# Patient Record
Sex: FEMALE | Race: OTHER | Employment: OTHER | URBAN - METROPOLITAN AREA
[De-identification: names, ages, dates, MRNs, and addresses within clinical notes are randomized per-mention and may not be internally consistent; named-entity substitution may affect disease eponyms.]

---

## 2017-03-03 ENCOUNTER — OFFICE VISIT (OUTPATIENT)
Dept: FAMILY MEDICINE CLINIC | Facility: CLINIC | Age: 62
End: 2017-03-03

## 2017-03-03 VITALS
DIASTOLIC BLOOD PRESSURE: 84 MMHG | RESPIRATION RATE: 18 BRPM | TEMPERATURE: 98 F | BODY MASS INDEX: 33 KG/M2 | HEART RATE: 98 BPM | WEIGHT: 174 LBS | OXYGEN SATURATION: 98 % | SYSTOLIC BLOOD PRESSURE: 122 MMHG

## 2017-03-03 DIAGNOSIS — M54.32 SCIATICA OF LEFT SIDE: Primary | ICD-10-CM

## 2017-03-03 PROCEDURE — 99213 OFFICE O/P EST LOW 20 MIN: CPT | Performed by: PHYSICIAN ASSISTANT

## 2017-03-03 RX ORDER — CYCLOBENZAPRINE HCL 10 MG
10 TABLET ORAL 3 TIMES DAILY PRN
Qty: 30 TABLET | Refills: 0 | Status: SHIPPED | OUTPATIENT
Start: 2017-03-03 | End: 2017-11-17 | Stop reason: ALTCHOICE

## 2017-03-03 RX ORDER — NAPROXEN 500 MG/1
500 TABLET ORAL 2 TIMES DAILY WITH MEALS
Qty: 30 TABLET | Refills: 0 | Status: SHIPPED | OUTPATIENT
Start: 2017-03-03 | End: 2017-03-18

## 2017-03-03 NOTE — PATIENT INSTRUCTIONS
Sciatica    Sciatica is a condition that causes pain in the lower back that spreads down into the buttock, hip, and leg. Sometimes the leg pain can happen without any back pain.  Sciatica happens when a spinal nerve is irritated or has pressure put on it · When in bed, try to find a position that is comfortable. A firm mattress is best. Try lying flat on your back with pillows under your knees. You can also try lying on your side with your knees bent up toward your chest and a pillow between your knees.   · © 5431-8118 [address], [address] [place]. All rights reserved. This information is not intended as a substitute for professional medical care. Always follow your healthcare professional's instructions.

## 2017-03-03 NOTE — PROGRESS NOTES
CHIEF COMPLAINT:     Patient presents with:  Spasms: left hip. HPI:   Irene Sotomayor is a 62year old female who presents with complaints of left hip and posterolateral thigh pain to the knee. Pain is sharp, shooting and intermittent.   Symptoms occ Social History:    Smoking Status: Never Smoker                      Smokeless Status: Never Used                        Alcohol Use: Yes           0.0 oz/week       0 Standard drinks or equivalent per week       Comment: rare       REVIEW OF SYSTEMS: Muscle spasms. naproxen 500 MG Oral Tab 30 tablet 0      Sig: Take 1 tablet (500 mg total) by mouth 2 (two) times daily with meals. Risks, benefits, side effects of medication addressed and explained.     There are no Patient Instructions on

## 2017-03-09 ENCOUNTER — OFFICE VISIT (OUTPATIENT)
Dept: FAMILY MEDICINE CLINIC | Facility: CLINIC | Age: 62
End: 2017-03-09

## 2017-03-09 ENCOUNTER — HOSPITAL ENCOUNTER (OUTPATIENT)
Dept: MAMMOGRAPHY | Age: 62
Discharge: HOME | End: 2017-03-09
Attending: PHYSICIAN ASSISTANT
Payer: COMMERCIAL

## 2017-03-09 ENCOUNTER — HOSPITAL ENCOUNTER (OUTPATIENT)
Dept: GENERAL RADIOLOGY | Age: 62
Discharge: HOME | End: 2017-03-09
Attending: PHYSICIAN ASSISTANT
Payer: COMMERCIAL

## 2017-03-09 VITALS
HEART RATE: 93 BPM | OXYGEN SATURATION: 99 % | TEMPERATURE: 98 F | BODY MASS INDEX: 32.47 KG/M2 | RESPIRATION RATE: 16 BRPM | DIASTOLIC BLOOD PRESSURE: 76 MMHG | HEIGHT: 61 IN | SYSTOLIC BLOOD PRESSURE: 120 MMHG | WEIGHT: 172 LBS

## 2017-03-09 DIAGNOSIS — Z12.39 SCREENING FOR BREAST CANCER: ICD-10-CM

## 2017-03-09 DIAGNOSIS — Z13.0 SCREENING FOR ENDOCRINE, METABOLIC AND IMMUNITY DISORDER: ICD-10-CM

## 2017-03-09 DIAGNOSIS — Z13.29 SCREENING FOR ENDOCRINE, METABOLIC AND IMMUNITY DISORDER: ICD-10-CM

## 2017-03-09 DIAGNOSIS — R80.9 TYPE 2 DIABETES MELLITUS WITH MICROALBUMINURIA, WITH LONG-TERM CURRENT USE OF INSULIN (HCC): ICD-10-CM

## 2017-03-09 DIAGNOSIS — E11.29 TYPE 2 DIABETES MELLITUS WITH MICROALBUMINURIA, WITH LONG-TERM CURRENT USE OF INSULIN (HCC): ICD-10-CM

## 2017-03-09 DIAGNOSIS — M54.42 LUMBAGO WITH SCIATICA, LEFT SIDE: ICD-10-CM

## 2017-03-09 DIAGNOSIS — M54.42 LUMBAGO WITH SCIATICA, LEFT SIDE: Primary | ICD-10-CM

## 2017-03-09 DIAGNOSIS — K42.9 PERIUMBILICAL HERNIA: ICD-10-CM

## 2017-03-09 DIAGNOSIS — Z12.11 SCREENING FOR COLON CANCER: ICD-10-CM

## 2017-03-09 DIAGNOSIS — Z79.4 TYPE 2 DIABETES MELLITUS WITH MICROALBUMINURIA, WITH LONG-TERM CURRENT USE OF INSULIN (HCC): ICD-10-CM

## 2017-03-09 DIAGNOSIS — Z13.228 SCREENING FOR ENDOCRINE, METABOLIC AND IMMUNITY DISORDER: ICD-10-CM

## 2017-03-09 PROCEDURE — 99214 OFFICE O/P EST MOD 30 MIN: CPT | Performed by: PHYSICIAN ASSISTANT

## 2017-03-09 RX ORDER — METHYLPREDNISOLONE 4 MG/1
TABLET ORAL
Qty: 1 KIT | Refills: 0 | Status: SHIPPED | OUTPATIENT
Start: 2017-03-09 | End: 2017-11-17 | Stop reason: ALTCHOICE

## 2017-03-09 NOTE — PROGRESS NOTES
CHIEF COMPLAINT:     Patient presents with: Follow - Up      HPI:   Ej Stanton is a 62year old female who is here to f/u after [facility] visit on 3/3/17 for sciatica. Patient was given naproxen and flexeril with relief until it wears off.  Pain is in her oz/week       0 Standard drinks or equivalent per week       Comment: rare       REVIEW OF SYSTEMS:   GENERAL: feels well otherwise  SKIN: No erythema or ecchymosis. GI: denies abdominal pain, N/V/C/D. : no dysuria, urgency or flank pain.   Ena Chowdhury methylPREDNISolone (MEDROL) 4 MG Oral Tablet Therapy Pack 1 kit 0      Sig: As directed.

## 2017-03-14 ENCOUNTER — HOSPITAL ENCOUNTER (OUTPATIENT)
Dept: GENERAL RADIOLOGY | Age: 62
Discharge: HOME | End: 2017-03-14
Attending: PHYSICIAN ASSISTANT
Payer: COMMERCIAL

## 2017-03-14 ENCOUNTER — HOSPITAL ENCOUNTER (OUTPATIENT)
Dept: MAMMOGRAPHY | Age: 62
Discharge: HOME | End: 2017-03-14
Attending: PHYSICIAN ASSISTANT
Payer: COMMERCIAL

## 2017-03-14 PROCEDURE — 72110 X-RAY EXAM L-2 SPINE 4/>VWS: CPT

## 2017-03-14 PROCEDURE — 77067 SCR MAMMO BI INCL CAD: CPT

## 2017-03-17 ENCOUNTER — TELEPHONE (OUTPATIENT)
Dept: FAMILY MEDICINE CLINIC | Facility: CLINIC | Age: 62
End: 2017-03-17

## 2017-03-17 NOTE — TELEPHONE ENCOUNTER
Discussed results with pt and MRI order. Pt would like to hold off on the MRI for now. Pt is planning on seeing [facility] [address] for a consult first early next week (her daughter works there). She will call back if she decides she wants the MRI.   She

## 2017-03-17 NOTE — TELEPHONE ENCOUNTER
Patient states this started with back pain 2-17-17 with pain so bad she can't walk at time. Completed the steroids, and currently taking the pain meds and muscle relaxants, and pain still very bad and runs down her left leg.   Would like to move on to the

## 2017-03-17 NOTE — TELEPHONE ENCOUNTER
Please go over xray results with the patient. I did review them on 3/14. Please order MRI lumbar spine for the patient. Diagnosis is DDD lumbar spine, lumbago with left sided sciatica.

## 2017-03-23 ENCOUNTER — TELEPHONE (OUTPATIENT)
Dept: FAMILY MEDICINE CLINIC | Facility: CLINIC | Age: 62
End: 2017-03-23

## 2017-03-23 DIAGNOSIS — M54.42 LUMBAGO WITH SCIATICA, LEFT SIDE: ICD-10-CM

## 2017-03-23 DIAGNOSIS — M51.36 DDD (DEGENERATIVE DISC DISEASE), LUMBAR: Primary | ICD-10-CM

## 2017-03-23 NOTE — TELEPHONE ENCOUNTER
See last TE. Patient refused lumbar spine MRI at that time. Please order MRI lumbar spine for the patient. Diagnosis is DDD lumbar spine, lumbago with left sided sciatica.

## 2017-05-31 RX ORDER — INSULIN DETEMIR 100 [IU]/ML
INJECTION, SOLUTION SUBCUTANEOUS
Qty: 90 ML | Refills: 1 | Status: SHIPPED | OUTPATIENT
Start: 2017-05-31 | End: 2018-07-24 | Stop reason: ALTCHOICE

## 2017-09-27 ENCOUNTER — TELEPHONE (OUTPATIENT)
Dept: FAMILY MEDICINE CLINIC | Facility: CLINIC | Age: 62
End: 2017-09-27

## 2017-09-27 NOTE — TELEPHONE ENCOUNTER
Patient is dropping the parking placard off to the office to have completed. She would like a call when it is complete and she will pick it back up. Putting form in the Triage folder for completion.

## 2017-10-06 NOTE — TELEPHONE ENCOUNTER
PSR: PLEASE LET PATIENT KNOW FORM WAS MAILED. THANK YOU! Parking placard form completed for patient. Copy sent to scanning. Form mailed to [place] of [id number] for patient.

## 2017-11-03 ENCOUNTER — TELEPHONE (OUTPATIENT)
Dept: FAMILY MEDICINE CLINIC | Facility: CLINIC | Age: 62
End: 2017-11-03

## 2017-11-17 ENCOUNTER — OFFICE VISIT (OUTPATIENT)
Dept: FAMILY MEDICINE CLINIC | Facility: CLINIC | Age: 62
End: 2017-11-17

## 2017-11-17 VITALS
RESPIRATION RATE: 16 BRPM | OXYGEN SATURATION: 98 % | DIASTOLIC BLOOD PRESSURE: 80 MMHG | BODY MASS INDEX: 32.8 KG/M2 | HEART RATE: 94 BPM | HEIGHT: 61.5 IN | SYSTOLIC BLOOD PRESSURE: 126 MMHG | TEMPERATURE: 98 F | WEIGHT: 176 LBS

## 2017-11-17 DIAGNOSIS — J20.9 ACUTE BRONCHITIS, UNSPECIFIED ORGANISM: ICD-10-CM

## 2017-11-17 DIAGNOSIS — J01.00 ACUTE NON-RECURRENT MAXILLARY SINUSITIS: Primary | ICD-10-CM

## 2017-11-17 PROCEDURE — 99214 OFFICE O/P EST MOD 30 MIN: CPT | Performed by: FAMILY MEDICINE

## 2017-11-17 RX ORDER — AZITHROMYCIN 250 MG/1
TABLET, FILM COATED ORAL
Qty: 6 TABLET | Refills: 0 | Status: SHIPPED | OUTPATIENT
Start: 2017-11-17 | End: 2017-12-18 | Stop reason: ALTCHOICE

## 2017-11-17 RX ORDER — CODEINE PHOSPHATE AND GUAIFENESIN 10; 100 MG/5ML; MG/5ML
5 SOLUTION ORAL 3 TIMES DAILY PRN
Qty: 120 ML | Refills: 0 | Status: SHIPPED
Start: 2017-11-17 | End: 2017-12-18 | Stop reason: ALTCHOICE

## 2017-11-17 NOTE — PROGRESS NOTES
Patient presents with:  Cough: Nasal drainage 1 week. HPI:   Pedro Luis Herrera is a 65year old female who presents for cough  for  7  days. Patient reports congestion, dry cough, cough is keeping pt up at night, wheezing. Cough started gradually, tight Father    • Diabetes Sister       Smoking status: Never Smoker                                                              Smokeless tobacco: Never Used                      Alcohol use: Yes           0.0 oz/week     Comment: rare        REVIEW OF SYSTEMS by mouth 3 (three) times daily as needed for cough. The patient indicates understanding of these issues and agrees to the plan. The patient is asked to return if sx's persist or worsen.

## 2017-11-21 ENCOUNTER — TELEPHONE (OUTPATIENT)
Dept: FAMILY MEDICINE CLINIC | Facility: CLINIC | Age: 62
End: 2017-11-21

## 2017-11-21 NOTE — TELEPHONE ENCOUNTER
Pt has been off of work since 11/17 when she saw Dr. Beatriz Fernandes. For her bronchitis and sinus has not been to work since would like a note stating she can return to work 11/24, will have spouse  note when ready.

## 2017-12-16 ENCOUNTER — APPOINTMENT (OUTPATIENT)
Dept: GENERAL RADIOLOGY | Facility: HOSPITAL | Age: 62
End: 2017-12-16
Attending: EMERGENCY MEDICINE
Payer: COMMERCIAL

## 2017-12-16 ENCOUNTER — HOSPITAL ENCOUNTER (EMERGENCY)
Facility: HOSPITAL | Age: 62
Discharge: HOME | End: 2017-12-17
Attending: EMERGENCY MEDICINE
Payer: COMMERCIAL

## 2017-12-16 ENCOUNTER — LABORATORY ENCOUNTER (OUTPATIENT)
Dept: LAB | Age: 62
End: 2017-12-16
Attending: FAMILY MEDICINE
Payer: COMMERCIAL

## 2017-12-16 VITALS
TEMPERATURE: 97 F | RESPIRATION RATE: 16 BRPM | SYSTOLIC BLOOD PRESSURE: 138 MMHG | BODY MASS INDEX: 33.04 KG/M2 | OXYGEN SATURATION: 99 % | HEART RATE: 85 BPM | DIASTOLIC BLOOD PRESSURE: 68 MMHG | WEIGHT: 175 LBS | HEIGHT: 61 IN

## 2017-12-16 DIAGNOSIS — Z13.29 SCREENING FOR ENDOCRINE, METABOLIC AND IMMUNITY DISORDER: ICD-10-CM

## 2017-12-16 DIAGNOSIS — E11.29 TYPE 2 DIABETES MELLITUS WITH MICROALBUMINURIA, WITH LONG-TERM CURRENT USE OF INSULIN (HCC): ICD-10-CM

## 2017-12-16 DIAGNOSIS — Z13.0 SCREENING FOR ENDOCRINE, METABOLIC AND IMMUNITY DISORDER: ICD-10-CM

## 2017-12-16 DIAGNOSIS — L97.529 ULCER OF LEFT FOOT, UNSPECIFIED ULCER STAGE (HCC): Primary | ICD-10-CM

## 2017-12-16 DIAGNOSIS — Z79.4 TYPE 2 DIABETES MELLITUS WITH MICROALBUMINURIA, WITH LONG-TERM CURRENT USE OF INSULIN (HCC): ICD-10-CM

## 2017-12-16 DIAGNOSIS — Z13.228 SCREENING FOR ENDOCRINE, METABOLIC AND IMMUNITY DISORDER: ICD-10-CM

## 2017-12-16 DIAGNOSIS — R80.9 TYPE 2 DIABETES MELLITUS WITH MICROALBUMINURIA, WITH LONG-TERM CURRENT USE OF INSULIN (HCC): ICD-10-CM

## 2017-12-16 PROCEDURE — 82570 ASSAY OF URINE CREATININE: CPT | Performed by: PHYSICIAN ASSISTANT

## 2017-12-16 PROCEDURE — 82306 VITAMIN D 25 HYDROXY: CPT | Performed by: PHYSICIAN ASSISTANT

## 2017-12-16 PROCEDURE — 87040 BLOOD CULTURE FOR BACTERIA: CPT | Performed by: EMERGENCY MEDICINE

## 2017-12-16 PROCEDURE — 87147 CULTURE TYPE IMMUNOLOGIC: CPT | Performed by: EMERGENCY MEDICINE

## 2017-12-16 PROCEDURE — 82043 UR ALBUMIN QUANTITATIVE: CPT | Performed by: PHYSICIAN ASSISTANT

## 2017-12-16 PROCEDURE — 87205 SMEAR GRAM STAIN: CPT | Performed by: EMERGENCY MEDICINE

## 2017-12-16 PROCEDURE — 87070 CULTURE OTHR SPECIMN AEROBIC: CPT | Performed by: EMERGENCY MEDICINE

## 2017-12-16 PROCEDURE — 85652 RBC SED RATE AUTOMATED: CPT | Performed by: EMERGENCY MEDICINE

## 2017-12-16 PROCEDURE — 36415 COLL VENOUS BLD VENIPUNCTURE: CPT | Performed by: PHYSICIAN ASSISTANT

## 2017-12-16 PROCEDURE — 36415 COLL VENOUS BLD VENIPUNCTURE: CPT

## 2017-12-16 PROCEDURE — 73630 X-RAY EXAM OF FOOT: CPT | Performed by: EMERGENCY MEDICINE

## 2017-12-16 PROCEDURE — 86140 C-REACTIVE PROTEIN: CPT | Performed by: EMERGENCY MEDICINE

## 2017-12-16 PROCEDURE — 96365 THER/PROPH/DIAG IV INF INIT: CPT

## 2017-12-16 PROCEDURE — 80050 GENERAL HEALTH PANEL: CPT | Performed by: PHYSICIAN ASSISTANT

## 2017-12-16 PROCEDURE — 83036 HEMOGLOBIN GLYCOSYLATED A1C: CPT | Performed by: PHYSICIAN ASSISTANT

## 2017-12-16 PROCEDURE — 99284 EMERGENCY DEPT VISIT MOD MDM: CPT

## 2017-12-16 PROCEDURE — 80061 LIPID PANEL: CPT | Performed by: PHYSICIAN ASSISTANT

## 2017-12-16 PROCEDURE — 85025 COMPLETE CBC W/AUTO DIFF WBC: CPT | Performed by: EMERGENCY MEDICINE

## 2017-12-17 RX ORDER — TRAMADOL HYDROCHLORIDE 50 MG/1
TABLET ORAL EVERY 4 HOURS PRN
Qty: 20 TABLET | Refills: 0 | Status: SHIPPED | OUTPATIENT
Start: 2017-12-17 | End: 2017-12-24

## 2017-12-17 RX ORDER — AMOXICILLIN AND CLAVULANATE POTASSIUM 875; 125 MG/1; MG/1
1 TABLET, FILM COATED ORAL 2 TIMES DAILY
Qty: 20 TABLET | Refills: 0 | Status: SHIPPED | OUTPATIENT
Start: 2017-12-17 | End: 2017-12-27

## 2017-12-17 NOTE — ED NOTES
Agree with triage note. Pt has diabetic ulcer to L bottom of foot. Pt reports pain has increased, pt sees a podiatrist, last saw them 1 month ago, has not contacted him yet.  Denies fevers or chills

## 2017-12-17 NOTE — ED PROVIDER NOTES
Patient Seen in: [facility] Emergency Department    History   Patient presents with:  Cellulitis (integumentary, infectious)    Stated Complaint: foot ulcer x one year    HPI    Diabetic has had an ulcer on the bottom of the left foot for about a ye tenderness around the wound. There is a watery drainage from the wound. No lymphangitis or lymphadenitis noted. Foot pulses intact.   Capillary refill normal.       ED Course     Labs Reviewed   BASIC METABOLIC PANEL (8) - Abnormal; Notable for the follo physician in 2 days.           Disposition and Plan     Clinical Impression:  Ulcer of left foot, unspecified ulcer stage ([facility] [address]  (primary encounter diagnosis)    Disposition:  Discharge  12/17/2017 12:22 am    Follow-up:  Libia Diamond MD  [facility]

## 2017-12-18 ENCOUNTER — OFFICE VISIT (OUTPATIENT)
Dept: FAMILY MEDICINE CLINIC | Facility: CLINIC | Age: 62
End: 2017-12-18

## 2017-12-18 VITALS
DIASTOLIC BLOOD PRESSURE: 76 MMHG | HEIGHT: 62 IN | RESPIRATION RATE: 15 BRPM | OXYGEN SATURATION: 98 % | TEMPERATURE: 98 F | HEART RATE: 89 BPM | BODY MASS INDEX: 32.57 KG/M2 | SYSTOLIC BLOOD PRESSURE: 140 MMHG | WEIGHT: 177 LBS

## 2017-12-18 DIAGNOSIS — E11.65 UNCONTROLLED TYPE 2 DIABETES MELLITUS WITH FOOT ULCER, WITH LONG-TERM CURRENT USE OF INSULIN (HCC): Primary | ICD-10-CM

## 2017-12-18 DIAGNOSIS — E11.59 HYPERTENSION ASSOCIATED WITH DIABETES (HCC): ICD-10-CM

## 2017-12-18 DIAGNOSIS — E78.2 HYPERLIPIDEMIA, MIXED: ICD-10-CM

## 2017-12-18 DIAGNOSIS — L98.492 INFECTED SKIN ULCER WITH FAT LAYER EXPOSED (HCC): ICD-10-CM

## 2017-12-18 DIAGNOSIS — E55.9 VITAMIN D DEFICIENCY: ICD-10-CM

## 2017-12-18 DIAGNOSIS — E11.621 UNCONTROLLED TYPE 2 DIABETES MELLITUS WITH FOOT ULCER, WITH LONG-TERM CURRENT USE OF INSULIN (HCC): Primary | ICD-10-CM

## 2017-12-18 DIAGNOSIS — E11.319 DIABETIC RETINOPATHY OF LEFT EYE ASSOCIATED WITH TYPE 2 DIABETES MELLITUS, MACULAR EDEMA PRESENCE UNSPECIFIED, UNSPECIFIED RETINOPATHY SEVERITY (HCC): ICD-10-CM

## 2017-12-18 DIAGNOSIS — L97.509 UNCONTROLLED TYPE 2 DIABETES MELLITUS WITH FOOT ULCER, WITH LONG-TERM CURRENT USE OF INSULIN (HCC): Primary | ICD-10-CM

## 2017-12-18 DIAGNOSIS — L08.9 INFECTED SKIN ULCER WITH FAT LAYER EXPOSED (HCC): ICD-10-CM

## 2017-12-18 DIAGNOSIS — I15.2 HYPERTENSION ASSOCIATED WITH DIABETES (HCC): ICD-10-CM

## 2017-12-18 DIAGNOSIS — Z79.4 UNCONTROLLED TYPE 2 DIABETES MELLITUS WITH FOOT ULCER, WITH LONG-TERM CURRENT USE OF INSULIN (HCC): Primary | ICD-10-CM

## 2017-12-18 PROBLEM — E11.3592 PROLIFERATIVE DIABETIC RETINOPATHY OF LEFT EYE ASSOCIATED WITH TYPE 2 DIABETES MELLITUS (HCC): Status: ACTIVE | Noted: 2017-12-18

## 2017-12-18 PROBLEM — E11.3592 PROLIFERATIVE DIABETIC RETINOPATHY OF LEFT EYE ASSOCIATED WITH TYPE 2 DIABETES MELLITUS (HCC): Status: RESOLVED | Noted: 2017-12-18 | Resolved: 2017-12-18

## 2017-12-18 PROCEDURE — 99214 OFFICE O/P EST MOD 30 MIN: CPT | Performed by: PHYSICIAN ASSISTANT

## 2017-12-18 RX ORDER — ERGOCALCIFEROL 1.25 MG/1
50000 CAPSULE ORAL WEEKLY
Qty: 12 CAPSULE | Refills: 0 | Status: SHIPPED | OUTPATIENT
Start: 2017-12-18 | End: 2018-03-18

## 2017-12-18 RX ORDER — ATORVASTATIN CALCIUM 10 MG/1
10 TABLET, FILM COATED ORAL NIGHTLY
Qty: 30 TABLET | Refills: 0 | Status: SHIPPED | OUTPATIENT
Start: 2017-12-18 | End: 2018-01-17

## 2017-12-18 RX ORDER — LISINOPRIL 10 MG/1
10 TABLET ORAL DAILY
Qty: 30 TABLET | Refills: 0 | Status: SHIPPED | OUTPATIENT
Start: 2017-12-18 | End: 2018-01-17 | Stop reason: WASHOUT

## 2017-12-18 NOTE — PROGRESS NOTES
HPI:   Liban Jaimes is a 65year old female who presents to follow up on recent ER visit on 12/16/17. She was seen after she developed drainage from a left foot diabetic ulcer. She had a normal CBC. CMP showed elevated blood glucose at 340.  Blood cultu 12/07/2015 277 (H)   ----------  HDL Cholesterol (mg/dL)   Date Value   12/16/2017 50   07/07/2016 39 (L)   12/07/2015 41 (L)   ----------  HDL CHOL (mg/dL)   Date Value   10/09/2013 44 (L)   ----------  LDL CHOLESTROL (mg/dL)   Date Value   10/09/2013 1 each Rfl: 0      No Known Allergies   Past Medical History:   Diagnosis Date   • AVM (arteriovenous malformation) brain     being followed at [facility] clinic   • Stroke [facility]) 4/18/15    s/p Brain surgery   • Stroke [facility]) 4/2015   • Type II or unspecified type capillary refill   NEURO: Sensation is intact to monofilament.       ASSESSMENT AND PLAN:       Uncontrolled type 2 diabetes mellitus with foot ulcer, with long-term current use of insulin (HCC)  -     HEMOGLOBIN A1C; Future  -     MICROALB/CREAT RATIO, RAN important to take medication twice daily          - Follow up with podiatry scheduled for 12/20/17 at 11 am Dr Amisha Huynh          - Monitor for increased redness/swelling/discharge/fever         - Follow up in office in 4 days

## 2018-01-05 ENCOUNTER — TELEPHONE (OUTPATIENT)
Dept: FAMILY MEDICINE CLINIC | Facility: CLINIC | Age: 63
End: 2018-01-05

## 2018-01-05 DIAGNOSIS — E11.319 DIABETIC RETINOPATHY OF LEFT EYE ASSOCIATED WITH TYPE 2 DIABETES MELLITUS, MACULAR EDEMA PRESENCE UNSPECIFIED, UNSPECIFIED RETINOPATHY SEVERITY (HCC): Primary | ICD-10-CM

## 2018-01-05 NOTE — TELEPHONE ENCOUNTER
----- Message from Nelly Stephens MD sent at 1/4/2018  4:13 PM CST -----  Ok to do referral to Dr. Smooth Shields, tell pt its time to see him for diabetic eye exam, thanks.

## 2018-01-09 NOTE — TELEPHONE ENCOUNTER
Called patient's home and mobile number. Home number not working. Mobile number has VM not set up. Will ady for follow up. Referral placed.

## 2018-01-16 NOTE — TELEPHONE ENCOUNTER
Attempted to contact pt, ceell phone, no answer & VM not set up yet. Home # is not a working #. Will ady for follow up.

## 2018-01-27 ENCOUNTER — TELEPHONE (OUTPATIENT)
Dept: FAMILY MEDICINE CLINIC | Facility: CLINIC | Age: 63
End: 2018-01-27

## 2018-01-27 DIAGNOSIS — IMO0001 UNCONTROLLED TYPE 2 DIABETES MELLITUS WITHOUT COMPLICATION, WITH LONG-TERM CURRENT USE OF INSULIN: Primary | ICD-10-CM

## 2018-01-31 ENCOUNTER — PATIENT OUTREACH (OUTPATIENT)
Dept: FAMILY MEDICINE CLINIC | Facility: CLINIC | Age: 63
End: 2018-01-31

## 2018-02-14 ENCOUNTER — TELEPHONE (OUTPATIENT)
Dept: FAMILY MEDICINE CLINIC | Facility: CLINIC | Age: 63
End: 2018-02-14

## 2018-02-14 ENCOUNTER — OFFICE VISIT (OUTPATIENT)
Dept: FAMILY MEDICINE CLINIC | Facility: CLINIC | Age: 63
End: 2018-02-14

## 2018-02-14 ENCOUNTER — HOSPITAL ENCOUNTER (OUTPATIENT)
Dept: GENERAL RADIOLOGY | Age: 63
Discharge: HOME | End: 2018-02-14
Attending: FAMILY MEDICINE
Payer: COMMERCIAL

## 2018-02-14 VITALS
RESPIRATION RATE: 16 BRPM | BODY MASS INDEX: 31.65 KG/M2 | HEIGHT: 62 IN | SYSTOLIC BLOOD PRESSURE: 126 MMHG | OXYGEN SATURATION: 97 % | WEIGHT: 172 LBS | DIASTOLIC BLOOD PRESSURE: 84 MMHG | HEART RATE: 90 BPM | TEMPERATURE: 98 F

## 2018-02-14 DIAGNOSIS — R05.9 COUGH: ICD-10-CM

## 2018-02-14 DIAGNOSIS — J11.1 FLU: Primary | ICD-10-CM

## 2018-02-14 DIAGNOSIS — IMO0002: ICD-10-CM

## 2018-02-14 DIAGNOSIS — J11.1 FLU: ICD-10-CM

## 2018-02-14 PROCEDURE — 71046 X-RAY EXAM CHEST 2 VIEWS: CPT | Performed by: FAMILY MEDICINE

## 2018-02-14 PROCEDURE — 99214 OFFICE O/P EST MOD 30 MIN: CPT | Performed by: FAMILY MEDICINE

## 2018-02-14 RX ORDER — OSELTAMIVIR PHOSPHATE 75 MG/1
75 CAPSULE ORAL 2 TIMES DAILY
Qty: 10 CAPSULE | Refills: 0 | Status: SHIPPED | OUTPATIENT
Start: 2018-02-14 | End: 2018-07-24 | Stop reason: ALTCHOICE

## 2018-02-14 RX ORDER — CODEINE PHOSPHATE AND GUAIFENESIN 10; 100 MG/5ML; MG/5ML
5 SOLUTION ORAL 3 TIMES DAILY PRN
Qty: 120 ML | Refills: 0 | Status: SHIPPED
Start: 2018-02-14 | End: 2018-07-24 | Stop reason: ALTCHOICE

## 2018-02-14 NOTE — PROGRESS NOTES
Patient presents with:  Cough: x 4 days, has had chills, loose stools, watery eyes, bad cough last night, felt short of breath, Runny nose, H/A      HPI:   Rosaura Anne is a 65year old female who presents for upper respiratory symptoms for  2  weeks. Comment: мария   [facility]  No date:    Family History   Problem Relation Age of Onset   • Diabetes Mother    • Hypertension Mother    • Hypertension Father    • Heart Disorder Brother      CAD stent in his 45s   • Diabetes Brother    • Diabetes S patient indicates understanding of these issues and agrees to the plan. The patient is asked to return in 3-4 days if sx's persist or worsen. Otherwise f/u in one month.

## 2018-02-14 NOTE — TELEPHONE ENCOUNTER
No pneumonia on xray, has some problems with vertebrae, I will order blood work and Dexa scan she needs to do it its like compression deformity. I will send Tamiflu.

## 2018-02-14 NOTE — TELEPHONE ENCOUNTER
I called pt at [phone] and verified 2 patient identifiers: name & . I discussed results and recommendations at length with patient. Pt wants to know if she can have an RX of cough syrup with codeine like before to help with the cough.   Previ

## 2018-02-16 ENCOUNTER — TELEPHONE (OUTPATIENT)
Dept: FAMILY MEDICINE CLINIC | Facility: CLINIC | Age: 63
End: 2018-02-16

## 2018-02-16 RX ORDER — METHYLPREDNISOLONE 4 MG/1
TABLET ORAL
Qty: 1 KIT | Refills: 0 | Status: SHIPPED | OUTPATIENT
Start: 2018-02-16 | End: 2018-07-24 | Stop reason: ALTCHOICE

## 2018-02-16 RX ORDER — AZITHROMYCIN 250 MG/1
TABLET, FILM COATED ORAL
Qty: 6 TABLET | Refills: 0 | Status: SHIPPED | OUTPATIENT
Start: 2018-02-16 | End: 2018-07-24 | Stop reason: ALTCHOICE

## 2018-02-16 NOTE — TELEPHONE ENCOUNTER
Please read below message. Pt LOV 2/14 for flu. CXR showed normal lungs. Rx for Tamiflu, & Cheratussin given. Please advise on any orders for pt.

## 2018-02-16 NOTE — TELEPHONE ENCOUNTER
Pt was seen for flu and feels better but the only thing that has not gotten better is her cough and has not gone back to work wants to know what more she can do for the cough.  Please call pt to advise

## 2018-02-16 NOTE — TELEPHONE ENCOUNTER
Attempted to contact pt, cell VM, mailbox is not set up, home # pt is not accepting calls so I was unable to leave message. Rx sent. Will try again later to contact pt.

## 2018-02-20 ENCOUNTER — TELEPHONE (OUTPATIENT)
Dept: FAMILY MEDICINE CLINIC | Facility: CLINIC | Age: 63
End: 2018-02-20

## 2018-02-20 DIAGNOSIS — R05.9 COUGH: Primary | ICD-10-CM

## 2018-02-20 RX ORDER — BENZONATATE 200 MG/1
200 CAPSULE ORAL 3 TIMES DAILY PRN
Qty: 30 CAPSULE | Refills: 0 | Status: SHIPPED | OUTPATIENT
Start: 2018-02-20 | End: 2018-07-24 | Stop reason: ALTCHOICE

## 2018-02-20 NOTE — TELEPHONE ENCOUNTER
Pt wants to know when she should return back to work based on her symptoms. She would like to talk to a nurse.

## 2018-02-20 NOTE — TELEPHONE ENCOUNTER
Pt states she is just starting to feel a little better since starting the Zpak & Medrol lyudmila on 2/16 but her cough is still bad. Cheratussin helps at night.  She still can't go back to work because she talks on the phone all day & coughs whenever she tries

## 2018-02-20 NOTE — TELEPHONE ENCOUNTER
Patient should complete medrol dose pack and Zpack. She can take tessalon perles up to 3 times daily as needed for cough. Orders placed. She is to return if symptoms fail to improve or worsen after completing medrol dose pack and Zpack.

## 2018-02-22 ENCOUNTER — PATIENT OUTREACH (OUTPATIENT)
Dept: FAMILY MEDICINE CLINIC | Facility: CLINIC | Age: 63
End: 2018-02-22

## 2018-02-22 ENCOUNTER — TELEPHONE (OUTPATIENT)
Dept: FAMILY MEDICINE CLINIC | Facility: CLINIC | Age: 63
End: 2018-02-22

## 2018-02-22 NOTE — TELEPHONE ENCOUNTER
Patient is needing a note so she can return to work on 2/26/18. Patient was off work for the flu & sore throat w/ cough.  Patient was off from 2/12/18 - 2/25/18

## 2018-02-22 NOTE — TELEPHONE ENCOUNTER
Spoke with pt, letter sent through [employer]). Pt states if she has trouble logging into [employer]), she may call 2/23/18 requesting copy be placed at .

## 2018-02-22 NOTE — TELEPHONE ENCOUNTER
Please see below message. Patient is requesting a return to work note. LOV: 2/14/18  Please advise. Thank you!

## 2018-05-23 ENCOUNTER — TELEPHONE (OUTPATIENT)
Dept: FAMILY MEDICINE CLINIC | Facility: CLINIC | Age: 63
End: 2018-05-23

## 2018-06-28 ENCOUNTER — MED REC SCAN ONLY (OUTPATIENT)
Dept: FAMILY MEDICINE CLINIC | Facility: CLINIC | Age: 63
End: 2018-06-28

## 2018-07-24 RX ORDER — HYDROCODONE BITARTRATE AND ACETAMINOPHEN 10; 325 MG/1; MG/1
1 TABLET ORAL
Refills: 0 | COMMUNITY
Start: 2018-07-19 | End: 2018-12-05 | Stop reason: ALTCHOICE

## 2018-07-24 RX ORDER — ENOXAPARIN SODIUM 100 MG/ML
40 INJECTION SUBCUTANEOUS NIGHTLY
COMMUNITY
Start: 2018-07-05 | End: 2018-12-05 | Stop reason: ALTCHOICE

## 2018-07-24 RX ORDER — INSULIN ASPART 100 [IU]/ML
INJECTION, SUSPENSION SUBCUTANEOUS
Refills: 0 | COMMUNITY
Start: 2018-07-18 | End: 2018-08-22 | Stop reason: ALTCHOICE

## 2018-07-24 RX ORDER — ASPIRIN 325 MG
TABLET, DELAYED RELEASE (ENTERIC COATED) ORAL
Refills: 0 | COMMUNITY
Start: 2018-07-18 | End: 2018-08-20

## 2018-07-24 RX ORDER — INSULIN DETEMIR 100 [IU]/ML
INJECTION, SOLUTION SUBCUTANEOUS
Qty: 90 ML | Refills: 1 | OUTPATIENT
Start: 2018-07-24

## 2018-07-24 RX ORDER — DOCUSATE SODIUM 100 MG/1
100 CAPSULE, LIQUID FILLED ORAL
COMMUNITY
Start: 2018-07-06 | End: 2020-02-03 | Stop reason: ALTCHOICE

## 2018-07-24 NOTE — TELEPHONE ENCOUNTER
CHELSEA:     Received request for pt's Levimir refill. Called and spoke with pt who states she is no longer taking levemir and is now taking novolog. She is now taking Metformin 1000 BID.  Pt had surgery on her leg, after a work injury, on 7/3/18 at [facility] in [place]

## 2018-08-20 ENCOUNTER — TELEPHONE (OUTPATIENT)
Dept: FAMILY MEDICINE CLINIC | Facility: CLINIC | Age: 63
End: 2018-08-20

## 2018-08-20 RX ORDER — ASPIRIN 325 MG
325 TABLET, DELAYED RELEASE (ENTERIC COATED) ORAL DAILY
Qty: 30 TABLET | Refills: 0 | Status: SHIPPED | OUTPATIENT
Start: 2018-08-20 | End: 2020-01-02

## 2018-08-20 NOTE — TELEPHONE ENCOUNTER
MetFORMIN HCl 1000 MG Oral Tab   Sig :  Take 1 tablet by mouth 2 (two) times daily. aspirin  MG Oral Tab EC   Sig :  Replacing lovenox inj starting 7/30/18        The pharmacy said they sent these to us electronically. Needs refills.  [facility] in [place]

## 2018-08-22 ENCOUNTER — TELEPHONE (OUTPATIENT)
Dept: FAMILY MEDICINE CLINIC | Facility: CLINIC | Age: 63
End: 2018-08-22

## 2018-08-22 RX ORDER — INSULIN DETEMIR 100 [IU]/ML
INJECTION, SOLUTION SUBCUTANEOUS
Qty: 90 ML | Refills: 1 | COMMUNITY
Start: 2018-08-22 | End: 2018-10-10

## 2018-08-22 NOTE — TELEPHONE ENCOUNTER
Pt called for two medication reasons:    1) Metformin was approved on 08/20 for one month only due to need for OV. However, Pt is currently still on non-weight bearing status per her orthopedist following Agueda injury.  As a result, leaving house is extremel

## 2018-08-22 NOTE — TELEPHONE ENCOUNTER
Pt was hospitalized in June for Ortho surgery & her Levemir was changed to below Novolog 70/30 30 units w/breakfast, 20 units with dinner , she is asking if she can return to taking her Levemir  30 UNITS SUBCUTANEOUSLY EVERY MORNING & 35 UNITS AT BEDTIME?

## 2018-09-07 ENCOUNTER — TELEPHONE (OUTPATIENT)
Dept: FAMILY MEDICINE CLINIC | Facility: CLINIC | Age: 63
End: 2018-09-07

## 2018-12-04 ENCOUNTER — NURSE ONLY (OUTPATIENT)
Dept: FAMILY MEDICINE CLINIC | Facility: CLINIC | Age: 63
End: 2018-12-04
Payer: COMMERCIAL

## 2018-12-04 VITALS
SYSTOLIC BLOOD PRESSURE: 120 MMHG | DIASTOLIC BLOOD PRESSURE: 80 MMHG | HEIGHT: 62 IN | OXYGEN SATURATION: 97 % | BODY MASS INDEX: 32.14 KG/M2 | HEART RATE: 93 BPM | RESPIRATION RATE: 16 BRPM | WEIGHT: 174.63 LBS | TEMPERATURE: 98 F

## 2018-12-04 DIAGNOSIS — J40 BRONCHITIS: ICD-10-CM

## 2018-12-04 DIAGNOSIS — E11.65 UNCONTROLLED TYPE 2 DIABETES MELLITUS WITH HYPERGLYCEMIA (HCC): ICD-10-CM

## 2018-12-04 DIAGNOSIS — R73.9 BLOOD GLUCOSE ELEVATED: Primary | ICD-10-CM

## 2018-12-04 PROCEDURE — 99213 OFFICE O/P EST LOW 20 MIN: CPT | Performed by: PHYSICIAN ASSISTANT

## 2018-12-04 PROCEDURE — 82962 GLUCOSE BLOOD TEST: CPT | Performed by: PHYSICIAN ASSISTANT

## 2018-12-05 ENCOUNTER — OFFICE VISIT (OUTPATIENT)
Dept: FAMILY MEDICINE CLINIC | Facility: CLINIC | Age: 63
End: 2018-12-05
Payer: COMMERCIAL

## 2018-12-05 VITALS
BODY MASS INDEX: 32.02 KG/M2 | OXYGEN SATURATION: 98 % | SYSTOLIC BLOOD PRESSURE: 118 MMHG | TEMPERATURE: 98 F | WEIGHT: 174 LBS | HEIGHT: 62 IN | HEART RATE: 64 BPM | DIASTOLIC BLOOD PRESSURE: 80 MMHG | RESPIRATION RATE: 16 BRPM

## 2018-12-05 DIAGNOSIS — J01.10 ACUTE NON-RECURRENT FRONTAL SINUSITIS: ICD-10-CM

## 2018-12-05 DIAGNOSIS — Z13.6 ENCOUNTER FOR LIPID SCREENING FOR CARDIOVASCULAR DISEASE: ICD-10-CM

## 2018-12-05 DIAGNOSIS — E55.9 VITAMIN D DEFICIENCY: ICD-10-CM

## 2018-12-05 DIAGNOSIS — E11.65 UNCONTROLLED TYPE 2 DIABETES MELLITUS WITH HYPERGLYCEMIA (HCC): Primary | ICD-10-CM

## 2018-12-05 DIAGNOSIS — Z13.220 ENCOUNTER FOR LIPID SCREENING FOR CARDIOVASCULAR DISEASE: ICD-10-CM

## 2018-12-05 PROCEDURE — 99214 OFFICE O/P EST MOD 30 MIN: CPT | Performed by: NURSE PRACTITIONER

## 2018-12-05 RX ORDER — AZITHROMYCIN 250 MG/1
TABLET, FILM COATED ORAL
Qty: 6 TABLET | Refills: 0 | Status: SHIPPED | OUTPATIENT
Start: 2018-12-05 | End: 2019-09-13 | Stop reason: ALTCHOICE

## 2018-12-05 RX ORDER — INSULIN DETEMIR 100 [IU]/ML
INJECTION, SOLUTION SUBCUTANEOUS
Qty: 90 ML | Refills: 1 | Status: SHIPPED | OUTPATIENT
Start: 2018-12-05 | End: 2019-09-13

## 2018-12-05 RX ORDER — FLUTICASONE PROPIONATE 50 MCG
2 SPRAY, SUSPENSION (ML) NASAL DAILY
Qty: 3 BOTTLE | Refills: 3 | Status: SHIPPED | OUTPATIENT
Start: 2018-12-05 | End: 2019-11-30

## 2018-12-05 NOTE — PATIENT INSTRUCTIONS
Diabetes with High Blood Sugar  You have been treated for high blood sugar (hyperglycemia). This may be because of an infection or other illness, eating too many sweets or starches, or not taking enough insulin.   Home care  Monitor and write down your bl instructions.

## 2018-12-05 NOTE — PROGRESS NOTES
CHIEF COMPLAINT:   Patient presents with:  Cough: x 2 weeks      HPI:   Eric Colon is a 59year old female who presents for cough for  2 weeks.   Patient reports cough-productive, scratchy throat, PND, nasal congestion, minimal sinus pressure, HA, ear s/p Brain surgery   • Stroke [facility]) 4/2015   • Type II or unspecified type diabetes mellitus without mention of complication, not stated as uncontrolled    • Vitamin D deficiency       Past Surgical History:   Procedure Laterality Date   • BRAIN SURGERY  4/ lymphadenopathy.     Recent Results (from the past 24 hour(s))   GLUCOSE BLOOD TEST    Collection Time: 12/04/18  6:42 PM   Result Value Ref Range    GLUCOSE 306     Test Strip Lot # [id number] Numeric    Test Strip Expiration Date 02/28/19 Date       ASSESSME

## 2018-12-05 NOTE — PROGRESS NOTES
HPI:   Libertad Richard is a 59year old female who presents for recheck of her diabetes.    Patient’s fasting blood sugars have been high since she ran out insulin  around 11/22/18 Reports 200   Last visit with ophthalmologist was 2 y ago   Pt  been checki Rfl: 0   aspirin  MG Oral Tab EC Take 1 tablet (325 mg total) by mouth daily.  Replacing lovenox inj starting 7/30/18 Disp: 30 tablet Rfl: 0   Glucose Blood In Vitro Strip Test daily as needed Disp: 100 each Rfl: 11   Calcium Carb-Cholecalciferol (MEREDITH numbness/tingling     EXAM:   /80   Pulse 64   Temp 98 °F (36.7 °C) (Oral)   Resp 16   Ht 62\"   Wt 174 lb   SpO2 98%   BMI 31.83 kg/m²   GENERAL: well developed, well nourished, in no apparent distress  SKIN: no rashes, no suspicious lesions  HEENT: VITAMIN D, 25-HYDROXY; Future    Encounter for lipid screening for cardiovascular disease  -     TSH W REFLEX TO FREE T4; Future    Acute non-recurrent frontal sinusitis  -     azithromycin (ZITHROMAX Z-SHILPA) 250 MG Oral Tab;  Take two tablets by mouth toda

## 2018-12-10 ENCOUNTER — TELEPHONE (OUTPATIENT)
Dept: FAMILY MEDICINE CLINIC | Facility: CLINIC | Age: 63
End: 2018-12-10

## 2018-12-10 DIAGNOSIS — E11.65 TYPE 2 DIABETES MELLITUS WITH HYPERGLYCEMIA, WITH LONG-TERM CURRENT USE OF INSULIN (HCC): Primary | ICD-10-CM

## 2018-12-10 DIAGNOSIS — Z79.4 TYPE 2 DIABETES MELLITUS WITH HYPERGLYCEMIA, WITH LONG-TERM CURRENT USE OF INSULIN (HCC): Primary | ICD-10-CM

## 2018-12-10 NOTE — TELEPHONE ENCOUNTER
Pharmacy [facility] calling to advise that pen needles are needed for levemir not syringes, please f/u with pharmacy

## 2018-12-10 NOTE — TELEPHONE ENCOUNTER
[facility] called. Patient was given Levemir pen but syringes were ordered not pen needles. Please approve or deny pending pen needles for patient. Thank you!

## 2018-12-20 ENCOUNTER — MED REC SCAN ONLY (OUTPATIENT)
Dept: FAMILY MEDICINE CLINIC | Facility: CLINIC | Age: 63
End: 2018-12-20

## 2019-05-02 DIAGNOSIS — E11.65 UNCONTROLLED TYPE 2 DIABETES MELLITUS WITH HYPERGLYCEMIA (HCC): ICD-10-CM

## 2019-05-02 NOTE — TELEPHONE ENCOUNTER
.  Medication(s) to Refill:   Requested Prescriptions     Pending Prescriptions Disp Refills   • metFORMIN HCl 1000 MG Oral Tab 180 tablet 0     Sig: Take 1 tablet (1,000 mg total) by mouth 2 (two) times daily.          Reason for Medication Refill being se

## 2019-09-06 ENCOUNTER — TELEPHONE (OUTPATIENT)
Dept: FAMILY MEDICINE CLINIC | Facility: CLINIC | Age: 64
End: 2019-09-06

## 2019-09-09 NOTE — TELEPHONE ENCOUNTER
Message left for spouse to call office back per [employer]   Fax received from pt's work for Dr. Rocio John to completed FMLA for him to take care of pt. Pt will need HFU OV. If pt had surgery recently, Surgeon should complete paperwork for him.

## 2019-09-13 ENCOUNTER — OFFICE VISIT (OUTPATIENT)
Dept: FAMILY MEDICINE CLINIC | Facility: CLINIC | Age: 64
End: 2019-09-13
Payer: COMMERCIAL

## 2019-09-13 VITALS
DIASTOLIC BLOOD PRESSURE: 78 MMHG | SYSTOLIC BLOOD PRESSURE: 120 MMHG | OXYGEN SATURATION: 100 % | HEIGHT: 62 IN | TEMPERATURE: 99 F | RESPIRATION RATE: 20 BRPM | BODY MASS INDEX: 30.18 KG/M2 | HEART RATE: 66 BPM | WEIGHT: 164 LBS

## 2019-09-13 DIAGNOSIS — Z23 NEED FOR VACCINATION: ICD-10-CM

## 2019-09-13 DIAGNOSIS — T81.42XA INFECTION FOLLOWING A PROCEDURE, DEEP INCISIONAL SURGICAL SITE, INITIAL ENCOUNTER: Primary | ICD-10-CM

## 2019-09-13 DIAGNOSIS — E11.65 UNCONTROLLED TYPE 2 DIABETES MELLITUS WITH HYPERGLYCEMIA (HCC): ICD-10-CM

## 2019-09-13 PROCEDURE — 87077 CULTURE AEROBIC IDENTIFY: CPT | Performed by: FAMILY MEDICINE

## 2019-09-13 PROCEDURE — 87205 SMEAR GRAM STAIN: CPT | Performed by: FAMILY MEDICINE

## 2019-09-13 PROCEDURE — 87070 CULTURE OTHR SPECIMN AEROBIC: CPT | Performed by: FAMILY MEDICINE

## 2019-09-13 PROCEDURE — 99215 OFFICE O/P EST HI 40 MIN: CPT | Performed by: FAMILY MEDICINE

## 2019-09-13 PROCEDURE — 90471 IMMUNIZATION ADMIN: CPT | Performed by: FAMILY MEDICINE

## 2019-09-13 PROCEDURE — 90686 IIV4 VACC NO PRSV 0.5 ML IM: CPT | Performed by: FAMILY MEDICINE

## 2019-09-13 PROCEDURE — 1111F DSCHRG MED/CURRENT MED MERGE: CPT | Performed by: FAMILY MEDICINE

## 2019-09-13 PROCEDURE — 87186 SC STD MICRODIL/AGAR DIL: CPT | Performed by: FAMILY MEDICINE

## 2019-09-13 RX ORDER — LEVOFLOXACIN 500 MG/1
500 TABLET, FILM COATED ORAL DAILY
Qty: 7 TABLET | Refills: 0 | Status: SHIPPED | OUTPATIENT
Start: 2019-09-13 | End: 2019-09-24 | Stop reason: ALTCHOICE

## 2019-09-13 RX ORDER — INSULIN DETEMIR 100 [IU]/ML
INJECTION, SOLUTION SUBCUTANEOUS
Qty: 90 ML | Refills: 0 | Status: SHIPPED | OUTPATIENT
Start: 2019-09-13 | End: 2020-02-03

## 2019-09-13 RX ORDER — LEVOFLOXACIN 500 MG/1
500 TABLET, FILM COATED ORAL DAILY
Qty: 7 TABLET | Refills: 0 | Status: SHIPPED
Start: 2019-09-13 | End: 2019-09-17

## 2019-09-13 NOTE — PROGRESS NOTES
CC: DM, wound. HPI:  Pt has postsurgical wound problem, got gallbladder removal and umbilical hernia last week, pt has erythema and pus coming from umbilical area. No fever, no abdominal pain.   Justice Alberts is a 59year old female who presents for rec 1   aspirin  MG Oral Tab EC Take 1 tablet (325 mg total) by mouth daily.  Replacing lovenox inj starting 7/30/18 Disp: 30 tablet Rfl: 0   Glucose Blood In Vitro Strip Test daily as needed Disp: 100 each Rfl: 11   Calcium Carb-Cholecalciferol (CALCIUM- and EOMI. No carotid bruits. No thyromegaly or masses. Heart: Regular rate and rhythm. S1, S2 no murmurs. Lungs: Clear to auscultation bilaterally, with no wheeze, rhonchi, or rales.     Abdomen: erythema of the umbilical incision, pus, abdominal is sof recommended. 45 min counsleing about diabetes, skin care. The patient indicates understanding of these issues and agrees to the plan. The patient is asked to return in 3 days, if not better in 48h will go to ER. Pt understands the directives.

## 2019-09-16 ENCOUNTER — TELEPHONE (OUTPATIENT)
Dept: FAMILY MEDICINE CLINIC | Facility: CLINIC | Age: 64
End: 2019-09-16

## 2019-09-16 NOTE — TELEPHONE ENCOUNTER
----- Message from Bekah Ramesh MD sent at 9/16/2019 11:26 AM CDT -----  Pt has infection, on antibiotic, ask pt how is she doing, tomorrow she has appt with surgeon.

## 2019-09-16 NOTE — TELEPHONE ENCOUNTER
Pt notified of lab results & below orders. Continue Levaquin, keep appt with Dr. Adenike Gilmore for tomorrow. Results routed to him. All questions answered, pt expresses understanding.

## 2019-09-20 ENCOUNTER — MED REC SCAN ONLY (OUTPATIENT)
Dept: FAMILY MEDICINE CLINIC | Facility: CLINIC | Age: 64
End: 2019-09-20

## 2019-09-20 ENCOUNTER — TELEPHONE (OUTPATIENT)
Dept: ENDOCRINOLOGY CLINIC | Facility: CLINIC | Age: 64
End: 2019-09-20

## 2019-09-20 DIAGNOSIS — E11.65 UNCONTROLLED TYPE 2 DIABETES MELLITUS WITH HYPERGLYCEMIA (HCC): Primary | ICD-10-CM

## 2019-09-20 NOTE — TELEPHONE ENCOUNTER
Pt. Contacted regarding referral for Diabetes Education; she was agreeable w/plan & appt. Scheduled for next week. Pt. also requested refill on testing supplies. E-scribed per pt.  Request.

## 2019-11-12 ENCOUNTER — TELEPHONE (OUTPATIENT)
Dept: FAMILY MEDICINE CLINIC | Facility: CLINIC | Age: 64
End: 2019-11-12

## 2019-11-12 DIAGNOSIS — E55.9 VITAMIN D DEFICIENCY: ICD-10-CM

## 2019-11-12 DIAGNOSIS — E11.319 DIABETIC RETINOPATHY OF LEFT EYE ASSOCIATED WITH TYPE 2 DIABETES MELLITUS, MACULAR EDEMA PRESENCE UNSPECIFIED, UNSPECIFIED RETINOPATHY SEVERITY (HCC): Primary | ICD-10-CM

## 2019-11-12 DIAGNOSIS — E11.65 UNCONTROLLED TYPE 2 DIABETES MELLITUS WITH HYPERGLYCEMIA (HCC): ICD-10-CM

## 2019-11-12 DIAGNOSIS — E78.2 HYPERLIPIDEMIA, MIXED: ICD-10-CM

## 2019-11-12 NOTE — TELEPHONE ENCOUNTER
----- Message from Curtis Bearden MD sent at 11/12/2019 12:52 PM CST -----  Please reorder all the labs and make sure she does it. Marivanna Ayush call her to see you. Labs all ordered and lmom for pt asking her to please get these done. Very important.   Fa

## 2019-11-25 ENCOUNTER — TELEPHONE (OUTPATIENT)
Dept: ENDOCRINOLOGY CLINIC | Facility: CLINIC | Age: 64
End: 2019-11-25

## 2019-11-25 NOTE — TELEPHONE ENCOUNTER
ANATM that she needs to return call to reschedule diabetes education appt. & also needs to complete labs ordered by her PCP. Hrs & no provided.

## 2019-11-26 ENCOUNTER — LAB ENCOUNTER (OUTPATIENT)
Dept: LAB | Age: 64
End: 2019-11-26
Attending: FAMILY MEDICINE
Payer: COMMERCIAL

## 2019-11-26 DIAGNOSIS — Z13.6 ENCOUNTER FOR LIPID SCREENING FOR CARDIOVASCULAR DISEASE: ICD-10-CM

## 2019-11-26 DIAGNOSIS — Z13.220 ENCOUNTER FOR LIPID SCREENING FOR CARDIOVASCULAR DISEASE: ICD-10-CM

## 2019-11-26 DIAGNOSIS — E78.2 HYPERLIPIDEMIA, MIXED: ICD-10-CM

## 2019-11-26 DIAGNOSIS — E55.9 VITAMIN D DEFICIENCY: ICD-10-CM

## 2019-11-26 DIAGNOSIS — E11.65 UNCONTROLLED TYPE 2 DIABETES MELLITUS WITH HYPERGLYCEMIA (HCC): ICD-10-CM

## 2019-11-26 DIAGNOSIS — E11.319 DIABETIC RETINOPATHY OF LEFT EYE ASSOCIATED WITH TYPE 2 DIABETES MELLITUS, MACULAR EDEMA PRESENCE UNSPECIFIED, UNSPECIFIED RETINOPATHY SEVERITY (HCC): ICD-10-CM

## 2019-11-26 PROCEDURE — 83036 HEMOGLOBIN GLYCOSYLATED A1C: CPT | Performed by: NURSE PRACTITIONER

## 2019-11-26 PROCEDURE — 80050 GENERAL HEALTH PANEL: CPT | Performed by: NURSE PRACTITIONER

## 2019-11-26 PROCEDURE — 36415 COLL VENOUS BLD VENIPUNCTURE: CPT | Performed by: NURSE PRACTITIONER

## 2019-11-26 PROCEDURE — 82306 VITAMIN D 25 HYDROXY: CPT | Performed by: NURSE PRACTITIONER

## 2019-11-26 PROCEDURE — 80061 LIPID PANEL: CPT | Performed by: NURSE PRACTITIONER

## 2019-11-27 ENCOUNTER — TELEPHONE (OUTPATIENT)
Dept: FAMILY MEDICINE CLINIC | Facility: CLINIC | Age: 64
End: 2019-11-27

## 2019-11-27 NOTE — TELEPHONE ENCOUNTER
----- Message from JOAQUIN Manzano sent at 11/26/2019  1:28 PM CST -----  Vitamin D low - sent new prescription for Vitamin D

## 2019-11-27 NOTE — TELEPHONE ENCOUNTER
----- Message from Israel Ramos MD sent at 11/26/2019  2:32 PM CST -----      Elio Kumar NP,  [facility] office every Thursday.       Phone: [phone]

## 2019-12-04 ENCOUNTER — TELEPHONE (OUTPATIENT)
Dept: FAMILY MEDICINE CLINIC | Facility: CLINIC | Age: 64
End: 2019-12-04

## 2019-12-04 DIAGNOSIS — R19.7 DIARRHEA, UNSPECIFIED TYPE: Primary | ICD-10-CM

## 2019-12-04 NOTE — TELEPHONE ENCOUNTER
Patient called, would like stool test for c-diff. Patient has diarrhea almost every day after eating. Please advise. Thank you.

## 2019-12-04 NOTE — TELEPHONE ENCOUNTER
Please see below message. LOV 9/13. Please advise on any orders for pt or if pt needs OV with Ausra.

## 2019-12-05 ENCOUNTER — NURSE ONLY (OUTPATIENT)
Dept: ENDOCRINOLOGY CLINIC | Facility: CLINIC | Age: 64
End: 2019-12-05
Payer: COMMERCIAL

## 2019-12-05 DIAGNOSIS — E11.319 DIABETIC RETINOPATHY OF LEFT EYE ASSOCIATED WITH TYPE 2 DIABETES MELLITUS, MACULAR EDEMA PRESENCE UNSPECIFIED, UNSPECIFIED RETINOPATHY SEVERITY (HCC): ICD-10-CM

## 2019-12-05 PROCEDURE — G0108 DIAB MANAGE TRN  PER INDIV: HCPCS | Performed by: DIETITIAN, REGISTERED

## 2019-12-08 VITALS — SYSTOLIC BLOOD PRESSURE: 136 MMHG | DIASTOLIC BLOOD PRESSURE: 80 MMHG | BODY MASS INDEX: 31 KG/M2 | WEIGHT: 164.38 LBS

## 2019-12-09 NOTE — PROGRESS NOTES
Berenice Crockett  : 10/22/1955 was seen for Diabetic Education Follow up:    Date: 2019  Referring Provider: Dr. Latasha Bejarano  Start time: 12:30pm End time: 1:30pm    Assessment:     Assessment: /80 (BP Location: Right arm, Patient Position: selection, rotation, action, timing, and side effects reviewed. Reducing Risk  Overview of complications reviewed including eye, dental, CV health, renal protection, and foot care discussed.     Health Coping  Family involvement/support encouraged  Blanquita

## 2019-12-13 ENCOUNTER — TELEPHONE (OUTPATIENT)
Dept: ENDOCRINOLOGY CLINIC | Facility: CLINIC | Age: 64
End: 2019-12-13

## 2019-12-13 DIAGNOSIS — E11.319 DIABETIC RETINOPATHY OF LEFT EYE ASSOCIATED WITH TYPE 2 DIABETES MELLITUS, MACULAR EDEMA PRESENCE UNSPECIFIED, UNSPECIFIED RETINOPATHY SEVERITY (HCC): Primary | ICD-10-CM

## 2019-12-24 ENCOUNTER — TELEPHONE (OUTPATIENT)
Dept: ENDOCRINOLOGY CLINIC | Facility: CLINIC | Age: 64
End: 2019-12-24

## 2019-12-24 NOTE — TELEPHONE ENCOUNTER
Pt. Jf Colviners that she no-showed appt. W/me because of her brother's death. Wants to reschedule. Attempted to contact pt. ; BARBARA to return call.

## 2020-01-02 ENCOUNTER — OFFICE VISIT (OUTPATIENT)
Dept: FAMILY MEDICINE CLINIC | Facility: CLINIC | Age: 65
End: 2020-01-02
Payer: COMMERCIAL

## 2020-01-02 VITALS
OXYGEN SATURATION: 98 % | WEIGHT: 168 LBS | RESPIRATION RATE: 16 BRPM | TEMPERATURE: 98 F | HEIGHT: 61 IN | SYSTOLIC BLOOD PRESSURE: 134 MMHG | HEART RATE: 92 BPM | DIASTOLIC BLOOD PRESSURE: 80 MMHG | BODY MASS INDEX: 31.72 KG/M2

## 2020-01-02 DIAGNOSIS — Z00.00 ROUTINE GENERAL MEDICAL EXAMINATION AT A HEALTH CARE FACILITY: Primary | ICD-10-CM

## 2020-01-02 DIAGNOSIS — Z12.39 SCREENING FOR BREAST CANCER: ICD-10-CM

## 2020-01-02 DIAGNOSIS — E11.65 UNCONTROLLED TYPE 2 DIABETES MELLITUS WITH HYPERGLYCEMIA (HCC): ICD-10-CM

## 2020-01-02 PROCEDURE — 99396 PREV VISIT EST AGE 40-64: CPT | Performed by: FAMILY MEDICINE

## 2020-01-02 NOTE — PROGRESS NOTES
Chelsea Fowler is a 62year old female who presents for a complete physical exam, no gyn. HPI:     Patient presents with:  Physical      Patient feels well, dental visit up to date, no hearing problem. Vaccinations up to date.   H/o DM, not well contr History:   Diagnosis Date   • AVM (arteriovenous malformation) brain     being followed at [facility] clinic   • Stroke [facility]) 4/18/15    s/p Brain surgery   • Stroke [facility]) 4/2015   • Type II or unspecified type diabetes mellitus without mention of complication, moist no lesions. Neck is supple, with no cervical LAD or thyroid abnormalities. No carotid bruits. Lungs: are clear to auscultation bilaterally, with no wheeze, rhonchi, or rales. Heart: is RRR.   S1, S2, with no murmurs,clicks, gallops  Breasts bila

## 2020-01-10 ENCOUNTER — OFFICE VISIT (OUTPATIENT)
Dept: ENDOCRINOLOGY CLINIC | Facility: CLINIC | Age: 65
End: 2020-01-10
Payer: COMMERCIAL

## 2020-01-10 VITALS
DIASTOLIC BLOOD PRESSURE: 68 MMHG | BODY MASS INDEX: 31.72 KG/M2 | HEART RATE: 85 BPM | SYSTOLIC BLOOD PRESSURE: 118 MMHG | WEIGHT: 168 LBS | RESPIRATION RATE: 16 BRPM | HEIGHT: 61 IN

## 2020-01-10 DIAGNOSIS — E78.5 DYSLIPIDEMIA: ICD-10-CM

## 2020-01-10 DIAGNOSIS — E11.65 TYPE 2 DIABETES MELLITUS WITH HYPERGLYCEMIA, WITH LONG-TERM CURRENT USE OF INSULIN (HCC): Primary | ICD-10-CM

## 2020-01-10 DIAGNOSIS — Z79.4 TYPE 2 DIABETES MELLITUS WITH HYPERGLYCEMIA, WITH LONG-TERM CURRENT USE OF INSULIN (HCC): Primary | ICD-10-CM

## 2020-01-10 PROBLEM — Z87.74 H/O ARTERIOVENOUS MALFORMATION (AVM): Status: ACTIVE | Noted: 2018-07-01

## 2020-01-10 PROBLEM — R80.9 POSITIVE FOR MACROALBUMINURIA: Status: ACTIVE | Noted: 2020-01-10

## 2020-01-10 PROCEDURE — 95250 CONT GLUC MNTR PHYS/QHP EQP: CPT | Performed by: NURSE PRACTITIONER

## 2020-01-10 PROCEDURE — 99214 OFFICE O/P EST MOD 30 MIN: CPT | Performed by: NURSE PRACTITIONER

## 2020-01-10 NOTE — PROGRESS NOTES
A continuous glucose sensor for 24 hour blood sugar monitoring was placed on patient today per [facility] order.    Serial NUMBER: [id number]  Exp date: (5/31/20)  - After cleansing skin with alcohol prep, Sensor (F87)was inserted on  Right Posterior upper arm ar

## 2020-01-10 NOTE — PROGRESS NOTES
HPI:   Irene Sotomayor is a 62year old female who presents for recheck of her type 2 diabetes. Strong family hx of type 2 DM.     Patient presents with:  New Patient: diabeates management     Diabetes: uncontrolled, needs improvement  Type: 2   Duration: Glucose Blood (ONETOUCH ULTRA BLUE) In Vitro Strip, Test 3 times daily  LEVEMIR FLEXTOUCH 100 UNIT/ML Subcutaneous Solution Pen-injector, INJECT 20 UNITS SUBCUTANEOUSLY EVERY MORNING & 30 UNITS AT BEDTIME (Patient taking differently: INJECT 30 UNITS SUBCUT Microalb (most recent labs)   Lab Results   Component Value Date/Time    MICROALBCREA 714.3 (H) 12/16/2017 07:06 AM        Lab results reviewed.     REVIEW OF SYSTEMS:   GENERAL HEALTH: feels well otherwise  SKIN: denies any unusual skin lesions or rashes A professional [facility] continuous glucose sensor for 24 hour blood sugar monitoring was placed on patient for 2 weeks to identify patterns and trends of glucose. Will reevaluate medication at next visit based on CGM interpretation.   Mustapha holly -     OPHTHALMOLOGY - INTERNAL  -     PODIATRY - INTERNAL    BMI 31.0-31.9,adult    Dyslipidemia       Return in about 2 weeks (around 1/24/2020) for 45 minute appointment, Professional CGM.     The risks and benefits of my recommendations, as well as other

## 2020-01-10 NOTE — PATIENT INSTRUCTIONS
We are here to support you with Diabetes but please remember that you still need your primary care doctor for your routine health maintenance. Your current A1C: 10.4%  This test provides us with your average blood sugar for the past 3 months.    The main activity in log book  Call us with any questions or concerns. Bring glucose sensor to two week appointment with logbook for download. If sensor falls off prior to 7 days call office to replace, bring sensor back to office.   If sensor falls off after 7 d

## 2020-01-16 ENCOUNTER — TELEPHONE (OUTPATIENT)
Dept: PODIATRY CLINIC | Facility: CLINIC | Age: 65
End: 2020-01-16

## 2020-01-16 ENCOUNTER — NURSE ONLY (OUTPATIENT)
Dept: ENDOCRINOLOGY CLINIC | Facility: CLINIC | Age: 65
End: 2020-01-16
Payer: COMMERCIAL

## 2020-01-16 ENCOUNTER — APPOINTMENT (OUTPATIENT)
Dept: LAB | Age: 65
End: 2020-01-16
Attending: NURSE PRACTITIONER
Payer: COMMERCIAL

## 2020-01-16 VITALS — SYSTOLIC BLOOD PRESSURE: 118 MMHG | WEIGHT: 168.81 LBS | BODY MASS INDEX: 32 KG/M2 | DIASTOLIC BLOOD PRESSURE: 64 MMHG

## 2020-01-16 DIAGNOSIS — E11.65 TYPE 2 DIABETES MELLITUS WITH HYPERGLYCEMIA, WITH LONG-TERM CURRENT USE OF INSULIN (HCC): ICD-10-CM

## 2020-01-16 DIAGNOSIS — Z79.4 TYPE 2 DIABETES MELLITUS WITH HYPERGLYCEMIA, WITH LONG-TERM CURRENT USE OF INSULIN (HCC): ICD-10-CM

## 2020-01-16 DIAGNOSIS — E11.65 UNCONTROLLED TYPE 2 DIABETES MELLITUS WITH HYPERGLYCEMIA (HCC): ICD-10-CM

## 2020-01-16 LAB
CREAT UR-SCNC: 160 MG/DL
MICROALBUMIN UR-MCNC: 289 MG/DL
MICROALBUMIN/CREAT 24H UR-RTO: 1806.3 UG/MG (ref ?–30)

## 2020-01-16 PROCEDURE — 82043 UR ALBUMIN QUANTITATIVE: CPT | Performed by: NURSE PRACTITIONER

## 2020-01-16 PROCEDURE — G0108 DIAB MANAGE TRN  PER INDIV: HCPCS | Performed by: DIETITIAN, REGISTERED

## 2020-01-16 PROCEDURE — 82570 ASSAY OF URINE CREATININE: CPT | Performed by: NURSE PRACTITIONER

## 2020-01-16 PROCEDURE — 36415 COLL VENOUS BLD VENIPUNCTURE: CPT | Performed by: NURSE PRACTITIONER

## 2020-01-16 NOTE — TELEPHONE ENCOUNTER
Spoke to pt and she states that both of her feet have sores. Rates pain 4/10. Cushioning sores with padding. Pt is diabetic. States she saw [facility] a couple of years ago. Onset 1-2 mths ago and not better. No abx taken.  Denies fever or red streaking or giovana

## 2020-01-16 NOTE — TELEPHONE ENCOUNTER
Patient called in and has sores (diabetic) and said Dr. Zuhair Montero told her to call and get in right away if that happens. Would like to see him tomorrow if possible-no availabillity.

## 2020-01-16 NOTE — PATIENT INSTRUCTIONS
1. Need to give urine sample to check for protein  2. Follow-up with Podiatry for foot exam   3.  Schedule visit  For Dilated eye exam

## 2020-01-16 NOTE — TELEPHONE ENCOUNTER
Dr. Clark Silverio, is it okay to schedule this pt with you for tomorrow? You are already overbooked and it would be another double book. Have you seen this pt before? I do not have record of this.

## 2020-01-17 ENCOUNTER — TELEPHONE (OUTPATIENT)
Dept: FAMILY MEDICINE CLINIC | Facility: CLINIC | Age: 65
End: 2020-01-17

## 2020-01-17 DIAGNOSIS — E11.65 TYPE 2 DIABETES MELLITUS WITH HYPERGLYCEMIA, WITH LONG-TERM CURRENT USE OF INSULIN (HCC): Primary | ICD-10-CM

## 2020-01-17 DIAGNOSIS — R35.0 INCREASED FREQUENCY OF URINATION: ICD-10-CM

## 2020-01-17 DIAGNOSIS — Z79.4 TYPE 2 DIABETES MELLITUS WITH HYPERGLYCEMIA, WITH LONG-TERM CURRENT USE OF INSULIN (HCC): Primary | ICD-10-CM

## 2020-01-17 NOTE — TELEPHONE ENCOUNTER
Pt returned call. Spoke with pt regarding results and instructions listed below. Pt verbalizes understanding. Pt states she will complete labs early next week. Pt requested further information about the urine test and asked if it \"showed infection\".

## 2020-01-17 NOTE — TELEPHONE ENCOUNTER
MICROALB/CREAT RATIO, RANDOM URINE   Notes recorded by Myrna Valente MD on 1/16/2020 at 1:59 PM CST  Pt has proteinuria, ok to do all labs for DM and then see me for f/u thanks

## 2020-01-17 NOTE — TELEPHONE ENCOUNTER
LVM for pt to call back to review test results. PSR: OK to lync me when pt calls back. Thank you! Repeat labs placed.

## 2020-01-17 NOTE — TELEPHONE ENCOUNTER
To confirm. .. Pt sees Cher Delarosa for DM and has an appointment scheduled with her on 02/03/2020. Last DM labs 11/26/19. Would you like pt to repeat labs now and follow up with you or continue with DM clinic plan and follow up with them 02/03/20?

## 2020-01-20 ENCOUNTER — OFFICE VISIT (OUTPATIENT)
Dept: PODIATRY CLINIC | Facility: CLINIC | Age: 65
End: 2020-01-20
Payer: COMMERCIAL

## 2020-01-20 DIAGNOSIS — E11.621 DIABETIC ULCER OF RIGHT MIDFOOT ASSOCIATED WITH TYPE 2 DIABETES MELLITUS, LIMITED TO BREAKDOWN OF SKIN (HCC): ICD-10-CM

## 2020-01-20 DIAGNOSIS — L97.411 DIABETIC ULCER OF RIGHT MIDFOOT ASSOCIATED WITH TYPE 2 DIABETES MELLITUS, LIMITED TO BREAKDOWN OF SKIN (HCC): ICD-10-CM

## 2020-01-20 DIAGNOSIS — B35.1 ONYCHOMYCOSIS: ICD-10-CM

## 2020-01-20 DIAGNOSIS — Z79.4 TYPE 2 DIABETES MELLITUS WITH HYPERGLYCEMIA, WITH LONG-TERM CURRENT USE OF INSULIN (HCC): Primary | ICD-10-CM

## 2020-01-20 DIAGNOSIS — E11.42 DIABETIC POLYNEUROPATHY ASSOCIATED WITH TYPE 2 DIABETES MELLITUS (HCC): ICD-10-CM

## 2020-01-20 DIAGNOSIS — E11.65 TYPE 2 DIABETES MELLITUS WITH HYPERGLYCEMIA, WITH LONG-TERM CURRENT USE OF INSULIN (HCC): Primary | ICD-10-CM

## 2020-01-20 DIAGNOSIS — L84 CALLUS OF FOOT: ICD-10-CM

## 2020-01-20 PROCEDURE — 11721 DEBRIDE NAIL 6 OR MORE: CPT | Performed by: PODIATRIST

## 2020-01-20 PROCEDURE — 11056 PARNG/CUTG B9 HYPRKR LES 2-4: CPT | Performed by: PODIATRIST

## 2020-01-20 NOTE — PROGRESS NOTES
Santa Peng is a 62year old female. Patient presents with:  Consult: pt states that she has sores on the bottoms of bilateral feet. pt is diabetic. bg this am 250. pt was seen at diabetes clinic 1/10/20. hgb a1c 10.4 on 11/26/19.  the wounds are a lit Needle 31G X 5 MM Does not apply Misc Use with Novolog pen TID and Levemir pen BID as directed 1 Box 4   • Glucose Blood (ONETOUCH ULTRA BLUE) In Vitro Strip USE AS DIRECTED 100 strip 0      Past Medical History:   Diagnosis Date   • AVM (arteriovenous mal dry.  Toenails 1-5 are thickened yellowed and dystrophic with subungual debris distal lysis from the nailbed and subungual keratosis 1-5 bilateral feet.   She has hyperkeratotic lesions which are nucleated underneath the first metatarsal head bilaterally th

## 2020-01-20 NOTE — PROGRESS NOTES
Briseydaritchie Delroy  : 10/22/1955 was seen for Diabetic Education Follow up:    Date: 2020 Referring Provider: TIP Menchaca/Dr.L. Betancourt  Start time: 9:30am End time10:30am    Assessment:     Assessment: /64 (BP Location: Right arm, Patien selection/Rotation/Storage/Travel: Pt. provided w/handout on inj. sites & review of steps for proper use of insulin pens.  Store unopened pens in refrigerator;once opened can remain at room temp     Hyperglycemia  - Causes/symptoms/prevention/treatment  - H

## 2020-02-03 ENCOUNTER — OFFICE VISIT (OUTPATIENT)
Dept: ENDOCRINOLOGY CLINIC | Facility: CLINIC | Age: 65
End: 2020-02-03
Payer: COMMERCIAL

## 2020-02-03 VITALS
DIASTOLIC BLOOD PRESSURE: 80 MMHG | HEART RATE: 89 BPM | SYSTOLIC BLOOD PRESSURE: 138 MMHG | HEIGHT: 61 IN | BODY MASS INDEX: 31.34 KG/M2 | WEIGHT: 166 LBS | RESPIRATION RATE: 16 BRPM

## 2020-02-03 DIAGNOSIS — E11.65 TYPE 2 DIABETES MELLITUS WITH HYPERGLYCEMIA, WITH LONG-TERM CURRENT USE OF INSULIN (HCC): Primary | ICD-10-CM

## 2020-02-03 DIAGNOSIS — Z79.4 TYPE 2 DIABETES MELLITUS WITH HYPERGLYCEMIA, WITH LONG-TERM CURRENT USE OF INSULIN (HCC): Primary | ICD-10-CM

## 2020-02-03 DIAGNOSIS — R80.9 POSITIVE FOR MACROALBUMINURIA: ICD-10-CM

## 2020-02-03 PROCEDURE — 95251 CONT GLUC MNTR ANALYSIS I&R: CPT | Performed by: NURSE PRACTITIONER

## 2020-02-03 PROCEDURE — 99214 OFFICE O/P EST MOD 30 MIN: CPT | Performed by: NURSE PRACTITIONER

## 2020-02-03 RX ORDER — SEMAGLUTIDE 1.34 MG/ML
0.25 INJECTION, SOLUTION SUBCUTANEOUS
Qty: 1 PEN | Refills: 0 | COMMUNITY
Start: 2020-02-03 | End: 2021-03-29

## 2020-02-03 RX ORDER — ERGOCALCIFEROL 1.25 MG/1
CAPSULE ORAL
COMMUNITY
Start: 2020-02-02 | End: 2021-04-12 | Stop reason: ALTCHOICE

## 2020-02-03 RX ORDER — INSULIN DETEMIR 100 [IU]/ML
INJECTION, SOLUTION SUBCUTANEOUS
Qty: 90 ML | Refills: 0 | COMMUNITY
Start: 2020-02-03 | End: 2020-06-11

## 2020-02-03 RX ORDER — LISINOPRIL 5 MG/1
5 TABLET ORAL DAILY
Qty: 30 TABLET | Refills: 1 | Status: SHIPPED | OUTPATIENT
Start: 2020-02-03 | End: 2021-03-29

## 2020-02-03 NOTE — PROGRESS NOTES
HPI:   Chau Roa is a 62year old female who presents for recheck of her type 2 diabetes.      Patient presents with:  Diabetes: Samara Munson follow up     Diabetes: uncontrolled, needs improvement  Type: 2   Duration:dx 2000  Current Meds: Levemir 30 units never used smokeless tobacco. She reports that she does not drink alcohol or use drugs. She has No Known Allergies.    ergocalciferol 1.25 MG (10243 UT) Oral Cap,   LEVEMIR FLEXTOUCH 100 UNIT/ML Subcutaneous Solution Pen-injector, INJECT 30 UNITS SUBCUT A1C 10.4 (H) 11/26/2019 10:16 AM          Microalb (most recent labs)   Lab Results   Component Value Date/Time    MICROALBCREA 1,806.3 (H) 01/16/2020 10:35 AM        Lab results reviewed.     REVIEW OF SYSTEMS:   GENERAL HEALTH: feels well otherwise  SKIN: agreed to monitoring bid - fasting in AM and 2 hours postprandial of one meal per day. Hypoglycemia S&S, Rx, and when to call APRN/CDE reviewed using Rule of 15's.  Stressed need to call if 2 readings below 80 mg/dl in 1 week for further insulin adjustment discussed with the patient today. questions were answered to the best of my knowledge.    35 min spent with patient and >50% time spent counseling and coordinating care related to their office visit.        [address]AN, LISAE

## 2020-02-03 NOTE — PATIENT INSTRUCTIONS
We are here to support you with Diabetes but please remember that you still need your primary care doctor for your routine health maintenance. Your current A1C: 10.4%  This test provides us with your average blood sugar for the past 3 months.    The main  (preferably < 110)  2 hours After meals: less than 180 (preferably less than 150)   Call for persistent blood sugars < 75 or > 200.    Blood sugars greater than 200 are not acceptable to reach your goal of improving diabetes    Health Maintenance:

## 2020-02-28 ENCOUNTER — APPOINTMENT (OUTPATIENT)
Dept: LAB | Age: 65
End: 2020-02-28
Attending: FAMILY MEDICINE
Payer: COMMERCIAL

## 2020-02-28 DIAGNOSIS — R19.7 DIARRHEA, UNSPECIFIED TYPE: ICD-10-CM

## 2020-02-28 PROCEDURE — 87493 C DIFF AMPLIFIED PROBE: CPT | Performed by: FAMILY MEDICINE

## 2020-02-29 LAB — C DIFF TOX B STL QL: NEGATIVE

## 2020-03-20 ENCOUNTER — TELEPHONE (OUTPATIENT)
Dept: PODIATRY CLINIC | Facility: CLINIC | Age: 65
End: 2020-03-20

## 2020-03-20 NOTE — TELEPHONE ENCOUNTER
Per Dr Magnus Olszewski, due to concerns regarding spread of COVID-19, call pt to r/s appt if pt does not have any open wounds on feet.      LMTCB on pt's preferred #

## 2020-06-11 DIAGNOSIS — E11.65 TYPE 2 DIABETES MELLITUS WITH HYPERGLYCEMIA, WITH LONG-TERM CURRENT USE OF INSULIN (HCC): ICD-10-CM

## 2020-06-11 DIAGNOSIS — Z79.4 TYPE 2 DIABETES MELLITUS WITH HYPERGLYCEMIA, WITH LONG-TERM CURRENT USE OF INSULIN (HCC): ICD-10-CM

## 2020-06-11 DIAGNOSIS — E11.65 UNCONTROLLED TYPE 2 DIABETES MELLITUS WITH HYPERGLYCEMIA (HCC): ICD-10-CM

## 2020-06-11 RX ORDER — LANCETS 30 GAUGE
EACH MISCELLANEOUS
Qty: 100 EACH | Refills: 0 | Status: SHIPPED | OUTPATIENT
Start: 2020-06-11 | End: 2021-03-16 | Stop reason: ALTCHOICE

## 2020-06-11 NOTE — TELEPHONE ENCOUNTER
Medication(s) to Refill:   Requested Prescriptions     Pending Prescriptions Disp Refills   • insulin detemir (LEVEMIR FLEXTOUCH) 100 UNIT/ML Subcutaneous Solution Pen-injector [Pharmacy Med Name: Christian Paz 100 UNIT/ML] 45 mL 1     Sig: INJECT 20 U

## 2020-08-26 ENCOUNTER — TELEPHONE (OUTPATIENT)
Dept: FAMILY MEDICINE CLINIC | Facility: CLINIC | Age: 65
End: 2020-08-26

## 2020-08-26 NOTE — TELEPHONE ENCOUNTER
[employer] message was sent to pt in Feb with these normal results and spoke to pt with these results today.

## 2020-10-21 DIAGNOSIS — E11.65 UNCONTROLLED TYPE 2 DIABETES MELLITUS WITH HYPERGLYCEMIA (HCC): ICD-10-CM

## 2020-10-21 RX ORDER — BLOOD SUGAR DIAGNOSTIC
STRIP MISCELLANEOUS
Qty: 100 STRIP | Refills: 11 | Status: SHIPPED | OUTPATIENT
Start: 2020-10-21 | End: 2021-03-16 | Stop reason: ALTCHOICE

## 2020-11-09 ENCOUNTER — OFFICE VISIT (OUTPATIENT)
Dept: FAMILY MEDICINE CLINIC | Facility: CLINIC | Age: 65
End: 2020-11-09
Payer: COMMERCIAL

## 2020-11-09 VITALS
WEIGHT: 175 LBS | TEMPERATURE: 98 F | OXYGEN SATURATION: 98 % | HEART RATE: 89 BPM | DIASTOLIC BLOOD PRESSURE: 70 MMHG | HEIGHT: 61 IN | SYSTOLIC BLOOD PRESSURE: 126 MMHG | BODY MASS INDEX: 33.04 KG/M2

## 2020-11-09 DIAGNOSIS — Z20.822 SUSPECTED 2019 NOVEL CORONAVIRUS INFECTION: Primary | ICD-10-CM

## 2020-11-09 PROCEDURE — 3008F BODY MASS INDEX DOCD: CPT | Performed by: NURSE PRACTITIONER

## 2020-11-09 PROCEDURE — 99213 OFFICE O/P EST LOW 20 MIN: CPT | Performed by: NURSE PRACTITIONER

## 2020-11-09 PROCEDURE — 99072 ADDL SUPL MATRL&STAF TM PHE: CPT | Performed by: NURSE PRACTITIONER

## 2020-11-09 PROCEDURE — 3074F SYST BP LT 130 MM HG: CPT | Performed by: NURSE PRACTITIONER

## 2020-11-09 PROCEDURE — 3078F DIAST BP <80 MM HG: CPT | Performed by: NURSE PRACTITIONER

## 2020-11-09 NOTE — PATIENT INSTRUCTIONS
Coronavirus Disease 2019 (COVID-19)     [facility] is committed to the safety and well-being of our patients, members, employees, and communities.  As concerns arise about the new strain of coronavirus that causes COVID-19, [facility] 4. If you have a medical appointment, call the healthcare provider ahead of time and tell them that you have or may have COVID-19.  5. For medical emergencies, call 911 and notify the dispatch personnel that you have or may have COVID-19.   6. Cover your c · At least 10 days have passed since symptoms first appeared OR if asymptomatic patient or date of symptom onset is unclear then use 10 days post COVID test date.    · At least 20 days have passed for severe illness (requiring hospitalization) OR if you are *Some people will be required to have a repeat COVID-19 test in order to be eligible to donate. If you’re instructed by Rachel Hernandez that a repeat test is required, please contact the [facility] COVID-19 Nurse Triage Line at [phone].     Additional Inf

## 2020-11-09 NOTE — PROGRESS NOTES
CHIEF COMPLAINT:   Patient presents with:  Cold: loss of taste and smell,cough, fatigue      HPI:   Liliana Jones is a 60year old female who presents for upper respiratory symptoms for  6 days.  Patient reports sore throat only at the beginning of sx's, mellitus without mention of complication, not stated as uncontrolled    • Vitamin D deficiency       Past Surgical History:   Procedure Laterality Date   • [facility]   •            Social History    Tobacco Use Monitor for any chest pain/breathing problems/sob. If occurs go to ED. Will notify of test results in 3 days. Follow up with PCP via phone call in 2-3 days, sooner if worsening.   Comfort care as described in Patient Instructions      Meds & Refills fo

## 2020-11-16 ENCOUNTER — TELEPHONE (OUTPATIENT)
Dept: FAMILY MEDICINE CLINIC | Facility: CLINIC | Age: 65
End: 2020-11-16

## 2020-11-16 NOTE — TELEPHONE ENCOUNTER
I spoke with pt, pt wanted to review when she could be around people again. 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 Below info given to pt, I instructed pt should wear a mask & have her Mom wear a mask also.        Due to the recent COVI

## 2020-12-18 DIAGNOSIS — Z79.4 TYPE 2 DIABETES MELLITUS WITH HYPERGLYCEMIA, WITH LONG-TERM CURRENT USE OF INSULIN (HCC): ICD-10-CM

## 2020-12-18 DIAGNOSIS — E11.65 TYPE 2 DIABETES MELLITUS WITH HYPERGLYCEMIA, WITH LONG-TERM CURRENT USE OF INSULIN (HCC): ICD-10-CM

## 2020-12-18 NOTE — TELEPHONE ENCOUNTER
Medication(s) to Refill:   Requested Prescriptions      No prescriptions requested or ordered in this encounter         Reason for Medication Refill being sent to Provider / Reason Protocol Failed:  [] Patient is overdue for an appointment  [] Labs are ove

## 2020-12-21 ENCOUNTER — TELEMEDICINE (OUTPATIENT)
Dept: FAMILY MEDICINE CLINIC | Facility: CLINIC | Age: 65
End: 2020-12-21
Payer: COMMERCIAL

## 2020-12-21 DIAGNOSIS — Z13.29 SCREENING FOR ENDOCRINE, NUTRITIONAL, METABOLIC AND IMMUNITY DISORDER: ICD-10-CM

## 2020-12-21 DIAGNOSIS — Z13.228 SCREENING FOR ENDOCRINE, NUTRITIONAL, METABOLIC AND IMMUNITY DISORDER: ICD-10-CM

## 2020-12-21 DIAGNOSIS — Z13.21 SCREENING FOR ENDOCRINE, NUTRITIONAL, METABOLIC AND IMMUNITY DISORDER: ICD-10-CM

## 2020-12-21 DIAGNOSIS — Z79.4 TYPE 2 DIABETES MELLITUS WITH HYPERGLYCEMIA, WITH LONG-TERM CURRENT USE OF INSULIN (HCC): ICD-10-CM

## 2020-12-21 DIAGNOSIS — E11.65 TYPE 2 DIABETES MELLITUS WITH HYPERGLYCEMIA, WITH LONG-TERM CURRENT USE OF INSULIN (HCC): ICD-10-CM

## 2020-12-21 DIAGNOSIS — M54.32 SCIATICA, LEFT SIDE: Primary | ICD-10-CM

## 2020-12-21 DIAGNOSIS — Z13.0 SCREENING FOR ENDOCRINE, NUTRITIONAL, METABOLIC AND IMMUNITY DISORDER: ICD-10-CM

## 2020-12-21 PROCEDURE — 99214 OFFICE O/P EST MOD 30 MIN: CPT | Performed by: FAMILY MEDICINE

## 2020-12-21 RX ORDER — MELOXICAM 15 MG/1
15 TABLET ORAL DAILY
Qty: 30 TABLET | Refills: 0 | Status: SHIPPED | OUTPATIENT
Start: 2020-12-21 | End: 2021-01-18

## 2020-12-21 NOTE — PROGRESS NOTES
CC; back pain. HPI:      Pt has L buttock pain, sharp, throbbing, radiation to the L knee, worsening, certain positions makes it worse and rest and lying still makes it better. Worsening. Last:one week.  Pt has DM, due for eye exam.    Current Outpati Smoker      Smokeless tobacco: Never Used    Alcohol use: Never      Alcohol/week: 0.0 standard drinks      Frequency: Never      Comment: rare    Drug use: No            ROS:  GEN: no fever, no chills, no fatigue  CHEST: no chest pains.   SKIN: no rashes

## 2020-12-28 DIAGNOSIS — Z79.4 TYPE 2 DIABETES MELLITUS WITH HYPERGLYCEMIA, WITH LONG-TERM CURRENT USE OF INSULIN (HCC): ICD-10-CM

## 2020-12-28 DIAGNOSIS — E11.65 TYPE 2 DIABETES MELLITUS WITH HYPERGLYCEMIA, WITH LONG-TERM CURRENT USE OF INSULIN (HCC): ICD-10-CM

## 2020-12-31 DIAGNOSIS — E11.65 TYPE 2 DIABETES MELLITUS WITH HYPERGLYCEMIA, WITH LONG-TERM CURRENT USE OF INSULIN (HCC): ICD-10-CM

## 2020-12-31 DIAGNOSIS — Z79.4 TYPE 2 DIABETES MELLITUS WITH HYPERGLYCEMIA, WITH LONG-TERM CURRENT USE OF INSULIN (HCC): ICD-10-CM

## 2020-12-31 RX ORDER — INSULIN DETEMIR 100 [IU]/ML
INJECTION, SOLUTION SUBCUTANEOUS
Qty: 15 ML | Refills: 0 | Status: SHIPPED | OUTPATIENT
Start: 2020-12-31 | End: 2021-02-25

## 2020-12-31 RX ORDER — INSULIN DETEMIR 100 [IU]/ML
INJECTION, SOLUTION SUBCUTANEOUS
Refills: 1 | OUTPATIENT
Start: 2020-12-31

## 2020-12-31 NOTE — TELEPHONE ENCOUNTER
Pt needs OV for med refills. Last OV 1/2/20. lmom for pt that 30 day supply was sent but will need either virtual or OV with Dr. Jose Burr for further refills.

## 2021-01-17 DIAGNOSIS — M54.32 SCIATICA, LEFT SIDE: ICD-10-CM

## 2021-01-18 RX ORDER — MELOXICAM 15 MG/1
TABLET ORAL
Qty: 30 TABLET | Refills: 0 | Status: SHIPPED | OUTPATIENT
Start: 2021-01-18 | End: 2021-05-24

## 2021-02-11 ENCOUNTER — TELEPHONE (OUTPATIENT)
Dept: FAMILY MEDICINE CLINIC | Facility: CLINIC | Age: 66
End: 2021-02-11

## 2021-02-11 DIAGNOSIS — R39.15 URGENCY OF URINATION: ICD-10-CM

## 2021-02-11 DIAGNOSIS — R30.0 DYSURIA: Primary | ICD-10-CM

## 2021-02-11 NOTE — TELEPHONE ENCOUNTER
New or ongoing issue:     Brief description of symptoms: urgency of urination, no burning    Approximately how long? : few days    Offered appointment: Pt is scheduled for 2/15    Appointment scheduled:   yes                 Scheduled with - PCP    Patient

## 2021-02-12 ENCOUNTER — LAB ENCOUNTER (OUTPATIENT)
Dept: LAB | Age: 66
End: 2021-02-12
Attending: FAMILY MEDICINE
Payer: COMMERCIAL

## 2021-02-12 DIAGNOSIS — Z13.29 SCREENING FOR ENDOCRINE, NUTRITIONAL, METABOLIC AND IMMUNITY DISORDER: ICD-10-CM

## 2021-02-12 DIAGNOSIS — R39.15 URGENCY OF URINATION: ICD-10-CM

## 2021-02-12 DIAGNOSIS — Z13.0 SCREENING FOR ENDOCRINE, NUTRITIONAL, METABOLIC AND IMMUNITY DISORDER: ICD-10-CM

## 2021-02-12 DIAGNOSIS — Z13.21 SCREENING FOR ENDOCRINE, NUTRITIONAL, METABOLIC AND IMMUNITY DISORDER: ICD-10-CM

## 2021-02-12 DIAGNOSIS — Z13.228 SCREENING FOR ENDOCRINE, NUTRITIONAL, METABOLIC AND IMMUNITY DISORDER: ICD-10-CM

## 2021-02-12 DIAGNOSIS — Z79.4 TYPE 2 DIABETES MELLITUS WITH HYPERGLYCEMIA, WITH LONG-TERM CURRENT USE OF INSULIN (HCC): ICD-10-CM

## 2021-02-12 DIAGNOSIS — E11.65 TYPE 2 DIABETES MELLITUS WITH HYPERGLYCEMIA, WITH LONG-TERM CURRENT USE OF INSULIN (HCC): ICD-10-CM

## 2021-02-12 LAB
ALBUMIN SERPL-MCNC: 3.4 G/DL (ref 3.4–5)
ALBUMIN/GLOB SERPL: 0.9 {RATIO} (ref 1–2)
ALP LIVER SERPL-CCNC: 89 U/L
ALT SERPL-CCNC: 14 U/L
ANION GAP SERPL CALC-SCNC: 6 MMOL/L (ref 0–18)
AST SERPL-CCNC: 9 U/L (ref 15–37)
BASOPHILS # BLD AUTO: 0.06 X10(3) UL (ref 0–0.2)
BASOPHILS NFR BLD AUTO: 0.8 %
BILIRUB SERPL-MCNC: 0.4 MG/DL (ref 0.1–2)
BILIRUB UR QL STRIP.AUTO: NEGATIVE
BUN BLD-MCNC: 16 MG/DL (ref 7–18)
BUN/CREAT SERPL: 27.6 (ref 10–20)
CALCIUM BLD-MCNC: 9.6 MG/DL (ref 8.5–10.1)
CHLORIDE SERPL-SCNC: 102 MMOL/L (ref 98–112)
CHOLEST SMN-MCNC: 231 MG/DL (ref ?–200)
CLARITY UR REFRACT.AUTO: CLEAR
CO2 SERPL-SCNC: 29 MMOL/L (ref 21–32)
COLOR UR AUTO: YELLOW
CREAT BLD-MCNC: 0.58 MG/DL
CREAT UR-SCNC: 37.8 MG/DL
DEPRECATED RDW RBC AUTO: 42.9 FL (ref 35.1–46.3)
EOSINOPHIL # BLD AUTO: 0.22 X10(3) UL (ref 0–0.7)
EOSINOPHIL NFR BLD AUTO: 3.1 %
ERYTHROCYTE [DISTWIDTH] IN BLOOD BY AUTOMATED COUNT: 13.4 % (ref 11–15)
EST. AVERAGE GLUCOSE BLD GHB EST-MCNC: 223 MG/DL (ref 68–126)
GLOBULIN PLAS-MCNC: 4 G/DL (ref 2.8–4.4)
GLUCOSE BLD-MCNC: 162 MG/DL (ref 70–99)
GLUCOSE UR STRIP.AUTO-MCNC: NEGATIVE MG/DL
HBA1C MFR BLD HPLC: 9.4 % (ref ?–5.7)
HCT VFR BLD AUTO: 37.9 %
HDLC SERPL-MCNC: 52 MG/DL (ref 40–59)
HGB BLD-MCNC: 12.3 G/DL
IMM GRANULOCYTES # BLD AUTO: 0.02 X10(3) UL (ref 0–1)
IMM GRANULOCYTES NFR BLD: 0.3 %
KETONES UR STRIP.AUTO-MCNC: NEGATIVE MG/DL
LDLC SERPL CALC-MCNC: 151 MG/DL (ref ?–100)
LEUKOCYTE ESTERASE UR QL STRIP.AUTO: NEGATIVE
LYMPHOCYTES # BLD AUTO: 1.79 X10(3) UL (ref 1–4)
LYMPHOCYTES NFR BLD AUTO: 24.9 %
M PROTEIN MFR SERPL ELPH: 7.4 G/DL (ref 6.4–8.2)
MCH RBC QN AUTO: 28.5 PG (ref 26–34)
MCHC RBC AUTO-ENTMCNC: 32.5 G/DL (ref 31–37)
MCV RBC AUTO: 87.7 FL
MICROALBUMIN UR-MCNC: 48.9 MG/DL
MICROALBUMIN/CREAT 24H UR-RTO: 1293.7 UG/MG (ref ?–30)
MONOCYTES # BLD AUTO: 0.56 X10(3) UL (ref 0.1–1)
MONOCYTES NFR BLD AUTO: 7.8 %
NEUTROPHILS # BLD AUTO: 4.55 X10 (3) UL (ref 1.5–7.7)
NEUTROPHILS # BLD AUTO: 4.55 X10(3) UL (ref 1.5–7.7)
NEUTROPHILS NFR BLD AUTO: 63.1 %
NITRITE UR QL STRIP.AUTO: NEGATIVE
NONHDLC SERPL-MCNC: 179 MG/DL (ref ?–130)
OSMOLALITY SERPL CALC.SUM OF ELEC: 289 MOSM/KG (ref 275–295)
PATIENT FASTING Y/N/NP: YES
PATIENT FASTING Y/N/NP: YES
PH UR STRIP.AUTO: 7 [PH] (ref 4.5–8)
PLATELET # BLD AUTO: 250 10(3)UL (ref 150–450)
POTASSIUM SERPL-SCNC: 4.1 MMOL/L (ref 3.5–5.1)
PROT UR STRIP.AUTO-MCNC: 100 MG/DL
RBC # BLD AUTO: 4.32 X10(6)UL
RBC UR QL AUTO: NEGATIVE
SODIUM SERPL-SCNC: 137 MMOL/L (ref 136–145)
SP GR UR STRIP.AUTO: 1.01 (ref 1–1.03)
TRIGL SERPL-MCNC: 141 MG/DL (ref 30–149)
TSI SER-ACNC: 1.63 MIU/ML (ref 0.36–3.74)
UROBILINOGEN UR STRIP.AUTO-MCNC: <2 MG/DL
VLDLC SERPL CALC-MCNC: 28 MG/DL (ref 0–30)
WBC # BLD AUTO: 7.2 X10(3) UL (ref 4–11)

## 2021-02-12 PROCEDURE — 81001 URINALYSIS AUTO W/SCOPE: CPT | Performed by: FAMILY MEDICINE

## 2021-02-12 PROCEDURE — 82043 UR ALBUMIN QUANTITATIVE: CPT | Performed by: FAMILY MEDICINE

## 2021-02-12 PROCEDURE — 36415 COLL VENOUS BLD VENIPUNCTURE: CPT | Performed by: FAMILY MEDICINE

## 2021-02-12 PROCEDURE — 80050 GENERAL HEALTH PANEL: CPT | Performed by: FAMILY MEDICINE

## 2021-02-12 PROCEDURE — 80061 LIPID PANEL: CPT | Performed by: FAMILY MEDICINE

## 2021-02-12 PROCEDURE — 83036 HEMOGLOBIN GLYCOSYLATED A1C: CPT | Performed by: FAMILY MEDICINE

## 2021-02-12 PROCEDURE — 82570 ASSAY OF URINE CREATININE: CPT | Performed by: FAMILY MEDICINE

## 2021-02-12 NOTE — TELEPHONE ENCOUNTER
Pt was already in for labs this morning, I spoke with Amos Wasserman in lab, she can add UA on to Urine Micro Albumin but a Urine Culture can not be added. UA order placed,& urine labeled in lab for UA also.

## 2021-02-15 ENCOUNTER — TELEMEDICINE (OUTPATIENT)
Dept: FAMILY MEDICINE CLINIC | Facility: CLINIC | Age: 66
End: 2021-02-15
Payer: COMMERCIAL

## 2021-02-15 DIAGNOSIS — R80.9 POSITIVE FOR MACROALBUMINURIA: ICD-10-CM

## 2021-02-15 DIAGNOSIS — Z12.31 VISIT FOR SCREENING MAMMOGRAM: Primary | ICD-10-CM

## 2021-02-15 DIAGNOSIS — E78.2 HYPERLIPIDEMIA, MIXED: ICD-10-CM

## 2021-02-15 DIAGNOSIS — B37.3 YEAST VAGINITIS: ICD-10-CM

## 2021-02-15 DIAGNOSIS — Z79.4 TYPE 2 DIABETES MELLITUS WITH HYPERGLYCEMIA, WITH LONG-TERM CURRENT USE OF INSULIN (HCC): ICD-10-CM

## 2021-02-15 DIAGNOSIS — E11.65 TYPE 2 DIABETES MELLITUS WITH HYPERGLYCEMIA, WITH LONG-TERM CURRENT USE OF INSULIN (HCC): ICD-10-CM

## 2021-02-15 PROCEDURE — 99214 OFFICE O/P EST MOD 30 MIN: CPT | Performed by: FAMILY MEDICINE

## 2021-02-15 RX ORDER — FLUCONAZOLE 150 MG/1
150 TABLET ORAL DAILY
Qty: 3 TABLET | Refills: 0 | Status: SHIPPED | OUTPATIENT
Start: 2021-02-15 | End: 2021-03-16 | Stop reason: ALTCHOICE

## 2021-02-15 NOTE — PROGRESS NOTES
Pedro Luis Herrera verbally consents to a [employer] on 02/15/21. Time spent:30 min    Pedro Luis Herrera is a 60year old female who presents for vaginal itching, HL, DM, abnormal labs.       HPI:  The patient complains of vaginal  i BEDTIME 15 mL 0   • Insulin Pen Needle (NOVOFINE PLUS) 32G X 4 MM Does not apply Misc Use to inject Levemir daily 100 each 1   • metFORMIN HCl 1000 MG Oral Tab Take 1 tablet (1,000 mg total) by mouth daily with dinner.  90 tablet 0   • ONETOUCH ULTRA In Vit pain on exertion  GI: as above. No heartburn. No melena, no rectal bleeding. No vomiting. :no dysuria. NEURO: denies headaches      EXAM:   There were no vitals taken for this visit. There is no height or weight on file to calculate BMI.    GENERAL: in no

## 2021-02-16 ENCOUNTER — TELEPHONE (OUTPATIENT)
Dept: FAMILY MEDICINE CLINIC | Facility: CLINIC | Age: 66
End: 2021-02-16

## 2021-02-16 DIAGNOSIS — E78.5 DYSLIPIDEMIA: ICD-10-CM

## 2021-02-16 DIAGNOSIS — I15.2 HYPERTENSION ASSOCIATED WITH DIABETES (HCC): ICD-10-CM

## 2021-02-16 DIAGNOSIS — E11.65 TYPE 2 DIABETES MELLITUS WITH HYPERGLYCEMIA, WITH LONG-TERM CURRENT USE OF INSULIN (HCC): Primary | ICD-10-CM

## 2021-02-16 DIAGNOSIS — E03.9 HYPOTHYROIDISM, UNSPECIFIED TYPE: ICD-10-CM

## 2021-02-16 DIAGNOSIS — Z79.4 TYPE 2 DIABETES MELLITUS WITH HYPERGLYCEMIA, WITH LONG-TERM CURRENT USE OF INSULIN (HCC): Primary | ICD-10-CM

## 2021-02-16 DIAGNOSIS — E11.59 HYPERTENSION ASSOCIATED WITH DIABETES (HCC): ICD-10-CM

## 2021-02-16 NOTE — TELEPHONE ENCOUNTER
----- Message from Aldair Alex MD sent at 2/12/2021  9:11 PM CST -----  Protein in the urine, most likely from not control DM.  Our DM with Kell Bryan is next step

## 2021-02-16 NOTE — TELEPHONE ENCOUNTER
Message left for pt to call office back. Pt [facility] Olivier[facility]

## 2021-02-18 NOTE — TELEPHONE ENCOUNTER
Called pt and lmom with results/instructions. Left the contact info for [facility], APN and referral in system. Asked pt after reviewing message to call office with any questions.

## 2021-02-25 ENCOUNTER — TELEPHONE (OUTPATIENT)
Dept: ENDOCRINOLOGY CLINIC | Facility: CLINIC | Age: 66
End: 2021-02-25

## 2021-02-25 DIAGNOSIS — Z79.4 TYPE 2 DIABETES MELLITUS WITH HYPERGLYCEMIA, WITH LONG-TERM CURRENT USE OF INSULIN (HCC): ICD-10-CM

## 2021-02-25 DIAGNOSIS — E11.65 TYPE 2 DIABETES MELLITUS WITH HYPERGLYCEMIA, WITH LONG-TERM CURRENT USE OF INSULIN (HCC): ICD-10-CM

## 2021-02-25 RX ORDER — INSULIN DETEMIR 100 [IU]/ML
INJECTION, SOLUTION SUBCUTANEOUS
Qty: 15 ML | Refills: 0 | Status: CANCELLED | OUTPATIENT
Start: 2021-02-25

## 2021-02-25 RX ORDER — INSULIN DETEMIR 100 [IU]/ML
INJECTION, SOLUTION SUBCUTANEOUS
Qty: 45 ML | Refills: 0 | Status: SHIPPED | OUTPATIENT
Start: 2021-02-25 | End: 2021-06-14

## 2021-03-13 ENCOUNTER — HOSPITAL ENCOUNTER (OUTPATIENT)
Dept: MAMMOGRAPHY | Age: 66
Discharge: HOME | End: 2021-03-13
Attending: FAMILY MEDICINE
Payer: COMMERCIAL

## 2021-03-13 DIAGNOSIS — E11.65 TYPE 2 DIABETES MELLITUS WITH HYPERGLYCEMIA, WITH LONG-TERM CURRENT USE OF INSULIN (HCC): ICD-10-CM

## 2021-03-13 DIAGNOSIS — Z79.4 TYPE 2 DIABETES MELLITUS WITH HYPERGLYCEMIA, WITH LONG-TERM CURRENT USE OF INSULIN (HCC): ICD-10-CM

## 2021-03-13 DIAGNOSIS — Z12.31 VISIT FOR SCREENING MAMMOGRAM: ICD-10-CM

## 2021-03-13 PROCEDURE — 77067 SCR MAMMO BI INCL CAD: CPT | Performed by: FAMILY MEDICINE

## 2021-03-13 PROCEDURE — 77063 BREAST TOMOSYNTHESIS BI: CPT | Performed by: FAMILY MEDICINE

## 2021-03-16 ENCOUNTER — OFFICE VISIT (OUTPATIENT)
Dept: PODIATRY CLINIC | Facility: CLINIC | Age: 66
End: 2021-03-16
Payer: COMMERCIAL

## 2021-03-16 DIAGNOSIS — L97.411 DIABETIC ULCER OF RIGHT MIDFOOT ASSOCIATED WITH TYPE 2 DIABETES MELLITUS, LIMITED TO BREAKDOWN OF SKIN (HCC): ICD-10-CM

## 2021-03-16 DIAGNOSIS — E11.65 TYPE 2 DIABETES MELLITUS WITH HYPERGLYCEMIA, WITH LONG-TERM CURRENT USE OF INSULIN (HCC): Primary | ICD-10-CM

## 2021-03-16 DIAGNOSIS — E11.621 DIABETIC ULCER OF RIGHT MIDFOOT ASSOCIATED WITH TYPE 2 DIABETES MELLITUS, LIMITED TO BREAKDOWN OF SKIN (HCC): ICD-10-CM

## 2021-03-16 DIAGNOSIS — E11.42 DIABETIC POLYNEUROPATHY ASSOCIATED WITH TYPE 2 DIABETES MELLITUS (HCC): ICD-10-CM

## 2021-03-16 DIAGNOSIS — Z79.4 TYPE 2 DIABETES MELLITUS WITH HYPERGLYCEMIA, WITH LONG-TERM CURRENT USE OF INSULIN (HCC): Primary | ICD-10-CM

## 2021-03-16 DIAGNOSIS — L84 CALLUS OF FOOT: ICD-10-CM

## 2021-03-16 PROCEDURE — 99213 OFFICE O/P EST LOW 20 MIN: CPT | Performed by: PODIATRIST

## 2021-03-16 PROCEDURE — L3260 AMBULATORY SURGICAL BOOT EAC: HCPCS | Performed by: PODIATRIST

## 2021-03-16 RX ORDER — AMOXICILLIN AND CLAVULANATE POTASSIUM 875; 125 MG/1; MG/1
1 TABLET, FILM COATED ORAL 2 TIMES DAILY
Qty: 14 TABLET | Refills: 0 | Status: SHIPPED | OUTPATIENT
Start: 2021-03-16 | End: 2021-03-29

## 2021-03-16 NOTE — PROGRESS NOTES
Jesse Mello is a 60year old female. Patient presents with:  Diabetic Foot Care: pt states she has calluses on both feet. pt states her her right hallux has cracked skin. pt states her fating BS was 167 today. LOV w/ pcp was 2/15/21.  last A1c was 9.4 on 3/16/2021 )        Past Medical History:   Diagnosis Date   • AVM (arteriovenous malformation) brain     being followed at [facility] clinic   • Stroke [facility]) 4/18/15    s/p Brain surgery   • Stroke [facility]) 4/2015   • Type II or unspecified type diabetes mellitu Member of Clubs or Organizations:       Attends Club or Organization Meetings:       Marital Status:   Intimate Partner Violence:       Fear of Current or Ex-Partner:       Emotionally Abused:       Physically Abused:       Sexually Abused:         REVIEW trimmed down debrided the edges of the skin fissure there was some hemorrhaging controlled with silver nitrate. The patient was told that this was turned the skin that bled a dark black not to confuse it with gangrene.   I dressed it with a gauze dressing

## 2021-03-22 ENCOUNTER — OFFICE VISIT (OUTPATIENT)
Dept: PODIATRY CLINIC | Facility: CLINIC | Age: 66
End: 2021-03-22
Payer: COMMERCIAL

## 2021-03-22 DIAGNOSIS — E11.621 DIABETIC ULCER OF RIGHT MIDFOOT ASSOCIATED WITH TYPE 2 DIABETES MELLITUS, LIMITED TO BREAKDOWN OF SKIN (HCC): ICD-10-CM

## 2021-03-22 DIAGNOSIS — L97.411 DIABETIC ULCER OF RIGHT MIDFOOT ASSOCIATED WITH TYPE 2 DIABETES MELLITUS, LIMITED TO BREAKDOWN OF SKIN (HCC): ICD-10-CM

## 2021-03-22 DIAGNOSIS — E11.42 DIABETIC POLYNEUROPATHY ASSOCIATED WITH TYPE 2 DIABETES MELLITUS (HCC): ICD-10-CM

## 2021-03-22 DIAGNOSIS — Z79.4 TYPE 2 DIABETES MELLITUS WITH HYPERGLYCEMIA, WITH LONG-TERM CURRENT USE OF INSULIN (HCC): Primary | ICD-10-CM

## 2021-03-22 DIAGNOSIS — L84 CALLUS OF FOOT: ICD-10-CM

## 2021-03-22 DIAGNOSIS — E11.65 TYPE 2 DIABETES MELLITUS WITH HYPERGLYCEMIA, WITH LONG-TERM CURRENT USE OF INSULIN (HCC): Primary | ICD-10-CM

## 2021-03-22 PROCEDURE — 99213 OFFICE O/P EST LOW 20 MIN: CPT | Performed by: PODIATRIST

## 2021-03-22 NOTE — PROGRESS NOTES
Garrison Fairbanks is a 60year old female. Patient presents with:  Diabetic Ulcer: right hallux- pt states she is taking augmentin as rx'd and applying mupirocin to  toe.  pt denies any fever or chills        HPI:   Patient returns the clinic she has not sta complication, not stated as uncontrolled    • Vitamin D deficiency       Past Surgical History:   Procedure Laterality Date   • BRAIN SURGERY  4/16/15    av[facility]   [facility]   •         Family History   Problem Relation Age of Onset   • Diabetes Mot reviewed systens as documented below  GENERAL HEALTH: feels well otherwise  SKIN: Refer to exam below  RESPIRATORY: denies shortness of breath with exertion  CARDIOVASCULAR: denies chest pain on exertion  GI: denies abdominal pain and denies heartburn  LACIE

## 2021-03-23 ENCOUNTER — TELEPHONE (OUTPATIENT)
Dept: ENDOCRINOLOGY CLINIC | Facility: CLINIC | Age: 66
End: 2021-03-23

## 2021-03-26 ENCOUNTER — TELEPHONE (OUTPATIENT)
Dept: ENDOCRINOLOGY CLINIC | Facility: CLINIC | Age: 66
End: 2021-03-26

## 2021-03-26 RX ORDER — BLOOD SUGAR DIAGNOSTIC
STRIP MISCELLANEOUS
Qty: 50 STRIP | Refills: 5 | Status: SHIPPED | OUTPATIENT
Start: 2021-03-26 | End: 2021-10-25 | Stop reason: ALTCHOICE

## 2021-03-26 NOTE — TELEPHONE ENCOUNTER
Patient states she was given a new meter and needs more test strips and lancets. Will send to pharmacy.

## 2021-03-29 ENCOUNTER — OFFICE VISIT (OUTPATIENT)
Dept: ENDOCRINOLOGY CLINIC | Facility: CLINIC | Age: 66
End: 2021-03-29
Payer: COMMERCIAL

## 2021-03-29 VITALS
BODY MASS INDEX: 30.78 KG/M2 | HEART RATE: 91 BPM | DIASTOLIC BLOOD PRESSURE: 54 MMHG | RESPIRATION RATE: 16 BRPM | WEIGHT: 163 LBS | HEIGHT: 61 IN | SYSTOLIC BLOOD PRESSURE: 128 MMHG

## 2021-03-29 DIAGNOSIS — E11.65 TYPE 2 DIABETES MELLITUS WITH HYPERGLYCEMIA, WITH LONG-TERM CURRENT USE OF INSULIN (HCC): Primary | ICD-10-CM

## 2021-03-29 DIAGNOSIS — I10 ESSENTIAL HYPERTENSION: ICD-10-CM

## 2021-03-29 DIAGNOSIS — R80.9 POSITIVE FOR MACROALBUMINURIA: ICD-10-CM

## 2021-03-29 DIAGNOSIS — Z79.4 TYPE 2 DIABETES MELLITUS WITH HYPERGLYCEMIA, WITH LONG-TERM CURRENT USE OF INSULIN (HCC): Primary | ICD-10-CM

## 2021-03-29 DIAGNOSIS — E66.09 CLASS 1 OBESITY DUE TO EXCESS CALORIES WITH SERIOUS COMORBIDITY AND BODY MASS INDEX (BMI) OF 30.0 TO 30.9 IN ADULT: ICD-10-CM

## 2021-03-29 DIAGNOSIS — E78.2 MIXED HYPERLIPIDEMIA: ICD-10-CM

## 2021-03-29 PROCEDURE — 99214 OFFICE O/P EST MOD 30 MIN: CPT | Performed by: NURSE PRACTITIONER

## 2021-03-29 PROCEDURE — 3074F SYST BP LT 130 MM HG: CPT | Performed by: NURSE PRACTITIONER

## 2021-03-29 PROCEDURE — 3078F DIAST BP <80 MM HG: CPT | Performed by: NURSE PRACTITIONER

## 2021-03-29 PROCEDURE — 3008F BODY MASS INDEX DOCD: CPT | Performed by: NURSE PRACTITIONER

## 2021-03-29 RX ORDER — LISINOPRIL 5 MG/1
5 TABLET ORAL DAILY
Qty: 30 TABLET | Refills: 1 | Status: SHIPPED | OUTPATIENT
Start: 2021-03-29 | End: 2021-10-25

## 2021-03-29 RX ORDER — SEMAGLUTIDE 1.34 MG/ML
0.25 INJECTION, SOLUTION SUBCUTANEOUS
Qty: 1 PEN | Refills: 0 | COMMUNITY
Start: 2021-03-29 | End: 2021-09-23 | Stop reason: ALTCHOICE

## 2021-03-29 NOTE — PROGRESS NOTES
HPI:   Mary Jane Lin is a 60year old female who presents for follow up for the management of her diabetes. Patient has not been seen by provider since 2/2020. Patient did not start Ozempic or lisinopril as ordered during last office visit.      Jose tobacco. She reports that she does not drink alcohol and does not use drugs. She has No Known Allergies.    Glucose Blood (CONTOUR NEXT TEST) In Vitro Strip, Test blood glucose 1-2 times daily  Tushar Microlet Lancets Does not apply Misc, Test 1-2 times 02/12/2021 09:11 AM    K 4.1 02/12/2021 09:11 AM     02/12/2021 09:11 AM    CO2 29.0 02/12/2021 09:11 AM           Lipids  (most recent labs)   Lab Results   Component Value Date/Time    CHOLEST 231 (H) 02/12/2021 09:11 AM    TRIG 141 02/12/2021 09:1 thyroid CA or MEN 2.      Patient to continue Metformin 1000mg po @ dinner and Levemir 30 units in AM and 20 units in PM.  Patient to start Ozempic 0.25mg injection WEEKLY    Per ADA guidelines, in patients with type 2 diabetes and established ASCVD, incorp Last Foot Exam: 03/22/21  Date of last PHQ-2 depression screen: PHQ-2 - Date of last depression screening: 3/29/2021  Patient  reports that she has never smoked. She has never used smokeless tobacco.  When is flu vaccine due?  Influenza Vaccine(1) due on 10

## 2021-03-29 NOTE — PATIENT INSTRUCTIONS
We are here to support you with Diabetes but please remember that you still need your primary care doctor for your routine health maintenance. Your current A1C: 9.4%  This test provides us with your average blood sugar for the past 3 months.    The main g (preferably less than 150)   Call for persistent blood sugars < 75 or > 200. Blood sugars greater than 200 are not acceptable to reach your goal of improving diabetes      Health Maintenance:   1.  LABS: It is important to monitor your kidney function (bl

## 2021-04-06 ENCOUNTER — TELEPHONE (OUTPATIENT)
Dept: FAMILY MEDICINE CLINIC | Facility: CLINIC | Age: 66
End: 2021-04-06

## 2021-04-06 RX ORDER — BLOOD SUGAR DIAGNOSTIC
STRIP MISCELLANEOUS
Qty: 200 EACH | Refills: 0 | Status: SHIPPED | OUTPATIENT
Start: 2021-04-06 | End: 2021-11-02

## 2021-04-12 ENCOUNTER — TELEPHONE (OUTPATIENT)
Dept: FAMILY MEDICINE CLINIC | Facility: CLINIC | Age: 66
End: 2021-04-12

## 2021-04-12 ENCOUNTER — HOSPITAL ENCOUNTER (OUTPATIENT)
Dept: GENERAL RADIOLOGY | Age: 66
Discharge: HOME | End: 2021-04-12
Attending: FAMILY MEDICINE
Payer: COMMERCIAL

## 2021-04-12 ENCOUNTER — OFFICE VISIT (OUTPATIENT)
Dept: FAMILY MEDICINE CLINIC | Facility: CLINIC | Age: 66
End: 2021-04-12
Payer: COMMERCIAL

## 2021-04-12 VITALS
SYSTOLIC BLOOD PRESSURE: 130 MMHG | BODY MASS INDEX: 31.53 KG/M2 | TEMPERATURE: 98 F | HEART RATE: 89 BPM | RESPIRATION RATE: 18 BRPM | WEIGHT: 167 LBS | DIASTOLIC BLOOD PRESSURE: 82 MMHG | OXYGEN SATURATION: 97 % | HEIGHT: 61 IN

## 2021-04-12 DIAGNOSIS — S29.9XXA TRAUMATIC INJURY OF RIB: ICD-10-CM

## 2021-04-12 DIAGNOSIS — Z00.00 ROUTINE GENERAL MEDICAL EXAMINATION AT A HEALTH CARE FACILITY: Primary | ICD-10-CM

## 2021-04-12 PROCEDURE — 99213 OFFICE O/P EST LOW 20 MIN: CPT | Performed by: FAMILY MEDICINE

## 2021-04-12 PROCEDURE — 71101 X-RAY EXAM UNILAT RIBS/CHEST: CPT | Performed by: FAMILY MEDICINE

## 2021-04-12 PROCEDURE — 3075F SYST BP GE 130 - 139MM HG: CPT | Performed by: FAMILY MEDICINE

## 2021-04-12 PROCEDURE — 99396 PREV VISIT EST AGE 40-64: CPT | Performed by: FAMILY MEDICINE

## 2021-04-12 PROCEDURE — 3008F BODY MASS INDEX DOCD: CPT | Performed by: FAMILY MEDICINE

## 2021-04-12 PROCEDURE — 3079F DIAST BP 80-89 MM HG: CPT | Performed by: FAMILY MEDICINE

## 2021-04-12 NOTE — PROGRESS NOTES
Juanita Spears is a 60year old female who presents for a complete physical exam, no gyn.   HPI:     Patient presents with:  Physical: pt states she fell getting into shower this morning, right side pain       Patient feels well, dental visit up to date not apply Misc Use to inject Levemir daily 100 each 1   • lisinopril 5 MG Oral Tab Take 1 tablet (5 mg total) by mouth daily.  (Patient not taking: Reported on 4/12/2021 ) 30 tablet 1   • Semaglutide,0.25 or 0.5MG/DOS, (OZEMPIC, 0.25 OR 0.5 MG/DOSE,) 2 MG/1 anemia; denies bruising or excessive bleeding  ENDOCRINE: denies excessive thirst or urination; denies unexpected wt gain or wt loss  ALLERGY/IMM.: denies food or seasonal allergies  PSYCH: no symptoms of depression or anxiety      EXAM:   /82   Puls

## 2021-04-12 NOTE — TELEPHONE ENCOUNTER
Called pt to let her know that x-ray of ribs were normal per Dr. Mcgraw Grandchild. OK to take Tylenol or Ibuprofen as needed for pain. Pt expresses understanding and does not have any questions at this time.

## 2021-04-29 DIAGNOSIS — E11.65 TYPE 2 DIABETES MELLITUS WITH HYPERGLYCEMIA, WITH LONG-TERM CURRENT USE OF INSULIN (HCC): ICD-10-CM

## 2021-04-29 DIAGNOSIS — Z79.4 TYPE 2 DIABETES MELLITUS WITH HYPERGLYCEMIA, WITH LONG-TERM CURRENT USE OF INSULIN (HCC): ICD-10-CM

## 2021-04-29 DIAGNOSIS — R80.9 POSITIVE FOR MACROALBUMINURIA: ICD-10-CM

## 2021-04-29 RX ORDER — LISINOPRIL 5 MG/1
TABLET ORAL
Qty: 30 TABLET | Refills: 1 | OUTPATIENT
Start: 2021-04-29

## 2021-05-03 ENCOUNTER — TELEPHONE (OUTPATIENT)
Dept: FAMILY MEDICINE CLINIC | Facility: CLINIC | Age: 66
End: 2021-05-03

## 2021-05-24 DIAGNOSIS — M54.32 SCIATICA, LEFT SIDE: ICD-10-CM

## 2021-05-24 RX ORDER — MELOXICAM 15 MG/1
TABLET ORAL
Qty: 30 TABLET | Refills: 0 | Status: SHIPPED | OUTPATIENT
Start: 2021-05-24 | End: 2021-10-18

## 2021-05-24 NOTE — TELEPHONE ENCOUNTER
Medication(s) to Refill:   Requested Prescriptions     Pending Prescriptions Disp Refills   • MELOXICAM 15 MG Oral Tab [Pharmacy Med Name: MELOXICAM 15 MG TABLET] 30 tablet 0     Sig: TAKE 1 TABLET BY MOUTH EVERY DAY         Reason for Medication Refill be

## 2021-06-14 DIAGNOSIS — Z79.4 TYPE 2 DIABETES MELLITUS WITH HYPERGLYCEMIA, WITH LONG-TERM CURRENT USE OF INSULIN (HCC): Primary | ICD-10-CM

## 2021-06-14 DIAGNOSIS — E11.65 TYPE 2 DIABETES MELLITUS WITH HYPERGLYCEMIA, WITH LONG-TERM CURRENT USE OF INSULIN (HCC): Primary | ICD-10-CM

## 2021-06-14 RX ORDER — INSULIN DETEMIR 100 [IU]/ML
INJECTION, SOLUTION SUBCUTANEOUS
Qty: 45 ML | Refills: 0 | Status: SHIPPED | OUTPATIENT
Start: 2021-06-14 | End: 2021-09-23

## 2021-06-14 NOTE — TELEPHONE ENCOUNTER
Requested Prescriptions     Pending Prescriptions Disp Refills   • insulin detemir (LEVEMIR FLEXTOUCH) 100 UNIT/ML Subcutaneous Solution Pen-injector [Pharmacy Med Name: Marliynn Furl 100 UNIT/ML] 45 mL 0     Sig: Inject 30 units in the AM and 20 units

## 2021-08-20 DIAGNOSIS — E11.65 TYPE 2 DIABETES MELLITUS WITH HYPERGLYCEMIA, WITH LONG-TERM CURRENT USE OF INSULIN (HCC): ICD-10-CM

## 2021-08-20 DIAGNOSIS — Z79.4 TYPE 2 DIABETES MELLITUS WITH HYPERGLYCEMIA, WITH LONG-TERM CURRENT USE OF INSULIN (HCC): ICD-10-CM

## 2021-08-20 RX ORDER — PEN NEEDLE, DIABETIC 32GX 5/32"
NEEDLE, DISPOSABLE MISCELLANEOUS
Qty: 100 EACH | Refills: 1 | Status: SHIPPED | OUTPATIENT
Start: 2021-08-20 | End: 2022-01-06

## 2021-09-22 ENCOUNTER — TELEPHONE (OUTPATIENT)
Dept: FAMILY MEDICINE CLINIC | Facility: CLINIC | Age: 66
End: 2021-09-22

## 2021-09-22 ENCOUNTER — TELEPHONE (OUTPATIENT)
Dept: ENDOCRINOLOGY CLINIC | Facility: CLINIC | Age: 66
End: 2021-09-22

## 2021-09-22 DIAGNOSIS — E78.2 MIXED HYPERLIPIDEMIA: ICD-10-CM

## 2021-09-22 DIAGNOSIS — Z79.4 TYPE 2 DIABETES MELLITUS WITH HYPERGLYCEMIA, WITH LONG-TERM CURRENT USE OF INSULIN (HCC): Primary | ICD-10-CM

## 2021-09-22 DIAGNOSIS — E11.65 UNCONTROLLED TYPE 2 DIABETES MELLITUS WITH HYPERGLYCEMIA (HCC): ICD-10-CM

## 2021-09-22 DIAGNOSIS — E11.65 TYPE 2 DIABETES MELLITUS WITH HYPERGLYCEMIA, WITH LONG-TERM CURRENT USE OF INSULIN (HCC): Primary | ICD-10-CM

## 2021-09-22 NOTE — TELEPHONE ENCOUNTER
Patient previously seeing DM clinic for diabetes management. Called patient to clarify if she is still seeing DM clinic (patient overdue for F/U and labs). Patient states that she does still see DM clinic.  Patient states that she left a message with DM

## 2021-09-22 NOTE — TELEPHONE ENCOUNTER
Pt called in and requests sample of ozempic (would like to restart) and levemir. She will have medicare effective 10/01/2021. Pt reports she stopped metformin IR due to GI side effects. Pt with many questions, booked video visit for Friday.   Ordered A

## 2021-09-23 ENCOUNTER — LAB ENCOUNTER (OUTPATIENT)
Dept: LAB | Age: 66
End: 2021-09-23
Attending: NURSE PRACTITIONER
Payer: COMMERCIAL

## 2021-09-23 DIAGNOSIS — E78.2 MIXED HYPERLIPIDEMIA: ICD-10-CM

## 2021-09-23 DIAGNOSIS — Z79.4 TYPE 2 DIABETES MELLITUS WITH HYPERGLYCEMIA, WITH LONG-TERM CURRENT USE OF INSULIN (HCC): ICD-10-CM

## 2021-09-23 DIAGNOSIS — E11.65 TYPE 2 DIABETES MELLITUS WITH HYPERGLYCEMIA, WITH LONG-TERM CURRENT USE OF INSULIN (HCC): ICD-10-CM

## 2021-09-23 LAB
ALBUMIN SERPL-MCNC: 3.3 G/DL (ref 3.4–5)
ALBUMIN/GLOB SERPL: 0.8 {RATIO} (ref 1–2)
ALP LIVER SERPL-CCNC: 99 U/L
ALT SERPL-CCNC: 14 U/L
ANION GAP SERPL CALC-SCNC: 7 MMOL/L (ref 0–18)
AST SERPL-CCNC: 11 U/L (ref 15–37)
BILIRUB SERPL-MCNC: 0.6 MG/DL (ref 0.1–2)
BUN BLD-MCNC: 16 MG/DL (ref 7–18)
CALCIUM BLD-MCNC: 9.3 MG/DL (ref 8.5–10.1)
CHLORIDE SERPL-SCNC: 104 MMOL/L (ref 98–112)
CHOLEST SERPL-MCNC: 271 MG/DL (ref ?–200)
CO2 SERPL-SCNC: 27 MMOL/L (ref 21–32)
CREAT BLD-MCNC: 0.72 MG/DL
CREAT UR-SCNC: 117 MG/DL
GLOBULIN PLAS-MCNC: 4.3 G/DL (ref 2.8–4.4)
GLUCOSE BLD-MCNC: 204 MG/DL (ref 70–99)
HDLC SERPL-MCNC: 51 MG/DL (ref 40–59)
LDLC SERPL CALC-MCNC: 190 MG/DL (ref ?–100)
MICROALBUMIN UR-MCNC: 137 MG/DL
MICROALBUMIN/CREAT 24H UR-RTO: 1170.9 UG/MG (ref ?–30)
NONHDLC SERPL-MCNC: 220 MG/DL (ref ?–130)
OSMOLALITY SERPL CALC.SUM OF ELEC: 293 MOSM/KG (ref 275–295)
PATIENT FASTING Y/N/NP: YES
PATIENT FASTING Y/N/NP: YES
POTASSIUM SERPL-SCNC: 3.8 MMOL/L (ref 3.5–5.1)
PROT SERPL-MCNC: 7.6 G/DL (ref 6.4–8.2)
SODIUM SERPL-SCNC: 138 MMOL/L (ref 136–145)
TRIGL SERPL-MCNC: 161 MG/DL (ref 30–149)
VLDLC SERPL CALC-MCNC: 34 MG/DL (ref 0–30)

## 2021-09-23 PROCEDURE — 82570 ASSAY OF URINE CREATININE: CPT | Performed by: NURSE PRACTITIONER

## 2021-09-23 PROCEDURE — 3046F HEMOGLOBIN A1C LEVEL >9.0%: CPT | Performed by: FAMILY MEDICINE

## 2021-09-23 PROCEDURE — 82043 UR ALBUMIN QUANTITATIVE: CPT | Performed by: NURSE PRACTITIONER

## 2021-09-23 PROCEDURE — 80053 COMPREHEN METABOLIC PANEL: CPT | Performed by: NURSE PRACTITIONER

## 2021-09-23 PROCEDURE — 3060F POS MICROALBUMINURIA REV: CPT | Performed by: FAMILY MEDICINE

## 2021-09-23 PROCEDURE — 80061 LIPID PANEL: CPT | Performed by: NURSE PRACTITIONER

## 2021-09-23 RX ORDER — INSULIN DETEMIR 100 [IU]/ML
INJECTION, SOLUTION SUBCUTANEOUS
Qty: 45 ML | Refills: 0 | Status: SHIPPED | OUTPATIENT
Start: 2021-09-23 | End: 2021-10-01

## 2021-09-23 NOTE — TELEPHONE ENCOUNTER
Patient has not been seen by provider since 3/21 and then a year prior in 2/2020. Patient will not be able to be seen virtually. Please cancel virtual visit and schedule patient for in office visit for 45 minutes.   Additional lab orders have been placed,

## 2021-10-01 DIAGNOSIS — E11.65 TYPE 2 DIABETES MELLITUS WITH HYPERGLYCEMIA, WITH LONG-TERM CURRENT USE OF INSULIN (HCC): ICD-10-CM

## 2021-10-01 DIAGNOSIS — Z79.4 TYPE 2 DIABETES MELLITUS WITH HYPERGLYCEMIA, WITH LONG-TERM CURRENT USE OF INSULIN (HCC): ICD-10-CM

## 2021-10-01 RX ORDER — INSULIN DETEMIR 100 [IU]/ML
INJECTION, SOLUTION SUBCUTANEOUS
Qty: 45 ML | Refills: 0 | Status: SHIPPED | OUTPATIENT
Start: 2021-10-01 | End: 2021-10-25

## 2021-10-18 DIAGNOSIS — M54.32 SCIATICA, LEFT SIDE: ICD-10-CM

## 2021-10-18 RX ORDER — MELOXICAM 15 MG/1
15 TABLET ORAL DAILY
Qty: 30 TABLET | Refills: 0 | Status: SHIPPED | OUTPATIENT
Start: 2021-10-18 | End: 2021-11-24

## 2021-10-18 NOTE — TELEPHONE ENCOUNTER
Medication(s) to Refill:   Requested Prescriptions     Pending Prescriptions Disp Refills   • Meloxicam 15 MG Oral Tab 30 tablet 0     Sig: Take 1 tablet (15 mg total) by mouth daily.          Reason for Medication Refill being sent to Provider / Reason Pro

## 2021-10-25 ENCOUNTER — OFFICE VISIT (OUTPATIENT)
Dept: ENDOCRINOLOGY CLINIC | Facility: CLINIC | Age: 66
End: 2021-10-25
Payer: MEDICARE

## 2021-10-25 VITALS
SYSTOLIC BLOOD PRESSURE: 126 MMHG | HEIGHT: 61 IN | HEART RATE: 90 BPM | RESPIRATION RATE: 18 BRPM | BODY MASS INDEX: 31.91 KG/M2 | WEIGHT: 169 LBS | DIASTOLIC BLOOD PRESSURE: 74 MMHG

## 2021-10-25 DIAGNOSIS — E66.09 CLASS 1 OBESITY DUE TO EXCESS CALORIES WITH SERIOUS COMORBIDITY AND BODY MASS INDEX (BMI) OF 31.0 TO 31.9 IN ADULT: ICD-10-CM

## 2021-10-25 DIAGNOSIS — I10 ESSENTIAL HYPERTENSION: ICD-10-CM

## 2021-10-25 DIAGNOSIS — Z79.4 TYPE 2 DIABETES MELLITUS WITH HYPERGLYCEMIA, WITH LONG-TERM CURRENT USE OF INSULIN (HCC): Primary | ICD-10-CM

## 2021-10-25 DIAGNOSIS — E78.2 MIXED HYPERLIPIDEMIA: ICD-10-CM

## 2021-10-25 DIAGNOSIS — R80.9 POSITIVE FOR MACROALBUMINURIA: ICD-10-CM

## 2021-10-25 DIAGNOSIS — E11.65 TYPE 2 DIABETES MELLITUS WITH HYPERGLYCEMIA, WITH LONG-TERM CURRENT USE OF INSULIN (HCC): Primary | ICD-10-CM

## 2021-10-25 PROCEDURE — 3078F DIAST BP <80 MM HG: CPT | Performed by: NURSE PRACTITIONER

## 2021-10-25 PROCEDURE — 3074F SYST BP LT 130 MM HG: CPT | Performed by: NURSE PRACTITIONER

## 2021-10-25 PROCEDURE — 3008F BODY MASS INDEX DOCD: CPT | Performed by: NURSE PRACTITIONER

## 2021-10-25 PROCEDURE — 99214 OFFICE O/P EST MOD 30 MIN: CPT | Performed by: NURSE PRACTITIONER

## 2021-10-25 RX ORDER — INSULIN DETEMIR 100 [IU]/ML
INJECTION, SOLUTION SUBCUTANEOUS
Qty: 45 ML | Refills: 0 | Status: SHIPPED | OUTPATIENT
Start: 2021-10-25 | End: 2021-10-25

## 2021-10-25 RX ORDER — INSULIN DETEMIR 100 [IU]/ML
26 INJECTION, SOLUTION SUBCUTANEOUS
Qty: 45 ML | Refills: 0 | COMMUNITY
Start: 2021-10-25

## 2021-10-25 RX ORDER — METFORMIN HYDROCHLORIDE 500 MG/1
500 TABLET, EXTENDED RELEASE ORAL
Qty: 30 TABLET | Refills: 1 | Status: SHIPPED | OUTPATIENT
Start: 2021-10-25

## 2021-10-25 RX ORDER — SEMAGLUTIDE 1.34 MG/ML
0.25 INJECTION, SOLUTION SUBCUTANEOUS
Qty: 1.5 ML | Refills: 1 | Status: SHIPPED | OUTPATIENT
Start: 2021-10-25 | End: 2021-11-26 | Stop reason: DRUGHIGH

## 2021-10-25 RX ORDER — LISINOPRIL 5 MG/1
5 TABLET ORAL DAILY
Qty: 30 TABLET | Refills: 1 | Status: SHIPPED | OUTPATIENT
Start: 2021-10-25

## 2021-10-25 NOTE — PROGRESS NOTES
HPI:   Dilip Ramos is a 73year old female who presents for follow up for the management of her diabetes.      Patient presents with:  Diabetes: follow up       Diabetes: needs improvement  Type: 2   Duration:dx yr 2000  Current Meds: Levemir 30 units Pen-injector, Inject 30 units in the AM and 26 units in the PM  Meloxicam 15 MG Oral Tab, Take 1 tablet (15 mg total) by mouth daily. Insulin Pen Needle (BD PEN NEEDLE TITO 2ND GEN) 32G X 4 MM Does not apply Misc, Use once daily. 90 day supply.   Glucose 1,170.9 (H) 09/23/2021 09:22 AM        Lab results reviewed with patient.     REVIEW OF SYSTEMS:   GENERAL HEALTH: feels well otherwise  SKIN: denies any unusual skin lesions or rashes  RESPIRATORY: denies shortness of breath with exertion  CARDIOVASCULAR: type 2 diabetes who are currently treating their CV disease. Discussed self monitoring blood glucose and the importance of monitoring. Patient agreed to monitoring bid - fasting in AM and 2 hours postprandial of one meal per day.     Hypoglycemia S&S, Rx, MG/DOSE,) 2 MG/1.5ML Subcutaneous Solution Pen-injector; Inject 0.25 mg into the skin every 7 days for 28 days. -     lisinopril 5 MG Oral Tab;  Take 1 tablet (5 mg total) by mouth daily.  -     Discontinue: insulin detemir (LEVEMIR FLEXTOUCH) 100 UNIT/ML

## 2021-10-25 NOTE — PATIENT INSTRUCTIONS
We are here to support you with Diabetes but please remember that you still need your primary care doctor for your routine health maintenance. Your current A1C: 9.2%  This test provides us with your average blood sugar for the past 3 months.    The main g than 150)   Call for persistent blood sugars < 75 or > 200. Blood sugars greater than 200 are not acceptable to reach your goal of improving diabetes      Health Maintenance:   1.  LABS: It is important to monitor your kidney function (blood and urine pro

## 2021-10-28 ENCOUNTER — DIABETIC EDUCATION (OUTPATIENT)
Dept: ENDOCRINOLOGY CLINIC | Facility: CLINIC | Age: 66
End: 2021-10-28
Payer: MEDICARE

## 2021-10-28 DIAGNOSIS — E11.65 TYPE 2 DIABETES MELLITUS WITH HYPERGLYCEMIA, WITH LONG-TERM CURRENT USE OF INSULIN (HCC): Primary | ICD-10-CM

## 2021-10-28 DIAGNOSIS — Z79.4 TYPE 2 DIABETES MELLITUS WITH HYPERGLYCEMIA, WITH LONG-TERM CURRENT USE OF INSULIN (HCC): Primary | ICD-10-CM

## 2021-10-28 PROCEDURE — 97802 MEDICAL NUTRITION INDIV IN: CPT | Performed by: DIETITIAN, REGISTERED

## 2021-10-28 NOTE — PROGRESS NOTES
Courtney Rios   10/22/1955 was seen for Medical Nutrition Therapy with Diabetes:    10/28/2021  Referring Provider: Rafat Renner  Start time: 8:00 End time: 9:00    HGBA1C (%)   Date Value   10/09/2013 12.1 (H)     HgbA1C (%)   Date Value   09/23/2021 9 protein to breakfast, 2) discontinue reg coke unless hypoglycemic, 3) reduce sweets    Patient is willing to make lifestyle changes to improve blood glucose control. Written materials provided for all topics covered.   Patient verbalized understanding an

## 2021-11-02 ENCOUNTER — TELEPHONE (OUTPATIENT)
Dept: ENDOCRINOLOGY CLINIC | Facility: CLINIC | Age: 66
End: 2021-11-02

## 2021-11-02 RX ORDER — LANCETS
EACH MISCELLANEOUS
Qty: 200 EACH | Refills: 2 | Status: SHIPPED | OUTPATIENT
Start: 2021-11-02

## 2021-11-02 RX ORDER — BLOOD SUGAR DIAGNOSTIC
STRIP MISCELLANEOUS
Qty: 200 EACH | Refills: 0 | Status: SHIPPED | OUTPATIENT
Start: 2021-11-02

## 2021-11-08 ENCOUNTER — OFFICE VISIT (OUTPATIENT)
Dept: FAMILY MEDICINE CLINIC | Facility: CLINIC | Age: 66
End: 2021-11-08
Payer: MEDICARE

## 2021-11-08 VITALS
RESPIRATION RATE: 16 BRPM | HEIGHT: 61 IN | OXYGEN SATURATION: 97 % | WEIGHT: 169 LBS | BODY MASS INDEX: 31.91 KG/M2 | SYSTOLIC BLOOD PRESSURE: 124 MMHG | HEART RATE: 99 BPM | DIASTOLIC BLOOD PRESSURE: 82 MMHG

## 2021-11-08 DIAGNOSIS — N32.81 OVERACTIVE BLADDER: ICD-10-CM

## 2021-11-08 DIAGNOSIS — G25.81 RLS (RESTLESS LEGS SYNDROME): Primary | ICD-10-CM

## 2021-11-08 DIAGNOSIS — E11.69 HYPERLIPIDEMIA ASSOCIATED WITH TYPE 2 DIABETES MELLITUS (HCC): ICD-10-CM

## 2021-11-08 DIAGNOSIS — E78.5 HYPERLIPIDEMIA ASSOCIATED WITH TYPE 2 DIABETES MELLITUS (HCC): ICD-10-CM

## 2021-11-08 PROCEDURE — 99214 OFFICE O/P EST MOD 30 MIN: CPT | Performed by: FAMILY MEDICINE

## 2021-11-08 PROCEDURE — 3079F DIAST BP 80-89 MM HG: CPT | Performed by: FAMILY MEDICINE

## 2021-11-08 PROCEDURE — 3008F BODY MASS INDEX DOCD: CPT | Performed by: FAMILY MEDICINE

## 2021-11-08 PROCEDURE — 3074F SYST BP LT 130 MM HG: CPT | Performed by: FAMILY MEDICINE

## 2021-11-08 RX ORDER — OXYBUTYNIN CHLORIDE 5 MG/1
5 TABLET ORAL EVERY EVENING
Qty: 30 TABLET | Refills: 5 | Status: SHIPPED | OUTPATIENT
Start: 2021-11-08

## 2021-11-08 RX ORDER — ROPINIROLE 0.5 MG/1
0.5 TABLET, FILM COATED ORAL NIGHTLY
Qty: 30 TABLET | Refills: 3 | Status: SHIPPED | OUTPATIENT
Start: 2021-11-08

## 2021-11-08 RX ORDER — ROSUVASTATIN CALCIUM 10 MG/1
10 TABLET, COATED ORAL NIGHTLY
Qty: 30 TABLET | Refills: 11 | Status: SHIPPED | OUTPATIENT
Start: 2021-11-08

## 2021-11-08 NOTE — PROGRESS NOTES
Patient presents with: Follow - Up: diabetic labs         HPI:  Pt has lower legs cramps, moderate to severe, worse at night, pt states she has to move her legs to get some relief, extremely disturbing the sleep, bothersome, worsening. Last months.    Pt skin every 7 days for 28 days. 1.5 mL 1   • insulin detemir (LEVEMIR FLEXTOUCH) 100 UNIT/ML Subcutaneous Solution Pen-injector Inject 30 units in the AM and 26 units in the PM 45 mL 0   • Meloxicam 15 MG Oral Tab Take 1 tablet (15 mg total) by mouth daily. dayan.  HEART:S1/S2 reg., no murmurs, clicks, gallops. NEURO: CN 2-12 intact, moving 4 extremities without difficulty, dtr 2+/4+ x 4 ext. No diff with hand eye coordination. No papilledema. A & O x 3 and answering questions without difficulty.  Visual fields

## 2021-11-23 DIAGNOSIS — M54.32 SCIATICA, LEFT SIDE: ICD-10-CM

## 2021-11-24 ENCOUNTER — TELEPHONE (OUTPATIENT)
Dept: ENDOCRINOLOGY CLINIC | Facility: CLINIC | Age: 66
End: 2021-11-24

## 2021-11-24 RX ORDER — MELOXICAM 15 MG/1
TABLET ORAL
Qty: 30 TABLET | Refills: 0 | Status: SHIPPED | OUTPATIENT
Start: 2021-11-24

## 2021-11-24 NOTE — TELEPHONE ENCOUNTER
Called pt to get BG reading for fridays visit.  Pt can not due to not having meter with told pt to give us a mychart message with the reading since she leaves her mothers house at 9 pm.

## 2021-11-24 NOTE — TELEPHONE ENCOUNTER
Called and spoke with pt asking for BG readings for upcoming appointment with [facility], pt states does not have readings on her at the moment but will give us a call back with BG readings

## 2021-11-26 ENCOUNTER — VIRTUAL PHONE E/M (OUTPATIENT)
Dept: ENDOCRINOLOGY CLINIC | Facility: CLINIC | Age: 66
End: 2021-11-26
Payer: MEDICARE

## 2021-11-26 DIAGNOSIS — E11.65 TYPE 2 DIABETES MELLITUS WITH HYPERGLYCEMIA, WITH LONG-TERM CURRENT USE OF INSULIN (HCC): Primary | ICD-10-CM

## 2021-11-26 DIAGNOSIS — Z79.4 TYPE 2 DIABETES MELLITUS WITH HYPERGLYCEMIA, WITH LONG-TERM CURRENT USE OF INSULIN (HCC): Primary | ICD-10-CM

## 2021-11-26 PROCEDURE — 99441 PHONE E/M BY PHYS 5-10 MIN: CPT | Performed by: NURSE PRACTITIONER

## 2021-11-26 RX ORDER — SEMAGLUTIDE 1.34 MG/ML
0.5 INJECTION, SOLUTION SUBCUTANEOUS
Qty: 1.5 ML | Refills: 1 | Status: SHIPPED | OUTPATIENT
Start: 2021-11-26

## 2021-11-26 NOTE — PROGRESS NOTES
HPI:   Berenice Crockett is a 73year old female presents virtually for her management of diabetes. This visit is conducted using Telemedicine with live, interactive audio due to COVID-19. Patient verbally consents to Telemedicine visit.   Patient underst TABLET(15 MG) BY MOUTH DAILY  rosuvastatin (CRESTOR) 10 MG Oral Tab, Take 1 tablet (10 mg total) by mouth nightly. rOPINIRole HCl (REQUIP) 0.5 MG Oral Tab, Take 1 tablet (0.5 mg total) by mouth at bedtime.  One tablet  oxybutynin 5 MG Oral Tab, Take 1 tabl 09/23/2021 09:22 AM    HDL 51 09/23/2021 09:22 AM     (H) 09/23/2021 09:22 AM    NONHDLC 220 (H) 09/23/2021 09:22 AM          Diabetes  (most recent labs)   Lab Results   Component Value Date/Time    A1C 9.2 (H) 09/23/2021 09:22 AM          Microalb 10/22/2021    The patient indicates understanding of these issues and agrees to the plan. Refills addressed at time of office visit.     Diagnoses and all orders for this visit:    Type 2 diabetes mellitus with hyperglycemia, with long-term current use of

## 2021-11-26 NOTE — PATIENT INSTRUCTIONS
Continue Metformin ER 500mg @ dinner.   Increase Ozempic to 0.5mg injection weekly and decrease Levemir to 26 units in AM and 26 units in PM

## 2021-12-07 ENCOUNTER — LAB ENCOUNTER (OUTPATIENT)
Dept: LAB | Age: 66
End: 2021-12-07
Attending: FAMILY MEDICINE
Payer: MEDICARE

## 2021-12-07 DIAGNOSIS — E78.5 HYPERLIPIDEMIA ASSOCIATED WITH TYPE 2 DIABETES MELLITUS (HCC): ICD-10-CM

## 2021-12-07 DIAGNOSIS — E11.69 HYPERLIPIDEMIA ASSOCIATED WITH TYPE 2 DIABETES MELLITUS (HCC): ICD-10-CM

## 2021-12-07 DIAGNOSIS — G25.81 RLS (RESTLESS LEGS SYNDROME): ICD-10-CM

## 2021-12-07 DIAGNOSIS — N32.81 OVERACTIVE BLADDER: ICD-10-CM

## 2021-12-07 PROCEDURE — 3052F HG A1C>EQUAL 8.0%<EQUAL 9.0%: CPT | Performed by: FAMILY MEDICINE

## 2021-12-07 PROCEDURE — 80053 COMPREHEN METABOLIC PANEL: CPT

## 2021-12-07 PROCEDURE — 80061 LIPID PANEL: CPT

## 2021-12-07 PROCEDURE — 36415 COLL VENOUS BLD VENIPUNCTURE: CPT

## 2021-12-07 PROCEDURE — 83036 HEMOGLOBIN GLYCOSYLATED A1C: CPT

## 2021-12-07 PROCEDURE — 82607 VITAMIN B-12: CPT

## 2021-12-07 PROCEDURE — 83735 ASSAY OF MAGNESIUM: CPT

## 2021-12-09 ENCOUNTER — TELEPHONE (OUTPATIENT)
Dept: FAMILY MEDICINE CLINIC | Facility: CLINIC | Age: 66
End: 2021-12-09

## 2021-12-09 NOTE — TELEPHONE ENCOUNTER
----- Message from Sejal Nicole MD sent at 12/8/2021 12:53 PM CST -----  Abnormal results patient needs to make an appointment to f/u.

## 2021-12-09 NOTE — TELEPHONE ENCOUNTER
Message sent to patient stating Dr Scar Flores would like her to come in to discuss her plan of care due to poor labs.

## 2021-12-10 ENCOUNTER — OFFICE VISIT (OUTPATIENT)
Dept: FAMILY MEDICINE CLINIC | Facility: CLINIC | Age: 66
End: 2021-12-10
Payer: MEDICARE

## 2021-12-10 VITALS
WEIGHT: 164 LBS | SYSTOLIC BLOOD PRESSURE: 122 MMHG | BODY MASS INDEX: 30.96 KG/M2 | HEART RATE: 87 BPM | RESPIRATION RATE: 16 BRPM | DIASTOLIC BLOOD PRESSURE: 84 MMHG | HEIGHT: 61 IN | OXYGEN SATURATION: 96 %

## 2021-12-10 DIAGNOSIS — Z00.00 WELCOME TO MEDICARE PREVENTIVE VISIT: Primary | ICD-10-CM

## 2021-12-10 DIAGNOSIS — E03.9 ACQUIRED HYPOTHYROIDISM: ICD-10-CM

## 2021-12-10 DIAGNOSIS — E78.2 MIXED HYPERLIPIDEMIA: ICD-10-CM

## 2021-12-10 DIAGNOSIS — Z79.4 TYPE 2 DIABETES MELLITUS WITH HYPERGLYCEMIA, WITH LONG-TERM CURRENT USE OF INSULIN (HCC): ICD-10-CM

## 2021-12-10 DIAGNOSIS — E11.3592: ICD-10-CM

## 2021-12-10 DIAGNOSIS — Z23 NEED FOR PNEUMOCOCCAL VACCINATION: ICD-10-CM

## 2021-12-10 DIAGNOSIS — G25.81 RLS (RESTLESS LEGS SYNDROME): ICD-10-CM

## 2021-12-10 DIAGNOSIS — M85.89 OSTEOPENIA OF MULTIPLE SITES: ICD-10-CM

## 2021-12-10 DIAGNOSIS — R80.9 POSITIVE FOR MACROALBUMINURIA: ICD-10-CM

## 2021-12-10 DIAGNOSIS — Z23 NEED FOR VACCINATION: ICD-10-CM

## 2021-12-10 DIAGNOSIS — Z12.31 VISIT FOR SCREENING MAMMOGRAM: ICD-10-CM

## 2021-12-10 DIAGNOSIS — I10 ESSENTIAL HYPERTENSION: ICD-10-CM

## 2021-12-10 DIAGNOSIS — Z86.73 STATUS POST STROKE: ICD-10-CM

## 2021-12-10 DIAGNOSIS — N32.81 OVERACTIVE BLADDER: ICD-10-CM

## 2021-12-10 DIAGNOSIS — E11.65 TYPE 2 DIABETES MELLITUS WITH HYPERGLYCEMIA, WITH LONG-TERM CURRENT USE OF INSULIN (HCC): ICD-10-CM

## 2021-12-10 PROBLEM — IMO0002: Status: RESOLVED | Noted: 2018-02-14 | Resolved: 2021-12-10

## 2021-12-10 PROCEDURE — 96160 PT-FOCUSED HLTH RISK ASSMT: CPT | Performed by: FAMILY MEDICINE

## 2021-12-10 PROCEDURE — 3008F BODY MASS INDEX DOCD: CPT | Performed by: FAMILY MEDICINE

## 2021-12-10 PROCEDURE — 3079F DIAST BP 80-89 MM HG: CPT | Performed by: FAMILY MEDICINE

## 2021-12-10 PROCEDURE — 3074F SYST BP LT 130 MM HG: CPT | Performed by: FAMILY MEDICINE

## 2021-12-10 PROCEDURE — G0403 EKG FOR INITIAL PREVENT EXAM: HCPCS | Performed by: FAMILY MEDICINE

## 2021-12-10 PROCEDURE — G0009 ADMIN PNEUMOCOCCAL VACCINE: HCPCS | Performed by: FAMILY MEDICINE

## 2021-12-10 PROCEDURE — 90662 IIV NO PRSV INCREASED AG IM: CPT | Performed by: FAMILY MEDICINE

## 2021-12-10 PROCEDURE — 90732 PPSV23 VACC 2 YRS+ SUBQ/IM: CPT | Performed by: FAMILY MEDICINE

## 2021-12-10 PROCEDURE — G0008 ADMIN INFLUENZA VIRUS VAC: HCPCS | Performed by: FAMILY MEDICINE

## 2021-12-10 PROCEDURE — G0402 INITIAL PREVENTIVE EXAM: HCPCS | Performed by: FAMILY MEDICINE

## 2021-12-10 PROCEDURE — 99397 PER PM REEVAL EST PAT 65+ YR: CPT | Performed by: FAMILY MEDICINE

## 2021-12-10 NOTE — PROGRESS NOTES
REASON FOR VISIT:    Joslyn Mendez is a 73year old female who presents for a Medicare Initial Preventative Physical exam.    HPI:    Patient Care Team: Patient Care Team:  Baron Raquel MD as PCP - General (Family Practice)  Geno Richards MD 1 tablet (10 mg total) by mouth nightly. (Patient not taking: Reported on 12/10/2021) 30 tablet 11   • rOPINIRole HCl (REQUIP) 0.5 MG Oral Tab Take 1 tablet (0.5 mg total) by mouth at bedtime.  One tablet (Patient not taking: Reported on 12/10/2021) 30 tabl patient maintain a good energy level?: Other  How would you describe your daily physical activity?: Moderate  How would you describe your current health state?: Poor  How do you maintain positive mental well-being?: Visiting Family  If you are a male age 3 Value   10/09/2013 12.1 (H)     HgbA1C (%)   Date Value   12/07/2021 8.8 (H)       Fasting Blood Sugar (FSB)Annually Glucose (mg/dL)   Date Value   12/07/2021 140 (H)     GLUCOSE (mg/dL)   Date Value   10/09/2013 316 (H)      Cardiovascular Disease Screeni Past Medical History:   Diagnosis Date   • AVM (arteriovenous malformation) brain     being followed at [facility] clinic   • Stroke [facility]) 4/18/15    s/p Brain surgery   • Stroke [facility]) 4/2015   • Type II or unspecified type diabetes mellitus without mention of or ST  LUNGS: denies shortness of breath with exertion  CARDIOVASCULAR: denies chest pain on exertion  GI: denies abdominal pain, denies heartburn  : denies dysuria, vaginal discharge or itching  MUSCULOSKELETAL: denies back pain  NEURO: denies headaches sites  -     XR DEXA BONE DENSITOMETRY (CPT=77080); Future   high calcium and protein diet d/w the pt. Caltrate 600+D once a day recommended. Acitive life style and exercise 3 times a week d/w the pt. Handouts given.     Visit for screening mammogram  -     M

## 2021-12-10 NOTE — PATIENT INSTRUCTIONS
Vero Ch M.D. or [facility]       [id number]  [address], [address] [place] [address]    Phone: [phone]  Fax: [phone].       Prevention G cancer All women at average risk in this age group through age 76 who are in good health. For women ages 68 to 80, talk with your healthcare provider about whether to continue screening. For women 80 and older, screening is not needed.  Multiple tests are a Osteoporosis All women in this age group Routinely done every 2 years, but repeat as advised by your healthcare provider   Syphilis Women at increased risk for infection At routine exams; talk with your healthcare provider   Thyroid-stimulating hormone ( Zoster (shingles) All women in this age group 2 vaccines are available:  · Recombinant zoster vaccine (Roddie Fails; Shigrix) is recommended as the preferred shingles vaccine. It's given in a series of 2 doses.  The 2nd dose is given 2 to 6 months after the first.

## 2022-01-06 DIAGNOSIS — Z79.4 TYPE 2 DIABETES MELLITUS WITH HYPERGLYCEMIA, WITH LONG-TERM CURRENT USE OF INSULIN (HCC): ICD-10-CM

## 2022-01-06 DIAGNOSIS — E11.65 TYPE 2 DIABETES MELLITUS WITH HYPERGLYCEMIA, WITH LONG-TERM CURRENT USE OF INSULIN (HCC): ICD-10-CM

## 2022-01-06 RX ORDER — PEN NEEDLE, DIABETIC 32GX 5/32"
NEEDLE, DISPOSABLE MISCELLANEOUS
Qty: 100 EACH | Refills: 1 | Status: SHIPPED | OUTPATIENT
Start: 2022-01-06

## 2022-01-07 ENCOUNTER — TELEPHONE (OUTPATIENT)
Dept: PODIATRY CLINIC | Facility: CLINIC | Age: 67
End: 2022-01-07

## 2022-01-07 NOTE — TELEPHONE ENCOUNTER
PT. Had upcoming appt but we no longer accept this insurance plan. Left message to call back if needed for possible self-pay.

## 2022-01-14 NOTE — TELEPHONE ENCOUNTER
[facility] - Emergency Department  Cardiology  Progress Note    Patient Name: Neymar Patel  Patient :  1954  MRN: [id number]  Admission Date: 2021  Hospital Length of Stay: 23 days  Code Status: Full Code   Attending Physician: Raul Gan III, MD   Primary Care Physician: Landen Brown MD  Expected Discharge Date:   Principal Problem:Acute on chronic respiratory failure with hypoxia    Subjective:     Brief HPI:    Chief Complaint:     Dizziness      HPI:   Patient is a 67-year-old male with persistent atrial flutter, PAD, history of CVA, hypertension, hyperlipidemia, tobacco use and alcohol abuse.     Presented to hospital for shortness of breath and dizziness.  Continues to have atrial fibrillation with RVR.  Continues with ETOH abuse.  States he has quit smoking.  Reports diagnosis of bronchitis about 2 weeks ago and symptoms have progressed.  Denies chest pain.        Interval History:   2021:  Shortness of breath improving. Rate improved but still has intermittent tachycardia.     Obtained from pcp note:  21:   Rapid Response Call #1: Blood pressure initially low and patient heart rate in the 50s sinus Nakul.  Glucose within normal limits.  Fluid bolus 1 L was given with improvement in blood pressure.  Advised to hold any medication that will lower patient's heart rate.  Patient also complains of shortness of breath but according to respiratory therapist, patient did not take his breathing treatment because he did not think he needed it.  Patient wheezy expiratory and saturating in the high 80s on room air. not on any antibiotics currently so will start.  Labs show that patient has TANIKA possibly due to dehydration.  Will resuscitate with fluid.  Will recheck basic blood work and continue to hydrate.  Treat accordingly will at started if patient is not already on it.  Patient more alert and oriented x4 moving all extremities with improvement of blood pressure into the 90s and  Will fax the Rx heart rate remains in the 50s sinus Nakul saturating 94% on 2 L  Rapid Response Call #2:Rapid response team called again with subsequent intubation and transfer to MICU.   12/25/21: On minimal sedation and pressor support in the MICU.   12/26/21: Able to wean off of all pressor support, pH of 5 while on bicarb on AM labs. Will get rid of bicarb drip and switch to gentle IV hydration with LR. SBT tomorrow?  12/27/21:  Events over the weekend noted.  Patient is now intubated on the ventilator.  Patient was admitted with pneumonia atrial fibrillation and alcohol abuse and now also has a enterococcal urinary tract infection with sensitivities pending.  Patient has required to sedatives for agitation and combativeness.  12/28/21:  Patient's ABG has been reviewed.  Patient has a mild respiratory alkalosis and his respiratory rate has been changed.  We have encouraged respiratory and nursing to wean his sedation and titrate his tube feeds.  The patient's chest x-ray also seems like he is slightly volume overloaded therefore will discontinue IV fluids and give low dosing of Lasix.  Patient has enterococcal infection was ampicillin sensitive therefore Rocephin should be adequate.  Will change Zithromax to Levaquin.  12/29/21 FM:  Patient had an episode of desaturation yesterday.  Respiratory was able to deep suction and several mucus plugs and thick secretions were removed.  I will broaden patient antibiotics today to Zosyn and otherwise plan on increasing his diuretics today.  Patient has bilateral pulmonary infiltrates consistent with pulmonary edema.  12/30/21 FM:  Patient had a elevated temperature through the night.  Rocephin was changed to Zosyn yesterday and consideration of drug fever should be entertained.  Will add medicine for MRSA infection today as well as antifungals.  Patient's chest x-ray seems to be improved with diuretics.  Continue diuretics cautiously.  12/31/21 CG: Antibiotic course broadened,  tachycardic with an increase in PEEP. He is net negative over the past 24 hours.    1/1/22 CG: No acute overnight events. PEEP decreased to 10.   1/2/22 CG: CXR shows worsening fluid burden, heart rates in 110s-120s.   1/3/22 FM:  Patient is on an FiO2 of 55%.  His chest x-ray still shows pulmonary edema which is also consistent with his CT scan.  We will replace electrolytes transfuse and continue to diurese.  Hopefully we can wean his FiO2 down this weekend and wean him off the ventilator by the end of the week.  1/4/22 FM:  Patient's vital signs are stable this morning and he is sedated.  Nursing reports that when sedation is weaned the patient is awake opens eyes and is alert.  We are attempting a CPAP trial this morning as the patient is tolerating and FiO2 of 50% and could probably be weaned further.  Will reduce dosing of diuretics today.  Continue to replete potassium.  1/5/22 FM:  Yesterday afternoon the patient had a desaturation his oxygen and his chest x-ray showed a worsening right-sided pleural effusion.  A CT scan done 2 days ago showed some layering and there was a discussion about possible loculations.  The patient's chest tube initially had 200 cc of a serous drainage which is now bloody.  Patient's chest x-ray has improved after placement.  Patient's oxygenation today is improved but his condition is precarious.  I have spoken to the family and let them know a high risk and possible poor outcome.  1/6/22 FM:  Patient had a left-sided pleural effusion drained and about 600 cc of a serous fluid was removed.  Patient's right chest tube had 10 cc overnight of a bloody fluid.  Diagnostic testing is all pending.  Today the patient's chest x-ray is noted in his left lung seems to be hypoinflated.  I am increasing the tidal volume slightly today and watching his airway pressures.  1/7/22 FM:  Patient's ABG shows a respiratory alkalosis.  We are reducing his minute ventilation.  Patient's chest x-ray is  significantly improved today.  Will have Respiratory and Nursing try to wean sedation and CPAP trial today.  Patient is now on ventilator day 14.  Patient has had intermittent bradycardia and will hold amiodarone for now.  1/8 AA, weekend crosscover, patient on ventilator, repeat chest x-ray pending, amiodarone stopped secondary to bradycardia, history of AFib, patient was on amiodarone, stopped secondary to bradycardia, now patient increased heart rate, will restart amiodarone, will decrease dose, blood pressure appears stable, continue to wean nor epi, on stress dose steroids, surgery following, H&H dropped, possible transfusion  1/9 AA, weekend crosscover, patient started to have afib with RVR, eICU was called, adjustment made to pressors, started on metoprolol, on levophed, drop in H/H, had chest tube removed on 7th, no melena or BRBPR reported, surgery called to evaluate, suspect possible from previous chest tube site, transfuse prbc, serial H/Hs, follow up with surgery recs.  1/10/22 FM:  Events over the weekend noted.  Patient is now back on amiodarone and his heart rate is slightly improved.  Chest tube is back in place with 100 cc of a bloody thin fluid.  All cultures have been reviewed and there is no significant growth.  Patient has now been on broad-spectrum antibiotics and antifungals for greater than 10 days.  Patient's WBC count is going up.  Spoke with General surgery today about placing tracheostomy.  1/11/22 FM:  Patient's chest x-ray has improved this morning with better ventilation.  Yesterday the patient's ET tube was in his right mainstem and the tube was pulled back.  The patient has a worsening anemia today and has had bleeding related to his chest tube.  General surgery is planning on doing a tracheostomy toward the end of the week.  Patient's white blood cell count is improved.  Patient's atrial fibrillation has worsened and we are titrating his amiodarone.  1/12/22 FM:  We are weaning the  patient's oxygen today.  Patient's chest x-ray shows improved aeration.  The plan is to have the patient have a tracheostomy your at the end of this week or this weekend and then to moved to an LTAC for vent weaning.  1/13/2022 FM:  Patient today has not had much change to the night.  For some reason the patient is back on 100% FiO2 and is satting 100% today.  I have turned this down and encouraged weaning of oxygen.  The patient's chest x-ray has been reviewed.  Patient's chest tube output was 33 cc through the night and his creatinine is 0.9.  1/14/2021 FM:  Patient had an acute episode of hypoxia through the night.  His atrial fibrillation with RVR also worsened.  Patient has been loaded on digoxin and has been changed to an amiodarone drip.  Patient today also had tube feeds and his ET tube suctioning.  Patient has coarse bilateral breath sounds and acute worsening on his chest x-ray this morning.  I have spoken with family and informed them that complications continue to arise and despite making some gains and planning to have tracheostomy placed the patient has now declined again.    Review of Systems:  Review of Systems   Unable to perform ROS: Intubated       Social History:  Social History     Tobacco Use    Smoking status: Current Every Day Smoker     Packs/day: 2.00     Years: 53.00     Pack years: 106.00     Start date: 1968    Smokeless tobacco: Never Used    Tobacco comment: Pt enrolled in the [employer] on 3/21/16 ([employer] Member ID # [id number]).  Ambulatory referral to Smoking Cessation Program.   Substance Use Topics    Alcohol use: Yes     Comment: vodka 3 drinks once weekly        Objective:     Vital Signs (Most Recent):  Temp: (!) 100.58 °F (38.1 °C) (01/14/22 1100)  Pulse: (!) 125 (01/14/22 1100)  Resp: (!) 21 (01/14/22 1100)  BP: (!) 96/54 (01/14/22 1100)  SpO2: (!) 90 % (01/14/22 1100) Vital Signs (24h Range):  Temp:  [98.3 °F (36.8 °C)-100.58 °F (38.1 °C)] 100.58 °F (38.1 °C)  Pulse:   [112-141] 125  Resp:  [18-54] 21  SpO2:  [84 %-100 %] 90 %  BP: ()/(50-66) 96/54  Arterial Line BP: ()/(43-60) 98/49     Weight: 77.1 kg (170 lb)  Body mass index is 24.39 kg/m².    SpO2: (!) 90 %  O2 Device (Oxygen Therapy): (S) ventilator      Intake/Output Summary (Last 24 hours) at 1/14/2022 1143  Last data filed at 1/14/2022 0500  Gross per 24 hour   Intake 5101.63 ml   Output 1270 ml   Net 3831.63 ml       Lines/Drains/Airways     Central Venous Catheter Line            Percutaneous Central Line Insertion/Assessment - Triple Lumen  01/09/22 1110 right internal jugular 5 days          Drain                 NG/OG Tube 12/24/21 2300 orogastric Center mouth 20 days         Urethral Catheter 12/24/21 2245 Latex 16 Fr. 20 days         Fecal Incontinence  01/06/22 0000 8 days         Chest Tube 01/09/22 1300 1 Right Midaxillary;Pleural 32 Fr. 4 days          Airway                 Airway - Non-Surgical 12/24/21 2230 Endotracheal Tube 20 days         Airway - Non-Surgical 01/10/22 1015 4 days          Arterial Line            Arterial Line 01/09/22 1100 Left Radial 5 days          Peripheral Intravenous Line                 Peripheral IV - Single Lumen 12/29/21 0500 22 G Posterior;Right Forearm 16 days         Midline Catheter Insertion/Assessment  - Single Lumen 12/31/21 1200 Right basilic vein (medial side of arm) 18g x 10cm 13 days                VTE Risk Mitigation (From admission, onward)         Ordered     enoxaparin injection 40 mg  Daily         01/12/22 0823     Place sequential compression device  Until discontinued         01/05/22 0815     IP VTE HIGH RISK PATIENT  Once         12/21/21 2233     Reason for No Pharmacological VTE Prophylaxis  Once        Question:  Reasons:  Answer:  Already adequately anticoagulated on oral Anticoagulants    12/21/21 2233                Significant Labs:   Recent Lab Results  (Last 5 results in the past 72 hours)      01/14/22  1008   01/14/22  0825    01/14/22  0431   01/14/22  0352   01/13/22  0523        Base Deficit     9.9     12.3       Notified Note         Called and read back by ROSA ISELA ELIZABETH       Performed By:     Latasha Pagan       Correct Temperature (PH)     7.456     7.405       Corrected Temperature (pCO2)     48.0     59.1       Corrected Temperature (pO2)     84.6     216       Provider Notified:         ROSA ISELA ELIZABETH       Specimen source     Arterial     Arterial       Notified Time         01/13/2022 05:24       Notified By         Latasha CHAMBERS     12     12       Albumin       1.1         Alkaline Phosphatase       148         ALT       32         Anion Gap       8         AST       78         Bands       18.0         Basophil %       0.0         BILIRUBIN TOTAL       1.2  Comment: For infants and newborns, interpretation of results should be based  on gestational age, weight and in agreement with clinical  observations.    Premature Infant recommended reference ranges:  Up to 24 hours.............<8.0 mg/dL  Up to 48 hours............<12.0 mg/dL  3-5 days..................<15.0 mg/dL  6-29 days.................<15.0 mg/dL    For patients on Eltrombopag therapy, use of Dimension [place] TBIL is   not   recommended.           BUN       73         Calcium       8.8         Chloride       101         CO2       32         Creatinine       1.1         D-Dimer 4.14  Comment: The quantitative D-dimer assay should be used as an aid in   the diagnosis of deep vein thrombosis and pulmonary embolism  in patients with the appropriate presentation and clinical  history. The upper limit of the reference interval and the clinical   cut off   point are identical. Causes of a positive (>0.50 mg/L FEU) D-Dimer   test  include, but are not limited to: DVT, PE, DIC, thrombolytic   therapy, anticoagulant therapy, recent surgery, trauma, or   pregnancy, disseminated malignancy, aortic aneurysm, cirrhosis,  and severe infection. False negative results may  occur in   patients with distal DVT.                 Differential Method       Manual  Comment: CORRECTED RESULT; previously reported as Automated on 01/14/2022 at   04:26.    [C]         eGFR if        >60.0         eGFR if non        >60.0  Comment: Calculation used to obtain the estimated glomerular filtration  rate (eGFR) is the CKD-EPI equation.            Eosinophil %       0.0         FiO2     70.0     100.0       Glucose       114         Gran %       72.0         Hematocrit       25.6         Hemoglobin       8.3         Immature Grans (Abs)       CANCELED  Comment: Mild elevation in immature granulocytes is non specific and   can be seen in a variety of conditions including stress response,   acute inflammation, trauma and pregnancy. Correlation with other   laboratory and clinical findings is essential.    Result canceled by the ancillary.           Immature Granulocytes       CANCELED  Comment: Result canceled by the ancillary.         Lymph %       6.0         Magnesium       2.6         MCH       29.6         MCHC       32.4         MCV       91         MODIFIED BON'S TEST     NA     NA       Mono %       4.0         MPV       12.2         nRBC       0         O2DEVICE     Ventilator     Ventilator       PEEP     8.0     8.0       Platelets       175         POC HCO3     32.6     34.6       POC PCO2     48.0  Comment: Value above reference range     59.1  Comment: Latasha notified ROSA ISELA ELIZABETH at 01/13/2022 05:24  Called and read back by ROSA ISELA ELIZABETH read back  Value above critical limit         POC PH     7.456  Comment: Value above reference range     7.405       POC PO2     84.6     216  Comment: Value above reference range       POC SATURATED O2     97.3     99.8       POC TCO2     31.8     35.1       POC Temp     37.0     37.0       Potassium       3.4         PROTEIN TOTAL       5.3         RBC       2.80         RDW       17.9         SARS-CoV-2 RNA,  Amplification, Qual   Negative  Comment: This test utilizes isothermal nucleic acid amplification   technology to detect the SARS-CoV-2 RdRp nucleic acid segment.   The analytical sensitivity (limit of detection) is 125 genome   equivalents/mL.     A POSITIVE result implies infection with the SARS-CoV-2 virus;  the patient is presumed to be contagious.    A NEGATIVE result means that SARS-CoV-2 nucleic acids are not  present above the limit of detection. A NEGATIVE result should be   treated as presumptive. It does not rule out the possibility of   COVID-19 and should not be the sole basis for treatment decisions.   If COVID-19 is strongly suspected based on clinical and exposure   history, re-testing using an alternate molecular assay should be   considered.       This test is only for use under the Food and Drug   Administration s Emergency Use Authorization (EUA).   Commercial kits are provided by [employer].   Performance characteristics of the EUA have been independently  verified by [facility] Department of  Pathology and Laboratory Medicine.   _________________________________________________________________  The ID NOW COVID-19 Letter of Authorization, along with the   authorized Fact Sheet for Healthcare Providers, the authorized Fact  Sheet for Patients, and authorized labeling are available on the FDA   website:  www.fda.gov/MedicalDevices/Safety/EmergencySituations/gvs195062.htm               Sodium       141         Vancomycin-Trough 19.8               Vt     550     550       WBC       28.51                               [C] - Corrected Result             Significant Imaging:   Imaging Results          CT Chest With Contrast (Final result)  Result time 12/21/21 18:07:07    Final result by Evangelist Mcgarry MD (12/21/21 18:07:07)                 Impression:      1. Small to moderate amount of right pleural fluid, increased when compared to prior CT of 12/01/2021.  The fluid is of  low attenuation.  2. Interval resolution of small left pleural effusion.  3. Mild pulmonary emphysema.  4. Bronchial wall thickening in the right lower lobe, may reflect bronchitis.  5. A nondisplaced fracture lateral aspect of right 6th rib, as noted on chest CT of 12/01/2021.      Electronically signed by: Evangelist Mcgarry MD  Date:    12/21/2021  Time:    18:07             Narrative:    EXAMINATION:  CT CHEST WITH CONTRAST    CLINICAL HISTORY:  Pleural effusion, malignancy suspected; history of a fall with a right rib fracture on chest CT of 12/01/2021.    TECHNIQUE:  Axial CT images were obtained. Iterative reconstruction technique was used.  CT/cardiac nuclear exam/s in prior 12 months: 4.    COMPARISON:  Portable chest 12/21/2021, CT chest with IV contrast 12/01/2021..    FINDINGS:  Mild pulmonary emphysema.    Bronchial wall thickening in the right lower lobe bronchi.    Small to moderate amount of right pleural fluid, increased when compared to prior exam of 12/01/2021.  There is associated compressive atelectasis.    No pneumothorax.    The left lung is clear.    There is no aortic aneurysm.  Atherosclerotic calcification of the thoracic aorta and coronary arteries.    No hilar or mediastinal lymph node enlargement.    No pericardial fluid.    Visualized upper abdomen appears unremarkable.    No axillary lymph node enlargement.    Nondisplaced fracture lateral aspect of right 6th rib, as noted on prior examination.                               X-Ray Chest 1 View (Final result)  Result time 12/21/21 17:01:22    Final result by Enedina Mcnair MD (12/21/21 17:01:22)                 Impression:      Increase in size of the right pleural effusion since 12/01/2021 with increased adjacent atelectasis or pneumonia    Enlarged cardiac silhouette      Electronically signed by: Enedina Mcnair MD  Date:    12/21/2021  Time:    17:01             Narrative:    EXAMINATION:  XR CHEST 1 VIEW    CLINICAL  HISTORY:  Weakness    COMPARISON:  12/01/2021    FINDINGS:  Significant increase in size of the right pleural effusion since 12/01/2021.  Adjacent atelectasis versus pneumonia.  Upper right lung and left lung are clear.  Cardiac silhouette is enlarged, as before.                               RADIOLOGY REPORT (Final result)  Result time 01/05/22 11:24:05    Final result by Unknown User (01/05/22 11:24:05)                                 RADIOLOGY REPORT (Final result)  Result time 01/14/22 11:42:53    Final result by Unknown User (01/14/22 11:42:53)                                  Recent Diagnostics:      Last Lipids:   Lab Results   Component Value Date    CHOL 221 (H) 03/06/2019    CHOL 158 02/07/2018    CHOL 172 08/11/2017     Lab Results   Component Value Date    HDL 61 03/06/2019     (H) 02/07/2018    HDL 85 (H) 08/11/2017     Lab Results   Component Value Date    LDLCALC 133.8 03/06/2019    LDLCALC 46.2 (L) 02/07/2018    LDLCALC 73.6 08/11/2017     Lab Results   Component Value Date    TRIG 131 03/06/2019    TRIG 54 02/07/2018    TRIG 67 08/11/2017     Lab Results   Component Value Date    CHOLHDL 27.6 03/06/2019    CHOLHDL 63.9 (H) 02/07/2018    CHOLHDL 49.4 08/11/2017       Telemetry:  Atrial fibrillation with RVR, rate 115 bpm         MEDICATIONS:    Current Facility-Administered Medications:     0.9%  NaCl infusion (for blood administration), , Intravenous, Q24H PRN, Raul Gan III, MD, Given at 01/03/22 1025    acetaminophen tablet 500 mg, 500 mg, Per NG tube, Q6H PRN, Raul Gan III, MD, 500 mg at 01/14/22 1010    amiodarone 360 mg/200 mL (1.8 mg/mL) infusion, 0.5 mg/min, Intravenous, Continuous, Raul Gan III, MD, Last Rate: 16.7 mL/hr at 01/14/22 0241, 0.5 mg/min at 01/14/22 0241    atorvastatin tablet 40 mg, 40 mg, Oral, Daily, GERALD Lewis, 40 mg at 01/14/22 0836    dexmedetomidine (PRECEDEX) 400mcg/100mL 0.9% NaCL infusion, 0-1.4 mcg/kg/hr, Intravenous, Continuous,  Marcial Bah MD, Last Rate: 27 mL/hr at 01/14/22 1053, 1.4 mcg/kg/hr at 01/14/22 1053    digoxin injection 250 mcg, 250 mcg, Intravenous, Daily, Raul Gan III, MD, 250 mcg at 01/14/22 0836    enoxaparin injection 40 mg, 40 mg, Subcutaneous, Daily, Raul Gan III, MD, 40 mg at 01/13/22 1624    fluconazole (DIFLUCAN) IVPB 200 mg 100 mL, 200 mg, Intravenous, Q24H, Raul Gan III, MD, Last Rate: 100 mL/hr at 01/14/22 1054, 200 mg at 01/14/22 1054    furosemide injection 20 mg, 20 mg, Intravenous, Q12H, Raul Gan III, MD, 20 mg at 01/14/22 0836    hydrocortisone sodium succinate injection 25 mg, 25 mg, Intravenous, Q8H, Raul Gan III, MD, 25 mg at 01/14/22 0550    influenza (QUADRIVALENT ADJUVANTED PF) vaccine 0.5 mL, 0.5 mL, Intramuscular, vaccine x 1 dose, Raul Gan III, MD    iohexoL (OMNIPAQUE 350) injection 100 mL, 100 mL, Intravenous, ONCE PRN, Raul Gan III, MD    ipratropium 0.02 % nebulizer solution 0.5 mg, 0.5 mg, Nebulization, Q8H, Raul Gan III, MD, 0.5 mg at 01/14/22 0729    levalbuterol nebulizer solution 1.25 mg, 1.25 mg, Nebulization, Q8H, Raul Gan III, MD, 1.25 mg at 01/14/22 0729    magnesium oxide tablet 400 mg, 400 mg, Oral, BID, Lashonda Syed NP, 400 mg at 01/14/22 0836    morphine injection 2 mg, 2 mg, Intravenous, Q4H PRN, Raul Gan III, MD, 2 mg at 01/13/22 0947    morphine injection 3 mg, 3 mg, Intravenous, Q4H PRN, Raul Gan III, MD    NORepinephrine 32 mg in dextrose 5 % 250 mL infusion, 0-3 mcg/kg/min, Intravenous, Continuous, Macy Ni MD, Last Rate: 10.1 mL/hr at 01/14/22 0000, 0.28 mcg/kg/min at 01/14/22 0000    pantoprazole injection 40 mg, 40 mg, Intravenous, Daily, Choco Sifuentes MD, 40 mg at 01/14/22 0836    piperacillin-tazobactam 4.5 g in dextrose 5 % 100 mL IVPB (ready to mix system), 4.5 g, Intravenous, Q8H, Raul Gan III, MD, Stopped at 01/14/22 0851    propofol (DIPRIVAN) 10  mg/mL infusion, 0-50 mcg/kg/min, Intravenous, Continuous, Choco Sifuentes MD, Last Rate: 23.1 mL/hr at 01/14/22 0626, 50 mcg/kg/min at 01/14/22 0626    sodium chloride 0.9% flush 10 mL, 10 mL, Intravenous, PRN, Romero Colunga MD    Pharmacy to dose Vancomycin consult, , , Once **AND** vancomycin - pharmacy to dose, , Intravenous, pharmacy to manage frequency, Gianfranco Hyatt MD    vancomycin in dextrose 5 % 1 gram/250 mL IVPB 1,000 mg, 1,000 mg, Intravenous, Q24H, Raul Gan III, MD, Stopped at 01/13/22 1228    Physical Exam:  Physical Exam  Vitals and nursing note reviewed.   Constitutional:       Appearance: He is ill-appearing.      Comments: Intubated    HENT:      Head: Normocephalic and atraumatic.      Nose: Nose normal.      Mouth/Throat:      Mouth: Mucous membranes are moist.   Eyes:      Pupils: Pupils are equal, round, and reactive to light.   Cardiovascular:      Rate and Rhythm: Tachycardia present. Rhythm irregular.      Pulses: Normal pulses.   Pulmonary:      Breath sounds: Rhonchi and rales present. No wheezing.   Abdominal:      General: Abdomen is flat.      Palpations: Abdomen is soft.   Skin:     General: Skin is warm.      Capillary Refill: Capillary refill takes less than 2 seconds.   Psychiatric:      Comments: Intubated and sedated          Assessment:     Active Diagnoses:    Diagnosis Date Noted POA    PRINCIPAL PROBLEM:  Acute on chronic respiratory failure with hypoxia [J96.21] 12/25/2021 Yes    Acute hemorrhage [R58] 01/09/2022 No    Pleural effusion [J90] 01/05/2022 No    PNA (pneumonia) [J18.9] 12/30/2021 No    UTI (urinary tract infection) [N39.0] 12/27/2021 No    Severe sepsis [A41.9, R65.20] 12/27/2021 Yes    DVT prophylaxis [Z29.9] 12/23/2021 Not Applicable    Tobacco user [Z72.0] 11/23/2021 Yes    Atrial fibrillation with rapid ventricular response [I48.91] 11/23/2021 Yes    Alcohol dependence [F10.20] 02/04/2019 Yes    Elevated LFTs [R79.89] 01/30/2018 Yes     TANIKA (acute kidney injury) [N17.9] 06/26/2016 No      Problems Resolved During this Admission:    Diagnosis Date Noted Date Resolved POA    Acute hypoxemic respiratory failure [J96.01] 12/25/2021 12/25/2021 Yes    Moderate recurrent major depression [F33.1] 11/23/2021 12/25/2021 Yes    PAD (peripheral artery disease) [I73.9] 04/10/2019 12/26/2021 Yes     Chronic    Centrilobular emphysema [J43.2] 04/10/2019 12/25/2021 Yes    Hypertension [I10] 07/06/2016 12/25/2021 Yes       VTE Risk Mitigation (From admission, onward)         Ordered     enoxaparin injection 40 mg  Daily         01/12/22 0823     Place sequential compression device  Until discontinued         01/05/22 0815     IP VTE HIGH RISK PATIENT  Once         12/21/21 2233     Reason for No Pharmacological VTE Prophylaxis  Once        Question:  Reasons:  Answer:  Already adequately anticoagulated on oral Anticoagulants    12/21/21 2233                    Plan:            1.  Acute on chronic respiratory failure with hypoxia and pneumonia:  Pending CT scan today s/t elevated D-Dimer.  Pulmonary decline over night.  Continue abx per IM services  Covid screen negative.  Possible aspiration?      2.  Atrial fibrillation with RVR:  Currently receiving amiodarone infusion and digoxin IVP  Will check digoxin level today  Consider adding CCB; however, tachycardia is likely complicated by multitude of other acute issues including declining pulmonary status.      3.  ETOH abuse:    Must abstain.       4.  History of CVA:  No acute neurological deficits      5.  HTN:  On levophed for hypotension and BP support.      6.  Hyperlipidemia:  Resume statin when able          GERALD Lewis  Cardiology  [place] - Emergency Department  01/14/2022

## 2022-02-18 ENCOUNTER — TELEPHONE (OUTPATIENT)
Dept: ENDOCRINOLOGY CLINIC | Facility: CLINIC | Age: 67
End: 2022-02-18

## 2022-02-18 RX ORDER — BLOOD SUGAR DIAGNOSTIC
STRIP MISCELLANEOUS
Qty: 200 EACH | Refills: 0 | Status: SHIPPED | OUTPATIENT
Start: 2022-02-18

## 2022-02-21 ENCOUNTER — TELEPHONE (OUTPATIENT)
Dept: FAMILY MEDICINE CLINIC | Facility: CLINIC | Age: 67
End: 2022-02-21

## 2022-02-21 NOTE — TELEPHONE ENCOUNTER
DM eye exam completed 2/21/2022. Flowsheet filed. Report placed in Dr. Jeanine Painter for review and then sent to scan.

## 2022-03-02 ENCOUNTER — TELEPHONE (OUTPATIENT)
Dept: ENDOCRINOLOGY CLINIC | Facility: CLINIC | Age: 67
End: 2022-03-02

## 2022-03-02 NOTE — TELEPHONE ENCOUNTER
Received call from Lynne Barba @ [employer] on behalf of [facility]. They will fax request for one chart note date of service to right fax.

## 2022-03-04 ENCOUNTER — TELEPHONE (OUTPATIENT)
Dept: FAMILY MEDICINE CLINIC | Facility: CLINIC | Age: 67
End: 2022-03-04

## 2022-03-04 NOTE — TELEPHONE ENCOUNTER
DM eye exam completed 3/3/2022. Flowsheet filed. Report placed in Dr. Blu Holland for review and will then send to scan.

## 2022-03-15 ENCOUNTER — TELEPHONE (OUTPATIENT)
Dept: ENDOCRINOLOGY CLINIC | Facility: CLINIC | Age: 67
End: 2022-03-15

## 2022-03-15 NOTE — TELEPHONE ENCOUNTER
Received fax from [facility] requesting last chart notes on be half of [facility]. On 3/2/22 Ashley CURRAN received a call from a [employer] us about a the incoming fax. Fax everything back to [phone].  Confirmation fax received and sent to scan

## 2022-04-07 ENCOUNTER — TELEPHONE (OUTPATIENT)
Dept: ENDOCRINOLOGY CLINIC | Facility: CLINIC | Age: 67
End: 2022-04-07

## 2022-04-07 RX ORDER — INSULIN DETEMIR 100 [IU]/ML
INJECTION, SOLUTION SUBCUTANEOUS
Qty: 45 ML | Refills: 0 | Status: SHIPPED | OUTPATIENT
Start: 2022-04-07 | End: 2022-04-11

## 2022-04-07 RX ORDER — SEMAGLUTIDE 1.34 MG/ML
INJECTION, SOLUTION SUBCUTANEOUS
Qty: 1.5 ML | Refills: 1 | Status: SHIPPED | OUTPATIENT
Start: 2022-04-07 | End: 2022-04-11 | Stop reason: DRUGHIGH

## 2022-04-07 NOTE — TELEPHONE ENCOUNTER
Pt called verified name and . Returning a call about lab work prior to [facility] apt. There is no lab work ordered in the system. Pt will get labs drawn tomorrow if needed prior to apt. If needed.  Routed to [facility]

## 2022-04-07 NOTE — TELEPHONE ENCOUNTER
No labs needed prior to [facility] appointment. Will obtain A1c% during appointment, no need for patient to fast prior to appointment.

## 2022-04-08 ENCOUNTER — TELEPHONE (OUTPATIENT)
Dept: ENDOCRINOLOGY CLINIC | Facility: CLINIC | Age: 67
End: 2022-04-08

## 2022-04-08 NOTE — TELEPHONE ENCOUNTER
Pt called in regarding labs for upcoming appt on Monday. All other labs up to date, can do A1C in office on Monday.

## 2022-04-11 ENCOUNTER — OFFICE VISIT (OUTPATIENT)
Dept: ENDOCRINOLOGY CLINIC | Facility: CLINIC | Age: 67
End: 2022-04-11
Payer: MEDICARE

## 2022-04-11 VITALS
BODY MASS INDEX: 30.58 KG/M2 | HEIGHT: 61 IN | HEART RATE: 87 BPM | SYSTOLIC BLOOD PRESSURE: 116 MMHG | RESPIRATION RATE: 16 BRPM | WEIGHT: 162 LBS | DIASTOLIC BLOOD PRESSURE: 76 MMHG

## 2022-04-11 DIAGNOSIS — E66.09 CLASS 1 OBESITY DUE TO EXCESS CALORIES WITH SERIOUS COMORBIDITY AND BODY MASS INDEX (BMI) OF 30.0 TO 30.9 IN ADULT: ICD-10-CM

## 2022-04-11 DIAGNOSIS — R80.9 POSITIVE FOR MACROALBUMINURIA: ICD-10-CM

## 2022-04-11 DIAGNOSIS — E78.2 MIXED HYPERLIPIDEMIA: ICD-10-CM

## 2022-04-11 DIAGNOSIS — I10 ESSENTIAL HYPERTENSION: ICD-10-CM

## 2022-04-11 DIAGNOSIS — E11.65 TYPE 2 DIABETES MELLITUS WITH HYPERGLYCEMIA, WITH LONG-TERM CURRENT USE OF INSULIN (HCC): Primary | ICD-10-CM

## 2022-04-11 DIAGNOSIS — Z79.4 TYPE 2 DIABETES MELLITUS WITH HYPERGLYCEMIA, WITH LONG-TERM CURRENT USE OF INSULIN (HCC): Primary | ICD-10-CM

## 2022-04-11 LAB
CARTRIDGE LOT#: 903 NUMERIC
HEMOGLOBIN A1C: 7.1 % (ref 4.3–5.6)

## 2022-04-11 PROCEDURE — 3051F HG A1C>EQUAL 7.0%<8.0%: CPT | Performed by: NURSE PRACTITIONER

## 2022-04-11 PROCEDURE — 83036 HEMOGLOBIN GLYCOSYLATED A1C: CPT | Performed by: NURSE PRACTITIONER

## 2022-04-11 PROCEDURE — 99214 OFFICE O/P EST MOD 30 MIN: CPT | Performed by: NURSE PRACTITIONER

## 2022-04-11 PROCEDURE — 3008F BODY MASS INDEX DOCD: CPT | Performed by: NURSE PRACTITIONER

## 2022-04-11 PROCEDURE — 3078F DIAST BP <80 MM HG: CPT | Performed by: NURSE PRACTITIONER

## 2022-04-11 PROCEDURE — 3074F SYST BP LT 130 MM HG: CPT | Performed by: NURSE PRACTITIONER

## 2022-04-11 RX ORDER — SEMAGLUTIDE 1.34 MG/ML
1 INJECTION, SOLUTION SUBCUTANEOUS WEEKLY
Qty: 3 ML | Refills: 1 | Status: SHIPPED | OUTPATIENT
Start: 2022-04-11

## 2022-04-11 RX ORDER — INSULIN DETEMIR 100 [IU]/ML
22 INJECTION, SOLUTION SUBCUTANEOUS 2 TIMES DAILY
Qty: 45 ML | Refills: 0 | COMMUNITY
Start: 2022-04-11

## 2022-04-13 ENCOUNTER — TELEPHONE (OUTPATIENT)
Dept: ENDOCRINOLOGY CLINIC | Facility: CLINIC | Age: 67
End: 2022-04-13

## 2022-04-13 NOTE — TELEPHONE ENCOUNTER
Pt left message on nurse line. Called in stating ozempic was going to cost over $200 for one month supply and she can't afford it. Pt called her insurance and was told that she has a $7000 deductible. Recommended she look into the pap program for ozempic, she will check it out online and call office back.

## 2022-04-14 ENCOUNTER — NURSE ONLY (OUTPATIENT)
Dept: ENDOCRINOLOGY CLINIC | Facility: CLINIC | Age: 67
End: 2022-04-14
Payer: MEDICARE

## 2022-04-15 RX ORDER — SEMAGLUTIDE 1.34 MG/ML
1 INJECTION, SOLUTION SUBCUTANEOUS WEEKLY
Qty: 3 ML | Refills: 0 | COMMUNITY
Start: 2022-04-15

## 2022-05-05 ENCOUNTER — TELEPHONE (OUTPATIENT)
Dept: FAMILY MEDICINE CLINIC | Facility: CLINIC | Age: 67
End: 2022-05-05

## 2022-05-05 NOTE — TELEPHONE ENCOUNTER
Pt called and states that Dr Clara Baumann office has faxed over a form for pt to get diabetic shoes but that they have not heard back from our office. Did you get this form by chance and if so would it be ready?   thanks

## 2022-05-06 NOTE — TELEPHONE ENCOUNTER
Received a fax from [address] [place] [address]. Requesting PCP to sign statement of certifying physician for therapeutic footwear and non-custom heat moldable inserts. Form placed at provider's desk for completion.

## 2022-05-06 NOTE — TELEPHONE ENCOUNTER
Paperwork completed & faxed back to [facility] at [phone], confirmation received.

## 2022-05-18 ENCOUNTER — OFFICE VISIT (OUTPATIENT)
Dept: ENDOCRINOLOGY CLINIC | Facility: CLINIC | Age: 67
End: 2022-05-18
Payer: MEDICARE

## 2022-05-18 VITALS
BODY MASS INDEX: 30.21 KG/M2 | HEART RATE: 89 BPM | RESPIRATION RATE: 16 BRPM | DIASTOLIC BLOOD PRESSURE: 76 MMHG | WEIGHT: 160 LBS | SYSTOLIC BLOOD PRESSURE: 118 MMHG | HEIGHT: 61 IN

## 2022-05-18 DIAGNOSIS — I10 ESSENTIAL HYPERTENSION: ICD-10-CM

## 2022-05-18 DIAGNOSIS — R80.9 POSITIVE FOR MACROALBUMINURIA: ICD-10-CM

## 2022-05-18 DIAGNOSIS — E11.65 TYPE 2 DIABETES MELLITUS WITH HYPERGLYCEMIA, WITH LONG-TERM CURRENT USE OF INSULIN (HCC): Primary | ICD-10-CM

## 2022-05-18 DIAGNOSIS — Z79.4 TYPE 2 DIABETES MELLITUS WITH HYPERGLYCEMIA, WITH LONG-TERM CURRENT USE OF INSULIN (HCC): Primary | ICD-10-CM

## 2022-05-18 DIAGNOSIS — E78.2 MIXED HYPERLIPIDEMIA: ICD-10-CM

## 2022-05-18 DIAGNOSIS — E66.09 CLASS 1 OBESITY DUE TO EXCESS CALORIES WITH SERIOUS COMORBIDITY AND BODY MASS INDEX (BMI) OF 30.0 TO 30.9 IN ADULT: ICD-10-CM

## 2022-05-18 LAB
GLUCOSE BLOOD: 108
TEST STRIP LOT #: NORMAL NUMERIC

## 2022-05-18 PROCEDURE — 3008F BODY MASS INDEX DOCD: CPT | Performed by: NURSE PRACTITIONER

## 2022-05-18 PROCEDURE — 3078F DIAST BP <80 MM HG: CPT | Performed by: NURSE PRACTITIONER

## 2022-05-18 PROCEDURE — 82947 ASSAY GLUCOSE BLOOD QUANT: CPT | Performed by: NURSE PRACTITIONER

## 2022-05-18 PROCEDURE — 99214 OFFICE O/P EST MOD 30 MIN: CPT | Performed by: NURSE PRACTITIONER

## 2022-05-18 PROCEDURE — 3074F SYST BP LT 130 MM HG: CPT | Performed by: NURSE PRACTITIONER

## 2022-05-18 RX ORDER — METFORMIN HYDROCHLORIDE 500 MG/1
500 TABLET, EXTENDED RELEASE ORAL 2 TIMES DAILY WITH MEALS
Qty: 180 TABLET | Refills: 1 | Status: SHIPPED | OUTPATIENT
Start: 2022-05-18

## 2022-05-18 RX ORDER — PEN NEEDLE, DIABETIC 32GX 5/32"
1 NEEDLE, DISPOSABLE MISCELLANEOUS 2 TIMES DAILY
Qty: 200 EACH | Refills: 1 | Status: SHIPPED | OUTPATIENT
Start: 2022-05-18

## 2022-05-18 NOTE — PATIENT INSTRUCTIONS
We are here to support you with Diabetes but please remember that you still need your primary care doctor for your routine health maintenance. Your current A1C: 7.1%  This test provides us with your average blood sugar for the past 3 months. The main goal of diabetes treatment is to keep your sugar from going too high. We measure your overall blood sugar trends with a Hemoglobin A1C test. (also called an A1C)  For most people the target is less than 7.0% but sometimes we make exceptions based on age, health history and other factors. Keeping an A1C less than 7% helps prevents diabetes related health problems. If your A1C goes too high, then we need to talk about changing your current diabetes treatment. MEDICATIONS:   It is important to take all of your medications as prescribed. Please call me if you cannot get the prescriptions filled or are having issues with refills. Also, please call me if you have any issues with medication questions, side effects, dosing questions or problems with your blood sugar trends BEFORE CHANGING OR STOPPING ANY MEDICATIONS. Continue Ozempic 1mg injection weekly  Continue Levemir 22 units injection 2x/day   Increase Metformin ER to 500mg 2x/day with breakfast and dinner    Blood sugar testing:   Always bring your glucose meter or blood sugar logbook to every appointment here at the diabetes center. This allows me to safely make adjustments to your diabetes plan. In order for me to determine any patterns in your blood sugars, you will need to test your blood sugar daily   Alternate testing before breakfast (fasting) and testing blood sugar 2 hours after your meals. Call if 2 readings below 80 mg/dl in 1 week for medication adjustment. Blood sugar targets:  Before breakfast:   (preferably < 110)  2 hours After meals: less than 180 (preferably less than 150)   Call for persistent blood sugars < 75 or > 200.    Blood sugars greater than 200 are not acceptable to reach your goal of improving diabetes      Health Maintenance:   1. LABS: It is important to monitor your kidney function (blood and urine protein levels) , liver function tests and cholesterol levels when you have diabetes. 2. FOOT EXAMS:  daily foot inspections for foot wounds or skin changes are important for foot care. Any unusual changes should be reported immediately. 3. EYE EXAMS: Checking your eyes for diabetes changes is important and you should have a dilated eye exam done by an eye doctor EVERY year since these changes occur in the BACK of the eye and not visible by you. Please let me know if you need provider list for eye doctor. Lifestyle Therapy:    1. NUTRITION: Maintain optimal weight, calorie restriction if overweight, plant-based diet    2. PHYSICAL ACTIVITY: 150 minutes per week (30 minutes a day 5 days a week) of moderate exertion such as walking, stair climbing. Include strength training 2-3 times per week. Increase as tolerated. 3. SLEEP: Try and get 7-8 hours of sleep per night    4. BEHAVIOR:  Tobacco cessation and alcohol in moderation      Reminders: Follow up with podiatry as scheduled    [place] [facility], [facility]  [address], [address], [address]  [address] [place] [address], [address] [facility], [address]  [address], [address] [place] [address]  Diabetes Services at [address] [facility] [address]

## 2022-05-26 ENCOUNTER — HOSPITAL ENCOUNTER (OUTPATIENT)
Age: 67
Discharge: HOME | End: 2022-05-26
Payer: MEDICARE

## 2022-05-26 ENCOUNTER — TELEPHONE (OUTPATIENT)
Dept: CASE MANAGEMENT | Age: 67
End: 2022-05-26

## 2022-05-26 ENCOUNTER — OFFICE VISIT (OUTPATIENT)
Dept: FAMILY MEDICINE CLINIC | Facility: CLINIC | Age: 67
End: 2022-05-26
Payer: MEDICARE

## 2022-05-26 VITALS
OXYGEN SATURATION: 98 % | HEART RATE: 102 BPM | RESPIRATION RATE: 14 BRPM | SYSTOLIC BLOOD PRESSURE: 146 MMHG | DIASTOLIC BLOOD PRESSURE: 53 MMHG | BODY MASS INDEX: 30.21 KG/M2 | TEMPERATURE: 98 F | HEIGHT: 61 IN | WEIGHT: 160 LBS

## 2022-05-26 VITALS
WEIGHT: 160 LBS | TEMPERATURE: 98 F | HEIGHT: 61 IN | DIASTOLIC BLOOD PRESSURE: 61 MMHG | HEART RATE: 75 BPM | OXYGEN SATURATION: 98 % | SYSTOLIC BLOOD PRESSURE: 132 MMHG | BODY MASS INDEX: 30.21 KG/M2 | RESPIRATION RATE: 16 BRPM

## 2022-05-26 DIAGNOSIS — U07.1 COVID-19: Primary | ICD-10-CM

## 2022-05-26 LAB
OPERATOR ID: ABNORMAL
POCT LOT NUMBER: ABNORMAL
RAPID SARS-COV-2 BY PCR: DETECTED

## 2022-05-26 PROCEDURE — 99204 OFFICE O/P NEW MOD 45 MIN: CPT

## 2022-05-26 PROCEDURE — 99214 OFFICE O/P EST MOD 30 MIN: CPT

## 2022-05-26 PROCEDURE — U0002 COVID-19 LAB TEST NON-CDC: HCPCS | Performed by: PHYSICIAN ASSISTANT

## 2022-05-26 RX ORDER — BENZONATATE 100 MG/1
100 CAPSULE ORAL 3 TIMES DAILY PRN
Qty: 30 CAPSULE | Refills: 0 | Status: SHIPPED | OUTPATIENT
Start: 2022-05-26 | End: 2022-06-25

## 2022-05-26 RX ORDER — BEBTELOVIMAB 87.5 MG/ML
175 INJECTION, SOLUTION INTRAVENOUS ONCE
Status: COMPLETED | OUTPATIENT
Start: 2022-05-26 | End: 2022-05-26

## 2022-05-26 NOTE — ED QUICK NOTES
No adverse reaction noted from monoclonal given. Warm blanket and pillow provided. Pt made comfortable. Bed on low fowlers. 1 side rail up. Call light within reach. Bottled water and crackers given.

## 2022-05-26 NOTE — TELEPHONE ENCOUNTER
Pt received MAB infusion at [address] [place] [address] on 5/26/22 for COVID-19. Please follow-up with pt for post-infusion assessment and home monitoring if needed. Thank you.

## 2022-05-26 NOTE — ED INITIAL ASSESSMENT (HPI)
Runny nose started Sunday. Pt went to the [facility] today and tested +covid. Pt advised to come to [facility] for antibody infusion. Denies fever. Pt is vaccinated with booster.

## 2022-05-31 ENCOUNTER — TELEPHONE (OUTPATIENT)
Dept: FAMILY MEDICINE CLINIC | Facility: CLINIC | Age: 67
End: 2022-05-31

## 2022-05-31 NOTE — TELEPHONE ENCOUNTER
Called the patient for day 2 of home monitoring. (Tested positive on 5/26/2022.) Patient states that she is feeling better. Only experiencing intermittent cough and fatigue; however, cough and fatigue have improved. Denies fever, SOB, chest pain, or difficulty breathing at this time. Speaking in full sentences without difficulty while on the phone. No coughing noted while on the phone. Ok to discharge from home monitoring? Please advise. Thank you.

## 2022-06-22 DIAGNOSIS — Z79.4 TYPE 2 DIABETES MELLITUS WITH HYPERGLYCEMIA, WITH LONG-TERM CURRENT USE OF INSULIN (HCC): ICD-10-CM

## 2022-06-22 DIAGNOSIS — E11.65 TYPE 2 DIABETES MELLITUS WITH HYPERGLYCEMIA, WITH LONG-TERM CURRENT USE OF INSULIN (HCC): ICD-10-CM

## 2022-06-23 RX ORDER — INSULIN DETEMIR 100 [IU]/ML
INJECTION, SOLUTION SUBCUTANEOUS
Qty: 45 ML | Refills: 0 | Status: SHIPPED | OUTPATIENT
Start: 2022-06-23

## 2022-06-30 ENCOUNTER — TELEPHONE (OUTPATIENT)
Dept: FAMILY MEDICINE CLINIC | Facility: CLINIC | Age: 67
End: 2022-06-30

## 2022-06-30 ENCOUNTER — OFFICE VISIT (OUTPATIENT)
Dept: FAMILY MEDICINE CLINIC | Facility: CLINIC | Age: 67
End: 2022-06-30
Payer: MEDICARE

## 2022-06-30 VITALS
DIASTOLIC BLOOD PRESSURE: 64 MMHG | TEMPERATURE: 98 F | HEIGHT: 61 IN | OXYGEN SATURATION: 96 % | RESPIRATION RATE: 20 BRPM | BODY MASS INDEX: 30.78 KG/M2 | WEIGHT: 163 LBS | HEART RATE: 103 BPM | SYSTOLIC BLOOD PRESSURE: 138 MMHG

## 2022-06-30 DIAGNOSIS — H00.011 HORDEOLUM EXTERNUM RIGHT UPPER EYELID: ICD-10-CM

## 2022-06-30 DIAGNOSIS — J01.40 ACUTE PANSINUSITIS, RECURRENCE NOT SPECIFIED: Primary | ICD-10-CM

## 2022-06-30 PROCEDURE — 99213 OFFICE O/P EST LOW 20 MIN: CPT | Performed by: NURSE PRACTITIONER

## 2022-06-30 PROCEDURE — 3078F DIAST BP <80 MM HG: CPT | Performed by: NURSE PRACTITIONER

## 2022-06-30 PROCEDURE — 3075F SYST BP GE 130 - 139MM HG: CPT | Performed by: NURSE PRACTITIONER

## 2022-06-30 PROCEDURE — 3008F BODY MASS INDEX DOCD: CPT | Performed by: NURSE PRACTITIONER

## 2022-06-30 RX ORDER — ERYTHROMYCIN 5 MG/G
1 OINTMENT OPHTHALMIC 2 TIMES DAILY
Qty: 1 G | Refills: 0 | Status: SHIPPED | OUTPATIENT
Start: 2022-06-30 | End: 2022-07-05

## 2022-06-30 RX ORDER — DOXYCYCLINE HYCLATE 100 MG/1
100 CAPSULE ORAL 2 TIMES DAILY
Qty: 14 CAPSULE | Refills: 0 | Status: SHIPPED | OUTPATIENT
Start: 2022-06-30 | End: 2022-07-07

## 2022-06-30 RX ORDER — BENZONATATE 200 MG/1
200 CAPSULE ORAL 3 TIMES DAILY PRN
Qty: 30 CAPSULE | Refills: 0 | Status: SHIPPED | OUTPATIENT
Start: 2022-06-30 | End: 2022-07-10

## 2022-06-30 NOTE — TELEPHONE ENCOUNTER
Patient says she is experiencing a sinus infection and would like to know if doctor can send in an antibiotic or medication to pharmacy.

## 2022-06-30 NOTE — TELEPHONE ENCOUNTER
Pt informed that Dr. De Jesus is out of the office & she can go to [facility] to be evaluated. All questions answered, pt expresses understanding.

## 2022-08-08 ENCOUNTER — TELEPHONE (OUTPATIENT)
Dept: FAMILY MEDICINE CLINIC | Facility: CLINIC | Age: 67
End: 2022-08-08

## 2022-08-08 ENCOUNTER — APPOINTMENT (OUTPATIENT)
Dept: CT IMAGING | Facility: HOSPITAL | Age: 67
End: 2022-08-08
Attending: EMERGENCY MEDICINE
Payer: MEDICARE

## 2022-08-08 ENCOUNTER — HOSPITAL ENCOUNTER (EMERGENCY)
Facility: HOSPITAL | Age: 67
Discharge: HOME | End: 2022-08-08
Attending: EMERGENCY MEDICINE
Payer: MEDICARE

## 2022-08-08 VITALS
TEMPERATURE: 100 F | DIASTOLIC BLOOD PRESSURE: 62 MMHG | HEART RATE: 100 BPM | HEIGHT: 61 IN | OXYGEN SATURATION: 96 % | WEIGHT: 168 LBS | BODY MASS INDEX: 31.72 KG/M2 | RESPIRATION RATE: 16 BRPM | SYSTOLIC BLOOD PRESSURE: 128 MMHG

## 2022-08-08 DIAGNOSIS — K52.9 ACUTE GASTROENTERITIS: Primary | ICD-10-CM

## 2022-08-08 LAB
ALBUMIN SERPL-MCNC: 3.6 G/DL (ref 3.4–5)
ALBUMIN/GLOB SERPL: 0.9 {RATIO} (ref 1–2)
ALP LIVER SERPL-CCNC: 105 U/L
ALT SERPL-CCNC: 17 U/L
ANION GAP SERPL CALC-SCNC: 7 MMOL/L (ref 0–18)
AST SERPL-CCNC: 20 U/L (ref 15–37)
BASOPHILS # BLD AUTO: 0.04 X10(3) UL (ref 0–0.2)
BASOPHILS NFR BLD AUTO: 0.3 %
BILIRUB SERPL-MCNC: 1.2 MG/DL (ref 0.1–2)
BILIRUB UR QL STRIP.AUTO: NEGATIVE
BUN BLD-MCNC: 27 MG/DL (ref 7–18)
CALCIUM BLD-MCNC: 9.5 MG/DL (ref 8.5–10.1)
CHLORIDE SERPL-SCNC: 105 MMOL/L (ref 98–112)
CLARITY UR REFRACT.AUTO: CLEAR
CO2 SERPL-SCNC: 27 MMOL/L (ref 21–32)
COLOR UR AUTO: YELLOW
CREAT BLD-MCNC: 1.12 MG/DL
EOSINOPHIL # BLD AUTO: 0.01 X10(3) UL (ref 0–0.7)
EOSINOPHIL NFR BLD AUTO: 0.1 %
ERYTHROCYTE [DISTWIDTH] IN BLOOD BY AUTOMATED COUNT: 12.9 %
GFR SERPLBLD BASED ON 1.73 SQ M-ARVRAT: 55 ML/MIN/1.73M2 (ref 60–?)
GLOBULIN PLAS-MCNC: 4.1 G/DL (ref 2.8–4.4)
GLUCOSE BLD-MCNC: 289 MG/DL (ref 70–99)
GLUCOSE UR STRIP.AUTO-MCNC: 500 MG/DL
HCT VFR BLD AUTO: 38 %
HGB BLD-MCNC: 12.7 G/DL
IMM GRANULOCYTES # BLD AUTO: 0.06 X10(3) UL (ref 0–1)
IMM GRANULOCYTES NFR BLD: 0.4 %
KETONES UR STRIP.AUTO-MCNC: 15 MG/DL
LIPASE SERPL-CCNC: 177 U/L (ref 73–393)
LYMPHOCYTES # BLD AUTO: 0.46 X10(3) UL (ref 1–4)
LYMPHOCYTES NFR BLD AUTO: 3.1 %
MCH RBC QN AUTO: 29.1 PG (ref 26–34)
MCHC RBC AUTO-ENTMCNC: 33.4 G/DL (ref 31–37)
MCV RBC AUTO: 87.2 FL
MONOCYTES # BLD AUTO: 0.62 X10(3) UL (ref 0.1–1)
MONOCYTES NFR BLD AUTO: 4.2 %
NEUTROPHILS # BLD AUTO: 13.57 X10 (3) UL (ref 1.5–7.7)
NEUTROPHILS # BLD AUTO: 13.57 X10(3) UL (ref 1.5–7.7)
NEUTROPHILS NFR BLD AUTO: 91.9 %
NITRITE UR QL STRIP.AUTO: NEGATIVE
OSMOLALITY SERPL CALC.SUM OF ELEC: 304 MOSM/KG (ref 275–295)
PH UR STRIP.AUTO: 5 [PH] (ref 5–8)
PLATELET # BLD AUTO: 161 10(3)UL (ref 150–450)
POTASSIUM SERPL-SCNC: 3.9 MMOL/L (ref 3.5–5.1)
PROT SERPL-MCNC: 7.7 G/DL (ref 6.4–8.2)
PROT UR STRIP.AUTO-MCNC: >=300 MG/DL
RBC # BLD AUTO: 4.36 X10(6)UL
SODIUM SERPL-SCNC: 139 MMOL/L (ref 136–145)
SP GR UR STRIP.AUTO: 1.02 (ref 1–1.03)
UROBILINOGEN UR STRIP.AUTO-MCNC: 0.2 MG/DL
WBC # BLD AUTO: 14.8 X10(3) UL (ref 4–11)

## 2022-08-08 PROCEDURE — 83690 ASSAY OF LIPASE: CPT | Performed by: EMERGENCY MEDICINE

## 2022-08-08 PROCEDURE — 87186 SC STD MICRODIL/AGAR DIL: CPT | Performed by: EMERGENCY MEDICINE

## 2022-08-08 PROCEDURE — 85025 COMPLETE CBC W/AUTO DIFF WBC: CPT | Performed by: EMERGENCY MEDICINE

## 2022-08-08 PROCEDURE — 99285 EMERGENCY DEPT VISIT HI MDM: CPT

## 2022-08-08 PROCEDURE — 80053 COMPREHEN METABOLIC PANEL: CPT | Performed by: EMERGENCY MEDICINE

## 2022-08-08 PROCEDURE — 87077 CULTURE AEROBIC IDENTIFY: CPT | Performed by: EMERGENCY MEDICINE

## 2022-08-08 PROCEDURE — 87086 URINE CULTURE/COLONY COUNT: CPT | Performed by: EMERGENCY MEDICINE

## 2022-08-08 PROCEDURE — 96374 THER/PROPH/DIAG INJ IV PUSH: CPT

## 2022-08-08 PROCEDURE — 81001 URINALYSIS AUTO W/SCOPE: CPT | Performed by: EMERGENCY MEDICINE

## 2022-08-08 PROCEDURE — 81015 MICROSCOPIC EXAM OF URINE: CPT | Performed by: EMERGENCY MEDICINE

## 2022-08-08 PROCEDURE — 74177 CT ABD & PELVIS W/CONTRAST: CPT | Performed by: EMERGENCY MEDICINE

## 2022-08-08 PROCEDURE — 96361 HYDRATE IV INFUSION ADD-ON: CPT

## 2022-08-08 PROCEDURE — 99284 EMERGENCY DEPT VISIT MOD MDM: CPT

## 2022-08-08 RX ORDER — IOHEXOL 350 MG/ML
100 INJECTION, SOLUTION INTRAVENOUS
Status: COMPLETED | OUTPATIENT
Start: 2022-08-08 | End: 2022-08-08

## 2022-08-08 RX ORDER — DICYCLOMINE HCL 20 MG
20 TABLET ORAL 4 TIMES DAILY PRN
Qty: 30 TABLET | Refills: 0 | Status: SHIPPED | OUTPATIENT
Start: 2022-08-08 | End: 2022-09-07

## 2022-08-08 RX ORDER — ONDANSETRON 2 MG/ML
4 INJECTION INTRAMUSCULAR; INTRAVENOUS ONCE
Status: COMPLETED | OUTPATIENT
Start: 2022-08-08 | End: 2022-08-08

## 2022-08-08 RX ORDER — ONDANSETRON 4 MG/1
4 TABLET, ORALLY DISINTEGRATING ORAL EVERY 4 HOURS PRN
Qty: 10 TABLET | Refills: 0 | Status: SHIPPED | OUTPATIENT
Start: 2022-08-08 | End: 2022-08-15

## 2022-08-08 NOTE — TELEPHONE ENCOUNTER
Patient started trembling uncontrollably last night, it subsided, she had some tea and fell asleep, Pt started trembling again this morning. Pt is considering going to ER or IC but asked to send a message as she would like to talk to one of our nurses.

## 2022-08-08 NOTE — TELEPHONE ENCOUNTER
Pt states she has been trembling last night & then again this morning. She just vomited. She c/o back pin radiating to the side & trouble controlling her urine. Her blood sugars have been running high she states, not low. I advised pt to go to have someone take her to [facility] or ER. Pt agrees to plan. All questions answered, pt expresses understanding.

## 2022-08-08 NOTE — ED INITIAL ASSESSMENT (HPI)
Pt reports chills and fever yesterday, now having left flank pain that radiates to her lower abdomen with urinary frequency. Pt also reports diarrhea, nausea, vomiting x2 days.  Hx of kidney stone in the past.

## 2022-08-10 ENCOUNTER — VIRTUAL PHONE E/M (OUTPATIENT)
Dept: FAMILY MEDICINE CLINIC | Facility: CLINIC | Age: 67
End: 2022-08-10
Payer: MEDICARE

## 2022-08-10 DIAGNOSIS — N30.00 ACUTE CYSTITIS WITHOUT HEMATURIA: Primary | ICD-10-CM

## 2022-08-10 DIAGNOSIS — N20.0 NEPHROLITHIASIS: ICD-10-CM

## 2022-08-10 PROCEDURE — 99443 PHONE E/M BY PHYS 21-30 MIN: CPT | Performed by: FAMILY MEDICINE

## 2022-08-10 RX ORDER — CIPROFLOXACIN 500 MG/1
500 TABLET, FILM COATED ORAL 2 TIMES DAILY
Qty: 14 TABLET | Refills: 0 | Status: SHIPPED | OUTPATIENT
Start: 2022-08-10 | End: 2022-08-11

## 2022-08-11 ENCOUNTER — TELEPHONE (OUTPATIENT)
Dept: FAMILY MEDICINE CLINIC | Facility: CLINIC | Age: 67
End: 2022-08-11

## 2022-08-11 RX ORDER — CIPROFLOXACIN 500 MG/1
500 TABLET, FILM COATED ORAL 2 TIMES DAILY
Qty: 14 TABLET | Refills: 0 | Status: SHIPPED | OUTPATIENT
Start: 2022-08-11

## 2022-08-11 NOTE — TELEPHONE ENCOUNTER
Patient calling as Xenia did not receive this new medication that was prescribed from telephone visit yesterday     ciprofloxacin (CIPRO) 500 MG Oral Tab

## 2022-08-16 ENCOUNTER — TELEPHONE (OUTPATIENT)
Dept: FAMILY MEDICINE CLINIC | Facility: CLINIC | Age: 67
End: 2022-08-16

## 2022-08-16 DIAGNOSIS — N39.0 URINARY TRACT INFECTION WITHOUT HEMATURIA, SITE UNSPECIFIED: Primary | ICD-10-CM

## 2022-08-16 NOTE — TELEPHONE ENCOUNTER
Pt went to podiatrist and was prescribed amoxicillin 875mg for her toe infection. Pt wants to make sure it is ok to take this antibiotic instead of the cipro.

## 2022-08-16 NOTE — TELEPHONE ENCOUNTER
Pt was given Cipro Rx for UTI on 8/11 for 7 days. Today she was started on Amoxicillin 875mg by Podiatrist d/t infected toe. Pt is asking if it is OK for her to take both medications.

## 2022-08-16 NOTE — TELEPHONE ENCOUNTER
Pt notified of below orders. Culture orders in Epic. All questions answered, pt expresses understanding.

## 2022-09-01 ENCOUNTER — LAB ENCOUNTER (OUTPATIENT)
Dept: LAB | Age: 67
End: 2022-09-01
Attending: FAMILY MEDICINE
Payer: MEDICARE

## 2022-09-01 DIAGNOSIS — N30.00 ACUTE CYSTITIS WITHOUT HEMATURIA: ICD-10-CM

## 2022-09-01 DIAGNOSIS — N20.0 NEPHROLITHIASIS: ICD-10-CM

## 2022-09-01 PROCEDURE — 87086 URINE CULTURE/COLONY COUNT: CPT

## 2022-09-16 ENCOUNTER — MED REC SCAN ONLY (OUTPATIENT)
Dept: FAMILY MEDICINE CLINIC | Facility: CLINIC | Age: 67
End: 2022-09-16

## 2022-09-16 ENCOUNTER — TELEPHONE (OUTPATIENT)
Dept: FAMILY MEDICINE CLINIC | Facility: CLINIC | Age: 67
End: 2022-09-16

## 2022-09-16 NOTE — TELEPHONE ENCOUNTER
DM eye exam completed 9/15/2022. Flowsheet filed. Report placed in Dr. Maria Victoria Smith to review and will then send to scan.

## 2022-09-22 ENCOUNTER — TELEPHONE (OUTPATIENT)
Dept: FAMILY MEDICINE CLINIC | Facility: CLINIC | Age: 67
End: 2022-09-22

## 2022-09-22 DIAGNOSIS — Z79.4 TYPE 2 DIABETES MELLITUS WITH HYPERGLYCEMIA, WITH LONG-TERM CURRENT USE OF INSULIN (HCC): ICD-10-CM

## 2022-09-22 DIAGNOSIS — E55.9 VITAMIN D DEFICIENCY: ICD-10-CM

## 2022-09-22 DIAGNOSIS — I10 ESSENTIAL HYPERTENSION: ICD-10-CM

## 2022-09-22 DIAGNOSIS — E53.8 VITAMIN B12 DEFICIENCY: ICD-10-CM

## 2022-09-22 DIAGNOSIS — E11.65 TYPE 2 DIABETES MELLITUS WITH HYPERGLYCEMIA, WITH LONG-TERM CURRENT USE OF INSULIN (HCC): ICD-10-CM

## 2022-09-22 DIAGNOSIS — E03.9 HYPOTHYROIDISM, UNSPECIFIED TYPE: ICD-10-CM

## 2022-09-22 DIAGNOSIS — E78.2 MIXED HYPERLIPIDEMIA: Primary | ICD-10-CM

## 2022-09-26 ENCOUNTER — LAB ENCOUNTER (OUTPATIENT)
Dept: LAB | Age: 67
End: 2022-09-26
Attending: FAMILY MEDICINE

## 2022-09-26 ENCOUNTER — OFFICE VISIT (OUTPATIENT)
Dept: ENDOCRINOLOGY CLINIC | Facility: CLINIC | Age: 67
End: 2022-09-26

## 2022-09-26 VITALS
HEART RATE: 83 BPM | SYSTOLIC BLOOD PRESSURE: 112 MMHG | HEIGHT: 61 IN | WEIGHT: 165 LBS | BODY MASS INDEX: 31.15 KG/M2 | DIASTOLIC BLOOD PRESSURE: 76 MMHG | RESPIRATION RATE: 16 BRPM

## 2022-09-26 DIAGNOSIS — Z79.4 TYPE 2 DIABETES MELLITUS WITH HYPERGLYCEMIA, WITH LONG-TERM CURRENT USE OF INSULIN (HCC): ICD-10-CM

## 2022-09-26 DIAGNOSIS — E66.09 CLASS 1 OBESITY DUE TO EXCESS CALORIES WITH SERIOUS COMORBIDITY AND BODY MASS INDEX (BMI) OF 30.0 TO 30.9 IN ADULT: ICD-10-CM

## 2022-09-26 DIAGNOSIS — E11.65 TYPE 2 DIABETES MELLITUS WITH HYPERGLYCEMIA, WITH LONG-TERM CURRENT USE OF INSULIN (HCC): ICD-10-CM

## 2022-09-26 DIAGNOSIS — I10 ESSENTIAL HYPERTENSION: ICD-10-CM

## 2022-09-26 DIAGNOSIS — E53.8 VITAMIN B12 DEFICIENCY: ICD-10-CM

## 2022-09-26 DIAGNOSIS — E55.9 VITAMIN D DEFICIENCY: ICD-10-CM

## 2022-09-26 DIAGNOSIS — E78.2 MIXED HYPERLIPIDEMIA: ICD-10-CM

## 2022-09-26 DIAGNOSIS — E11.65 TYPE 2 DIABETES MELLITUS WITH HYPERGLYCEMIA, WITH LONG-TERM CURRENT USE OF INSULIN (HCC): Primary | ICD-10-CM

## 2022-09-26 DIAGNOSIS — R80.9 POSITIVE FOR MACROALBUMINURIA: ICD-10-CM

## 2022-09-26 DIAGNOSIS — E03.9 HYPOTHYROIDISM, UNSPECIFIED TYPE: ICD-10-CM

## 2022-09-26 DIAGNOSIS — Z79.4 TYPE 2 DIABETES MELLITUS WITH HYPERGLYCEMIA, WITH LONG-TERM CURRENT USE OF INSULIN (HCC): Primary | ICD-10-CM

## 2022-09-26 LAB
ALBUMIN SERPL-MCNC: 3.4 G/DL (ref 3.4–5)
ALBUMIN/GLOB SERPL: 0.8 {RATIO} (ref 1–2)
ALP LIVER SERPL-CCNC: 93 U/L
ALT SERPL-CCNC: 16 U/L
ANION GAP SERPL CALC-SCNC: 3 MMOL/L (ref 0–18)
AST SERPL-CCNC: 12 U/L (ref 15–37)
BASOPHILS # BLD AUTO: 0.05 X10(3) UL (ref 0–0.2)
BASOPHILS NFR BLD AUTO: 0.7 %
BILIRUB SERPL-MCNC: 0.6 MG/DL (ref 0.1–2)
BUN BLD-MCNC: 20 MG/DL (ref 7–18)
CALCIUM BLD-MCNC: 9.3 MG/DL (ref 8.5–10.1)
CARTRIDGE LOT#: 992 NUMERIC
CHLORIDE SERPL-SCNC: 108 MMOL/L (ref 98–112)
CHOLEST SERPL-MCNC: 239 MG/DL (ref ?–200)
CO2 SERPL-SCNC: 28 MMOL/L (ref 21–32)
CREAT BLD-MCNC: 0.77 MG/DL
EOSINOPHIL # BLD AUTO: 0.2 X10(3) UL (ref 0–0.7)
EOSINOPHIL NFR BLD AUTO: 2.7 %
ERYTHROCYTE [DISTWIDTH] IN BLOOD BY AUTOMATED COUNT: 13.2 %
FASTING PATIENT LIPID ANSWER: YES
FASTING STATUS PATIENT QL REPORTED: YES
GFR SERPLBLD BASED ON 1.73 SQ M-ARVRAT: 86 ML/MIN/1.73M2 (ref 60–?)
GLOBULIN PLAS-MCNC: 4.3 G/DL (ref 2.8–4.4)
GLUCOSE BLD-MCNC: 195 MG/DL (ref 70–99)
GLUCOSE BLOOD: 193
HCT VFR BLD AUTO: 37.2 %
HDLC SERPL-MCNC: 55 MG/DL (ref 40–59)
HEMOGLOBIN A1C: 7.8 % (ref 4.3–5.6)
HGB BLD-MCNC: 12.2 G/DL
IMM GRANULOCYTES # BLD AUTO: 0.02 X10(3) UL (ref 0–1)
IMM GRANULOCYTES NFR BLD: 0.3 %
LDLC SERPL CALC-MCNC: 165 MG/DL (ref ?–100)
LYMPHOCYTES # BLD AUTO: 1.89 X10(3) UL (ref 1–4)
LYMPHOCYTES NFR BLD AUTO: 25.1 %
MCH RBC QN AUTO: 28.3 PG (ref 26–34)
MCHC RBC AUTO-ENTMCNC: 32.8 G/DL (ref 31–37)
MCV RBC AUTO: 86.3 FL
MONOCYTES # BLD AUTO: 0.59 X10(3) UL (ref 0.1–1)
MONOCYTES NFR BLD AUTO: 7.8 %
NEUTROPHILS # BLD AUTO: 4.79 X10 (3) UL (ref 1.5–7.7)
NEUTROPHILS # BLD AUTO: 4.79 X10(3) UL (ref 1.5–7.7)
NEUTROPHILS NFR BLD AUTO: 63.4 %
NONHDLC SERPL-MCNC: 184 MG/DL (ref ?–130)
OSMOLALITY SERPL CALC.SUM OF ELEC: 296 MOSM/KG (ref 275–295)
PLATELET # BLD AUTO: 191 10(3)UL (ref 150–450)
POTASSIUM SERPL-SCNC: 4.6 MMOL/L (ref 3.5–5.1)
PROT SERPL-MCNC: 7.7 G/DL (ref 6.4–8.2)
RBC # BLD AUTO: 4.31 X10(6)UL
SODIUM SERPL-SCNC: 139 MMOL/L (ref 136–145)
TEST STRIP LOT #: NORMAL NUMERIC
TRIGL SERPL-MCNC: 109 MG/DL (ref 30–149)
TSI SER-ACNC: 1.05 MIU/ML (ref 0.36–3.74)
VIT B12 SERPL-MCNC: 320 PG/ML (ref 193–986)
VIT D+METAB SERPL-MCNC: 23.8 NG/ML (ref 30–100)
VLDLC SERPL CALC-MCNC: 21 MG/DL (ref 0–30)
WBC # BLD AUTO: 7.5 X10(3) UL (ref 4–11)

## 2022-09-26 PROCEDURE — 3074F SYST BP LT 130 MM HG: CPT | Performed by: NURSE PRACTITIONER

## 2022-09-26 PROCEDURE — 84443 ASSAY THYROID STIM HORMONE: CPT

## 2022-09-26 PROCEDURE — 80053 COMPREHEN METABOLIC PANEL: CPT

## 2022-09-26 PROCEDURE — 82607 VITAMIN B-12: CPT

## 2022-09-26 PROCEDURE — 3078F DIAST BP <80 MM HG: CPT | Performed by: NURSE PRACTITIONER

## 2022-09-26 PROCEDURE — 36415 COLL VENOUS BLD VENIPUNCTURE: CPT

## 2022-09-26 PROCEDURE — 82947 ASSAY GLUCOSE BLOOD QUANT: CPT | Performed by: NURSE PRACTITIONER

## 2022-09-26 PROCEDURE — 85025 COMPLETE CBC W/AUTO DIFF WBC: CPT

## 2022-09-26 PROCEDURE — 80061 LIPID PANEL: CPT

## 2022-09-26 PROCEDURE — 3051F HG A1C>EQUAL 7.0%<8.0%: CPT | Performed by: NURSE PRACTITIONER

## 2022-09-26 PROCEDURE — 83036 HEMOGLOBIN GLYCOSYLATED A1C: CPT | Performed by: NURSE PRACTITIONER

## 2022-09-26 PROCEDURE — 82306 VITAMIN D 25 HYDROXY: CPT

## 2022-09-26 PROCEDURE — 3008F BODY MASS INDEX DOCD: CPT | Performed by: NURSE PRACTITIONER

## 2022-09-26 PROCEDURE — 99214 OFFICE O/P EST MOD 30 MIN: CPT | Performed by: NURSE PRACTITIONER

## 2022-09-26 RX ORDER — SEMAGLUTIDE 1.34 MG/ML
0.25 INJECTION, SOLUTION SUBCUTANEOUS
Qty: 1.5 ML | Refills: 0 | COMMUNITY
Start: 2022-09-26

## 2022-09-26 RX ORDER — BLOOD SUGAR DIAGNOSTIC
STRIP MISCELLANEOUS
Qty: 100 STRIP | Refills: 1 | Status: SHIPPED | OUTPATIENT
Start: 2022-09-26 | End: 2023-09-26

## 2022-09-26 RX ORDER — ROSUVASTATIN CALCIUM 10 MG/1
10 TABLET, COATED ORAL NIGHTLY
Qty: 30 TABLET | Refills: 11 | Status: SHIPPED | OUTPATIENT
Start: 2022-09-26

## 2022-09-26 RX ORDER — LISINOPRIL 5 MG/1
5 TABLET ORAL DAILY
Qty: 30 TABLET | Refills: 1 | Status: SHIPPED | OUTPATIENT
Start: 2022-09-26 | End: 2022-09-26

## 2022-09-26 RX ORDER — METFORMIN HYDROCHLORIDE 500 MG/1
500 TABLET, EXTENDED RELEASE ORAL 2 TIMES DAILY WITH MEALS
Qty: 180 TABLET | Refills: 1 | Status: SHIPPED | OUTPATIENT
Start: 2022-09-26

## 2022-09-26 RX ORDER — SEMAGLUTIDE 1.34 MG/ML
0.5 INJECTION, SOLUTION SUBCUTANEOUS
Qty: 1.5 ML | Refills: 0 | COMMUNITY
Start: 2022-09-26

## 2022-09-26 RX ORDER — LANCETS 33 GAUGE
1 EACH MISCELLANEOUS DAILY
Qty: 100 EACH | Refills: 1 | Status: SHIPPED | OUTPATIENT
Start: 2022-09-26 | End: 2023-09-26

## 2022-09-26 RX ORDER — LISINOPRIL 5 MG/1
TABLET ORAL
Qty: 90 TABLET | Refills: 0 | Status: SHIPPED | OUTPATIENT
Start: 2022-09-26

## 2022-09-26 NOTE — PATIENT INSTRUCTIONS
We are here to support you with Diabetes but please remember that you still need your primary care doctor for your routine health maintenance. Your current A1C: 7.8%  This test provides us with your average blood sugar for the past 3 months. The main goal of diabetes treatment is to keep your sugar from going too high. We measure your overall blood sugar trends with a Hemoglobin A1C test. (also called an A1C)  For most people the target is less than 7.0% but sometimes we make exceptions based on age, health history and other factors. Keeping an A1C less than 7% helps prevents diabetes related health problems. If your A1C goes too high, then we need to talk about changing your current diabetes treatment. MEDICATIONS:   It is important to take all of your medications as prescribed. Please call me if you cannot get the prescriptions filled or are having issues with refills. Also, please call me if you have any issues with medication questions, side effects, dosing questions or problems with your blood sugar trends BEFORE CHANGING OR STOPPING ANY MEDICATIONS. Continue Levemir 30 units injection in AM and 28 units in PM  Restart Metformin ER 500mg 2x/day with breakfast and dinner - Week 1 take 1 tab in AM and if tolerating increase to 2x/day on Week2  Restart Ozempic 0.25mg injection weekly x 4 weeks if tolerating increase to 0.5mg injection weekly  Restart lisinopril and rosuvastatin    Blood sugar testing:   Always bring your glucose meter or blood sugar logbook to every appointment here at the diabetes center. This allows me to safely make adjustments to your diabetes plan. In order for me to determine any patterns in your blood sugars, you will need to test your blood sugar daily   It would be best to change up the times of day that you are testing your sugar. Alternate testing before breakfast (fasting) and testing blood sugar 2 hours after your meals.    Call if 2 readings below 80 mg/dl in 1 week for medication adjustment. Blood sugar targets:  Before breakfast:   (preferably < 110)  2 hours After meals: less than 180 (preferably less than 150)   Call for persistent blood sugars < 75 or > 200. Blood sugars greater than 200 are not acceptable to reach your goal of improving diabetes      Health Maintenance:   1. LABS: It is important to monitor your kidney function (blood and urine protein levels) , liver function tests and cholesterol levels when you have diabetes. 2. FOOT EXAMS:  daily foot inspections for foot wounds or skin changes are important for foot care. Any unusual changes should be reported immediately. 3. EYE EXAMS: Checking your eyes for diabetes changes is important and you should have a dilated eye exam done by an eye doctor EVERY year since these changes occur in the BACK of the eye and not visible by you. Please let me know if you need provider list for eye doctor. Lifestyle Therapy:    1. NUTRITION: Maintain optimal weight, calorie restriction if overweight, plant-based diet    2. PHYSICAL ACTIVITY: 150 minutes per week (30 minutes a day 5 days a week) of moderate exertion such as walking, stair climbing. Include strength training 2-3 times per week. Increase as tolerated. 3. SLEEP: Try and get 7-8 hours of sleep per night    4. BEHAVIOR:  Tobacco cessation and alcohol in moderation      Reminders:  Have fasting labs done  Apply for social security medication assistance    [facility] AGNES MCCARTY-[facility]ES  [address] [facility] [address], [address]  [address] [place] [address], [address] [facility] [facility] [address] [place], [address]  [address], [address] [place] [address]  Diabetes Services at 700 [facility] [address]

## 2022-09-27 ENCOUNTER — LABORATORY ENCOUNTER (OUTPATIENT)
Dept: LAB | Age: 67
End: 2022-09-27
Attending: NURSE PRACTITIONER

## 2022-09-27 ENCOUNTER — HOSPITAL ENCOUNTER (OUTPATIENT)
Dept: MAMMOGRAPHY | Age: 67
Discharge: HOME | End: 2022-09-27
Attending: FAMILY MEDICINE

## 2022-09-27 ENCOUNTER — TELEPHONE (OUTPATIENT)
Dept: FAMILY MEDICINE CLINIC | Facility: CLINIC | Age: 67
End: 2022-09-27

## 2022-09-27 ENCOUNTER — HOSPITAL ENCOUNTER (OUTPATIENT)
Dept: BONE DENSITY | Age: 67
Discharge: HOME | End: 2022-09-27
Attending: FAMILY MEDICINE

## 2022-09-27 DIAGNOSIS — Z12.31 VISIT FOR SCREENING MAMMOGRAM: ICD-10-CM

## 2022-09-27 DIAGNOSIS — E11.65 TYPE 2 DIABETES MELLITUS WITH HYPERGLYCEMIA, WITH LONG-TERM CURRENT USE OF INSULIN (HCC): ICD-10-CM

## 2022-09-27 DIAGNOSIS — Z79.4 TYPE 2 DIABETES MELLITUS WITH HYPERGLYCEMIA, WITH LONG-TERM CURRENT USE OF INSULIN (HCC): ICD-10-CM

## 2022-09-27 DIAGNOSIS — M85.89 OSTEOPENIA OF MULTIPLE SITES: ICD-10-CM

## 2022-09-27 LAB
CREAT UR-SCNC: 341 MG/DL
MICROALBUMIN UR-MCNC: 148 MG/DL
MICROALBUMIN/CREAT 24H UR-RTO: 434 UG/MG (ref ?–30)

## 2022-09-27 PROCEDURE — 77080 DXA BONE DENSITY AXIAL: CPT | Performed by: FAMILY MEDICINE

## 2022-09-27 PROCEDURE — 82570 ASSAY OF URINE CREATININE: CPT

## 2022-09-27 PROCEDURE — 77063 BREAST TOMOSYNTHESIS BI: CPT | Performed by: FAMILY MEDICINE

## 2022-09-27 PROCEDURE — 3060F POS MICROALBUMINURIA REV: CPT | Performed by: NURSE PRACTITIONER

## 2022-09-27 PROCEDURE — 77067 SCR MAMMO BI INCL CAD: CPT | Performed by: FAMILY MEDICINE

## 2022-09-27 PROCEDURE — 82043 UR ALBUMIN QUANTITATIVE: CPT

## 2022-09-28 ENCOUNTER — TELEPHONE (OUTPATIENT)
Dept: FAMILY MEDICINE CLINIC | Facility: CLINIC | Age: 67
End: 2022-09-28

## 2022-09-28 ENCOUNTER — OFFICE VISIT (OUTPATIENT)
Dept: FAMILY MEDICINE CLINIC | Facility: CLINIC | Age: 67
End: 2022-09-28

## 2022-09-28 VITALS
HEART RATE: 78 BPM | WEIGHT: 163 LBS | RESPIRATION RATE: 20 BRPM | HEIGHT: 61 IN | SYSTOLIC BLOOD PRESSURE: 110 MMHG | DIASTOLIC BLOOD PRESSURE: 68 MMHG | BODY MASS INDEX: 30.78 KG/M2 | OXYGEN SATURATION: 99 %

## 2022-09-28 DIAGNOSIS — E03.9 ACQUIRED HYPOTHYROIDISM: ICD-10-CM

## 2022-09-28 DIAGNOSIS — E66.09 CLASS 1 OBESITY DUE TO EXCESS CALORIES WITH SERIOUS COMORBIDITY AND BODY MASS INDEX (BMI) OF 30.0 TO 30.9 IN ADULT: ICD-10-CM

## 2022-09-28 DIAGNOSIS — G25.81 RLS (RESTLESS LEGS SYNDROME): ICD-10-CM

## 2022-09-28 DIAGNOSIS — M81.0 AGE-RELATED OSTEOPOROSIS WITHOUT CURRENT PATHOLOGICAL FRACTURE: Primary | ICD-10-CM

## 2022-09-28 DIAGNOSIS — E78.2 MIXED HYPERLIPIDEMIA: ICD-10-CM

## 2022-09-28 DIAGNOSIS — N32.81 OVERACTIVE BLADDER: ICD-10-CM

## 2022-09-28 DIAGNOSIS — Z87.74 H/O ARTERIOVENOUS MALFORMATION (AVM): ICD-10-CM

## 2022-09-28 DIAGNOSIS — Z86.73 STATUS POST STROKE: ICD-10-CM

## 2022-09-28 DIAGNOSIS — E11.3412 SEVERE NONPROLIFERATIVE DIABETIC RETINOPATHY OF LEFT EYE WITH MACULAR EDEMA ASSOCIATED WITH TYPE 2 DIABETES MELLITUS (HCC): ICD-10-CM

## 2022-09-28 DIAGNOSIS — E11.65 TYPE 2 DIABETES MELLITUS WITH HYPERGLYCEMIA, WITH LONG-TERM CURRENT USE OF INSULIN (HCC): ICD-10-CM

## 2022-09-28 DIAGNOSIS — I10 ESSENTIAL HYPERTENSION: ICD-10-CM

## 2022-09-28 DIAGNOSIS — Z79.4 TYPE 2 DIABETES MELLITUS WITH HYPERGLYCEMIA, WITH LONG-TERM CURRENT USE OF INSULIN (HCC): ICD-10-CM

## 2022-09-28 PROBLEM — R80.9 POSITIVE FOR MACROALBUMINURIA: Status: RESOLVED | Noted: 2020-01-10 | Resolved: 2022-09-28

## 2022-09-28 PROBLEM — E11.319 DIABETIC RETINOPATHY OF LEFT EYE ASSOCIATED WITH TYPE 2 DIABETES MELLITUS (HCC): Status: RESOLVED | Noted: 2017-12-18 | Resolved: 2022-09-28

## 2022-09-28 PROCEDURE — 96160 PT-FOCUSED HLTH RISK ASSMT: CPT | Performed by: FAMILY MEDICINE

## 2022-09-28 PROCEDURE — G0438 PPPS, INITIAL VISIT: HCPCS | Performed by: FAMILY MEDICINE

## 2022-09-28 PROCEDURE — 3078F DIAST BP <80 MM HG: CPT | Performed by: FAMILY MEDICINE

## 2022-09-28 PROCEDURE — 3008F BODY MASS INDEX DOCD: CPT | Performed by: FAMILY MEDICINE

## 2022-09-28 PROCEDURE — 99397 PER PM REEVAL EST PAT 65+ YR: CPT | Performed by: FAMILY MEDICINE

## 2022-09-28 PROCEDURE — 3074F SYST BP LT 130 MM HG: CPT | Performed by: FAMILY MEDICINE

## 2022-09-28 RX ORDER — IBANDRONATE SODIUM 150 MG/1
150 TABLET, FILM COATED ORAL
Qty: 4 TABLET | Refills: 11 | Status: SHIPPED | OUTPATIENT
Start: 2022-09-28

## 2022-09-28 NOTE — TELEPHONE ENCOUNTER
----- Message from Nicolasa Gillespie MD sent at 9/27/2022  5:11 PM CDT -----  Abnormal results patient needs to make an appointment to f/u.

## 2022-09-28 NOTE — TELEPHONE ENCOUNTER
Pt had visit today and forgot to bring in her form for handicap placard. Copy made.  Routed to Daniele Rockwell

## 2022-09-28 NOTE — TELEPHONE ENCOUNTER
----- Message from Russel Allison MD sent at 9/27/2022  5:07 PM CDT -----  I recommend low lipid diet and Crestor 5mg po qhs, 30, 3 refills, recheck lipids and CMP in 3 months.

## 2022-09-30 NOTE — TELEPHONE ENCOUNTER
Dr. De Jesus pointed out that form is only one page. New form placed in Dr. Marcie Merrittr to fill out and sign.

## 2022-10-06 ENCOUNTER — TELEPHONE (OUTPATIENT)
Dept: FAMILY MEDICINE CLINIC | Facility: CLINIC | Age: 67
End: 2022-10-06

## 2022-10-06 RX ORDER — TRAMADOL HYDROCHLORIDE 50 MG/1
50 TABLET ORAL EVERY 6 HOURS PRN
Qty: 30 TABLET | Refills: 1 | Status: SHIPPED | OUTPATIENT
Start: 2022-10-06

## 2022-10-06 NOTE — TELEPHONE ENCOUNTER
Pt is going on vacation for 10 days & is requesting an Rx stronger than Tylenol, to help her with her back & hip pains that she has during her traveling.    LOV 9/28

## 2022-10-06 NOTE — TELEPHONE ENCOUNTER
Pt requesting rx for stronger OTC Tylenol for 10 day trip.  Pt leaving tomorrow    Pt requesting update for handicap placard

## 2022-11-07 ENCOUNTER — OFFICE VISIT (OUTPATIENT)
Dept: ENDOCRINOLOGY CLINIC | Facility: CLINIC | Age: 67
End: 2022-11-07
Payer: MEDICARE

## 2022-11-07 VITALS
DIASTOLIC BLOOD PRESSURE: 64 MMHG | HEART RATE: 85 BPM | BODY MASS INDEX: 31.15 KG/M2 | SYSTOLIC BLOOD PRESSURE: 108 MMHG | WEIGHT: 165 LBS | HEIGHT: 61 IN | RESPIRATION RATE: 16 BRPM

## 2022-11-07 DIAGNOSIS — E11.29 TYPE 2 DIABETES MELLITUS WITH MICROALBUMINURIA, WITH LONG-TERM CURRENT USE OF INSULIN (HCC): Primary | ICD-10-CM

## 2022-11-07 DIAGNOSIS — E78.2 MIXED HYPERLIPIDEMIA: ICD-10-CM

## 2022-11-07 DIAGNOSIS — Z23 INFLUENZA VACCINE NEEDED: ICD-10-CM

## 2022-11-07 DIAGNOSIS — R80.9 TYPE 2 DIABETES MELLITUS WITH MICROALBUMINURIA, WITH LONG-TERM CURRENT USE OF INSULIN (HCC): Primary | ICD-10-CM

## 2022-11-07 DIAGNOSIS — I10 ESSENTIAL HYPERTENSION: ICD-10-CM

## 2022-11-07 DIAGNOSIS — Z79.4 TYPE 2 DIABETES MELLITUS WITH MICROALBUMINURIA, WITH LONG-TERM CURRENT USE OF INSULIN (HCC): Primary | ICD-10-CM

## 2022-11-07 RX ORDER — INSULIN DETEMIR 100 [IU]/ML
25 INJECTION, SOLUTION SUBCUTANEOUS 2 TIMES DAILY
Qty: 45 ML | Refills: 0 | COMMUNITY
Start: 2022-11-07

## 2022-11-07 NOTE — PATIENT INSTRUCTIONS
We are here to support you with Diabetes but please remember that you still need your primary care doctor for your routine health maintenance. Your current A1C: 7.8%  This test provides us with your average blood sugar for the past 3 months. The main goal of diabetes treatment is to keep your sugar from going too high. We measure your overall blood sugar trends with a Hemoglobin A1C test. (also called an A1C)  For most people the target is less than 7.0% but sometimes we make exceptions based on age, health history and other factors. Keeping an A1C less than 7% helps prevents diabetes related health problems. If your A1C goes too high, then we need to talk about changing your current diabetes treatment. MEDICATIONS:   It is important to take all of your medications as prescribed. Please call me if you cannot get the prescriptions filled or are having issues with refills. Also, please call me if you have any issues with medication questions, side effects, dosing questions or problems with your blood sugar trends BEFORE CHANGING OR STOPPING ANY MEDICATIONS. Continue Metformin ER 500mg 2x/day with breakfast and dinner  Increase Ozempic 0.5mg injection weekly  Decrease Levemir to 25 units injection 2x/day    Blood sugar testing:   Always bring your glucose meter or blood sugar logbook to every appointment here at the diabetes center. This allows me to safely make adjustments to your diabetes plan. In order for me to determine any patterns in your blood sugars, you will need to test your blood sugar daily   It would be best to change up the times of day that you are testing your sugar. Alternate testing before breakfast (fasting) and testing blood sugar 2 hours after your meals. Call if 2 readings below 80 mg/dl in 1 week for medication adjustment.      Blood sugar targets:  Before breakfast:   (preferably < 110)  2 hours After meals: less than 180 (preferably less than 150)   Call for persistent blood sugars < 75 or > 200. Blood sugars greater than 200 are not acceptable to reach your goal of improving diabetes      Health Maintenance:   1. LABS: It is important to monitor your kidney function (blood and urine protein levels) , liver function tests and cholesterol levels when you have diabetes. 2. FOOT EXAMS:  daily foot inspections for foot wounds or skin changes are important for foot care. Any unusual changes should be reported immediately. 3. EYE EXAMS: Checking your eyes for diabetes changes is important and you should have a dilated eye exam done by an eye doctor EVERY year since these changes occur in the BACK of the eye and not visible by you. Please let me know if you need provider list for eye doctor. Lifestyle Therapy:    1. NUTRITION: Maintain optimal weight, calorie restriction if overweight, plant-based diet    2. PHYSICAL ACTIVITY: 150 minutes per week (30 minutes a day 5 days a week) of moderate exertion such as walking, stair climbing. Include strength training 2-3 times per week. Increase as tolerated. 3. SLEEP: Try and get 7-8 hours of sleep per night    4. BEHAVIOR:  Tobacco cessation and alcohol in moderation      Reminders:  Contact social security for [employer] assistance, if denies obtain denial letter    [employer] APRN, BC-ADM, [facility]  [address], [address], [address]  [address] [place] [address], [address] [facility], [address]  [address], [address] [facility] [address]  Diabetes Services at [facility] [address]

## 2022-11-11 ENCOUNTER — TELEPHONE (OUTPATIENT)
Dept: FAMILY MEDICINE CLINIC | Facility: CLINIC | Age: 67
End: 2022-11-11

## 2022-11-11 ENCOUNTER — OFFICE VISIT (OUTPATIENT)
Dept: FAMILY MEDICINE CLINIC | Facility: CLINIC | Age: 67
End: 2022-11-11
Payer: MEDICARE

## 2022-11-11 VITALS
HEART RATE: 98 BPM | SYSTOLIC BLOOD PRESSURE: 119 MMHG | RESPIRATION RATE: 18 BRPM | DIASTOLIC BLOOD PRESSURE: 48 MMHG | TEMPERATURE: 98 F | WEIGHT: 165 LBS | HEIGHT: 61 IN | OXYGEN SATURATION: 98 % | BODY MASS INDEX: 31.15 KG/M2

## 2022-11-11 DIAGNOSIS — J06.9 VIRAL URI WITH COUGH: Primary | ICD-10-CM

## 2022-11-11 PROCEDURE — 3008F BODY MASS INDEX DOCD: CPT | Performed by: NURSE PRACTITIONER

## 2022-11-11 PROCEDURE — 3078F DIAST BP <80 MM HG: CPT | Performed by: NURSE PRACTITIONER

## 2022-11-11 PROCEDURE — 87637 SARSCOV2&INF A&B&RSV AMP PRB: CPT | Performed by: NURSE PRACTITIONER

## 2022-11-11 PROCEDURE — 3074F SYST BP LT 130 MM HG: CPT | Performed by: NURSE PRACTITIONER

## 2022-11-11 PROCEDURE — 99213 OFFICE O/P EST LOW 20 MIN: CPT | Performed by: NURSE PRACTITIONER

## 2022-11-11 NOTE — TELEPHONE ENCOUNTER
Pt had her flu shot here on 11/07. Pt has been sick ever since. Cough, sore throat, ears hurt, fever and very weak. Pls call pt.

## 2022-11-11 NOTE — TELEPHONE ENCOUNTER
Pt states she has been having Flu-like symptoms since 11/7, she received Flu vaccine. She denies SOB. Has fever, cough, sore throat, ear pain. Advised pt for comfort measures, Tylenol for pain or fever, Mucinex, rest, push fluids, or pt can go to walk in clinic to be evaluated. All questions answered, pt expresses understanding.

## 2022-11-12 LAB
FLUAV + FLUBV RNA SPEC NAA+PROBE: NOT DETECTED
FLUAV + FLUBV RNA SPEC NAA+PROBE: NOT DETECTED
RSV RNA SPEC NAA+PROBE: NOT DETECTED
SARS-COV-2 RNA RESP QL NAA+PROBE: NOT DETECTED

## 2022-11-16 ENCOUNTER — PATIENT MESSAGE (OUTPATIENT)
Dept: ENDOCRINOLOGY CLINIC | Facility: CLINIC | Age: 67
End: 2022-11-16

## 2022-11-16 DIAGNOSIS — R80.9 TYPE 2 DIABETES MELLITUS WITH MICROALBUMINURIA, WITH LONG-TERM CURRENT USE OF INSULIN (HCC): ICD-10-CM

## 2022-11-16 DIAGNOSIS — E11.29 TYPE 2 DIABETES MELLITUS WITH MICROALBUMINURIA, WITH LONG-TERM CURRENT USE OF INSULIN (HCC): ICD-10-CM

## 2022-11-16 DIAGNOSIS — Z79.4 TYPE 2 DIABETES MELLITUS WITH MICROALBUMINURIA, WITH LONG-TERM CURRENT USE OF INSULIN (HCC): ICD-10-CM

## 2022-11-16 RX ORDER — INSULIN DETEMIR 100 [IU]/ML
25 INJECTION, SOLUTION SUBCUTANEOUS 2 TIMES DAILY
Qty: 45 ML | Refills: 0 | Status: SHIPPED | OUTPATIENT
Start: 2022-11-16

## 2022-11-16 NOTE — TELEPHONE ENCOUNTER
From: Antolin Gomez  To: TIP Rosa  Sent: 11/16/2022 1:28 PM CST  Subject: Levemir insulin    It is time to refill my prescription for levemir and want to make sure that I should refill for 3 months.  Thanks

## 2022-11-16 NOTE — TELEPHONE ENCOUNTER
LOV: 11/07/2022  Future Appointments   Date Time Provider Susana Syeda   12/8/2022 11:30 AM Norah Hicks APN [id number]     Last A1c value was 7.8% done 9/26/2022.

## 2022-11-28 ENCOUNTER — TELEPHONE (OUTPATIENT)
Dept: ENDOCRINOLOGY CLINIC | Facility: CLINIC | Age: 67
End: 2022-11-28

## 2022-11-28 NOTE — TELEPHONE ENCOUNTER
Pt called stating her levemir was almost $400, so she has not filled it. States it was supposed to go down this refill. Contacted pharmacy, they confirmed she is in coverage gap currently. Requests assistance for less expensive med.   Could she use [employer] card  basaglar through for $35?

## 2022-11-28 NOTE — TELEPHONE ENCOUNTER
[facility] savings card is not able to be used with Medicare. Please have patient contact insurance to inquire on the lowest cost basal insulin with her insurance. Have her contact us with preferred basal and will send to pharmacy. Can also provide patient with [facility] weave energy information for Lantus, patient will need to sign up.     Adrien MCCARTY, BC-ADM, [facility]

## 2022-12-02 ENCOUNTER — TELEPHONE (OUTPATIENT)
Dept: FAMILY MEDICINE CLINIC | Facility: CLINIC | Age: 67
End: 2022-12-02

## 2022-12-02 ENCOUNTER — VIRTUAL PHONE E/M (OUTPATIENT)
Dept: FAMILY MEDICINE CLINIC | Facility: CLINIC | Age: 67
End: 2022-12-02
Payer: MEDICARE

## 2022-12-02 DIAGNOSIS — J01.00 ACUTE NON-RECURRENT MAXILLARY SINUSITIS: Primary | ICD-10-CM

## 2022-12-02 RX ORDER — ALBUTEROL SULFATE 90 UG/1
2 AEROSOL, METERED RESPIRATORY (INHALATION) EVERY 4 HOURS PRN
Qty: 18 G | Refills: 0 | Status: SHIPPED | OUTPATIENT
Start: 2022-12-02

## 2022-12-02 RX ORDER — AMOXICILLIN AND CLAVULANATE POTASSIUM 875; 125 MG/1; MG/1
1 TABLET, FILM COATED ORAL 2 TIMES DAILY
Qty: 14 TABLET | Refills: 0 | Status: SHIPPED | OUTPATIENT
Start: 2022-12-02 | End: 2022-12-09

## 2022-12-02 RX ORDER — BENZONATATE 200 MG/1
200 CAPSULE ORAL 3 TIMES DAILY PRN
Qty: 60 CAPSULE | Refills: 0 | Status: SHIPPED | OUTPATIENT
Start: 2022-12-02

## 2022-12-07 ENCOUNTER — TELEPHONE (OUTPATIENT)
Dept: FAMILY MEDICINE CLINIC | Facility: CLINIC | Age: 67
End: 2022-12-07

## 2022-12-07 DIAGNOSIS — J01.00 ACUTE NON-RECURRENT MAXILLARY SINUSITIS: ICD-10-CM

## 2022-12-07 RX ORDER — BENZONATATE 200 MG/1
200 CAPSULE ORAL 3 TIMES DAILY PRN
Qty: 60 CAPSULE | Refills: 0 | Status: SHIPPED | OUTPATIENT
Start: 2022-12-07

## 2022-12-07 NOTE — TELEPHONE ENCOUNTER
Candy Brothzenon. Benzonatate is not covered by the patient's insurance. Rx will cost (using GoodRx coupon) $64.36.     Using GoodRx coupon through [facility] will cost $16.73  [facility] [id number]  [address]  Member ID [id number]

## 2022-12-07 NOTE — TELEPHONE ENCOUNTER
Pt states cough med that was called in 12/2 she does not believe is covered by insurance. Pt states she is still not feeling better. Requesting new med.

## 2022-12-07 NOTE — TELEPHONE ENCOUNTER
Notified patient that benzonatate is not covered by insurance; however, rx can be sent to [address] [place] [address] for lower cost. Patient verbalized understanding and stated that she would like for the prescription to be sent to [facility] on [address]. Rx sent. While on the phone, patient was speaking in full sentences without difficulty. No coughing noted while on the phone.

## 2022-12-08 ENCOUNTER — OFFICE VISIT (OUTPATIENT)
Dept: ENDOCRINOLOGY CLINIC | Facility: CLINIC | Age: 67
End: 2022-12-08
Payer: MEDICARE

## 2022-12-08 VITALS
HEART RATE: 93 BPM | DIASTOLIC BLOOD PRESSURE: 72 MMHG | WEIGHT: 163 LBS | BODY MASS INDEX: 30.78 KG/M2 | SYSTOLIC BLOOD PRESSURE: 114 MMHG | RESPIRATION RATE: 16 BRPM | HEIGHT: 61 IN

## 2022-12-08 DIAGNOSIS — E11.29 TYPE 2 DIABETES MELLITUS WITH MICROALBUMINURIA, WITH LONG-TERM CURRENT USE OF INSULIN (HCC): Primary | ICD-10-CM

## 2022-12-08 DIAGNOSIS — Z79.4 TYPE 2 DIABETES MELLITUS WITH MICROALBUMINURIA, WITH LONG-TERM CURRENT USE OF INSULIN (HCC): Primary | ICD-10-CM

## 2022-12-08 DIAGNOSIS — E78.2 MIXED HYPERLIPIDEMIA: ICD-10-CM

## 2022-12-08 DIAGNOSIS — R80.9 TYPE 2 DIABETES MELLITUS WITH MICROALBUMINURIA, WITH LONG-TERM CURRENT USE OF INSULIN (HCC): Primary | ICD-10-CM

## 2022-12-08 DIAGNOSIS — I10 ESSENTIAL HYPERTENSION: ICD-10-CM

## 2022-12-08 LAB
CARTRIDGE LOT#: 535 NUMERIC
HEMOGLOBIN A1C: 7.4 % (ref 4.3–5.6)

## 2022-12-08 PROCEDURE — 3008F BODY MASS INDEX DOCD: CPT | Performed by: NURSE PRACTITIONER

## 2022-12-08 PROCEDURE — 3074F SYST BP LT 130 MM HG: CPT | Performed by: NURSE PRACTITIONER

## 2022-12-08 PROCEDURE — 3078F DIAST BP <80 MM HG: CPT | Performed by: NURSE PRACTITIONER

## 2022-12-08 PROCEDURE — 99214 OFFICE O/P EST MOD 30 MIN: CPT | Performed by: NURSE PRACTITIONER

## 2022-12-08 PROCEDURE — 83036 HEMOGLOBIN GLYCOSYLATED A1C: CPT | Performed by: NURSE PRACTITIONER

## 2022-12-08 RX ORDER — SEMAGLUTIDE 1.34 MG/ML
1 INJECTION, SOLUTION SUBCUTANEOUS WEEKLY
Qty: 3 ML | Refills: 0 | COMMUNITY
Start: 2022-12-08

## 2022-12-08 RX ORDER — SEMAGLUTIDE 1.34 MG/ML
1 INJECTION, SOLUTION SUBCUTANEOUS WEEKLY
Qty: 3 ML | Refills: 1 | Status: SHIPPED | OUTPATIENT
Start: 2022-12-08 | End: 2022-12-08

## 2022-12-08 NOTE — PATIENT INSTRUCTIONS
We are here to support you with Diabetes but please remember that you still need your primary care doctor for your routine health maintenance. Your current A1C: 7.4%  This test provides us with your average blood sugar for the past 3 months. The main goal of diabetes treatment is to keep your sugar from going too high. We measure your overall blood sugar trends with a Hemoglobin A1C test. (also called an A1C)  For most people the target is less than 7.0% but sometimes we make exceptions based on age, health history and other factors. Keeping an A1C less than 7% helps prevents diabetes related health problems. If your A1C goes too high, then we need to talk about changing your current diabetes treatment. MEDICATIONS:   It is important to take all of your medications as prescribed. Please call me if you cannot get the prescriptions filled or are having issues with refills. Also, please call me if you have any issues with medication questions, side effects, dosing questions or problems with your blood sugar trends BEFORE CHANGING OR STOPPING ANY MEDICATIONS. Continue Metformin ER 500mg 2x/day with breakfast and dinner  Continue Levemir 25 units injection 2x/day   Increase Ozempic 1mg injection weekly    Blood sugar testing:   Always bring your glucose meter or blood sugar logbook to every appointment here at the diabetes center. This allows me to safely make adjustments to your diabetes plan. In order for me to determine any patterns in your blood sugars, you will need to test your blood sugar daily   It would be best to change up the times of day that you are testing your sugar. Alternate testing before breakfast (fasting) and testing blood sugar 2 hours after your meals. Call if 2 readings below 80 mg/dl in 1 week for medication adjustment.      Blood sugar targets:  Before breakfast:   (preferably < 110)  2 hours After meals: less than 180 (preferably less than 150)   Call for persistent blood sugars < 75 or > 200. Blood sugars greater than 200 are not acceptable to reach your goal of improving diabetes      Health Maintenance:   1. LABS: It is important to monitor your kidney function (blood and urine protein levels) , liver function tests and cholesterol levels when you have diabetes. 2. FOOT EXAMS:  daily foot inspections for foot wounds or skin changes are important for foot care. Any unusual changes should be reported immediately. 3. EYE EXAMS: Checking your eyes for diabetes changes is important and you should have a dilated eye exam done by an eye doctor EVERY year since these changes occur in the BACK of the eye and not visible by you. Please let me know if you need provider list for eye doctor. Lifestyle Therapy:    1. NUTRITION: Maintain optimal weight, calorie restriction if overweight, plant-based diet    2. PHYSICAL ACTIVITY: 150 minutes per week (30 minutes a day 5 days a week) of moderate exertion such as walking, stair climbing. Include strength training 2-3 times per week. Increase as tolerated. 3. SLEEP: Try and get 7-8 hours of sleep per night    4. BEHAVIOR:  Tobacco cessation and alcohol in moderation      Reminders:  If assistance denied through social security, obtain denial letter and contact office to  [facility] patient assistance paperwork. Felix MCCARTY, BC-ADM, [facility]  [address], [address], [address]  [address] [place] [address], 45 [facility], [address]  [address]  Stephon Arias, [address] [place] [address]  Diabetes Services at 700 [facility] [address]

## 2022-12-29 ENCOUNTER — TELEPHONE (OUTPATIENT)
Dept: ENDOCRINOLOGY CLINIC | Facility: CLINIC | Age: 67
End: 2022-12-29

## 2022-12-29 NOTE — TELEPHONE ENCOUNTER
Pt left voice mail needed to speak to a nurse. Called back patient to see what it was about. Left message . Told her when call back hit option 2 to speak to nurse.

## 2022-12-29 NOTE — TELEPHONE ENCOUNTER
Called patient back, she was denied assistance through social security. Has access to [employer] so I will send link via [employer]. She will complete her portion and let us know when she's coming in.

## 2023-01-05 ENCOUNTER — OFFICE VISIT (OUTPATIENT)
Dept: ENDOCRINOLOGY CLINIC | Facility: CLINIC | Age: 68
End: 2023-01-05
Payer: MEDICARE

## 2023-01-05 VITALS
DIASTOLIC BLOOD PRESSURE: 70 MMHG | HEIGHT: 61 IN | HEART RATE: 89 BPM | RESPIRATION RATE: 18 BRPM | WEIGHT: 167 LBS | SYSTOLIC BLOOD PRESSURE: 130 MMHG | BODY MASS INDEX: 31.53 KG/M2

## 2023-01-05 DIAGNOSIS — E78.2 MIXED HYPERLIPIDEMIA: ICD-10-CM

## 2023-01-05 DIAGNOSIS — I10 ESSENTIAL HYPERTENSION: ICD-10-CM

## 2023-01-05 DIAGNOSIS — R80.9 TYPE 2 DIABETES MELLITUS WITH MICROALBUMINURIA, WITH LONG-TERM CURRENT USE OF INSULIN (HCC): Primary | ICD-10-CM

## 2023-01-05 DIAGNOSIS — Z79.4 TYPE 2 DIABETES MELLITUS WITH MICROALBUMINURIA, WITH LONG-TERM CURRENT USE OF INSULIN (HCC): Primary | ICD-10-CM

## 2023-01-05 DIAGNOSIS — E11.29 TYPE 2 DIABETES MELLITUS WITH MICROALBUMINURIA, WITH LONG-TERM CURRENT USE OF INSULIN (HCC): Primary | ICD-10-CM

## 2023-01-05 PROCEDURE — 99214 OFFICE O/P EST MOD 30 MIN: CPT | Performed by: NURSE PRACTITIONER

## 2023-01-05 PROCEDURE — 3008F BODY MASS INDEX DOCD: CPT | Performed by: NURSE PRACTITIONER

## 2023-01-05 PROCEDURE — 3075F SYST BP GE 130 - 139MM HG: CPT | Performed by: NURSE PRACTITIONER

## 2023-01-05 PROCEDURE — 3078F DIAST BP <80 MM HG: CPT | Performed by: NURSE PRACTITIONER

## 2023-01-05 RX ORDER — SEMAGLUTIDE 2.68 MG/ML
2 INJECTION, SOLUTION SUBCUTANEOUS WEEKLY
Qty: 3 ML | Refills: 0 | COMMUNITY
Start: 2023-01-05

## 2023-01-05 RX ORDER — INSULIN DETEMIR 100 [IU]/ML
20 INJECTION, SOLUTION SUBCUTANEOUS 2 TIMES DAILY
Qty: 15 ML | Refills: 0 | Status: SHIPPED | OUTPATIENT
Start: 2023-01-05

## 2023-01-05 NOTE — PATIENT INSTRUCTIONS
We are here to support you with Diabetes but please remember that you still need your primary care doctor for your routine health maintenance. Your current A1C: 7.4%  This test provides us with your average blood sugar for the past 3 months. The main goal of diabetes treatment is to keep your sugar from going too high. We measure your overall blood sugar trends with a Hemoglobin A1C test. (also called an A1C)  For most people the target is less than 7.0% but sometimes we make exceptions based on age, health history and other factors. Keeping an A1C less than 7% helps prevents diabetes related health problems. If your A1C goes too high, then we need to talk about changing your current diabetes treatment. MEDICATIONS:   It is important to take all of your medications as prescribed. Please call me if you cannot get the prescriptions filled or are having issues with refills. Also, please call me if you have any issues with medication questions, side effects, dosing questions or problems with your blood sugar trends BEFORE CHANGING OR STOPPING ANY MEDICATIONS. Continue Metformin ER 500mg 2x/day with breakfast and dinner  Increase Ozempic to 2mg injection weekly  Decrease Levemir to 20 units 2x/day with breakfast and dinner    Blood sugar testing:   Always bring your glucose meter or blood sugar logbook to every appointment here at the diabetes center. This allows me to safely make adjustments to your diabetes plan. In order for me to determine any patterns in your blood sugars, you will need to test your blood sugar daily   It would be best to change up the times of day that you are testing your sugar. Alternate testing before breakfast (fasting) and then alternate testing blood sugar 2 hours after your meals. Call if 2 readings below 80 mg/dl in 1 week for medication adjustment.      Blood sugar targets:  Before breakfast:   (preferably < 110)  2 hours After meals: less than 180 (preferably less than 150)   Call for persistent blood sugars < 75 or > 200. Blood sugars greater than 200 are not acceptable to reach your goal of improving diabetes      Health Maintenance:   1. LABS: It is important to monitor your kidney function (blood and urine protein levels) , liver function tests and cholesterol levels when you have diabetes. 2. FOOT EXAMS:  daily foot inspections for foot wounds or skin changes are important for foot care. Any unusual changes should be reported immediately. 3. EYE EXAMS: Checking your eyes for diabetes changes is important and you should have a dilated eye exam done by an eye doctor EVERY year since these changes occur in the BACK of the eye and not visible by you. Please let me know if you need provider list for eye doctor. Lifestyle Therapy:    1. NUTRITION: Maintain optimal weight, calorie restriction if overweight, plant-based diet    2. PHYSICAL ACTIVITY: 150 minutes per week (30 minutes a day 5 days a week) of moderate exertion such as walking, stair climbing. Include strength training 2-3 times per week. Increase as tolerated. 3. SLEEP: Try and get 7-8 hours of sleep per night    4. BEHAVIOR:  Tobacco cessation and alcohol in moderation      Reminders:  Complete patient assistance application and contact office to schedule drop off, bring social security denial letter with application    Thelma MCCARTY, BC-ADM, [facility]  [address], [address], [address]  [address] [place] [address], 45 [facility], [address]  [address]  Stephon Arias, [address]  Diabetes Services at 700 [facility] [address]

## 2023-01-19 ENCOUNTER — TELEPHONE (OUTPATIENT)
Dept: ENDOCRINOLOGY CLINIC | Facility: CLINIC | Age: 68
End: 2023-01-19

## 2023-01-19 NOTE — TELEPHONE ENCOUNTER
Pt called and said that she has been having HA/nausea/sweating after taking 2 doses of 2.0 dose Ozempic. She said the HA can last a few days after and the nausea takes awhile, but eventually goes away. Pt has only just taken 2nd dose since being increased. She said BG are good for her (between /150). She said when she was on lower dose Ozempic, she had no S/E. She is wondering what  wants her to do. 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 [facility] to [facility] neuro [facility] for CT/MRI every 2 years, but has not seen them in about 4 years now. Does not have local neuro. I told her we cannot say her HA is not r/t something neurological and with her hx, she should establish with a neuro nearby. Do you have any recommendations?

## 2023-01-20 NOTE — TELEPHONE ENCOUNTER
Attempted to contact patient to discuss symptoms. No answer, left message to contact office.     Thelma MCCARTY, BC-ADM, [facility]

## 2023-01-20 NOTE — TELEPHONE ENCOUNTER
Pt informed of all. States she is better today. States S/E typically start night of injection through the following day, but by day 2, she is better. She will continue ozempic 2mg for now and see if s/e lessen with future doses. Also instructed that if she feels HA worsen or she is concerned, she should f/u with PCP or see neuro and if necessary, proceed to ED.

## 2023-01-20 NOTE — TELEPHONE ENCOUNTER
Please attempt to follow up with patient, attempted to contact earlier today. If symptoms are tolerable continue Ozempic 2mg injection weekly as symptoms should improve as body adjusts to increased dose. Reinforce eating small meals throughout the day and drinking plenty of water to stay hydrated. Nausea is a common side effect however headaches and sweating are not common side effects. Patient should monitor glucose closely to ensure that she is not experiencing hypoglycemia. If headaches continue she should follow up with neurology or PCP, if symptoms worsen go to ED for further evaluation.       Faustino MCCARTY, BC-ADM, [facility]  I

## 2023-02-17 ENCOUNTER — TELEPHONE (OUTPATIENT)
Dept: ENDOCRINOLOGY CLINIC | Facility: CLINIC | Age: 68
End: 2023-02-17

## 2023-02-17 ENCOUNTER — NURSE ONLY (OUTPATIENT)
Dept: ENDOCRINOLOGY CLINIC | Facility: CLINIC | Age: 68
End: 2023-02-17
Payer: MEDICARE

## 2023-02-17 DIAGNOSIS — Z79.4 TYPE 2 DIABETES MELLITUS WITH MICROALBUMINURIA, WITH LONG-TERM CURRENT USE OF INSULIN (HCC): ICD-10-CM

## 2023-02-17 DIAGNOSIS — E11.29 TYPE 2 DIABETES MELLITUS WITH MICROALBUMINURIA, WITH LONG-TERM CURRENT USE OF INSULIN (HCC): ICD-10-CM

## 2023-02-17 DIAGNOSIS — R80.9 TYPE 2 DIABETES MELLITUS WITH MICROALBUMINURIA, WITH LONG-TERM CURRENT USE OF INSULIN (HCC): ICD-10-CM

## 2023-02-17 RX ORDER — SEMAGLUTIDE 2.68 MG/ML
2 INJECTION, SOLUTION SUBCUTANEOUS WEEKLY
Qty: 3 ML | Refills: 0 | COMMUNITY
Start: 2023-02-17

## 2023-02-17 NOTE — TELEPHONE ENCOUNTER
Pt called and said she is out of Ozempic and wonders if she can get a sample. I asked if she needed a prescription sent to the pharmacy and she told me she is supposed to be applying for patient assistance. She said she received a denial letter from the state, but she \"misplaced\" it, so she needs to call them to get another copy before she can finish her PAP paperwork. She injects on Sunday and she has none for this weekend. Please advise if a sample can be given. I will let her know to please work on PAP during next few weeks before she is out again.

## 2023-02-17 NOTE — TELEPHONE ENCOUNTER
[id number] [address] Lemus for 1 sample and pt to work on PAP asap as this can take several weeks to process as well

## 2023-02-17 NOTE — TELEPHONE ENCOUNTER
Pt schedule for  of sample. She agreed to work on PAP. I told her that paperwork can take weeks as well, so we need her to get that in. She verbalized understanding.

## 2023-02-22 ENCOUNTER — TELEPHONE (OUTPATIENT)
Dept: FAMILY MEDICINE CLINIC | Facility: CLINIC | Age: 68
End: 2023-02-22

## 2023-02-22 ENCOUNTER — VIRTUAL PHONE E/M (OUTPATIENT)
Dept: FAMILY MEDICINE CLINIC | Facility: CLINIC | Age: 68
End: 2023-02-22
Payer: MEDICARE

## 2023-02-22 DIAGNOSIS — K52.9 GASTROENTERITIS: Primary | ICD-10-CM

## 2023-02-22 DIAGNOSIS — K92.1 BLOOD IN THE STOOL: ICD-10-CM

## 2023-02-22 NOTE — TELEPHONE ENCOUNTER
Patient is feeling sick, with Vomiting, nausea and some mucus with blood. I offered the [facility] however the patient does not want to go out, she feels miserable and wants to know if there's an antibotic she can take?     [facility] [address] [facility], [address] [place] [address],[address] AT [facility], [phone], [phone]

## 2023-03-07 ENCOUNTER — TELEPHONE (OUTPATIENT)
Dept: ENDOCRINOLOGY CLINIC | Facility: CLINIC | Age: 68
End: 2023-03-07

## 2023-03-07 DIAGNOSIS — R80.9 TYPE 2 DIABETES MELLITUS WITH MICROALBUMINURIA, WITH LONG-TERM CURRENT USE OF INSULIN (HCC): Primary | ICD-10-CM

## 2023-03-07 DIAGNOSIS — Z79.4 TYPE 2 DIABETES MELLITUS WITH MICROALBUMINURIA, WITH LONG-TERM CURRENT USE OF INSULIN (HCC): Primary | ICD-10-CM

## 2023-03-07 DIAGNOSIS — E11.29 TYPE 2 DIABETES MELLITUS WITH MICROALBUMINURIA, WITH LONG-TERM CURRENT USE OF INSULIN (HCC): Primary | ICD-10-CM

## 2023-03-07 RX ORDER — INSULIN DETEMIR 100 [IU]/ML
20 INJECTION, SOLUTION SUBCUTANEOUS 2 TIMES DAILY
Qty: 15 ML | Refills: 0 | Status: SHIPPED | OUTPATIENT
Start: 2023-03-07

## 2023-03-07 NOTE — TELEPHONE ENCOUNTER
Pt called office today stating she went to pharmacy to  Levemir name to order pens changed from flexpen to flextouch. Told by pharmacy the Rx needs to be changed to reflect correct was told they do not have flexpen in stock need to reorder Levemir flextouch - exact same ingredients just a rebranding.

## 2023-03-13 DIAGNOSIS — E11.65 TYPE 2 DIABETES MELLITUS WITH HYPERGLYCEMIA, WITH LONG-TERM CURRENT USE OF INSULIN (HCC): ICD-10-CM

## 2023-03-13 DIAGNOSIS — Z79.4 TYPE 2 DIABETES MELLITUS WITH HYPERGLYCEMIA, WITH LONG-TERM CURRENT USE OF INSULIN (HCC): ICD-10-CM

## 2023-03-13 DIAGNOSIS — R80.9 POSITIVE FOR MACROALBUMINURIA: ICD-10-CM

## 2023-03-13 RX ORDER — LISINOPRIL 5 MG/1
5 TABLET ORAL DAILY
Qty: 90 TABLET | Refills: 0 | Status: SHIPPED | OUTPATIENT
Start: 2023-03-13

## 2023-03-24 DIAGNOSIS — R80.9 TYPE 2 DIABETES MELLITUS WITH MICROALBUMINURIA, WITH LONG-TERM CURRENT USE OF INSULIN (HCC): Primary | ICD-10-CM

## 2023-03-24 DIAGNOSIS — E11.29 TYPE 2 DIABETES MELLITUS WITH MICROALBUMINURIA, WITH LONG-TERM CURRENT USE OF INSULIN (HCC): Primary | ICD-10-CM

## 2023-03-24 DIAGNOSIS — Z79.4 TYPE 2 DIABETES MELLITUS WITH MICROALBUMINURIA, WITH LONG-TERM CURRENT USE OF INSULIN (HCC): Primary | ICD-10-CM

## 2023-03-24 RX ORDER — SEMAGLUTIDE 2.68 MG/ML
2 INJECTION, SOLUTION SUBCUTANEOUS WEEKLY
Qty: 3 ML | Refills: 1 | Status: SHIPPED | OUTPATIENT
Start: 2023-03-24

## 2023-03-24 NOTE — TELEPHONE ENCOUNTER
Pt called in requesting refill of ozempic 2 mg to [facility]. LOV: 01/05/2023  Future Appointments   Date Time Provider Susana Landa   4/6/2023 12:30 PM TIP Salguero [facility]   4/10/2023  3:00 PM Armida Enrique DO [facility]     Last A1c value was 7.4% done 12/8/2022.

## 2023-04-03 ENCOUNTER — OFFICE VISIT (OUTPATIENT)
Dept: ENDOCRINOLOGY CLINIC | Facility: CLINIC | Age: 68
End: 2023-04-03
Payer: MEDICARE

## 2023-04-03 VITALS
DIASTOLIC BLOOD PRESSURE: 78 MMHG | HEART RATE: 88 BPM | SYSTOLIC BLOOD PRESSURE: 124 MMHG | HEIGHT: 61 IN | WEIGHT: 163 LBS | BODY MASS INDEX: 30.78 KG/M2 | RESPIRATION RATE: 16 BRPM

## 2023-04-03 DIAGNOSIS — E78.2 MIXED HYPERLIPIDEMIA: ICD-10-CM

## 2023-04-03 DIAGNOSIS — E11.65 TYPE 2 DIABETES MELLITUS WITH HYPERGLYCEMIA, WITH LONG-TERM CURRENT USE OF INSULIN (HCC): Primary | ICD-10-CM

## 2023-04-03 DIAGNOSIS — I10 ESSENTIAL HYPERTENSION: ICD-10-CM

## 2023-04-03 DIAGNOSIS — Z79.4 TYPE 2 DIABETES MELLITUS WITH HYPERGLYCEMIA, WITH LONG-TERM CURRENT USE OF INSULIN (HCC): Primary | ICD-10-CM

## 2023-04-03 DIAGNOSIS — R80.9 POSITIVE FOR MACROALBUMINURIA: ICD-10-CM

## 2023-04-03 LAB
CARTRIDGE LOT#: 56 NUMERIC
HEMOGLOBIN A1C: 6 % (ref 4.3–5.6)

## 2023-04-03 NOTE — PATIENT INSTRUCTIONS
We are here to support you with Diabetes but please remember that you still need your primary care doctor for your routine health maintenance. Your current A1C: 6.0%  This test provides us with your average blood sugar for the past 3 months. The main goal of diabetes treatment is to keep your sugar from going too high. We measure your overall blood sugar trends with a Hemoglobin A1C test. (also called an A1C)  For most people the target is less than 7.0% but sometimes we make exceptions based on age, health history and other factors. Keeping an A1C less than 7% helps prevents diabetes related health problems. If your A1C goes too high, then we need to talk about changing your current diabetes treatment. MEDICATIONS:   It is important to take all of your medications as prescribed. Please call me if you cannot get the prescriptions filled or are having issues with refills. Also, please call me if you have any issues with medication questions, side effects, dosing questions or problems with your blood sugar trends BEFORE CHANGING OR STOPPING ANY MEDICATIONS. Continue Metformin ER 500mg 2x/day with breakfast and dinner  Continue Ozempic 2mg injection weekly  Continue Levemir 20 units 2x/day     Blood sugar testing:   Always bring your glucose meter or blood sugar logbook to every appointment here at the diabetes center. This allows me to safely make adjustments to your diabetes plan. In order for me to determine any patterns in your blood sugars, you will need to test your blood sugar daily   It would be best to change up the times of day that you are testing your sugar. Alternate testing before breakfast (fasting) and  testing blood sugar 2 hours after your meals. Call if 2 readings below 80 mg/dl in 1 week for medication adjustment.      Blood sugar targets:  Before breakfast:   (preferably < 110)  2 hours After meals: less than 180 (preferably less than 150)   Call for persistent blood sugars < 75 or > 200. Blood sugars greater than 200 are not acceptable to reach your goal of improving diabetes      Health Maintenance:   1. LABS: It is important to monitor your kidney function (blood and urine protein levels) , liver function tests and cholesterol levels when you have diabetes. 2. FOOT EXAMS:  daily foot inspections for foot wounds or skin changes are important for foot care. Any unusual changes should be reported immediately. 3. EYE EXAMS: Checking your eyes for diabetes changes is important and you should have a dilated eye exam done by an eye doctor EVERY year since these changes occur in the BACK of the eye and not visible by you. Please let me know if you need provider list for eye doctor. Lifestyle Therapy:    1. NUTRITION: Maintain optimal weight, calorie restriction if overweight, plant-based diet    2. PHYSICAL ACTIVITY: 150 minutes per week (30 minutes a day 5 days a week) of moderate exertion such as walking, stair climbing. Include strength training 2-3 times per week. Increase as tolerated. 3. SLEEP: Try and get 7-8 hours of sleep per night    4. BEHAVIOR:  Tobacco cessation and alcohol in moderation      Reminders:  Contact insurance to inquire on out of pocket cost for basal (long acting) insulin - Jesus Olivarez and Roxane  Contact insurance to inquire on out of pocket cost for [id number] [facility], [facility], [facility]  [address], [address], [address]  [address] [place] [address], 45 [facility], [address]  [address]  Stephon Arias, [address]  Diabetes Services at 700 [facility] [address]

## 2023-04-20 ENCOUNTER — TELEPHONE (OUTPATIENT)
Dept: FAMILY MEDICINE CLINIC | Facility: CLINIC | Age: 68
End: 2023-04-20

## 2023-04-20 NOTE — TELEPHONE ENCOUNTER
DM eye exam completed 4/20/2023. Flowsheet filed. Report placed in Dr. Erika Marinelli for review and will then send to scan.

## 2023-04-25 ENCOUNTER — TELEPHONE (OUTPATIENT)
Dept: ENDOCRINOLOGY CLINIC | Facility: CLINIC | Age: 68
End: 2023-04-25

## 2023-04-25 DIAGNOSIS — R80.9 TYPE 2 DIABETES MELLITUS WITH MICROALBUMINURIA, WITH LONG-TERM CURRENT USE OF INSULIN (HCC): Primary | ICD-10-CM

## 2023-04-25 DIAGNOSIS — E11.29 TYPE 2 DIABETES MELLITUS WITH MICROALBUMINURIA, WITH LONG-TERM CURRENT USE OF INSULIN (HCC): Primary | ICD-10-CM

## 2023-04-25 DIAGNOSIS — Z79.4 TYPE 2 DIABETES MELLITUS WITH MICROALBUMINURIA, WITH LONG-TERM CURRENT USE OF INSULIN (HCC): Primary | ICD-10-CM

## 2023-04-25 RX ORDER — SEMAGLUTIDE 2.68 MG/ML
2 INJECTION, SOLUTION SUBCUTANEOUS WEEKLY
Qty: 9 ML | Refills: 1 | Status: SHIPPED | OUTPATIENT
Start: 2023-04-25

## 2023-04-25 RX ORDER — INSULIN DEGLUDEC 200 U/ML
36 INJECTION, SOLUTION SUBCUTANEOUS DAILY
Qty: 18 ML | Refills: 1 | Status: SHIPPED | OUTPATIENT
Start: 2023-04-25

## 2023-04-25 NOTE — TELEPHONE ENCOUNTER
Pt was instructed to call insurance on medications and contact office with cost.     Long Acting Insulin- Doris Ax ,Lantus $10/30days $30/90days    Farxiga $47/ 30days or $141/90days     Ozempic $140/ 90days    Pt states provider was waiting for this information to change her insulin and she is almost out.

## 2023-04-25 NOTE — TELEPHONE ENCOUNTER
Contacted patient to discuss medication adjustment. Patient to continue Metformin ER 500mg po bid and Ozempic 2mg injection weekly. Start Tresiba U200 36 units injection daily and discontinue Levemir. Discussed timing and dosing of new basal insulin. Will discuss initiation of Trinda Pier and next office visit. Patient verbalizes understanding. Prescription for Ozempic 90 day supply and Letty Armor U200 sent to pharmacy.     Brian MCCARTY, BC-ADM, [facility]

## 2023-04-26 DIAGNOSIS — E11.65 TYPE 2 DIABETES MELLITUS WITH HYPERGLYCEMIA, WITH LONG-TERM CURRENT USE OF INSULIN (HCC): ICD-10-CM

## 2023-04-26 DIAGNOSIS — Z79.4 TYPE 2 DIABETES MELLITUS WITH HYPERGLYCEMIA, WITH LONG-TERM CURRENT USE OF INSULIN (HCC): ICD-10-CM

## 2023-04-27 RX ORDER — BLOOD SUGAR DIAGNOSTIC
STRIP MISCELLANEOUS
Qty: 100 STRIP | Refills: 1 | Status: SHIPPED | OUTPATIENT
Start: 2023-04-27 | End: 2023-04-27

## 2023-04-27 RX ORDER — BLOOD SUGAR DIAGNOSTIC
STRIP MISCELLANEOUS
Qty: 100 STRIP | Refills: 1 | Status: SHIPPED | OUTPATIENT
Start: 2023-04-27

## 2023-04-27 NOTE — TELEPHONE ENCOUNTER
Received fax from pharmacy requesting how many time pt is testing BG per day filled and sending back

## 2023-06-06 DIAGNOSIS — R80.9 POSITIVE FOR MACROALBUMINURIA: ICD-10-CM

## 2023-06-06 DIAGNOSIS — Z79.4 TYPE 2 DIABETES MELLITUS WITH HYPERGLYCEMIA, WITH LONG-TERM CURRENT USE OF INSULIN (HCC): ICD-10-CM

## 2023-06-06 DIAGNOSIS — E11.65 TYPE 2 DIABETES MELLITUS WITH HYPERGLYCEMIA, WITH LONG-TERM CURRENT USE OF INSULIN (HCC): ICD-10-CM

## 2023-06-06 RX ORDER — LISINOPRIL 5 MG/1
TABLET ORAL
Qty: 90 TABLET | Refills: 0 | Status: SHIPPED | OUTPATIENT
Start: 2023-06-06

## 2023-06-06 NOTE — TELEPHONE ENCOUNTER
Requesting  Lisinopril 5 mg 1 tab daily   LOV: 4/3/23  RTC: 3 mth  Last Relevant Labs: 4/3/23  6.0% HgbA1c  Filled: 3/13/23 # 90 with 0 refills    Future Appointments   Date Time Provider Susana Landa   7/3/2023  9:45 AM Jessica Hicks APN [facility]

## 2023-07-03 ENCOUNTER — OFFICE VISIT (OUTPATIENT)
Dept: ENDOCRINOLOGY CLINIC | Facility: CLINIC | Age: 68
End: 2023-07-03
Payer: MEDICARE

## 2023-07-03 VITALS
DIASTOLIC BLOOD PRESSURE: 66 MMHG | HEART RATE: 83 BPM | SYSTOLIC BLOOD PRESSURE: 132 MMHG | WEIGHT: 162.13 LBS | RESPIRATION RATE: 16 BRPM | OXYGEN SATURATION: 96 % | BODY MASS INDEX: 31 KG/M2

## 2023-07-03 DIAGNOSIS — Z79.4 TYPE 2 DIABETES MELLITUS WITH MICROALBUMINURIA, WITH LONG-TERM CURRENT USE OF INSULIN (HCC): Primary | ICD-10-CM

## 2023-07-03 DIAGNOSIS — E78.2 MIXED HYPERLIPIDEMIA: ICD-10-CM

## 2023-07-03 DIAGNOSIS — I10 ESSENTIAL HYPERTENSION: ICD-10-CM

## 2023-07-03 DIAGNOSIS — R80.9 TYPE 2 DIABETES MELLITUS WITH MICROALBUMINURIA, WITH LONG-TERM CURRENT USE OF INSULIN (HCC): Primary | ICD-10-CM

## 2023-07-03 DIAGNOSIS — E11.29 TYPE 2 DIABETES MELLITUS WITH MICROALBUMINURIA, WITH LONG-TERM CURRENT USE OF INSULIN (HCC): Primary | ICD-10-CM

## 2023-07-03 LAB
CARTRIDGE LOT#: 587 NUMERIC
HEMOGLOBIN A1C: 5.8 % (ref 4.3–5.6)

## 2023-07-03 PROCEDURE — 1160F RVW MEDS BY RX/DR IN RCRD: CPT | Performed by: NURSE PRACTITIONER

## 2023-07-03 PROCEDURE — 3078F DIAST BP <80 MM HG: CPT | Performed by: NURSE PRACTITIONER

## 2023-07-03 PROCEDURE — 1159F MED LIST DOCD IN RCRD: CPT | Performed by: NURSE PRACTITIONER

## 2023-07-03 PROCEDURE — 3075F SYST BP GE 130 - 139MM HG: CPT | Performed by: NURSE PRACTITIONER

## 2023-07-03 PROCEDURE — 83036 HEMOGLOBIN GLYCOSYLATED A1C: CPT | Performed by: NURSE PRACTITIONER

## 2023-07-03 PROCEDURE — 99214 OFFICE O/P EST MOD 30 MIN: CPT | Performed by: NURSE PRACTITIONER

## 2023-07-03 RX ORDER — INSULIN DEGLUDEC 200 U/ML
32 INJECTION, SOLUTION SUBCUTANEOUS DAILY
Qty: 18 ML | Refills: 1 | COMMUNITY
Start: 2023-07-03

## 2023-08-01 DIAGNOSIS — E11.65 TYPE 2 DIABETES MELLITUS WITH HYPERGLYCEMIA, WITH LONG-TERM CURRENT USE OF INSULIN (HCC): ICD-10-CM

## 2023-08-01 DIAGNOSIS — Z79.4 TYPE 2 DIABETES MELLITUS WITH HYPERGLYCEMIA, WITH LONG-TERM CURRENT USE OF INSULIN (HCC): ICD-10-CM

## 2023-08-01 RX ORDER — BLOOD SUGAR DIAGNOSTIC
STRIP MISCELLANEOUS
Qty: 100 STRIP | Refills: 1 | Status: SHIPPED | OUTPATIENT
Start: 2023-08-01

## 2023-08-01 NOTE — TELEPHONE ENCOUNTER
Requested Prescriptions     Pending Prescriptions Disp Refills    Glucose Blood (Limmie Marrow) In Vitro Strip [place] [facility] Name: ONE TOUCH VERIO TEST ST(NEW)100S] 100 strip 1     Sig: TEST ONCE TO TWICE DAILY     Your appointments       Date & Time Appointment Department [place] [facility])    Oct 09, 2023 10:00 AM CDT [place] Diabetic Follow Up with PORFIRIO Silva [address] ([facility] [place])              [facility] [address]  [facility] [place]  [address]  [facility] [id number] [phone]          Last A1c value was 5.8% done 7/3/2023.     Refill 4/27/23  LOV 7/03/23

## 2023-08-31 DIAGNOSIS — E11.65 TYPE 2 DIABETES MELLITUS WITH HYPERGLYCEMIA, WITH LONG-TERM CURRENT USE OF INSULIN (HCC): ICD-10-CM

## 2023-08-31 DIAGNOSIS — Z79.4 TYPE 2 DIABETES MELLITUS WITH HYPERGLYCEMIA, WITH LONG-TERM CURRENT USE OF INSULIN (HCC): ICD-10-CM

## 2023-08-31 DIAGNOSIS — R80.9 POSITIVE FOR MACROALBUMINURIA: ICD-10-CM

## 2023-08-31 RX ORDER — LISINOPRIL 5 MG/1
5 TABLET ORAL DAILY
Qty: 90 TABLET | Refills: 0 | Status: SHIPPED | OUTPATIENT
Start: 2023-08-31

## 2023-10-16 DIAGNOSIS — Z79.4 TYPE 2 DIABETES MELLITUS WITH MICROALBUMINURIA, WITH LONG-TERM CURRENT USE OF INSULIN (HCC): ICD-10-CM

## 2023-10-16 DIAGNOSIS — E11.29 TYPE 2 DIABETES MELLITUS WITH MICROALBUMINURIA, WITH LONG-TERM CURRENT USE OF INSULIN (HCC): ICD-10-CM

## 2023-10-16 DIAGNOSIS — R80.9 TYPE 2 DIABETES MELLITUS WITH MICROALBUMINURIA, WITH LONG-TERM CURRENT USE OF INSULIN (HCC): ICD-10-CM

## 2023-10-16 NOTE — TELEPHONE ENCOUNTER
Received fax with refill request, pended med  Requested Prescriptions     Pending Prescriptions Disp Refills    Insulin Degludec (TRESIBA FLEXTOUCH) 200 UNIT/ML Subcutaneous Solution Pen-injector 18 mL 1     Sig: Inject 32 Units into the skin daily.     Last A1c value was 5.8% done 7/3/2023.    Refill 7/3/23  LOV 7/3/23, RTC 3 months  No FU, [facility] sent

## 2023-10-17 RX ORDER — INSULIN DEGLUDEC 200 U/ML
32 INJECTION, SOLUTION SUBCUTANEOUS DAILY
Qty: 18 ML | Refills: 1 | Status: SHIPPED | OUTPATIENT
Start: 2023-10-17

## 2023-11-02 DIAGNOSIS — E11.65 TYPE 2 DIABETES MELLITUS WITH HYPERGLYCEMIA, WITH LONG-TERM CURRENT USE OF INSULIN (HCC): ICD-10-CM

## 2023-11-02 DIAGNOSIS — Z79.4 TYPE 2 DIABETES MELLITUS WITH HYPERGLYCEMIA, WITH LONG-TERM CURRENT USE OF INSULIN (HCC): ICD-10-CM

## 2023-11-02 RX ORDER — PEN NEEDLE, DIABETIC 32GX 5/32"
NEEDLE, DISPOSABLE MISCELLANEOUS
Qty: 200 EACH | Refills: 1 | Status: SHIPPED | OUTPATIENT
Start: 2023-11-02

## 2023-11-02 NOTE — TELEPHONE ENCOUNTER
Requested Prescriptions     Pending Prescriptions Disp Refills    BD PEN NEEDLE TITO 2ND GEN 32G X 4 MM Does not apply Misc [Pharmacy Med Name: B-D TITO 2ND GEN PEN NDL [id number]] 200 each 1     Sig: USE AS DIRECTED 1 [address] 106 SKIN TWICE DAILY     Your appointments       Date & Time Appointment Department ([place])    Nov 21, 2023  8:00 AM CST [place]/[facility] Diabetic Follow Up with BIBIANA Brown [address] ([facility] [place])              [address] DIAB[facility]ES [place]  [address]  [facility] [id number] [phone]          Last A1c value was 5.8% done 7/3/2023.     Refill 5/18/22  LOV 7/03/23

## 2023-11-06 ENCOUNTER — OFFICE VISIT (OUTPATIENT)
Dept: FAMILY MEDICINE CLINIC | Facility: CLINIC | Age: 68
End: 2023-11-06
Payer: MEDICARE

## 2023-11-06 VITALS
WEIGHT: 165 LBS | RESPIRATION RATE: 18 BRPM | SYSTOLIC BLOOD PRESSURE: 130 MMHG | BODY MASS INDEX: 31.15 KG/M2 | DIASTOLIC BLOOD PRESSURE: 80 MMHG | TEMPERATURE: 97 F | HEART RATE: 67 BPM | OXYGEN SATURATION: 100 % | HEIGHT: 61 IN

## 2023-11-06 DIAGNOSIS — R39.15 URINARY URGENCY: ICD-10-CM

## 2023-11-06 DIAGNOSIS — R39.9 UTI SYMPTOMS: Primary | ICD-10-CM

## 2023-11-06 LAB
APPEARANCE: CLEAR
BILIRUBIN: NEGATIVE
GLUCOSE (URINE DIPSTICK): 100 MG/DL
LEUKOCYTES: NEGATIVE
MULTISTIX LOT#: ABNORMAL NUMERIC
NITRITE, URINE: NEGATIVE
OCCULT BLOOD: NEGATIVE
PH, URINE: 5.5 (ref 4.5–8)
PROTEIN (URINE DIPSTICK): 100 MG/DL
SPECIFIC GRAVITY: 1.02 (ref 1–1.03)
URINE-COLOR: YELLOW
UROBILINOGEN,SEMI-QN: 0.2 MG/DL (ref 0–1.9)

## 2023-11-06 PROCEDURE — 87086 URINE CULTURE/COLONY COUNT: CPT | Performed by: NURSE PRACTITIONER

## 2023-11-10 ENCOUNTER — VIRTUAL PHONE E/M (OUTPATIENT)
Dept: FAMILY MEDICINE CLINIC | Facility: CLINIC | Age: 68
End: 2023-11-10
Payer: MEDICARE

## 2023-11-10 DIAGNOSIS — N32.81 OAB (OVERACTIVE BLADDER): ICD-10-CM

## 2023-11-10 DIAGNOSIS — N30.00 ACUTE CYSTITIS WITHOUT HEMATURIA: Primary | ICD-10-CM

## 2023-11-10 PROCEDURE — 99443 PHONE E/M BY PHYS 21-30 MIN: CPT | Performed by: FAMILY MEDICINE

## 2023-11-10 RX ORDER — CIPROFLOXACIN 500 MG/1
500 TABLET, FILM COATED ORAL 2 TIMES DAILY
Qty: 10 TABLET | Refills: 0 | Status: SHIPPED | OUTPATIENT
Start: 2023-11-10

## 2023-11-10 RX ORDER — FESOTERODINE FUMARATE 4 MG/1
4 TABLET, EXTENDED RELEASE ORAL DAILY
Qty: 30 TABLET | Refills: 3 | Status: SHIPPED | OUTPATIENT
Start: 2023-11-10

## 2023-11-10 NOTE — PROGRESS NOTES
Virtual Telephone Check-In    Betty Lozano verbally consents to a Virtual/Telephone Check-In visit on 11/10/23. Patient has been referred to the [facility] website at [email or web address] to review the yearly Consent to Treat document. Patient understands and accepts financial responsibility for any deductible, co-insurance and/or co-pays associated with this service. Duration of the service: 21 minutes      Summary of topics discussed:bladder issues      Betty Lozano is a 80year old female who presents for incontinence of the urine. HPI:  The patient complaints urinary incontinence. Feeling urgency to go very frequently, sometimes feeling of incomplete emptying of the bladder not worse  with sneezing, laughing, coughing, exercise and heavy lifting. Nocturnal symptoms. Started:few days ago  Volume:moderate  Associated symptoms:no fever, chills, dysuria, dyspareunia, depression, fecal incontinence, sexual dysfunction, function decline, embarrassment, social disengagement. No weigh loss. No abdominal pain, no pelvic pain. No vaginal pressure. No  surgery. No new medications. Current treatment:      Wt Readings from Last 6 Encounters:   11/06/23 165 lb (74.8 kg)   07/03/23 162 lb 1.9 oz (73.5 kg)   04/03/23 163 lb (73.9 kg)   01/05/23 167 lb (75.8 kg)   12/08/22 163 lb (73.9 kg)   11/11/22 165 lb (74.8 kg)     There is no height or weight on file to calculate BMI.      Cholesterol, Total (mg/dL)   Date Value   09/26/2022 239 (H)   12/07/2021 237 (H)   09/23/2021 271 (H)   02/12/2021 231 (H)     CHOLESTEROL (mg/dL)   Date Value   10/09/2013 245 (H)     HDL Cholesterol (mg/dL)   Date Value   09/26/2022 55   12/07/2021 47   09/23/2021 51   02/12/2021 52     HDL CHOL (mg/dL)   Date Value   10/09/2013 44 (L)     LDL Cholesterol (mg/dL)   Date Value   09/26/2022 165 (H)   12/07/2021 156 (H)   09/23/2021 190 (H)   02/12/2021 151 (H)     LDL CHOLESTROL (mg/dL)   Date Value   10/09/2013 170 (H)     AST (U/L)   Date Value   09/26/2022 12 (L)   08/08/2022 20   12/07/2021 11 (L)   10/09/2013 17     ALT (U/L)   Date Value   09/26/2022 16   08/08/2022 17   12/07/2021 13   10/09/2013 19      Current Outpatient Medications   Medication Sig Dispense Refill    ciprofloxacin (CIPRO) 500 MG Oral Tab Take 1 tablet (500 mg total) by mouth 2 (two) times daily. For 5 days 10 tablet 0    fesoterodine ER (TOVIAZ) 4 MG Oral Tablet 24 Hr Take 1 tablet (4 mg total) by mouth daily. 30 tablet 3    Insulin Pen Needle (BD PEN NEEDLE TITO 2ND GEN) 32G X 4 MM Does not apply Misc USE AS DIRECTED 1 EACH INTO THE SKIN TWICE DAILY 200 each 1    Insulin Degludec (TRESIBA FLEXTOUCH) 200 UNIT/ML Subcutaneous Solution Pen-injector Inject 32 Units into the skin daily. 18 mL 1    lisinopril 5 MG Oral Tab Take 1 tablet (5 mg total) by mouth daily. 90 tablet 0    Glucose Blood (ONETOUCH VERIO) In Vitro Strip TEST ONCE TO TWICE DAILY 100 strip 1    semaglutide (OZEMPIC, 2 MG/DOSE,) 8 MG/3ML Subcutaneous Solution Pen-injector Inject 2 mg into the skin once a week. (Patient not taking: Reported on 11/6/2023) 9 mL 1    albuterol (PROAIR HFA) 108 (90 Base) MCG/ACT Inhalation Aero Soln Inhale 2 puffs into the lungs every 4 (four) hours as needed for Wheezing. 18 g 0    traMADol 50 MG Oral Tab Take 1 tablet (50 mg total) by mouth every 6 (six) hours as needed for Pain. (Patient not taking: Reported on 11/6/2023) 30 tablet 1    rosuvastatin (CRESTOR) 10 MG Oral Tab Take 1 tablet (10 mg total) by mouth nightly. 30 tablet 11    OneTouch Delica Lancets 98W Does not apply Misc 1 lancet by Finger stick route daily. 100 each 1    metFORMIN  MG Oral Tablet 24 Hr Take 1 tablet (500 mg total) by mouth 2 (two) times daily with meals.  180 tablet 1    MELOXICAM 15 MG Oral Tab TAKE 1 TABLET(15 MG) BY MOUTH DAILY (Patient not taking: Reported on 11/6/2023) 30 tablet 0      Past Medical History:   Diagnosis Date    AVM (arteriovenous malformation) brain     [facility]    Stroke [facility]) 4/18/15    s/p Brain surgery    Stroke [facility]) 2015    Type II or unspecified type diabetes mellitus without mention of complication, not stated as uncontrolled     Vitamin D deficiency       Past Surgical History:   Procedure Laterality Date    [facility]            Family History   Problem Relation Age of Onset    Diabetes Mother     Hypertension Mother     Hypertension Father     Heart Disorder Brother         CAD stent in his 45s    Diabetes Brother     Diabetes Sister       Social History:  Social History     Socioeconomic History    Marital status:    Tobacco Use    Smoking status: Never    Smokeless tobacco: Never   Vaping Use    Vaping Use: Never used   Substance and Sexual Activity    Alcohol use: Never     Alcohol/week: 0.0 standard drinks of alcohol     Comment: rare    Drug use: No   Other Topics Concern    Caffeine Concern Yes     Comment: coffee 2 cups daily    Exercise No    Seat Belt Yes         REVIEW OF SYSTEMS:   As above. EXAM:   LMP  (LMP Unknown)   There is no height or weight on file to calculate BMI. GENERAL: in no apparent distress  HEENT: normal voice  LUNGS: no SOB  PSYCH: normal mood. ASSESSMENT AND PLAN:   Shima Marrero is a 80year old female who presents for:    Diagnoses and all orders for this visit:    Acute cystitis without hematuria    OAB (overactive bladder)    Other orders  -     ciprofloxacin (CIPRO) 500 MG Oral Tab; Take 1 tablet (500 mg total) by mouth 2 (two) times daily. For 5 days  -     fesoterodine ER (TOVIAZ) 4 MG Oral Tablet 24 Hr; Take 1 tablet (4 mg total) by mouth daily. [facility] visit and results d/w the pt. Continue with current treatment plan.       Allean Sicard, MD

## 2023-11-14 ENCOUNTER — TELEPHONE (OUTPATIENT)
Dept: FAMILY MEDICINE CLINIC | Facility: CLINIC | Age: 68
End: 2023-11-14

## 2023-11-14 RX ORDER — OXYBUTYNIN CHLORIDE 5 MG/1
5 TABLET ORAL 2 TIMES DAILY
Qty: 60 TABLET | Refills: 3 | Status: SHIPPED | OUTPATIENT
Start: 2023-11-14

## 2023-11-14 NOTE — TELEPHONE ENCOUNTER
fesoterodine ER (TOVIAZ) 4 MG Oral Tablet 24 Hr is not covered by INS. Pt asking if there is a substitute?

## 2023-11-20 ENCOUNTER — LAB ENCOUNTER (OUTPATIENT)
Dept: LAB | Age: 68
End: 2023-11-20
Attending: NURSE PRACTITIONER
Payer: MEDICARE

## 2023-11-20 DIAGNOSIS — D64.9 ANEMIA, UNSPECIFIED TYPE: ICD-10-CM

## 2023-11-20 DIAGNOSIS — E11.29 TYPE 2 DIABETES MELLITUS WITH MICROALBUMINURIA, WITH LONG-TERM CURRENT USE OF INSULIN (HCC): ICD-10-CM

## 2023-11-20 DIAGNOSIS — Z79.4 TYPE 2 DIABETES MELLITUS WITH MICROALBUMINURIA, WITH LONG-TERM CURRENT USE OF INSULIN (HCC): ICD-10-CM

## 2023-11-20 DIAGNOSIS — E78.2 MIXED HYPERLIPIDEMIA: ICD-10-CM

## 2023-11-20 DIAGNOSIS — K52.9 GASTROENTERITIS: ICD-10-CM

## 2023-11-20 DIAGNOSIS — R80.9 TYPE 2 DIABETES MELLITUS WITH MICROALBUMINURIA, WITH LONG-TERM CURRENT USE OF INSULIN (HCC): ICD-10-CM

## 2023-11-20 DIAGNOSIS — K92.1 BLOOD IN THE STOOL: ICD-10-CM

## 2023-11-20 LAB
ALBUMIN SERPL-MCNC: 3.3 G/DL (ref 3.4–5)
ALBUMIN/GLOB SERPL: 0.9 {RATIO} (ref 1–2)
ALP LIVER SERPL-CCNC: 77 U/L
ALT SERPL-CCNC: 13 U/L
ANION GAP SERPL CALC-SCNC: 3 MMOL/L (ref 0–18)
AST SERPL-CCNC: 18 U/L (ref 15–37)
BASOPHILS # BLD AUTO: 0.04 X10(3) UL (ref 0–0.2)
BASOPHILS NFR BLD AUTO: 0.6 %
BILIRUB SERPL-MCNC: 0.4 MG/DL (ref 0.1–2)
BUN BLD-MCNC: 22 MG/DL (ref 9–23)
CALCIUM BLD-MCNC: 9.1 MG/DL (ref 8.5–10.1)
CHLORIDE SERPL-SCNC: 108 MMOL/L (ref 98–112)
CHOLEST SERPL-MCNC: 157 MG/DL (ref ?–200)
CO2 SERPL-SCNC: 29 MMOL/L (ref 21–32)
CREAT BLD-MCNC: 0.8 MG/DL
CREAT UR-SCNC: 41.5 MG/DL
EGFRCR SERPLBLD CKD-EPI 2021: 81 ML/MIN/1.73M2 (ref 60–?)
EOSINOPHIL # BLD AUTO: 0.12 X10(3) UL (ref 0–0.7)
EOSINOPHIL NFR BLD AUTO: 1.8 %
ERYTHROCYTE [DISTWIDTH] IN BLOOD BY AUTOMATED COUNT: 13 %
EST. AVERAGE GLUCOSE BLD GHB EST-MCNC: 174 MG/DL (ref 68–126)
FASTING PATIENT LIPID ANSWER: YES
FASTING STATUS PATIENT QL REPORTED: YES
GLOBULIN PLAS-MCNC: 3.5 G/DL (ref 2.8–4.4)
GLUCOSE BLD-MCNC: 86 MG/DL (ref 70–99)
HBA1C MFR BLD: 7.7 % (ref ?–5.7)
HCT VFR BLD AUTO: 36.5 %
HDLC SERPL-MCNC: 52 MG/DL (ref 40–59)
HGB BLD-MCNC: 11.6 G/DL
IMM GRANULOCYTES # BLD AUTO: 0.01 X10(3) UL (ref 0–1)
IMM GRANULOCYTES NFR BLD: 0.1 %
LDLC SERPL CALC-MCNC: 87 MG/DL (ref ?–100)
LYMPHOCYTES # BLD AUTO: 1.96 X10(3) UL (ref 1–4)
LYMPHOCYTES NFR BLD AUTO: 28.7 %
MCH RBC QN AUTO: 28.7 PG (ref 26–34)
MCHC RBC AUTO-ENTMCNC: 31.8 G/DL (ref 31–37)
MCV RBC AUTO: 90.3 FL
MICROALBUMIN UR-MCNC: 10.1 MG/DL
MICROALBUMIN/CREAT 24H UR-RTO: 243.4 UG/MG (ref ?–30)
MONOCYTES # BLD AUTO: 0.43 X10(3) UL (ref 0.1–1)
MONOCYTES NFR BLD AUTO: 6.3 %
NEUTROPHILS # BLD AUTO: 4.27 X10 (3) UL (ref 1.5–7.7)
NEUTROPHILS # BLD AUTO: 4.27 X10(3) UL (ref 1.5–7.7)
NEUTROPHILS NFR BLD AUTO: 62.5 %
NONHDLC SERPL-MCNC: 105 MG/DL (ref ?–130)
OSMOLALITY SERPL CALC.SUM OF ELEC: 293 MOSM/KG (ref 275–295)
PLATELET # BLD AUTO: 211 10(3)UL (ref 150–450)
POTASSIUM SERPL-SCNC: 3.9 MMOL/L (ref 3.5–5.1)
PROT SERPL-MCNC: 6.8 G/DL (ref 6.4–8.2)
RBC # BLD AUTO: 4.04 X10(6)UL
SODIUM SERPL-SCNC: 140 MMOL/L (ref 136–145)
TRIGL SERPL-MCNC: 97 MG/DL (ref 30–149)
VLDLC SERPL CALC-MCNC: 16 MG/DL (ref 0–30)
WBC # BLD AUTO: 6.8 X10(3) UL (ref 4–11)

## 2023-11-20 PROCEDURE — 83550 IRON BINDING TEST: CPT

## 2023-11-20 PROCEDURE — 83036 HEMOGLOBIN GLYCOSYLATED A1C: CPT

## 2023-11-20 PROCEDURE — 36415 COLL VENOUS BLD VENIPUNCTURE: CPT

## 2023-11-20 PROCEDURE — 82728 ASSAY OF FERRITIN: CPT

## 2023-11-20 PROCEDURE — 80053 COMPREHEN METABOLIC PANEL: CPT

## 2023-11-20 PROCEDURE — 80061 LIPID PANEL: CPT

## 2023-11-20 PROCEDURE — 82043 UR ALBUMIN QUANTITATIVE: CPT

## 2023-11-20 PROCEDURE — 85025 COMPLETE CBC W/AUTO DIFF WBC: CPT

## 2023-11-20 PROCEDURE — 83540 ASSAY OF IRON: CPT

## 2023-11-20 PROCEDURE — 82570 ASSAY OF URINE CREATININE: CPT

## 2023-11-21 ENCOUNTER — TELEPHONE (OUTPATIENT)
Dept: FAMILY MEDICINE CLINIC | Facility: CLINIC | Age: 68
End: 2023-11-21

## 2023-11-21 ENCOUNTER — OFFICE VISIT (OUTPATIENT)
Dept: ENDOCRINOLOGY CLINIC | Facility: CLINIC | Age: 68
End: 2023-11-21
Payer: MEDICARE

## 2023-11-21 ENCOUNTER — MED REC SCAN ONLY (OUTPATIENT)
Dept: FAMILY MEDICINE CLINIC | Facility: CLINIC | Age: 68
End: 2023-11-21

## 2023-11-21 VITALS
DIASTOLIC BLOOD PRESSURE: 62 MMHG | BODY MASS INDEX: 33 KG/M2 | SYSTOLIC BLOOD PRESSURE: 130 MMHG | OXYGEN SATURATION: 94 % | RESPIRATION RATE: 16 BRPM | WEIGHT: 177 LBS | HEART RATE: 84 BPM

## 2023-11-21 DIAGNOSIS — R80.9 TYPE 2 DIABETES MELLITUS WITH MICROALBUMINURIA, WITH LONG-TERM CURRENT USE OF INSULIN (HCC): Primary | ICD-10-CM

## 2023-11-21 DIAGNOSIS — E66.09 CLASS 1 OBESITY DUE TO EXCESS CALORIES WITH SERIOUS COMORBIDITY AND BODY MASS INDEX (BMI) OF 33.0 TO 33.9 IN ADULT: ICD-10-CM

## 2023-11-21 DIAGNOSIS — I10 ESSENTIAL HYPERTENSION: ICD-10-CM

## 2023-11-21 DIAGNOSIS — E78.2 MIXED HYPERLIPIDEMIA: ICD-10-CM

## 2023-11-21 DIAGNOSIS — E11.29 TYPE 2 DIABETES MELLITUS WITH MICROALBUMINURIA, WITH LONG-TERM CURRENT USE OF INSULIN (HCC): Primary | ICD-10-CM

## 2023-11-21 DIAGNOSIS — Z79.4 TYPE 2 DIABETES MELLITUS WITH MICROALBUMINURIA, WITH LONG-TERM CURRENT USE OF INSULIN (HCC): Primary | ICD-10-CM

## 2023-11-21 LAB
DEPRECATED HBV CORE AB SER IA-ACNC: 41.4 NG/ML
GLUCOSE BLOOD: 89
IRON SATN MFR SERPL: 17 %
IRON SERPL-MCNC: 74 UG/DL
TEST STRIP LOT #: NORMAL NUMERIC
TIBC SERPL-MCNC: 435 UG/DL (ref 240–450)
TRANSFERRIN SERPL-MCNC: 292 MG/DL (ref 200–360)

## 2023-11-21 PROCEDURE — 99214 OFFICE O/P EST MOD 30 MIN: CPT | Performed by: NURSE PRACTITIONER

## 2023-11-21 PROCEDURE — 82947 ASSAY GLUCOSE BLOOD QUANT: CPT | Performed by: NURSE PRACTITIONER

## 2023-11-21 RX ORDER — SEMAGLUTIDE 0.68 MG/ML
0.5 INJECTION, SOLUTION SUBCUTANEOUS WEEKLY
Qty: 3 ML | Refills: 1 | Status: SHIPPED | OUTPATIENT
Start: 2023-11-21

## 2023-11-21 RX ORDER — SEMAGLUTIDE 0.68 MG/ML
0.25 INJECTION, SOLUTION SUBCUTANEOUS WEEKLY
Qty: 3 ML | Refills: 0 | COMMUNITY
Start: 2023-11-21

## 2023-11-21 RX ORDER — TIMOLOL MALEATE 5 MG/ML
1 SOLUTION/ DROPS OPHTHALMIC
COMMUNITY
Start: 2023-11-07

## 2023-11-21 NOTE — TELEPHONE ENCOUNTER
----- Message from BIBIANA Saleh sent at 11/21/2023  8:00 AM CST -----  Slight anemia - added iron studies , complete occult blood orders in the computer

## 2023-11-21 NOTE — PATIENT INSTRUCTIONS
We are here to support you with Diabetes but please remember that you still need your primary care doctor for your routine health maintenance. Your current A1C: 7.7%  This test provides us with your average blood sugar for the past 3 months. The main goal of diabetes treatment is to keep your sugar from going too high. We measure your overall blood sugar trends with a Hemoglobin A1C test. (also called an A1C)  For most people the target is less than 7.0% but sometimes we make exceptions based on age, health history and other factors. Keeping an A1C less than 7% helps prevents diabetes related health problems. If your A1C goes too high, then we need to talk about changing your current diabetes treatment. MEDICATIONS:   It is important to take all of your medications as prescribed. Please call me if you cannot get the prescriptions filled or are having issues with refills. Also, please call me if you have any issues with medication questions, side effects, dosing questions or problems with your blood sugar trends BEFORE CHANGING OR STOPPING ANY MEDICATIONS. Continue Metformin  mg 2x/day with breakfast and dinner  Continue Tresiba 32 units injection daily  Restart Ozempic at 0.25mg injection weekly x 4 weeks then if tolerating increase to 0.5mg injection weekly until follow up    Blood sugar testing:   Always bring your glucose meter or blood sugar logbook to every appointment here at the diabetes center. This allows me to safely make adjustments to your diabetes plan. In order for me to determine any patterns in your blood sugars, you will need to test your blood suga daily   It would be best to change up the times of day that you are testing your sugar. Alternate testing before breakfast (fasting) and testing blood sugar 2 hours after your meals. Call if 2 readings below 80 mg/dl in 1 week for medication adjustment.      Blood sugar targets:  Before breakfast:   (preferably < 110)  2 hours After meals: less than 180 (preferably less than 150)   Call for persistent blood sugars < 75 or > 200. Blood sugars greater than 200 are not acceptable to reach your goal of improving diabetes      Health Maintenance:   1. LABS: It is important to monitor your kidney function (blood and urine protein levels) , liver function tests and cholesterol levels when you have diabetes. 2. FOOT EXAMS:  daily foot inspections for foot wounds or skin changes are important for foot care. Any unusual changes should be reported immediately. 3. EYE EXAMS: Checking your eyes for diabetes changes is important and you should have a dilated eye exam done by an eye doctor EVERY year since these changes occur in the BACK of the eye and not visible by you. Please let me know if you need provider list for eye doctor. Lifestyle Therapy:    1. NUTRITION: Maintain optimal weight, calorie restriction if overweight, plant-based diet    2. PHYSICAL ACTIVITY: 150 minutes per week (30 minutes a day 5 days a week) of moderate exertion such as walking, stair climbing. Include strength training 2-3 times per week. Increase as tolerated. 3. SLEEP: Try and get 7-8 hours of sleep per night    4. BEHAVIOR:  Tobacco cessation and alcohol in moderation        Jessica MCCARTY, BC-ADM, [facility]  [address] [facility], [facility] [address], [address]  [address] [place] [address], [address] [place], [address]  [address] Arias, [address]  Diabetes Services at [address] [facility] [address]

## 2023-11-21 NOTE — TELEPHONE ENCOUNTER
DM eye exam completed. Flowsheet filed. Report placed in Dr. Aki Ness for review and will then send to scan.

## 2023-11-28 DIAGNOSIS — E78.2 MIXED HYPERLIPIDEMIA: ICD-10-CM

## 2023-11-28 DIAGNOSIS — E11.65 TYPE 2 DIABETES MELLITUS WITH HYPERGLYCEMIA, WITH LONG-TERM CURRENT USE OF INSULIN (HCC): ICD-10-CM

## 2023-11-28 DIAGNOSIS — Z79.4 TYPE 2 DIABETES MELLITUS WITH HYPERGLYCEMIA, WITH LONG-TERM CURRENT USE OF INSULIN (HCC): ICD-10-CM

## 2023-11-28 DIAGNOSIS — R80.9 POSITIVE FOR MACROALBUMINURIA: ICD-10-CM

## 2023-11-28 RX ORDER — ROSUVASTATIN CALCIUM 10 MG/1
10 TABLET, COATED ORAL NIGHTLY
Qty: 30 TABLET | Refills: 11 | OUTPATIENT
Start: 2023-11-28

## 2023-11-28 RX ORDER — LISINOPRIL 5 MG/1
5 TABLET ORAL DAILY
Qty: 90 TABLET | Refills: 1 | Status: SHIPPED | OUTPATIENT
Start: 2023-11-28

## 2023-11-28 RX ORDER — ROSUVASTATIN CALCIUM 10 MG/1
10 TABLET, COATED ORAL NIGHTLY
Qty: 90 TABLET | Refills: 3 | Status: SHIPPED | OUTPATIENT
Start: 2023-11-28

## 2023-11-28 NOTE — TELEPHONE ENCOUNTER
Requested Prescriptions     Pending Prescriptions Disp Refills    ROSUVASTATIN 10 MG Oral Tab [Pharmacy Med Name: ROSUVASTATIN 10MG TABLETS] 30 tablet 11     Sig: TAKE 1 TABLET(10 MG) BY MOUTH EVERY NIGHT    LISINOPRIL 5 MG Oral Tab [Pharmacy Med Name: LISINOPRIL 5MG TABLETS] 90 tablet 0     Sig: TAKE 1 TABLET(5 MG) BY MOUTH DAILY     Future Appointments   Date Time Provider Susana Landa   2/22/2024  8:15 AM Elizabeth Appl, APRN [id number]     Last A1c value was 7.7% done 11/20/2023.   Refill 9/26/22 8/31/23  LOV 11/21/23

## 2024-02-06 ENCOUNTER — TELEMEDICINE (OUTPATIENT)
Dept: FAMILY MEDICINE CLINIC | Facility: CLINIC | Age: 69
End: 2024-02-06
Payer: MEDICARE

## 2024-02-06 DIAGNOSIS — K21.9 GASTROESOPHAGEAL REFLUX DISEASE WITHOUT ESOPHAGITIS: Primary | ICD-10-CM

## 2024-02-06 PROCEDURE — 1159F MED LIST DOCD IN RCRD: CPT | Performed by: FAMILY MEDICINE

## 2024-02-06 PROCEDURE — 99443 PHONE E/M BY PHYS 21-30 MIN: CPT | Performed by: FAMILY MEDICINE

## 2024-02-06 PROCEDURE — 1160F RVW MEDS BY RX/DR IN RCRD: CPT | Performed by: FAMILY MEDICINE

## 2024-02-06 RX ORDER — PANTOPRAZOLE SODIUM 40 MG/1
40 TABLET, DELAYED RELEASE ORAL DAILY
Qty: 30 TABLET | Refills: 6 | Status: SHIPPED | OUTPATIENT
Start: 2024-02-06

## 2024-02-06 NOTE — PROGRESS NOTES
Marcela Moreno verbally consents to a Virtual/Telephone Check-In service on 02/06/24.    Time spent: 22 min.      HPI:    Marcela Moreno is a 68 year old female who presents for heartburn.    The patient complaints of heartburn.  Pain is located behind the sternum. It a lot bad taste in the mouth. Severity is moderate, severe. Associated symptoms: vomiting, bloating.Has had for 2  weeks. Problem is worsened by fried, spicy food and  relief  with tums, water and fasting. Pt on Ozempic 0.5mg weekly.      Wt Readings from Last 6 Encounters:   11/21/23 177 lb (80.3 kg)   11/06/23 165 lb (74.8 kg)   07/03/23 162 lb 1.9 oz (73.5 kg)   04/03/23 163 lb (73.9 kg)   01/05/23 167 lb (75.8 kg)   12/08/22 163 lb (73.9 kg)     There is no height or weight on file to calculate BMI.     Cholesterol, Total (mg/dL)   Date Value   11/20/2023 157   09/26/2022 239 (H)   12/07/2021 237 (H)   09/23/2021 271 (H)     CHOLESTEROL (mg/dL)   Date Value   10/09/2013 245 (H)     HDL Cholesterol (mg/dL)   Date Value   11/20/2023 52   09/26/2022 55   12/07/2021 47   09/23/2021 51     HDL CHOL (mg/dL)   Date Value   10/09/2013 44 (L)     LDL Cholesterol (mg/dL)   Date Value   11/20/2023 87   09/26/2022 165 (H)   12/07/2021 156 (H)   09/23/2021 190 (H)     LDL CHOLESTROL (mg/dL)   Date Value   10/09/2013 170 (H)     AST (U/L)   Date Value   11/20/2023 18   09/26/2022 12 (L)   08/08/2022 20   10/09/2013 17     ALT (U/L)   Date Value   11/20/2023 13   09/26/2022 16   08/08/2022 17   10/09/2013 19      Current Outpatient Medications   Medication Sig Dispense Refill    rosuvastatin 10 MG Oral Tab TAKE 1 TABLET(10 MG) BY MOUTH EVERY NIGHT 90 tablet 3    lisinopril 5 MG Oral Tab TAKE 1 TABLET(5 MG) BY MOUTH DAILY 90 tablet 1    timolol 0.5 % Ophthalmic Solution Place 1 drop into both eyes 4 DAYS.      semaglutide (OZEMPIC, 0.25 OR 0.5 MG/DOSE,) 2 MG/3ML Subcutaneous Solution Pen-injector Inject 0.5 mg into the skin once a week. 3 mL 1    semaglutide  (OZEMPIC, 0.25 OR 0.5 MG/DOSE,) 2 MG/3ML Subcutaneous Solution Pen-injector Inject 0.25 mg into the skin once a week. X 4 weeks then if tolerating increase to 0.5mg injection weekly 3 mL 0    oxybutynin 5 MG Oral Tab Take 1 tablet (5 mg total) by mouth in the morning and 1 tablet (5 mg total) before bedtime. 60 tablet 3    Insulin Pen Needle (BD PEN NEEDLE TITO 2ND GEN) 32G X 4 MM Does not apply Misc USE AS DIRECTED 1 EACH INTO THE SKIN TWICE DAILY 200 each 1    Insulin Degludec (TRESIBA FLEXTOUCH) 200 UNIT/ML Subcutaneous Solution Pen-injector Inject 32 Units into the skin daily. 18 mL 1    Glucose Blood (ONETOUCH VERIO) In Vitro Strip TEST ONCE TO TWICE DAILY 100 strip 1    OneTouch Delica Lancets 33G Does not apply Misc 1 lancet by Finger stick route daily. 100 each 1    metFORMIN  MG Oral Tablet 24 Hr Take 1 tablet (500 mg total) by mouth 2 (two) times daily with meals. 180 tablet 1      Past Medical History:   Diagnosis Date    AVM (arteriovenous malformation) brain     being followed at [facility]    [facility]) 4/18/15    s/p Brain surgery    Stroke ([facility]) 2015    Type II or unspecified type diabetes mellitus without mention of complication, not stated as uncontrolled     Vitamin D deficiency       Past Surgical History:   Procedure Laterality Date    BRAIN SURGERY  4/16/15    avm   [facility]            Family History   Problem Relation Age of Onset    Diabetes Mother     Hypertension Mother     Hypertension Father     Heart Disorder Brother         CAD stent in his 40s    Diabetes Brother     Diabetes Sister       Social History:  Social History     Socioeconomic History    Marital status:    Tobacco Use    Smoking status: Never    Smokeless tobacco: Never   Vaping Use    Vaping Use: Never used   Substance and Sexual Activity    Alcohol use: Never     Alcohol/week: 0.0 standard drinks of alcohol     Comment: rare    Drug use: No   Other Topics Concern    Caffeine Concern Yes      Comment: coffee 2 cups daily    Exercise No    Seat Belt Yes         REVIEW OF SYSTEMS:   GENERAL: feels tired, no lethargy, no fever, no chills  SKIN: denies any unusual skin lesions, rash.  HEENT:no abnormal taste  LUNGS: denies shortness of breath with exertion  CARDIOVASCULAR: denies chest pain on exertion  GI: as above.No heartburn.No melena, no rectal bleeding.No vomiting.  :no dysuria.  MUSCULOSKELETAL: no myalgia  NEURO: denies headaches      EXAM:   LMP  (LMP Unknown)   There is no height or weight on file to calculate BMI.   GENERAL: in no apparent distress  HEENT: normal voice  LUNGS: no SOB  PSYCH: normal mood.     ASSESSMENT AND PLAN:   Marcela Moreno is a 68 year old female who presents for   GERD:stop Ozempic.  Requested Prescriptions     Signed Prescriptions Disp Refills    pantoprazole 40 MG Oral Tab EC 30 tablet 6     Sig: Take 1 tablet (40 mg total) by mouth daily.      The pathophysiology of reflux disease was discussed in detail. Antireflux measures such as dietary changes and lifestyle modifications were discussed. These include trying to avoid ASA or NSAID products, caffeine, mints, tobacco and alcohol products and carbonated beverages. Additionally, the patient was instructed to avoid eating or drinking at least 2-3 hours before going to bed or taking naps, and raising the head of the bed.    F/u in one month.

## 2024-02-22 ENCOUNTER — OFFICE VISIT (OUTPATIENT)
Dept: ENDOCRINOLOGY CLINIC | Facility: CLINIC | Age: 69
End: 2024-02-22
Payer: MEDICARE

## 2024-02-22 VITALS
HEART RATE: 84 BPM | BODY MASS INDEX: 34 KG/M2 | OXYGEN SATURATION: 99 % | SYSTOLIC BLOOD PRESSURE: 122 MMHG | RESPIRATION RATE: 20 BRPM | WEIGHT: 180 LBS | DIASTOLIC BLOOD PRESSURE: 82 MMHG

## 2024-02-22 DIAGNOSIS — I10 ESSENTIAL HYPERTENSION: ICD-10-CM

## 2024-02-22 DIAGNOSIS — Z79.4 TYPE 2 DIABETES MELLITUS WITH HYPERGLYCEMIA, WITH LONG-TERM CURRENT USE OF INSULIN (HCC): Primary | ICD-10-CM

## 2024-02-22 DIAGNOSIS — E66.09 CLASS 1 OBESITY DUE TO EXCESS CALORIES WITH SERIOUS COMORBIDITY AND BODY MASS INDEX (BMI) OF 34.0 TO 34.9 IN ADULT: ICD-10-CM

## 2024-02-22 DIAGNOSIS — E11.65 TYPE 2 DIABETES MELLITUS WITH HYPERGLYCEMIA, WITH LONG-TERM CURRENT USE OF INSULIN (HCC): Primary | ICD-10-CM

## 2024-02-22 DIAGNOSIS — E78.2 MIXED HYPERLIPIDEMIA: ICD-10-CM

## 2024-02-22 DIAGNOSIS — R80.9 MICROALBUMINURIA: ICD-10-CM

## 2024-02-22 LAB
CARTRIDGE LOT#: 648 NUMERIC
HEMOGLOBIN A1C: 7.7 % (ref 4.3–5.6)

## 2024-02-22 PROCEDURE — 99214 OFFICE O/P EST MOD 30 MIN: CPT | Performed by: NURSE PRACTITIONER

## 2024-02-22 PROCEDURE — 83036 HEMOGLOBIN GLYCOSYLATED A1C: CPT | Performed by: NURSE PRACTITIONER

## 2024-02-22 RX ORDER — DAPAGLIFLOZIN 10 MG/1
10 TABLET, FILM COATED ORAL DAILY
Qty: 28 TABLET | Refills: 0 | COMMUNITY
Start: 2024-02-22 | End: 2024-03-23

## 2024-02-22 RX ORDER — METFORMIN HYDROCHLORIDE 500 MG/1
500 TABLET, EXTENDED RELEASE ORAL 2 TIMES DAILY WITH MEALS
COMMUNITY
Start: 2024-02-22

## 2024-02-22 NOTE — PATIENT INSTRUCTIONS
We are here to support you with Diabetes but please remember that you still need your primary care doctor for your routine health maintenance.   Your current A1C: 7.7%  This test provides us with your average blood sugar for the past 3 months.   The main goal of diabetes treatment is to keep your sugar from going too high. We measure your overall blood sugar trends with a Hemoglobin A1C test. (also called an A1C)  For most people the target is less than 7.0% but sometimes we make exceptions based on age, health history and other factors.   Keeping an A1C less than 7% helps prevents diabetes related health problems.   If your A1C goes too high, then we need to talk about changing your current diabetes treatment.    MEDICATIONS:   It is important to take all of your medications as prescribed.   Please call me if you cannot get the prescriptions filled or are having issues with refills.   Also, please call me if you have any issues with medication questions, side effects, dosing questions or problems with your blood sugar trends BEFORE CHANGING OR STOPPING ANY MEDICATIONS.   Increase Metformin ER to 500mg 2x/day with breakfast and dinner  Continue Tresiba 32 units injection once daily  Start Farxiga 10mg daily - drink 4-6 eight ounce glasses of water daily to avoid dehydration  Remain off of Ozempic at this time     Blood sugar testing:   Always bring your glucose meter or blood sugar logbook to every appointment here at the diabetes center.  This allows me to safely make adjustments to your diabetes plan.   In order for me to determine any patterns in your blood sugars, you will need to test your blood sugar daily   It would be best to change up the times of day that you are testing your sugar.   Alternate testing before breakfast (fasting) and then testing blood sugar 2 hours after your meals.   Call if 2 readings below 80 mg/dl in 1 week for medication adjustment.     Blood sugar targets:  Before breakfast:    (preferably < 110)  2 hours After meals: less than 180 (preferably less than 150)   Call for persistent blood sugars < 75 or > 200.   Blood sugars greater than 200 are not acceptable to reach your goal of improving diabetes      Health Maintenance:   1. LABS: It is important to monitor your kidney function (blood and urine protein levels) , liver function tests and cholesterol levels when you have diabetes.     2. FOOT EXAMS:  daily foot inspections for foot wounds or skin changes are important for foot care. Any unusual changes should be reported immediately.    3. EYE EXAMS: Checking your eyes for diabetes changes is important and you should have a dilated eye exam done by an eye doctor EVERY year since these changes occur in the BACK of the eye and not visible by you.  Please let me know if you need provider list for eye doctor.     Lifestyle Therapy:    1. NUTRITION: Maintain optimal weight, calorie restriction if overweight, plant-based diet    2. PHYSICAL ACTIVITY: 150 minutes per week (30 minutes a day 5 days a week) of moderate exertion such as walking, stair climbing.  Include strength training 2-3 times per week.  Increase as tolerated.    3. SLEEP: Try and get 7-8 hours of sleep per night    4. BEHAVIOR:  Tobacco cessation and alcohol in moderation        Jessica MCCARTY, BC-ADM, [facility]  [address] [place] [address]  [address], [address] [place] [address]  [address] [place] [address]  Diabetes Services at [facility]  T [phone]  F [phone]

## 2024-02-22 NOTE — PROGRESS NOTES
HPI:   Marcela Moreno is a 68 year old female who presents for follow up for the management of her diabetes.        Chief Complaint   Patient presents with    Diabetes     F/U - brought meter       Diabetes: stable, needs improvement  Type: 2   Duration: 2000  Current Meds: Metformin ER 500mg po bid - only taking once, Ozempic 0.5mg injection weekly - stopped taking having stomach issues, Tresiba 32 units injection daily   SE: diarrhea with higher doses of Metformin  Long term insulin use: yes  Failed Meds: Novolog 70/30, Jardiance, Toujeo, Novolog, Humalog 75/25, Lantus, Janumet, Metformin immediate release, Levemir, Ozempic    Complications: Retinopathy, Proteinuria, CAD, CVA/Stroke    Testing with: One Touch Verio   Monitoring blood glucose: daily  Average glucose readin mg/dl  Highest glucose readin mg/dl    Lowest glucose readin mg/dl  Hypoglycemia frequency:  denies     Overall glucose control:   HGBA1C:    Lab Results   Component Value Date    A1C 7.7 (A) 2024    A1C 7.7 (H) 2023    A1C 5.8 (A) 2023     (H) 2023       Hypertension: stable, at goal  Blood Pressure: 122/82  Medication prescribed: lisinopril  SE: denies     Hyperlipidemia: stable, at goal  LDL: 87   Tri  Medication prescribed: rosuvastatin  SE: denies     Wt Readings from Last 3 Encounters:   24 180 lb (81.6 kg)   23 177 lb (80.3 kg)   23 165 lb (74.8 kg)     BP Readings from Last 3 Encounters:   24 122/82   23 130/62   23 130/80          Past History:   She  has a past medical history of AVM (arteriovenous malformation) brain (HCC), Stroke (HCC) (4/18/15), Stroke (HCC) (2015), Type II or unspecified type diabetes mellitus without mention of complication, not stated as uncontrolled, and Vitamin D deficiency.   Her family history includes Diabetes in her brother, mother, and sister; Heart Disorder in her brother; Hypertension in her father and mother.    She  reports that she has never smoked. She has never used smokeless tobacco. She reports that she does not drink alcohol and does not use drugs.     She has No Known Allergies.     Current Outpatient Medications on File Prior to Visit   Medication Sig    metFORMIN  MG Oral Tablet 24 Hr Take 1 tablet (500 mg total) by mouth 2 (two) times daily with meals.    pantoprazole 40 MG Oral Tab EC Take 1 tablet (40 mg total) by mouth daily.    rosuvastatin 10 MG Oral Tab TAKE 1 TABLET(10 MG) BY MOUTH EVERY NIGHT    lisinopril 5 MG Oral Tab TAKE 1 TABLET(5 MG) BY MOUTH DAILY    timolol 0.5 % Ophthalmic Solution Place 1 drop into both eyes 4 DAYS.    oxybutynin 5 MG Oral Tab Take 1 tablet (5 mg total) by mouth in the morning and 1 tablet (5 mg total) before bedtime.    Insulin Pen Needle (BD PEN NEEDLE TITO 2ND GEN) 32G X 4 MM Does not apply Misc USE AS DIRECTED 1 EACH INTO THE SKIN TWICE DAILY    Insulin Degludec (TRESIBA FLEXTOUCH) 200 UNIT/ML Subcutaneous Solution Pen-injector Inject 32 Units into the skin daily.    Glucose Blood (ONETOUCH VERIO) In Vitro Strip TEST ONCE TO TWICE DAILY    OneTouch Delica Lancets 33G Does not apply Misc 1 lancet by Finger stick route daily.    [DISCONTINUED] metFORMIN  MG Oral Tablet 24 Hr Take 1 tablet (500 mg total) by mouth 2 (two) times daily with meals. (Patient taking differently: Take 1 tablet (500 mg total) by mouth daily with breakfast.)     No current facility-administered medications on file prior to visit.       CMP  (most recent labs)   Lab Results   Component Value Date/Time    GLU 86 11/20/2023 09:12 AM    BUN 22 11/20/2023 09:12 AM    CREATSERUM 0.80 11/20/2023 09:12 AM    GFRNAA 85 12/07/2021 09:09 AM    GFRAA 98 12/07/2021 09:09 AM    EGFRCR 81 11/20/2023 09:12 AM    CA 9.1 11/20/2023 09:12 AM    ALKPHO 77 11/20/2023 09:12 AM    AST 18 11/20/2023 09:12 AM    ALT 13 11/20/2023 09:12 AM    BILT 0.4 11/20/2023 09:12 AM    TP 6.8 11/20/2023 09:12 AM    ALB 3.3 (L)  11/20/2023 09:12 AM     11/20/2023 09:12 AM    K 3.9 11/20/2023 09:12 AM     11/20/2023 09:12 AM    CO2 29.0 11/20/2023 09:12 AM           Lipids  (most recent labs)   Lab Results   Component Value Date/Time    CHOLEST 157 11/20/2023 09:12 AM    TRIG 97 11/20/2023 09:12 AM    HDL 52 11/20/2023 09:12 AM    LDL 87 11/20/2023 09:12 AM    NONHDLC 105 11/20/2023 09:12 AM          Diabetes  (most recent labs)   Lab Results   Component Value Date/Time    A1C 7.7 (A) 02/22/2024 08:54 AM          Microalb (most recent labs)   Lab Results   Component Value Date/Time    MICROALBCREA 243.4 (H) 11/20/2023 09:09 AM        Lab results reviewed with patient.    REVIEW OF SYSTEMS:   GENERAL HEALTH: feels well otherwise  SKIN: denies any unusual skin lesions or rashes  RESPIRATORY: denies shortness of breath with exertion  CARDIOVASCULAR: denies chest pain on exertion  GI: c/o heartburn and occasional nausea and abdominal pain   NEURO: c/o numbness and tingling to bilateral feet, denies headaches  ENDO: denies polydipsia, polyuria or polyphagia, denies unexplained weight changes    EXAM:   /82   Pulse 84   Resp 20   Wt 180 lb (81.6 kg)   LMP  (LMP Unknown)   SpO2 99%   BMI 34.01 kg/m²  Estimated body mass index is 34.01 kg/m² as calculated from the following:    Height as of 11/6/23: 5' 1\" (1.549 m).    Weight as of this encounter: 180 lb (81.6 kg).   Physical Exam  Vitals reviewed.   Constitutional:       Appearance: Normal appearance.   Cardiovascular:      Pulses:           Dorsalis pedis pulses are 1+ on the right side and 1+ on the left side.   Pulmonary:      Effort: Pulmonary effort is normal.   Feet:      Right foot:      Protective Sensation: 5 sites tested.  5 sites sensed.      Skin integrity: Skin integrity normal.      Toenail Condition: Right toenails are abnormally thick.      Left foot:      Protective Sensation: 5 sites tested.  5 sites sensed.      Skin integrity: Skin integrity normal.       Toenail Condition: Left toenails are abnormally thick.      Comments: Diabetes foot exam performed: Vibration to dorsum to the first toe perceived bilaterally.  Foot care practices reviewed with patient.    Neurological:      Mental Status: She is alert and oriented to person, place, and time.   Psychiatric:         Mood and Affect: Mood normal.         Behavior: Behavior normal.         ASSESSMENT AND PLAN:   As for her Diabetes, it is needs further observation, needs improvement, needs to follow diet more regularly.     Recommendations are: continue present meds, lose weight by increased dietary compliance and exercise, and check feet daily.    Discussed the risk of and benefits of SGLT2 inhibitor class for treatment of type 2 Diabetes. This class of meds results in the renal excretion of glucose and can lower blood sugars.   eGfr: 81  Patient was instructed to drink at least 6 eight ounce glasses of water daily to avoid dehydration.    Patient to increase Metformin ER back to 500mg po bid.  ContinueTresiba 32 units injection daily.  Start Farxiga 10mg daily.  Remain off of Ozempic at this time until follow up with GI.    Per FDA label, Farxiga is indicated to reduce the risk of cardiovascular death and hospitalization for heart failure in adults with heart failure (NYHA class II-IV) with reduced ejection fraction.  Indicated to reduce the risk of kidney function decline and kidney failure in adults with chronic kidney disease who are at risk of disease progression.    Discussed self monitoring blood glucose and the importance of monitoring.  Patient agreed to monitoring daily - alternating fasting in AM and 2 hours postprandial of one meal per day.  Hypoglycemia S&S, Rx, and when to call APRN/CDE reviewed using Rule of 15's. Stressed need to call if 2 readings below 80 mg/dl in 1 week for medication adjustment.   Reinforced healthy eating in healthy portions and increasing daily physical activity.  Reviewed ways  to improve the protein in the urine:   Keep blood sugars in target range   Keep blood pressure in target range   Watch over the counter medicines like NSAID (non steroidal anti-inflammatory drugs) these can be harmful to  kidneys (examples include: , Motrin , Advil, ibuprofen, naprosyn, Aleve)   Continue ACEI and SGLT2 therapy - renoprotective       As for her hypertension, Blood Pressure is stable, no significant medication side effects noted.   PLAN: will continue present medications, reviewed diet, exercise and weight control, continue ACEI therapy.       As for her cholesterol, Lipids are stable, improved, no significant medication side effects noted.   PLAN: will continue present medications, reviewed diet, exercise and weight control, continue statin therapy.      DM Health Maintenance  Last dilated eye exam: Last Dilated Eye Exam: 23   Exam shows retinopathy? Eye Exam shows Diabetic Retinopathy?: Yes    Last diabetic foot exam: Last Foot Exam: 24      Date of last PHQ-2 depression screen: PHQ-2 - Date of last depression screenin2024    Patient  reports that she has never smoked. She has never used smokeless tobacco.  When is flu vaccine due? Influenza Vaccine(1) due on 10/01/2023  When is pneumonia vaccine due? No recommendations at this time    The patient indicates understanding of these issues and agrees to the plan.  Refills addressed at time of office visit.    Diagnoses and all orders for this visit:    Type 2 diabetes mellitus with hyperglycemia, with long-term current use of insulin (HCC)  -     HEMOGLOBIN A1C  -     Microalb/Creat Ratio, Random Urine [E]; Future  -     dapagliflozin (FARXIGA) 10 MG Oral Tab; Take 1 tablet (10 mg total) by mouth daily.    Essential hypertension    Mixed hyperlipidemia    Microalbuminuria    Class 1 obesity due to excess calories with serious comorbidity and body mass index (BMI) of 34.0 to 34.9 in adult           Return in about 4 weeks (around  3/21/2024) for 45 minute appointment, Virtual Visit.    The risks and benefits of my recommendations, as well as other treatment options were discussed with the patient today. Questions were answered to the best of my knowledge.   30 min spent with patient and >50% time spent counseling and coordinating care related to their office visit.      Jessica MCCARTY, BC-ADM, [facility]

## 2024-03-18 ENCOUNTER — TELEPHONE (OUTPATIENT)
Dept: ENDOCRINOLOGY CLINIC | Facility: CLINIC | Age: 69
End: 2024-03-18

## 2024-03-18 NOTE — TELEPHONE ENCOUNTER
Pt left message, returned call.  Pt states she is not tolerating the farxiga, can't handle the frequent urination.  States if she's out, she needs to be close to the nearest bathroom.  Pt reports her blood sugar trends are about the same since starting farxiga.  Informed pt I would notify Jessica, monitor BG trends, call with any concerns prior to next VV.  Informed pt Jessica sent [employer] message regarding upcoming appt and needing BG log Thursday afternoon.  Further action needed?

## 2024-03-18 NOTE — TELEPHONE ENCOUNTER
Will discuss glucose trends and medication during upcoming virtual visit on Friday.    Jessica MCCARTY, BC-ADM, [facility]

## 2024-03-22 ENCOUNTER — TELEMEDICINE (OUTPATIENT)
Dept: ENDOCRINOLOGY CLINIC | Facility: CLINIC | Age: 69
End: 2024-03-22
Payer: MEDICARE

## 2024-03-22 DIAGNOSIS — E78.2 MIXED HYPERLIPIDEMIA: ICD-10-CM

## 2024-03-22 DIAGNOSIS — I10 ESSENTIAL HYPERTENSION: ICD-10-CM

## 2024-03-22 DIAGNOSIS — R80.9 MICROALBUMINURIA: ICD-10-CM

## 2024-03-22 DIAGNOSIS — Z79.4 TYPE 2 DIABETES MELLITUS WITH HYPERGLYCEMIA, WITH LONG-TERM CURRENT USE OF INSULIN (HCC): Primary | ICD-10-CM

## 2024-03-22 DIAGNOSIS — E11.65 TYPE 2 DIABETES MELLITUS WITH HYPERGLYCEMIA, WITH LONG-TERM CURRENT USE OF INSULIN (HCC): Primary | ICD-10-CM

## 2024-03-22 PROCEDURE — 99214 OFFICE O/P EST MOD 30 MIN: CPT | Performed by: NURSE PRACTITIONER

## 2024-03-22 NOTE — PROGRESS NOTES
HPI:   Marcela Moreno is a 68 year old female who presents virtually for the management of her diabetes.   This visit is conducted using Telemedicine with live, two way, interactive video and audio.  Patient verbally consents to Telemedicine visit.  Patient understands and accepts financial responsibility for any deductible, co-insurance and/or co-pays associated with this service.    Chief Complaint: Diabetes Follow Up    Diabetes: needs improvement  Type: 2   Duration:dxx   Current Meds: Metformin ER 500mg po bid, Tresiba 32 units injection daily, Farxiga 10mg po daily - stopped taking  SE: frequent urination  *Patient continues to experience nausea and occasional vomiting despite discontinuation of Ozempic  Long term insulin use: yes  Failed Meds/Previous Tx: Novolog 70/30, Jardiance, Toujeo, Novolog, Humalog 75/25, Lantus, Janumet, Metformin IR, Levemir, Ozempic    Complications: Retinopathy, Proteinuria, CAD, CVA/Stroke    Testing with: One Touch Verio   Monitoring blood glucose: inconsistent  Average glucose readin mg/dl  Highest glucose readin mg/d    Lowest glucose readin mg/dl  Hypoglycemia frequency:  denies     Overall glucose control:   HGBA1C:    Lab Results   Component Value Date    A1C 7.7 (A) 2024    A1C 7.7 (H) 2023    A1C 5.8 (A) 2023     (H) 2023          Wt Readings from Last 3 Encounters:   24 180 lb (81.6 kg)   23 177 lb (80.3 kg)   23 165 lb (74.8 kg)           Past History:   She  has a past medical history of AVM (arteriovenous malformation) brain (HCC), Stroke (HCC) (4/18/15), Stroke (HCC) (2015), Type II or unspecified type diabetes mellitus without mention of complication, not stated as uncontrolled, and Vitamin D deficiency.   Her family history includes Diabetes in her brother, mother, and sister; Heart Disorder in her brother; Hypertension in her father and mother.   She  reports that she has never smoked. She  has never used smokeless tobacco. She reports that she does not drink alcohol and does not use drugs.     She has No Known Allergies.     Current Outpatient Medications on File Prior to Visit   Medication Sig    metFORMIN  MG Oral Tablet 24 Hr Take 1 tablet (500 mg total) by mouth 2 (two) times daily with meals.    pantoprazole 40 MG Oral Tab EC Take 1 tablet (40 mg total) by mouth daily.    rosuvastatin 10 MG Oral Tab TAKE 1 TABLET(10 MG) BY MOUTH EVERY NIGHT    lisinopril 5 MG Oral Tab TAKE 1 TABLET(5 MG) BY MOUTH DAILY    timolol 0.5 % Ophthalmic Solution Place 1 drop into both eyes 4 DAYS.    oxybutynin 5 MG Oral Tab Take 1 tablet (5 mg total) by mouth in the morning and 1 tablet (5 mg total) before bedtime.    Insulin Pen Needle (BD PEN NEEDLE TITO 2ND GEN) 32G X 4 MM Does not apply Misc USE AS DIRECTED 1 EACH INTO THE SKIN TWICE DAILY    Insulin Degludec (TRESIBA FLEXTOUCH) 200 UNIT/ML Subcutaneous Solution Pen-injector Inject 32 Units into the skin daily.    Glucose Blood (ONETOUCH VERIO) In Vitro Strip TEST ONCE TO TWICE DAILY    OneTouch Delica Lancets 33G Does not apply Misc 1 lancet by Finger stick route daily.     No current facility-administered medications on file prior to visit.       CMP  (most recent labs)   Lab Results   Component Value Date/Time    GLU 86 11/20/2023 09:12 AM    BUN 22 11/20/2023 09:12 AM    CREATSERUM 0.80 11/20/2023 09:12 AM    GFRNAA 85 12/07/2021 09:09 AM    GFRAA 98 12/07/2021 09:09 AM    EGFRCR 81 11/20/2023 09:12 AM    CA 9.1 11/20/2023 09:12 AM    ALKPHO 77 11/20/2023 09:12 AM    AST 18 11/20/2023 09:12 AM    ALT 13 11/20/2023 09:12 AM    BILT 0.4 11/20/2023 09:12 AM    TP 6.8 11/20/2023 09:12 AM    ALB 3.3 (L) 11/20/2023 09:12 AM     11/20/2023 09:12 AM    K 3.9 11/20/2023 09:12 AM     11/20/2023 09:12 AM    CO2 29.0 11/20/2023 09:12 AM           Lipids  (most recent labs)   Lab Results   Component Value Date/Time    CHOLEST 157 11/20/2023 09:12 AM     TRIG 97 11/20/2023 09:12 AM    HDL 52 11/20/2023 09:12 AM    LDL 87 11/20/2023 09:12 AM    NONHDLC 105 11/20/2023 09:12 AM          Diabetes  (most recent labs)   Lab Results   Component Value Date/Time    A1C 7.7 (A) 02/22/2024 08:54 AM          Microalb (most recent labs)   Lab Results   Component Value Date/Time    MICROALBCREA 243.4 (H) 11/20/2023 09:09 AM        Lab results reviewed with patient.    REVIEW OF SYSTEMS:   SKIN: denies any unusual skin lesions or rashes  RESPIRATORY: denies shortness of breath with exertion  CARDIOVASCULAR: denies chest pain on exertion  GI: c/o of intermittent nausea and vomiting, denies abdominal pain and denies heartburn  NEURO: denies headaches     EXAM:   Physical Exam  Constitutional:       Appearance: Normal appearance.   Pulmonary:      Comments: Able to speak in complete sentences without difficulty  Neurological:      Mental Status: She is alert and oriented to person, place, and time.   Psychiatric:         Mood and Affect: Mood normal.         Behavior: Behavior normal.         ASSESSMENT AND PLAN:   Diabetes: Patient to continue Metformin ER 500mg po bid and Tresiba 32 units injection daily at this time.    Discussed increasing Tresiba and starting mealtime rapid acting insulin regimen.  Patient declines at this time stating she does not want to make any changes to regimen including dosage changes until seen by GI 3/29.    Discussed that insulin will not affect her GI system in fact having elevated readings could contribute.  Patient continues to decline.  Will follow up with patient after she meets with GI and plan is to initiate meal time insulin and CGM therapy.  Discussed self monitoring blood glucose and the importance of monitoring.  Patient agreed to monitoring daily - alternating fasting in AM and 2 hours postprandial of one meal per day.  Reviewed ways to improve the protein in the urine:   Keep blood sugars in target range   Keep blood pressure in target  range   Watch over the counter medicines like NSAID (non steroidal anti-inflammatory drugs) these can be harmful to  kidneys (examples include: , Motrin , Advil, ibuprofen, naprosyn, Aleve)   Continue ACEI therapy - renoprotective    Reinforced healthy eating in healthy portions and increasing daily physical activity.      Hypertension:  Continue ACEI therapy.    Hyperlipidemia:  Continue statin therapy.    DM Health Maintenance  Last dilated eye exam: Last Dilated Eye Exam: 23   Exam shows retinopathy? Eye Exam shows Diabetic Retinopathy?: Yes    Last diabetic foot exam: Last Foot Exam: 24    Date of last PHQ-2 depression screen: PHQ-2 - Date of last depression screenin2024    Patient  reports that she has never smoked. She has never used smokeless tobacco.  When is flu vaccine due? Influenza Vaccine(1) due on 10/01/2023  When is pneumonia vaccine due? No recommendations at this time    The patient indicates understanding of these issues and agrees to the plan.  Refills addressed at time of office visit.    Diagnoses and all orders for this visit:    Type 2 diabetes mellitus with hyperglycemia, with long-term current use of insulin (HCC)    Essential hypertension    Mixed hyperlipidemia    Microalbuminuria       Return in about 10 days (around 2024) for 30 minute appointment.    The risks and benefits of my recommendations, as well as other treatment options were discussed with the patient today. questions were answered to the best of my knowledge.  Patient verbalizes understanding and amendable to plan of care.  Duration of this service:  15 minutes   Jessica MCCARTY, BC-ADM, [facility]

## 2024-03-25 ENCOUNTER — TELEPHONE (OUTPATIENT)
Dept: FAMILY MEDICINE CLINIC | Facility: CLINIC | Age: 69
End: 2024-03-25

## 2024-03-25 NOTE — TELEPHONE ENCOUNTER
DM eye exam completed. Flowsheet filed. Report placed in MD's inbox to be reviewed and will then send to scan.

## 2024-03-26 DIAGNOSIS — Z79.4 TYPE 2 DIABETES MELLITUS WITH HYPERGLYCEMIA, WITH LONG-TERM CURRENT USE OF INSULIN (HCC): ICD-10-CM

## 2024-03-26 DIAGNOSIS — E11.65 TYPE 2 DIABETES MELLITUS WITH HYPERGLYCEMIA, WITH LONG-TERM CURRENT USE OF INSULIN (HCC): ICD-10-CM

## 2024-03-26 RX ORDER — METFORMIN HYDROCHLORIDE 500 MG/1
500 TABLET, EXTENDED RELEASE ORAL 2 TIMES DAILY WITH MEALS
Qty: 180 TABLET | Refills: 1 | Status: SHIPPED | OUTPATIENT
Start: 2024-03-26

## 2024-03-26 NOTE — TELEPHONE ENCOUNTER
Received fax request from [employer] for refill of metformin, pended  Requested Prescriptions     Pending Prescriptions Disp Refills    metFORMIN  MG Oral Tablet 24 Hr 90 tablet 3     Sig: Take 1 tablet (500 mg total) by mouth 2 (two) times daily with meals.     Your appointments       Date & Time Appointment Department ([place])    Mar 29, 2024  8:40 AM CDT New Patient OV with Guerrero Moser MD [facility] ([facility])    Please arrive 15 minutes prior to your scheduled appointment time.         Apr 01, 2024  1:15 PM CDT Apn/Md Diabetic Follow Up with Jessica Hicks APRN [facility] [address] ([facility])              [facility] [address]  [facility] [place]  [address]  [facility] [id number] [phone] [facility]  [facility]  [address] [facility] [address] [phone]          Last A1c value was 7.7% done 2/22/2024.    Refill 2/22/24 - historical  LOV 3/22/24

## 2024-03-28 ENCOUNTER — MED REC SCAN ONLY (OUTPATIENT)
Dept: FAMILY MEDICINE CLINIC | Facility: CLINIC | Age: 69
End: 2024-03-28

## 2024-04-01 ENCOUNTER — OFFICE VISIT (OUTPATIENT)
Dept: ENDOCRINOLOGY CLINIC | Facility: CLINIC | Age: 69
End: 2024-04-01
Payer: MEDICARE

## 2024-04-01 VITALS
BODY MASS INDEX: 34 KG/M2 | SYSTOLIC BLOOD PRESSURE: 118 MMHG | DIASTOLIC BLOOD PRESSURE: 78 MMHG | WEIGHT: 182 LBS | RESPIRATION RATE: 20 BRPM

## 2024-04-01 DIAGNOSIS — Z79.4 TYPE 2 DIABETES MELLITUS WITH HYPERGLYCEMIA, WITH LONG-TERM CURRENT USE OF INSULIN (HCC): Primary | ICD-10-CM

## 2024-04-01 DIAGNOSIS — I10 ESSENTIAL HYPERTENSION: ICD-10-CM

## 2024-04-01 DIAGNOSIS — E11.65 TYPE 2 DIABETES MELLITUS WITH HYPERGLYCEMIA, WITH LONG-TERM CURRENT USE OF INSULIN (HCC): Primary | ICD-10-CM

## 2024-04-01 DIAGNOSIS — E78.2 MIXED HYPERLIPIDEMIA: ICD-10-CM

## 2024-04-01 DIAGNOSIS — R80.9 MICROALBUMINURIA: ICD-10-CM

## 2024-04-01 LAB
CREAT UR-SCNC: 132 MG/DL
MICROALBUMIN UR-MCNC: 76.1 MG/DL
MICROALBUMIN/CREAT 24H UR-RTO: 576.5 UG/MG (ref ?–30)

## 2024-04-01 PROCEDURE — 3074F SYST BP LT 130 MM HG: CPT | Performed by: NURSE PRACTITIONER

## 2024-04-01 PROCEDURE — 1160F RVW MEDS BY RX/DR IN RCRD: CPT | Performed by: NURSE PRACTITIONER

## 2024-04-01 PROCEDURE — 82043 UR ALBUMIN QUANTITATIVE: CPT | Performed by: NURSE PRACTITIONER

## 2024-04-01 PROCEDURE — 1159F MED LIST DOCD IN RCRD: CPT | Performed by: NURSE PRACTITIONER

## 2024-04-01 PROCEDURE — 3078F DIAST BP <80 MM HG: CPT | Performed by: NURSE PRACTITIONER

## 2024-04-01 PROCEDURE — 82570 ASSAY OF URINE CREATININE: CPT | Performed by: NURSE PRACTITIONER

## 2024-04-01 PROCEDURE — 99214 OFFICE O/P EST MOD 30 MIN: CPT | Performed by: NURSE PRACTITIONER

## 2024-04-01 RX ORDER — INSULIN DEGLUDEC 200 U/ML
36 INJECTION, SOLUTION SUBCUTANEOUS DAILY
COMMUNITY
Start: 2024-04-01

## 2024-04-01 NOTE — PROGRESS NOTES
HPI:   Marcela Moreno is a 68 year old female who presents for follow up for the management of her diabetes.  Patient met with GI and will be having a MRI, endoscopy and colonoscopy.      Chief Complaint   Patient presents with    Diabetes     F/U - brought meter       Diabetes: stable, needs improvement  Type: 2   Duration: 2000  Current Meds: Metformin ER 500mg po bid, Tresiba 32 units injection daily   SE: diarrhea with higher doses of Metformin  Long term insulin use: yes  Failed Meds: Novolog 70/30, Jardiance, Toujeo, Novolog, Humalog 75/25, Lantus, Janumet, Metformin immediate release, Levemir, Ozempic, Farxiga    Complications: Retinopathy, Proteinuria, CAD, CVA/Stroke    Testing with: One Touch Verio   Monitoring blood glucose: daily  Average glucose readin mg/dl  Highest glucose readin mg/dl    Lowest glucose readin mg/dl  Hypoglycemia frequency:  denies     Overall glucose control:   HGBA1C:    Lab Results   Component Value Date    A1C 7.7 (A) 2024    A1C 7.7 (H) 2023    A1C 5.8 (A) 2023     (H) 2023       Hypertension: stable, at goal  Blood Pressure: 118/78  Medication prescribed: lisinopril  SE: denies     Hyperlipidemia: stable   LDL: 87   Tri  Medication prescribed: rosuvastatin  SE: denies     Wt Readings from Last 3 Encounters:   24 182 lb (82.6 kg)   24 182 lb 12.8 oz (82.9 kg)   24 180 lb (81.6 kg)     BP Readings from Last 3 Encounters:   24 118/78   24 160/79   24 122/82          Past History:   She  has a past medical history of Abdominal pain, Acute, but ill-defined, cerebrovascular disease, Arthritis, AVM (arteriovenous malformation) brain (HCC), Back pain, Bad breath, Bloating, Calculus of kidney, Constipation, Diabetes mellitus (HCC), Diarrhea, unspecified, Fatigue, Fever, Food intolerance, Frequent urination, Heartburn, History of depression, Indigestion, Irregular bowel habits, Leaking of urine,  Leg swelling, Nausea, Pain in joints, Pain with bowel movements, Presence of other cardiac implants and grafts, Problems with swallowing, Sleep disturbance, Sputum production, Stool incontinence, Stroke ([facility]) (04/18/2015), Stroke ([facility]) (04/01/2015), Type II or unspecified type diabetes mellitus without mention of complication, not stated as uncontrolled, Uncomfortable fullness after meals, Vitamin D deficiency, Vomiting, Wears glasses, and Weight gain.   Her family history includes Diabetes in her brother, mother, and sister; Heart Attack in her brother; Heart Disorder in her brother; Hypertension in her father and mother.   She  reports that she has never smoked. She has never used smokeless tobacco. She reports that she does not drink alcohol and does not use drugs.     She has No Known Allergies.     Current Outpatient Medications on File Prior to Visit   Medication Sig    insulin degludec (TRESIBA FLEXTOUCH) 200 UNIT/ML Subcutaneous Solution Pen-injector Inject 36 Units into the skin daily.    metFORMIN  MG Oral Tablet 24 Hr Take 1 tablet (500 mg total) by mouth 2 (two) times daily with meals.    pantoprazole 40 MG Oral Tab EC Take 1 tablet (40 mg total) by mouth daily.    rosuvastatin 10 MG Oral Tab TAKE 1 TABLET(10 MG) BY MOUTH EVERY NIGHT    lisinopril 5 MG Oral Tab TAKE 1 TABLET(5 MG) BY MOUTH DAILY    timolol 0.5 % Ophthalmic Solution Place 1 drop into both eyes 4 DAYS.    oxybutynin 5 MG Oral Tab Take 1 tablet (5 mg total) by mouth in the morning and 1 tablet (5 mg total) before bedtime.    Insulin Pen Needle (BD PEN NEEDLE TITO 2ND GEN) 32G X 4 MM Does not apply Misc USE AS DIRECTED 1 EACH INTO THE SKIN TWICE DAILY    Glucose Blood (ONETOUCH VERIO) In Vitro Strip TEST ONCE TO TWICE DAILY    [DISCONTINUED] Insulin Degludec (TRESIBA FLEXTOUCH) 200 UNIT/ML Subcutaneous Solution Pen-injector Inject 32 Units into the skin daily.    OneTouch Delica Lancets 33G Does not apply Misc 1 lancet by Finger stick  route daily.     No current facility-administered medications on file prior to visit.       CMP  (most recent labs)   Lab Results   Component Value Date/Time    GLU 86 11/20/2023 09:12 AM    BUN 22 11/20/2023 09:12 AM    CREATSERUM 0.80 11/20/2023 09:12 AM    GFRNAA 85 12/07/2021 09:09 AM    GFRAA 98 12/07/2021 09:09 AM    EGFRCR 81 11/20/2023 09:12 AM    CA 9.1 11/20/2023 09:12 AM    ALKPHO 77 11/20/2023 09:12 AM    AST 18 11/20/2023 09:12 AM    ALT 13 11/20/2023 09:12 AM    BILT 0.4 11/20/2023 09:12 AM    TP 6.8 11/20/2023 09:12 AM    ALB 3.3 (L) 11/20/2023 09:12 AM     11/20/2023 09:12 AM    K 3.9 11/20/2023 09:12 AM     11/20/2023 09:12 AM    CO2 29.0 11/20/2023 09:12 AM           Lipids  (most recent labs)   Lab Results   Component Value Date/Time    CHOLEST 157 11/20/2023 09:12 AM    TRIG 97 11/20/2023 09:12 AM    HDL 52 11/20/2023 09:12 AM    LDL 87 11/20/2023 09:12 AM    NONHDLC 105 11/20/2023 09:12 AM          Diabetes  (most recent labs)   Lab Results   Component Value Date/Time    A1C 7.7 (A) 02/22/2024 08:54 AM          Microalb (most recent labs)   Lab Results   Component Value Date/Time    MICROALBCREA 243.4 (H) 11/20/2023 09:09 AM        Lab results reviewed with patient.    REVIEW OF SYSTEMS:   GENERAL HEALTH: feels well otherwise  SKIN: denies any unusual skin lesions or rashes  RESPIRATORY: denies shortness of breath with exertion  CARDIOVASCULAR: denies chest pain on exertion  GI: c/o heartburn and occasional nausea and abdominal pain   NEURO: c/o numbness and tingling to bilateral feet, denies headaches  ENDO: denies polydipsia, polyuria or polyphagia, denies unexplained weight changes    EXAM:   /78   Resp 20   Wt 182 lb (82.6 kg)   LMP  (LMP Unknown)   BMI 34.39 kg/m²  Estimated body mass index is 34.39 kg/m² as calculated from the following:    Height as of 3/29/24: 5' 1\" (1.549 m).    Weight as of this encounter: 182 lb (82.6 kg).   Physical Exam  Vitals reviewed.    Constitutional:       Appearance: Normal appearance.   Pulmonary:      Effort: Pulmonary effort is normal.   Neurological:      Mental Status: She is alert and oriented to person, place, and time.   Psychiatric:         Mood and Affect: Mood normal.         Behavior: Behavior normal.         ASSESSMENT AND PLAN:   As for her Diabetes, it is needs further observation, needs improvement, needs to follow diet more regularly.     Recommendations are: continue present meds, lose weight by increased dietary compliance and exercise, and check feet daily.    Patient to continue Metformin ER 500mg po bid and increase Tresiba to 36 units injection daily.    Discussed adding meal time insulin to regimen, patient declines at this time.  Amendable to discussing further during next office visit in 6 weeks based on professional CGM data.    Overview of complications of uncontrolled diabetes including foot, eye, dental, renal and lipid monitoring, and skin care reviewed.    A professional continuous glucose sensor for 24 hour blood sugar monitoring will be placed on patient for 2 weeks prior to next office visit to identify patterns and trends of glucose.  Will reevaluate medication at next visit based on CGM interpretation.  Discussed self monitoring blood glucose and the importance of monitoring.  Patient agreed to monitoring daily - alternating fasting in AM and 2 hours postprandial of one meal per day.  Hypoglycemia S&S, Rx, and when to call APRN/CDE reviewed using Rule of 15's. Stressed need to call if 2 readings below 80 mg/dl in 1 week for medication adjustment.   Reinforced healthy eating in healthy portions and increasing daily physical activity.  Reviewed ways to improve the protein in the urine:   Keep blood sugars in target range   Keep blood pressure in target range   Watch over the counter medicines like NSAID (non steroidal anti-inflammatory drugs) these can be harmful to  kidneys (examples include: , Motrin ,  Advil, ibuprofen, naprosyn, Aleve)   Continue ACEI and SGLT2 therapy - renoprotective       As for her hypertension, Blood Pressure is stable, no significant medication side effects noted.   PLAN: will continue present medications, reviewed diet, exercise and weight control, continue ACEI therapy.       As for her cholesterol, Lipids are stable, improved, no significant medication side effects noted.   PLAN: will continue present medications, reviewed diet, exercise and weight control, continue statin therapy.      DM Health Maintenance  Last dilated eye exam: Last Dilated Eye Exam: 24   Exam shows retinopathy? Eye Exam shows Diabetic Retinopathy?: Yes    Last diabetic foot exam: Last Foot Exam: 24      Date of last PHQ-2 depression screen: PHQ-2 - Date of last depression screenin2024    Patient  reports that she has never smoked. She has never used smokeless tobacco.  When is flu vaccine due? No recommendations at this time  When is pneumonia vaccine due? No recommendations at this time    The patient indicates understanding of these issues and agrees to the plan.  Refills addressed at time of office visit.    Diagnoses and all orders for this visit:    Type 2 diabetes mellitus with hyperglycemia, with long-term current use of insulin (HCC)    Essential hypertension    Mixed hyperlipidemia    Microalbuminuria             Return in about 7 weeks (around 2024) for 45 minute appointment, Professional CGM.    The risks and benefits of my recommendations, as well as other treatment options were discussed with the patient today. Questions were answered to the best of my knowledge.   30 min spent with patient and >50% time spent counseling and coordinating care related to their office visit.      Jessica MCCARTY, BC-ADM, CDCES

## 2024-04-05 ENCOUNTER — TELEPHONE (OUTPATIENT)
Dept: FAMILY MEDICINE CLINIC | Facility: CLINIC | Age: 69
End: 2024-04-05

## 2024-04-05 ENCOUNTER — VIRTUAL PHONE E/M (OUTPATIENT)
Dept: FAMILY MEDICINE CLINIC | Facility: CLINIC | Age: 69
End: 2024-04-05
Payer: MEDICARE

## 2024-04-05 DIAGNOSIS — N32.81 OAB (OVERACTIVE BLADDER): Primary | ICD-10-CM

## 2024-04-05 PROCEDURE — 99443 PHONE E/M BY PHYS 21-30 MIN: CPT | Performed by: FAMILY MEDICINE

## 2024-04-05 RX ORDER — CIPROFLOXACIN 250 MG/1
250 TABLET, FILM COATED ORAL 2 TIMES DAILY
Qty: 10 TABLET | Refills: 0 | Status: SHIPPED | OUTPATIENT
Start: 2024-04-05 | End: 2024-04-10

## 2024-04-05 RX ORDER — MIRABEGRON 50 MG/1
50 TABLET, FILM COATED, EXTENDED RELEASE ORAL DAILY
Qty: 21 TABLET | Refills: 0 | COMMUNITY
Start: 2024-04-05

## 2024-04-05 NOTE — TELEPHONE ENCOUNTER
Orders received from Dr. Betancourt to give pt Myrbetriq 50mg 1 every day samples. She did phone appt with pt today & pt will  samples today. Stop Oxybutynin. Samples & instructions placed at  for .

## 2024-04-05 NOTE — PROGRESS NOTES
Marcela Moreno verbally consents to a Virtual/Telephone Check-In service on 04/05/24.    Time spent: 21 min.    Marcela Moreno is a 68 year old female who presents for incontinence of the urine.      HPI:  The patient complaints urinary incontinence. Feeling urgency to go very frequently, sometimes feeling of incomplete emptying of the bladder not worse  with sneezing, laughing, coughing, exercise and heavy lifting.  Started:few weeks ago but pt has it for few years.  Volume:large  Progression:worsening recently.  Relieving factors: decrease fluid, coffee intake.  Exacerbating factors: coffee, large fluid intake, nightly symptoms mainly.  Associated symptoms:no fever, chills, dysuria,  depression, fecal incontinence, sexual dysfunction, function decline, embarrassment, social disengagement.No weigh loss.No abdominal pain, no pelvic pain.No vaginal pressure.  No  surgery.   No new medications.  Current treatment:Oxybutynin 5mg daily or BID.      Wt Readings from Last 6 Encounters:   04/01/24 182 lb (82.6 kg)   03/29/24 182 lb 12.8 oz (82.9 kg)   02/22/24 180 lb (81.6 kg)   11/21/23 177 lb (80.3 kg)   11/06/23 165 lb (74.8 kg)   07/03/23 162 lb 1.9 oz (73.5 kg)     There is no height or weight on file to calculate BMI.     Cholesterol, Total (mg/dL)   Date Value   11/20/2023 157   09/26/2022 239 (H)   12/07/2021 237 (H)   09/23/2021 271 (H)     CHOLESTEROL (mg/dL)   Date Value   10/09/2013 245 (H)     HDL Cholesterol (mg/dL)   Date Value   11/20/2023 52   09/26/2022 55   12/07/2021 47   09/23/2021 51     HDL CHOL (mg/dL)   Date Value   10/09/2013 44 (L)     LDL Cholesterol (mg/dL)   Date Value   11/20/2023 87   09/26/2022 165 (H)   12/07/2021 156 (H)   09/23/2021 190 (H)     LDL CHOLESTROL (mg/dL)   Date Value   10/09/2013 170 (H)     AST (U/L)   Date Value   11/20/2023 18   09/26/2022 12 (L)   08/08/2022 20   10/09/2013 17     ALT (U/L)   Date Value   11/20/2023 13   09/26/2022 16   08/08/2022 17   10/09/2013  19      Current Outpatient Medications   Medication Sig Dispense Refill    insulin degludec (TRESIBA FLEXTOUCH) 200 UNIT/ML Subcutaneous Solution Pen-injector Inject 36 Units into the skin daily.      metFORMIN  MG Oral Tablet 24 Hr Take 1 tablet (500 mg total) by mouth 2 (two) times daily with meals. 180 tablet 1    pantoprazole 40 MG Oral Tab EC Take 1 tablet (40 mg total) by mouth daily. 30 tablet 6    rosuvastatin 10 MG Oral Tab TAKE 1 TABLET(10 MG) BY MOUTH EVERY NIGHT 90 tablet 3    lisinopril 5 MG Oral Tab TAKE 1 TABLET(5 MG) BY MOUTH DAILY 90 tablet 1    timolol 0.5 % Ophthalmic Solution Place 1 drop into both eyes 4 DAYS.      oxybutynin 5 MG Oral Tab Take 1 tablet (5 mg total) by mouth in the morning and 1 tablet (5 mg total) before bedtime. 60 tablet 3    Insulin Pen Needle (BD PEN NEEDLE TITO 2ND GEN) 32G X 4 MM Does not apply Misc USE AS DIRECTED 1 EACH INTO THE SKIN TWICE DAILY 200 each 1    Glucose Blood (ONETOUCH VERIO) In Vitro Strip TEST ONCE TO TWICE DAILY 100 strip 1    OneTouch Delica Lancets 33G Does not apply Misc 1 lancet by Finger stick route daily. 100 each 1      Past Medical History:   Diagnosis Date    Abdominal pain     Acute, but ill-defined, cerebrovascular disease     Arthritis     AVM (arteriovenous malformation) brain ([facility])     being followed at [facility]    Back pain     Bad breath     Bloating     Calculus of kidney     Constipation     Diabetes mellitus ([facility])     Diarrhea, unspecified     Fatigue     Fever     Food intolerance     Frequent urination     Heartburn     History of depression     Indigestion     Irregular bowel habits     Leaking of urine     Leg swelling     Nausea     Pain in joints     Pain with bowel movements     Presence of other cardiac implants and grafts     Problems with swallowing     Sleep disturbance     Sputum production     Stool incontinence     Stroke ([facility]) 04/18/2015    s/p Brain surgery    Stroke ([facility]) 04/01/2015    Type II or unspecified  type diabetes mellitus without mention of complication, not stated as uncontrolled     Uncomfortable fullness after meals     Vitamin D deficiency     Vomiting     Wears glasses     Weight gain       Past Surgical History:   Procedure Laterality Date    BRAIN SURGERY  2015    [facility]          CHOLECYSTECTOMY      COLONOSCOPY      HERNIA SURGERY        Family History   Problem Relation Age of Onset    Diabetes Mother     Hypertension Mother     Hypertension Father     Heart Disorder Brother         CAD stent in his 40s    Diabetes Brother     Diabetes Sister     Heart Attack Brother       Social History:  Social History     Socioeconomic History    Marital status:    Tobacco Use    Smoking status: Never    Smokeless tobacco: Never   Vaping Use    Vaping Use: Never used   Substance and Sexual Activity    Alcohol use: Never     Comment: rare    Drug use: No   Other Topics Concern    Caffeine Concern Yes     Comment: coffee 2 cups daily    Exercise No    Seat Belt Yes         REVIEW OF SYSTEMS:   As above.      EXAM:   LMP  (LMP Unknown)   There is no height or weight on file to calculate BMI.   GENERAL: in no apparent distress  HEENT: normal voice  LUNGS: no SOB  PSYCH: normal mood.     ASSESSMENT AND PLAN:   Marcela Moreno is a 68 year old female who presents for:   Diagnoses and all orders for this visit:    OAB (overactive bladder)    Other orders  -     ciprofloxacin 250 MG Oral Tab; Take 1 tablet (250 mg total) by mouth 2 (two) times daily for 5 days.      Rx Merbetriq 50mg po every day, pt will stop Oxybutynin.  F/u in one week.

## 2024-04-10 DIAGNOSIS — E11.65 TYPE 2 DIABETES MELLITUS WITH HYPERGLYCEMIA, WITH LONG-TERM CURRENT USE OF INSULIN (HCC): ICD-10-CM

## 2024-04-10 DIAGNOSIS — Z79.4 TYPE 2 DIABETES MELLITUS WITH HYPERGLYCEMIA, WITH LONG-TERM CURRENT USE OF INSULIN (HCC): ICD-10-CM

## 2024-04-10 RX ORDER — LANCETS 33 GAUGE
1 EACH MISCELLANEOUS DAILY
Qty: 100 EACH | Refills: 0 | Status: SHIPPED | OUTPATIENT
Start: 2024-04-10 | End: 2025-04-10

## 2024-04-10 NOTE — TELEPHONE ENCOUNTER
Received fax from [employer] requesting refill of lancets, pended  Requested Prescriptions     Pending Prescriptions Disp Refills    OneTouch Delica Lancets 33G Does not apply Misc 100 each 0     Si Lancet by Finger stick route daily.     Your appointments       Date & Time Appointment Department ([place])    2024 8:00 AM CDT NUCLEAR MEDICINE GI GASTRIC EMPTYING EXAM with [facility] Nuclear Medicine ([facility])    Please arrive 15 minutes early for this appointment.    Nothing to eat or drink for 4 hours before your exam.     THE FOLLOWING MEDICATION RESTRICTIONS MUST BE OBSERVED PRIOR TO THE EXAM:     DO NOT TAKE THE FOLLOWING MEDICATION FOR 2 DAYS PRIOR TO YOUR EXAM.  Metoclopramide (Reglan), Tegaserod (Zelnorm), Erythromycin and Domperidone (Motilium), opioid pain medications ( including Demerol, Codeine, Morphine, Oxycontin, Percodan, Percocet, oxycodone, Hydrocodone, Dilaudid, buprenorphine, Norco, Tylenol with codeine(Tylenol #3), Vicodin, Vicoprofen, Xolox, Zamicet, Zerlor, Zydone, Tylox,Trezix, Lorcet, Lortab, Magnacet, Maxidone, Hycet, Endocet, Fiorinal with codeine, Duragesis( fentanyl patch), Ultram (tramadol)), Bentyl, Donnatal, Levsin, Robinul, Atropine, nifedipine, progesterone (therapeutic doses not birth control doses), octreotide, theophylline, phentolamine  benzodiazepines ( including vallium, diazepam, Ativan, alprazolam, lorazepam, clonazepam, Klonopin, Xanax, temazepam, flurazepam)       Wear comfortable clothing. Clothing should not contain metal, such as snaps or zippers.     Diabetic patients, please bring glucose monitor with you to the test.  IF the blood sugar is greater than 250mg/dL, the test will have to be rescheduled     No smoking or nicotine the morning of the test     You will be asked to eat a radiolabeled breakfast of eggs. After eating, the scan will begin.     The exam takes 2-4 hours        2024 10:30 AM CDT Colonoscopy  with [facility] (--)    Please arrive 60 minutes prior to your scheduled procedure time.         Apr 30, 2024 11:00 AM CDT EGD with [facility] PROCED[facility] (--)    Please arrive 60 minutes prior to your scheduled procedure time.         May 09, 2024 10:15 AM CDT Nurse Visit with [facility] NURSE [address] ([facility] [place])        May 23, 2024 1:30 PM CDT Diabetes Pump follow up with Jessica Hicks APRN [address] ([facility] [place])              [facility] Nuclear Medicine  [facility]  [address] [facility] [address]  [phone] [address]  [facility] [place]  [address]  [facility] [id number] [phone] [facility]  No address on file  [phone]          Last A1c value was 7.7% done 2/22/2024.    Refill 9/26/22  LOV 4/1/24

## 2024-04-15 ENCOUNTER — OFFICE VISIT (OUTPATIENT)
Dept: FAMILY MEDICINE CLINIC | Facility: CLINIC | Age: 69
End: 2024-04-15
Payer: MEDICARE

## 2024-04-15 ENCOUNTER — HOSPITAL ENCOUNTER (EMERGENCY)
Age: 69
Discharge: HOME | End: 2024-04-15
Attending: EMERGENCY MEDICINE
Payer: MEDICARE

## 2024-04-15 ENCOUNTER — APPOINTMENT (OUTPATIENT)
Dept: GENERAL RADIOLOGY | Age: 69
End: 2024-04-15
Payer: MEDICARE

## 2024-04-15 VITALS
HEIGHT: 61 IN | DIASTOLIC BLOOD PRESSURE: 70 MMHG | HEART RATE: 102 BPM | OXYGEN SATURATION: 97 % | BODY MASS INDEX: 33.99 KG/M2 | RESPIRATION RATE: 20 BRPM | WEIGHT: 180 LBS | TEMPERATURE: 100 F | SYSTOLIC BLOOD PRESSURE: 142 MMHG

## 2024-04-15 VITALS
SYSTOLIC BLOOD PRESSURE: 150 MMHG | HEART RATE: 98 BPM | BODY MASS INDEX: 33.99 KG/M2 | WEIGHT: 180 LBS | DIASTOLIC BLOOD PRESSURE: 72 MMHG | RESPIRATION RATE: 16 BRPM | OXYGEN SATURATION: 100 % | TEMPERATURE: 98 F | HEIGHT: 61 IN

## 2024-04-15 DIAGNOSIS — R05.1 ACUTE COUGH: Primary | ICD-10-CM

## 2024-04-15 DIAGNOSIS — J06.9 VIRAL URI WITH COUGH: Primary | ICD-10-CM

## 2024-04-15 LAB
POCT INFLUENZA A: NEGATIVE
POCT INFLUENZA B: NEGATIVE
SARS-COV-2 RNA RESP QL NAA+PROBE: NOT DETECTED

## 2024-04-15 PROCEDURE — 87502 INFLUENZA DNA AMP PROBE: CPT | Performed by: EMERGENCY MEDICINE

## 2024-04-15 PROCEDURE — 87502 INFLUENZA DNA AMP PROBE: CPT

## 2024-04-15 PROCEDURE — 99284 EMERGENCY DEPT VISIT MOD MDM: CPT

## 2024-04-15 PROCEDURE — 71046 X-RAY EXAM CHEST 2 VIEWS: CPT

## 2024-04-15 RX ORDER — PREDNISONE 20 MG/1
40 TABLET ORAL DAILY
Qty: 10 TABLET | Refills: 0 | Status: SHIPPED | OUTPATIENT
Start: 2024-04-15 | End: 2024-04-20

## 2024-04-15 RX ORDER — BENZONATATE 200 MG/1
200 CAPSULE ORAL 3 TIMES DAILY PRN
Qty: 30 CAPSULE | Refills: 0 | Status: SHIPPED | OUTPATIENT
Start: 2024-04-15 | End: 2024-05-15

## 2024-04-15 RX ORDER — GUAIFENESIN AND DEXTROMETHORPHAN HYDROBROMIDE 1200; 60 MG/1; MG/1
1 TABLET, EXTENDED RELEASE ORAL EVERY 12 HOURS
Qty: 20 TABLET | Refills: 0 | Status: SHIPPED | OUTPATIENT
Start: 2024-04-15

## 2024-04-15 NOTE — ED INITIAL ASSESSMENT (HPI)
Pt to ed from [facility], productive cough, EDGAR after coughing, left back and rib pain with fever since easter.   No medication taken

## 2024-04-15 NOTE — PROGRESS NOTES
Pt presented with cough, rib/back pain, mild dyspnea and low grade fever for 1 week.  Lungs clear, but diminished.      /70   Pulse 102   Temp 99.5 °F (37.5 °C) (Oral)   Resp 20   Ht 5' 1\" (1.549 m)   Wt 180 lb (81.6 kg)   LMP  (LMP Unknown)   SpO2 97%   BMI 34.01 kg/m²     Accompanied by:   After triage, a higher level of care was recommended to pt.  Discussed limitations of WIC and need for further evaluation due to needing imaging and further evaluation.  Referred to: ED  Patient verbalized understanding of rationale for further evaluation and was stable upon discharge.

## 2024-04-15 NOTE — ED PROVIDER NOTES
Patient Seen in: [facility] In [place]      History     Chief Complaint   Patient presents with    Cough/URI     Stated Complaint: From [facility]- cough/EDGAR/rib pain/fever X 1 week    Subjective:   HPI    68-year-old female states that she developed a respiratory infection 2 weeks ago associate with flulike symptoms which lasted for several days, seem to improve but now she has a persistent cough.  The cough is keeping her awake at night.  She is not using anything for the cough currently.  She believes she may be wheezing intermittently.  No colored sputum production.      Objective:   Past Medical History:    Abdominal pain    Acute, but ill-defined, cerebrovascular disease    Arthritis    AVM (arteriovenous malformation) brain (HCC)    being followed at [facility]    Back pain    Bad breath    Bloating    Calculus of kidney    Constipation    Diabetes mellitus (HCC)    Diarrhea, unspecified    Fatigue    Fever    Food intolerance    Frequent urination    Heartburn    High cholesterol    History of depression    Indigestion    Irregular bowel habits    Leaking of urine    Leg swelling    Nausea    Neuropathy    TOES SOMETIMES    Osteoarthritis    Pain in joints    Pain with bowel movements    Presence of other cardiac implants and grafts    Problems with swallowing    Sleep disturbance    Sputum production    Stool incontinence    Stroke (HCC)    s/p Brain surgery    Stroke (HCC)    LEFT SIDE EFFECTED- USING CANE    Type II or unspecified type diabetes mellitus without mention of complication, not stated as uncontrolled    Uncomfortable fullness after meals    Vitamin D deficiency    Vomiting    Wears glasses    Weight gain              Past Surgical History:   Procedure Laterality Date    Brain surgery  2015    avm   [facility]          X3    Cholecystectomy      Colonoscopy      Hernia surgery      Other surgical history      LEFT TIBIA FRACTURE- PLATE AND SCREWS                   Social History     Socioeconomic History    Marital status:    Tobacco Use    Smoking status: Never    Smokeless tobacco: Never   Vaping Use    Vaping status: Never Used   Substance and Sexual Activity    Alcohol use: Never     Comment: rare    Drug use: No   Other Topics Concern    Caffeine Concern Yes     Comment: coffee 2 cups daily    Exercise No    Seat Belt Yes     Social Determinants of Health      Received from [facility], [facility]    Social Connections              Review of Systems    Positive for stated complaint: [facility]- [facility]/EDGAR/rib pain/fever X 1 week  Other systems are as noted in HPI.  Constitutional and vital signs reviewed.      All other systems reviewed and negative except as noted above.    Physical Exam     ED Triage Vitals [04/15/24 1210]   /68   Pulse 99   Resp 18   Temp 98.2 °F (36.8 °C)   Temp src Oral   SpO2 98 %   O2 Device None (Room air)       Current:/70   Pulse 99   Temp 98.2 °F (36.8 °C) (Oral)   Resp 18   Ht 154.9 cm (5' 1\")   Wt 81.6 kg   LMP  (LMP Unknown)   SpO2 98%   BMI 34.01 kg/m²         Physical Exam    General appearance: This is a middle-aged female sitting on a gurney.  Vital signs were reviewed per nurses notes.  Pulse oximetry is 98% on room air.  HEENT: Normocephalic atraumatic.  Anicteric sclera.  Oral mucosa is moist.  Oropharynx is normal.  Neck: No adenopathy or thyromegaly.  No hoarseness or stridor.  Lungs are clear to auscultation.  Air exchange is good.  No wheezing on forced exhalation.  Heart exam: Normal S1-S2 without extra sounds or murmurs.  Regular rate and rhythm.  Skin is dry without rashes or lesions.  Neuroexam: Awake, conversive and moving all 4 extremities well.    ED Course     Labs Reviewed   RAPID SARS-COV-2 BY PCR - Normal   POCT FLU TEST - Normal    Narrative:     This assay is a rapid molecular in vitro test utilizing nucleic acid amplification of influenza A and B  viral RNA.             XR CHEST PA + LAT CHEST (CPT=71046)    Result Date: 4/15/2024  PROCEDURE:  XR CHEST PA + LAT CHEST (CPT=71046)  INDICATIONS:  From [facility]- cough/EDGAR/rib pain/fever X 1 week  COMPARISON:  [place], XR, XR CHEST PA + LAT CHEST (CPT=71046), 2/14/2018, 3:57 PM.  [place], XR, XR RIBS WITH CHEST (3 VIEWS), RIGHT  (CPT=71101), 4/12/2021, 2:21 PM.  TECHNIQUE:  PA and lateral chest radiographs were obtained.  PATIENT STATED HISTORY: (As transcribed by Technologist)  Productive cough with short of breath for past 1.5 weeks.    FINDINGS:  Cardiac silhouette and pulmonary vasculature within normal limits. No focal consolidation, pneumothorax or pleural effusion.  Mild anterior wedge deformity involves the T9 vertebral body stable when compared to 2/14/2018 radiograph.  Mild diffuse degenerative change of the mid to lower thoracic spine.            CONCLUSION:  No focal consolidation.   LOCATION:  [facility]   Dictated by (CST): Kendra Gann MD on 4/15/2024 at 1:30 PM     Finalized by (CST): Kendra Gann MD on 4/15/2024 at 1:32 PM       I personally reviewed the images myself and went over results with patient.    I viewed the chest x-ray films myself and there is no focal consolidation.    Test results and treatment plan were discussed.         MDM      #1.  Viral URI with cough.  COVID and flu negative.  No lobar infiltrate to suggest need for antibiotic therapy.  No wheezing to require bronchodilators.  Antitussives were prescribed including Tessalon and Mucinex DM as well as a brief course of steroids.                                   MDM    Disposition and Plan     Clinical Impression:  1. Viral URI with cough         Disposition:  Discharge  4/15/2024  1:49 pm    Follow-up:  Rhiannon Betancourt MD  [id number]  [facility] [address]  [phone]    Call  As needed          Medications Prescribed:  Current Discharge Medication List        START taking these medications    Details   benzonatate  200 MG Oral Cap Take 1 capsule (200 mg total) by mouth 3 (three) times daily as needed.  Qty: 30 capsule, Refills: 0      dextromethorphan-guaifenesin ER (MUCINEX DM MAXIMUM STRENGTH)  MG Oral Tablet 12 Hr Take 1 tablet by mouth every 12 (twelve) hours.  Qty: 20 tablet, Refills: 0      predniSONE 20 MG Oral Tab Take 2 tablets (40 mg total) by mouth daily for 5 days.  Qty: 10 tablet, Refills: 0

## 2024-04-15 NOTE — DISCHARGE INSTRUCTIONS
The prednisone will increase your blood sugars and you may require additional short acting insulin.    Return to the emergency department for new or worsening symptoms.

## 2024-04-17 ENCOUNTER — NURSE ONLY (OUTPATIENT)
Dept: ENDOCRINOLOGY CLINIC | Facility: CLINIC | Age: 69
End: 2024-04-17
Payer: MEDICARE

## 2024-04-17 ENCOUNTER — TELEPHONE (OUTPATIENT)
Dept: ENDOCRINOLOGY CLINIC | Facility: CLINIC | Age: 69
End: 2024-04-17

## 2024-04-17 ENCOUNTER — TELEPHONE (OUTPATIENT)
Dept: CASE MANAGEMENT | Facility: HOSPITAL | Age: 69
End: 2024-04-17

## 2024-04-17 DIAGNOSIS — E11.65 TYPE 2 DIABETES MELLITUS WITH HYPERGLYCEMIA, WITH LONG-TERM CURRENT USE OF INSULIN (HCC): Primary | ICD-10-CM

## 2024-04-17 DIAGNOSIS — Z79.4 TYPE 2 DIABETES MELLITUS WITH HYPERGLYCEMIA, WITH LONG-TERM CURRENT USE OF INSULIN (HCC): Primary | ICD-10-CM

## 2024-04-17 RX ORDER — INSULIN DEGLUDEC 200 U/ML
INJECTION, SOLUTION SUBCUTANEOUS
Qty: 3 ML | Refills: 0 | COMMUNITY
Start: 2024-04-17

## 2024-04-17 NOTE — TELEPHONE ENCOUNTER
Received fax from pt's insurance- Tresiba U-200 was approved until 04/17/25.     Called pharmacy- was on hold, left voicemail- asked to run again as pt was approved. Call office with any questions.     FYI-approval is on my desk if needing to review

## 2024-04-17 NOTE — TELEPHONE ENCOUNTER
Called pt to update- she will be here by 4 for tresiba u-200 sample, scheduled nurse visit, sample set aside- provided office address  Future Appointments   Date Time Provider Department Center   4/17/2024  3:45 PM [facility] NURSE [facility]       Updated about PA request- still waiting to hear from insurance- failed previous long-acting insulins, including alternatives suggested, Rosangela, made note of this on PA- will see what insurance says, hopefully it will be covered.

## 2024-04-17 NOTE — CM/SW NOTE
Pt states dextramorphan RX is not covered under her insurance. CM advised pt she may take the Mucinex DM over the counter which is the same as the RX.

## 2024-04-17 NOTE — TELEPHONE ENCOUNTER
We do have about 8 sample pens at this time- will set one aside for patient and update her on plan-

## 2024-04-17 NOTE — TELEPHONE ENCOUNTER
Pt called office- she is currently out of her Tresiba U-200. She had spoken with pharmacy where she had requested a Tresiba re-fill, they had told her she had re-fills, but when she went to  they told her that insurance is not covering the Tresiba anymore. According to LOV note, pt has failed all other long acting insulins previously-  Toujeo, Lantus, & Levemir.     Called pharmacy for more information- they stated that we can either submit a PA or we can change the pt to Lantus or Toujeo.     Starting PA on covermymeds

## 2024-04-17 NOTE — TELEPHONE ENCOUNTER
Submitted PA as urgent status- pt is currently out of prescription    Key: [id number]    Your information has been submitted to [employer]. [employer] is reviewing the PA request and you will receive an electronic response. You may check for the updated outcome later by reopening this request. The standard fax determination will also be sent to you directly.    If you have any questions about your PA submission, contact [employer] at [phone].

## 2024-04-18 NOTE — TELEPHONE ENCOUNTER
Called pharmacy - the rx is now going through pt's insurance. But they need to order the med. It should be in by tomorrow    Sent approval letter to jia scott approved through 4/17/25

## 2024-04-19 RX ORDER — OXYBUTYNIN CHLORIDE 5 MG/1
5 TABLET ORAL 2 TIMES DAILY
Qty: 180 TABLET | Refills: 0 | OUTPATIENT
Start: 2024-04-19

## 2024-04-22 ENCOUNTER — HOSPITAL ENCOUNTER (OUTPATIENT)
Dept: NUCLEAR MEDICINE | Facility: HOSPITAL | Age: 69
Discharge: HOME | End: 2024-04-22
Attending: INTERNAL MEDICINE
Payer: MEDICARE

## 2024-04-22 DIAGNOSIS — R11.14 BILIOUS VOMITING WITH NAUSEA: ICD-10-CM

## 2024-04-22 PROCEDURE — 78264 GASTRIC EMPTYING IMG STUDY: CPT | Performed by: INTERNAL MEDICINE

## 2024-04-25 ENCOUNTER — OFFICE VISIT (OUTPATIENT)
Dept: FAMILY MEDICINE CLINIC | Facility: CLINIC | Age: 69
End: 2024-04-25
Payer: MEDICARE

## 2024-04-25 VITALS
SYSTOLIC BLOOD PRESSURE: 134 MMHG | OXYGEN SATURATION: 97 % | DIASTOLIC BLOOD PRESSURE: 74 MMHG | BODY MASS INDEX: 33.99 KG/M2 | HEIGHT: 61 IN | HEART RATE: 88 BPM | WEIGHT: 180 LBS | RESPIRATION RATE: 16 BRPM

## 2024-04-25 DIAGNOSIS — J01.10 ACUTE NON-RECURRENT FRONTAL SINUSITIS: Primary | ICD-10-CM

## 2024-04-25 PROCEDURE — 1159F MED LIST DOCD IN RCRD: CPT | Performed by: NURSE PRACTITIONER

## 2024-04-25 PROCEDURE — 99214 OFFICE O/P EST MOD 30 MIN: CPT | Performed by: NURSE PRACTITIONER

## 2024-04-25 PROCEDURE — 3075F SYST BP GE 130 - 139MM HG: CPT | Performed by: NURSE PRACTITIONER

## 2024-04-25 PROCEDURE — 3008F BODY MASS INDEX DOCD: CPT | Performed by: NURSE PRACTITIONER

## 2024-04-25 PROCEDURE — 3078F DIAST BP <80 MM HG: CPT | Performed by: NURSE PRACTITIONER

## 2024-04-25 RX ORDER — AMOXICILLIN AND CLAVULANATE POTASSIUM 875; 125 MG/1; MG/1
1 TABLET, FILM COATED ORAL 2 TIMES DAILY
Qty: 20 TABLET | Refills: 0 | Status: SHIPPED | OUTPATIENT
Start: 2024-04-25 | End: 2024-05-05

## 2024-04-25 RX ORDER — METHYLPREDNISOLONE 4 MG/1
TABLET ORAL
Qty: 21 EACH | Refills: 0 | Status: SHIPPED | OUTPATIENT
Start: 2024-04-25

## 2024-04-25 RX ORDER — OXYBUTYNIN CHLORIDE 5 MG/1
TABLET ORAL
COMMUNITY
Start: 2024-04-19

## 2024-04-25 RX ORDER — BENZONATATE 200 MG/1
200 CAPSULE ORAL 3 TIMES DAILY PRN
Qty: 60 CAPSULE | Refills: 0 | Status: SHIPPED | OUTPATIENT
Start: 2024-04-25

## 2024-04-25 NOTE — PROGRESS NOTES
Chief Complaint   Patient presents with    ER F/U     C/o sinus drainage and cough        HPI:   Marcela Moreno is a 68 year old female who presents for cough  for  2  weeks. Patient reports congestion, dry cough, sinus pain.Cough is  tight, wheezy,deep, dry, worse at night, OTC cough syrups not helpful, getting worse.  Trigger for the cough:Sinus pressure, postnasal dripping, getting worse, yellow rhinorrhea.         Current Outpatient Medications   Medication Sig Dispense Refill    oxybutynin 5 MG Oral Tab       insulin degludec (TRESIBA FLEXTOUCH) 200 UNIT/ML Subcutaneous Solution Pen-injector Inject 36 units as directed daily. 3 mL 0    benzonatate 200 MG Oral Cap Take 1 capsule (200 mg total) by mouth 3 (three) times daily as needed. 30 capsule 0    dextromethorphan-guaifenesin ER (MUCINEX DM MAXIMUM STRENGTH)  MG Oral Tablet 12 Hr Take 1 tablet by mouth every 12 (twelve) hours. 20 tablet 0    OneTouch Delica Lancets 33G Does not apply Misc 1 Lancet by Finger stick route daily. 100 each 0    Mirabegron ER (MYRBETRIQ) 50 MG Oral Tablet 24 Hr Take 1 tablet (50 mg total) by mouth daily. 21 tablet 0    insulin degludec (TRESIBA FLEXTOUCH) 200 UNIT/ML Subcutaneous Solution Pen-injector Inject 36 Units into the skin every morning.      metFORMIN  MG Oral Tablet 24 Hr Take 1 tablet (500 mg total) by mouth 2 (two) times daily with meals. 180 tablet 1    pantoprazole 40 MG Oral Tab EC Take 1 tablet (40 mg total) by mouth daily. 30 tablet 6    rosuvastatin 10 MG Oral Tab TAKE 1 TABLET(10 MG) BY MOUTH EVERY NIGHT 90 tablet 3    lisinopril 5 MG Oral Tab TAKE 1 TABLET(5 MG) BY MOUTH DAILY 90 tablet 1    timolol 0.5 % Ophthalmic Solution Place 1 drop into both eyes 2 (two) times daily.      Insulin Pen Needle (BD PEN NEEDLE TITO 2ND GEN) 32G X 4 MM Does not apply Misc USE AS DIRECTED 1 EACH INTO THE SKIN TWICE DAILY 200 each 1    Glucose Blood (ONETOUCH VERIO) In Vitro Strip TEST ONCE TO TWICE DAILY 100 strip  1      Past Medical History:    Abdominal pain    Acute, but ill-defined, cerebrovascular disease    Arthritis    AVM (arteriovenous malformation) brain (HCC)    being followed at [facility]    Back pain    Bad breath    Bloating    Calculus of kidney    Constipation    Diabetes mellitus (HCC)    Diarrhea, unspecified    Fatigue    Fever    Food intolerance    Frequent urination    Heartburn    High cholesterol    History of depression    Indigestion    Irregular bowel habits    Leaking of urine    Leg swelling    Nausea    Neuropathy    TOES SOMETIMES    Osteoarthritis    Pain in joints    Pain with bowel movements    Presence of other cardiac implants and grafts    Problems with swallowing    Sleep disturbance    Sputum production    Stool incontinence    Stroke (HCC)    s/p Brain surgery    Stroke (HCC)    LEFT SIDE EFFECTED- USING CANE    Type II or unspecified type diabetes mellitus without mention of complication, not stated as uncontrolled    Uncomfortable fullness after meals    Vitamin D deficiency    Vomiting    Wears glasses    Weight gain      Past Surgical History:   Procedure Laterality Date    Brain surgery  2015    avm   [facility]          X3    Cholecystectomy      Colonoscopy      Hernia surgery      Other surgical history      LEFT TIBIA FRACTURE- PLATE AND SCREWS  2017      Family History   Problem Relation Age of Onset    Diabetes Mother     Hypertension Mother     Hypertension Father     Heart Disorder Brother         CAD stent in his 40s    Diabetes Brother     Diabetes Sister     Heart Attack Brother       Social History     Socioeconomic History    Marital status:    Tobacco Use    Smoking status: Never    Smokeless tobacco: Never   Vaping Use    Vaping status: Never Used   Substance and Sexual Activity    Alcohol use: Never     Comment: rare    Drug use: No   Other Topics Concern    Caffeine Concern Yes     Comment: coffee 2 cups daily    Exercise No    Seat Belt  Yes     Social Determinants of Health      Received from [facility], [facility]    Social Connections         REVIEW OF SYSTEMS:   GENERAL: no fever, no chills.  SKIN: no rashes, no hives.  EYES:denies blurred vision or double vision,   HEENT: no earache, sore throat, mild sinus congestion.  CHEST: no chest pains, no palpitations, no orthopnea.  LUNGS: denies shortness of breath with exertion or rest. Wheezing, cough.  CARDIOVASCULAR: denies chest pain on exertion  GI: no nausea or abdominal pain      EXAM:   /74   Pulse 88   Resp 16   Ht 5' 1\" (1.549 m)   Wt 180 lb (81.6 kg)   LMP  (LMP Unknown)   SpO2 97%   BMI 34.01 kg/m²   GENERAL: well developed, well nourished,in no apparent distress  SKIN: no rashes,no suspicious lesions  EYES:PERRLA, EOMI, normal optic disk,conjunctiva are clear  HEENT: atraumatic, normocephalic,TM wnl dayan, erythema of the throat.  Sinuses maxillary: mild  tender to percussion.   Nares: red mucosa and thick yellow discharge, no septal deviations.   NECK: supple,no adenopathy  LUNGS: normal respiratory rate, normal effort, dry cough, increase expiration phase to inspiratory phase. No expiratory wheezing, no rales, no crackles.Normal on percussion.No decreased BS.Normal on palpation,normal vocal fremitus.  CARDIO: RRR without murmur  GI: good BS's,no masses, HSM or tenderness    ASSESSMENT AND PLAN:   Marcela Moreno is a 68 year old female who presents with:   Diagnoses and all orders for this visit:    Acute non-recurrent frontal sinusitis  -     amoxicillin clavulanate 875-125 MG Oral Tab; Take 1 tablet by mouth 2 (two) times daily for 10 days.  -     benzonatate 200 MG Oral Cap; Take 1 capsule (200 mg total) by mouth 3 (three) times daily as needed for cough.  -     methylPREDNISolone (MEDROL) 4 MG Oral Tablet Therapy Pack; As directed.       Increase fluids, rest.Meds as below.  The patient indicates understanding of these issues and  agrees to the plan.  The patient is asked to return if sx's persist or worsen.

## 2024-04-29 ENCOUNTER — TELEPHONE (OUTPATIENT)
Dept: FAMILY MEDICINE CLINIC | Facility: CLINIC | Age: 69
End: 2024-04-29

## 2024-04-30 RX ORDER — MIRABEGRON 50 MG/1
50 TABLET, FILM COATED, EXTENDED RELEASE ORAL DAILY
Qty: 30 TABLET | Refills: 1 | Status: SHIPPED | OUTPATIENT
Start: 2024-04-30

## 2024-04-30 NOTE — TELEPHONE ENCOUNTER
Rx pended for review / approval of Refill for:  Mirabegron ER (MYRBETRIQ) 50 MG Oral Tablet 24 Hr   LOV: 4/25/24  Last Refill date: Sample picked up from office 4/5 #21  Pharmacy: [id number] [place], [place]   Next Scheduled appointment: 6/3/2024

## 2024-04-30 NOTE — TELEPHONE ENCOUNTER
Marcela Moreno requesting Medication Refill for:  Mirabegron ER (MYRBETRIQ) 50 MG Oral Tablet 24 Hr     LOV: Visit date not found   Last Refill date: k  Pharmacy: [facility] #[id number] [place], [place]   Next Scheduled appointment: 6/3/2024    Patient said someone told her it had been called into the pharmacy but pharmacy says they don't have it.

## 2024-05-09 ENCOUNTER — NURSE ONLY (OUTPATIENT)
Dept: ENDOCRINOLOGY CLINIC | Facility: CLINIC | Age: 69
End: 2024-05-09
Payer: MEDICARE

## 2024-05-09 DIAGNOSIS — E11.65 TYPE 2 DIABETES MELLITUS WITH HYPERGLYCEMIA, WITH LONG-TERM CURRENT USE OF INSULIN (HCC): Primary | ICD-10-CM

## 2024-05-09 DIAGNOSIS — Z79.4 TYPE 2 DIABETES MELLITUS WITH HYPERGLYCEMIA, WITH LONG-TERM CURRENT USE OF INSULIN (HCC): Primary | ICD-10-CM

## 2024-05-09 PROCEDURE — 95250 CONT GLUC MNTR PHYS/QHP EQP: CPT | Performed by: NURSE PRACTITIONER

## 2024-05-09 NOTE — PROGRESS NOTES
A continuous glucose sensor for 24 hour blood sugar monitoring was placed on patient today per APN order.   Serial NUMBER: [id number]  Exp date: (8/31/24)  - After cleansing skin with alcohol prep, Sensor (red pro)was inserted on  left Posterior upper arm area without difficulty. Small amount of skin prep was added around sensor tape after placement to help with sensor adhesive.    Area remains free of any bleeding or irritation or pain at site when patient left DM center.  Patient instructions:   Record daily food/drink intake and activity in log book  Call Diabetes center with any questions or concerns.   instructed to record food, exercise, insulin doses (if taking) on log provided

## 2024-05-17 ENCOUNTER — TELEPHONE (OUTPATIENT)
Dept: ENDOCRINOLOGY CLINIC | Facility: CLINIC | Age: 69
End: 2024-05-17

## 2024-05-17 NOTE — TELEPHONE ENCOUNTER
Patient called office- had to cancel appointment with Jessica on 5/23 which also had a red pro removal. Was wondering what she should do with the sensor, offered nurse visit to remove the sensor so she would not have to remove it herself. Scheduled nurse visit. Spoke with Perla IVY- advised Jessica did have an opening on 5/21 @ 10:30 in [place] office-    Had told patient the appointment was for 5/28- confirmed with patient that the appointment date is 5/21 @ 10:30- patient confirmed appointment time- placed on schedule.     Future Appointments   Date Time Provider Department Center   5/21/2024 10:30 AM Jessica Hicks, [place] [facility]

## 2024-05-21 ENCOUNTER — OFFICE VISIT (OUTPATIENT)
Dept: ENDOCRINOLOGY CLINIC | Facility: CLINIC | Age: 69
End: 2024-05-21

## 2024-05-21 VITALS
WEIGHT: 180 LBS | HEART RATE: 79 BPM | DIASTOLIC BLOOD PRESSURE: 88 MMHG | BODY MASS INDEX: 34 KG/M2 | SYSTOLIC BLOOD PRESSURE: 132 MMHG | RESPIRATION RATE: 20 BRPM | OXYGEN SATURATION: 94 %

## 2024-05-21 DIAGNOSIS — E11.65 TYPE 2 DIABETES MELLITUS WITH HYPERGLYCEMIA, WITH LONG-TERM CURRENT USE OF INSULIN (HCC): Primary | ICD-10-CM

## 2024-05-21 DIAGNOSIS — Z79.4 TYPE 2 DIABETES MELLITUS WITH HYPERGLYCEMIA, WITH LONG-TERM CURRENT USE OF INSULIN (HCC): Primary | ICD-10-CM

## 2024-05-21 LAB — HEMOGLOBIN A1C: 8.2 % (ref 4.3–5.6)

## 2024-05-21 PROCEDURE — 83036 HEMOGLOBIN GLYCOSYLATED A1C: CPT | Performed by: NURSE PRACTITIONER

## 2024-05-21 NOTE — PROGRESS NOTES
A continuous glucose sensor for 24 hour blood sugar monitoring was placed on patient today per APN order.   Serial NUMBER: [id number]  Exp date: (8/31/24)  - After cleansing skin with alcohol prep, Sensor (red pro)was inserted on  left Posterior upper arm area without difficulty. Small amount of skin prep was added around sensor tape after placement to help with sensor adhesive.    Area remains free of any bleeding or irritation or pain at site when patient left DM center.  Patient instructions:   Record daily food/drink intake and activity in log book  Call Diabetes center with any questions or concerns.   instructed to record food, exercise, insulin doses (if taking) on log provided

## 2024-05-21 NOTE — PROGRESS NOTES
No data was obtained from the professional [employer] Rolando CGM that was placed.  Discussed with patient and opted to place new professional Rolando CGM sensor and reschedule appointment for 2 weeks for evaluation.  POC A1c in office today 8.2%, patient recently on steroid therapy.    Jessica MCCARTY, BC-ADM, [facility]

## 2024-05-31 ENCOUNTER — TELEPHONE (OUTPATIENT)
Dept: FAMILY MEDICINE CLINIC | Facility: CLINIC | Age: 69
End: 2024-05-31

## 2024-06-03 ENCOUNTER — OFFICE VISIT (OUTPATIENT)
Dept: FAMILY MEDICINE CLINIC | Facility: CLINIC | Age: 69
End: 2024-06-03
Payer: MEDICARE

## 2024-06-03 VITALS
BODY MASS INDEX: 34.17 KG/M2 | HEART RATE: 90 BPM | HEIGHT: 61 IN | WEIGHT: 181 LBS | RESPIRATION RATE: 18 BRPM | DIASTOLIC BLOOD PRESSURE: 78 MMHG | SYSTOLIC BLOOD PRESSURE: 130 MMHG | OXYGEN SATURATION: 98 %

## 2024-06-03 DIAGNOSIS — Z86.73 STATUS POST STROKE: ICD-10-CM

## 2024-06-03 DIAGNOSIS — E78.2 MIXED HYPERLIPIDEMIA: Primary | ICD-10-CM

## 2024-06-03 DIAGNOSIS — Z12.31 ENCOUNTER FOR SCREENING MAMMOGRAM FOR MALIGNANT NEOPLASM OF BREAST: ICD-10-CM

## 2024-06-03 DIAGNOSIS — I10 ESSENTIAL HYPERTENSION: ICD-10-CM

## 2024-06-03 DIAGNOSIS — E11.3412 SEVERE NONPROLIFERATIVE DIABETIC RETINOPATHY OF LEFT EYE WITH MACULAR EDEMA ASSOCIATED WITH TYPE 2 DIABETES MELLITUS (HCC): ICD-10-CM

## 2024-06-03 DIAGNOSIS — N20.0 NEPHROLITHIASIS: ICD-10-CM

## 2024-06-03 DIAGNOSIS — Z79.4 TYPE 2 DIABETES MELLITUS WITH HYPERGLYCEMIA, WITH LONG-TERM CURRENT USE OF INSULIN (HCC): ICD-10-CM

## 2024-06-03 DIAGNOSIS — K57.90 DIVERTICULOSIS: ICD-10-CM

## 2024-06-03 DIAGNOSIS — K43.9 VENTRAL HERNIA WITHOUT OBSTRUCTION OR GANGRENE: ICD-10-CM

## 2024-06-03 DIAGNOSIS — M81.0 AGE-RELATED OSTEOPOROSIS WITHOUT CURRENT PATHOLOGICAL FRACTURE: ICD-10-CM

## 2024-06-03 DIAGNOSIS — R10.84 GENERALIZED ABDOMINAL PAIN: ICD-10-CM

## 2024-06-03 DIAGNOSIS — E11.65 TYPE 2 DIABETES MELLITUS WITH HYPERGLYCEMIA, WITH LONG-TERM CURRENT USE OF INSULIN (HCC): ICD-10-CM

## 2024-06-03 DIAGNOSIS — E03.9 ACQUIRED HYPOTHYROIDISM: ICD-10-CM

## 2024-06-03 DIAGNOSIS — E55.9 VITAMIN D DEFICIENCY: ICD-10-CM

## 2024-06-03 DIAGNOSIS — G25.81 RLS (RESTLESS LEGS SYNDROME): ICD-10-CM

## 2024-06-03 DIAGNOSIS — Z87.74 H/O ARTERIOVENOUS MALFORMATION (AVM): ICD-10-CM

## 2024-06-03 DIAGNOSIS — E66.09 CLASS 1 OBESITY DUE TO EXCESS CALORIES WITH SERIOUS COMORBIDITY AND BODY MASS INDEX (BMI) OF 30.0 TO 30.9 IN ADULT: ICD-10-CM

## 2024-06-03 DIAGNOSIS — N32.81 OVERACTIVE BLADDER: ICD-10-CM

## 2024-06-03 PROCEDURE — G0439 PPPS, SUBSEQ VISIT: HCPCS | Performed by: FAMILY MEDICINE

## 2024-06-03 PROCEDURE — 3052F HG A1C>EQUAL 8.0%<EQUAL 9.0%: CPT | Performed by: FAMILY MEDICINE

## 2024-06-03 PROCEDURE — 99499 UNLISTED E&M SERVICE: CPT | Performed by: FAMILY MEDICINE

## 2024-06-03 PROCEDURE — 3008F BODY MASS INDEX DOCD: CPT | Performed by: FAMILY MEDICINE

## 2024-06-03 PROCEDURE — 3078F DIAST BP <80 MM HG: CPT | Performed by: FAMILY MEDICINE

## 2024-06-03 PROCEDURE — 96160 PT-FOCUSED HLTH RISK ASSMT: CPT | Performed by: FAMILY MEDICINE

## 2024-06-03 PROCEDURE — 1170F FXNL STATUS ASSESSED: CPT | Performed by: FAMILY MEDICINE

## 2024-06-03 PROCEDURE — 99214 OFFICE O/P EST MOD 30 MIN: CPT | Performed by: FAMILY MEDICINE

## 2024-06-03 PROCEDURE — 3075F SYST BP GE 130 - 139MM HG: CPT | Performed by: FAMILY MEDICINE

## 2024-06-03 RX ORDER — OXYBUTYNIN CHLORIDE 5 MG/1
5 TABLET ORAL 2 TIMES DAILY
Qty: 60 TABLET | Refills: 3 | Status: SHIPPED | OUTPATIENT
Start: 2024-06-03

## 2024-06-04 ENCOUNTER — LAB ENCOUNTER (OUTPATIENT)
Dept: LAB | Age: 69
End: 2024-06-04
Attending: FAMILY MEDICINE
Payer: MEDICARE

## 2024-06-04 ENCOUNTER — OFFICE VISIT (OUTPATIENT)
Dept: ENDOCRINOLOGY CLINIC | Facility: CLINIC | Age: 69
End: 2024-06-04
Payer: MEDICARE

## 2024-06-04 VITALS
BODY MASS INDEX: 34 KG/M2 | WEIGHT: 181 LBS | SYSTOLIC BLOOD PRESSURE: 116 MMHG | RESPIRATION RATE: 20 BRPM | HEART RATE: 82 BPM | DIASTOLIC BLOOD PRESSURE: 76 MMHG | OXYGEN SATURATION: 97 %

## 2024-06-04 DIAGNOSIS — E55.9 VITAMIN D DEFICIENCY: ICD-10-CM

## 2024-06-04 DIAGNOSIS — I10 ESSENTIAL HYPERTENSION: ICD-10-CM

## 2024-06-04 DIAGNOSIS — E11.65 TYPE 2 DIABETES MELLITUS WITH HYPERGLYCEMIA, WITH LONG-TERM CURRENT USE OF INSULIN (HCC): ICD-10-CM

## 2024-06-04 DIAGNOSIS — Z79.4 TYPE 2 DIABETES MELLITUS WITH HYPERGLYCEMIA, WITH LONG-TERM CURRENT USE OF INSULIN (HCC): Primary | ICD-10-CM

## 2024-06-04 DIAGNOSIS — R80.9 MICROALBUMINURIA: ICD-10-CM

## 2024-06-04 DIAGNOSIS — E78.2 MIXED HYPERLIPIDEMIA: ICD-10-CM

## 2024-06-04 DIAGNOSIS — Z79.4 TYPE 2 DIABETES MELLITUS WITH HYPERGLYCEMIA, WITH LONG-TERM CURRENT USE OF INSULIN (HCC): ICD-10-CM

## 2024-06-04 DIAGNOSIS — E11.65 TYPE 2 DIABETES MELLITUS WITH HYPERGLYCEMIA, WITH LONG-TERM CURRENT USE OF INSULIN (HCC): Primary | ICD-10-CM

## 2024-06-04 DIAGNOSIS — R73.01 IMPAIRED FASTING GLUCOSE: ICD-10-CM

## 2024-06-04 LAB
ALBUMIN SERPL-MCNC: 3.4 G/DL (ref 3.4–5)
ALBUMIN/GLOB SERPL: 1 {RATIO} (ref 1–2)
ALP LIVER SERPL-CCNC: 107 U/L
ALT SERPL-CCNC: 14 U/L
ANION GAP SERPL CALC-SCNC: 6 MMOL/L (ref 0–18)
AST SERPL-CCNC: 13 U/L (ref 15–37)
BASOPHILS # BLD AUTO: 0.05 X10(3) UL (ref 0–0.2)
BASOPHILS NFR BLD AUTO: 0.7 %
BILIRUB SERPL-MCNC: 0.4 MG/DL (ref 0.1–2)
BUN BLD-MCNC: 19 MG/DL (ref 9–23)
CALCIUM BLD-MCNC: 9.4 MG/DL (ref 8.5–10.1)
CHLORIDE SERPL-SCNC: 111 MMOL/L (ref 98–112)
CHOLEST SERPL-MCNC: 180 MG/DL (ref ?–200)
CO2 SERPL-SCNC: 28 MMOL/L (ref 21–32)
CREAT BLD-MCNC: 0.93 MG/DL
CREAT UR-SCNC: 173 MG/DL
EGFRCR SERPLBLD CKD-EPI 2021: 67 ML/MIN/1.73M2 (ref 60–?)
EOSINOPHIL # BLD AUTO: 0.22 X10(3) UL (ref 0–0.7)
EOSINOPHIL NFR BLD AUTO: 3.1 %
ERYTHROCYTE [DISTWIDTH] IN BLOOD BY AUTOMATED COUNT: 14.1 %
FASTING PATIENT LIPID ANSWER: YES
FASTING STATUS PATIENT QL REPORTED: YES
GLOBULIN PLAS-MCNC: 3.3 G/DL (ref 2.8–4.4)
GLUCOSE BLD-MCNC: 151 MG/DL (ref 70–99)
HCT VFR BLD AUTO: 35.9 %
HDLC SERPL-MCNC: 54 MG/DL (ref 40–59)
HGB BLD-MCNC: 11.7 G/DL
IMM GRANULOCYTES # BLD AUTO: 0.02 X10(3) UL (ref 0–1)
IMM GRANULOCYTES NFR BLD: 0.3 %
LDLC SERPL CALC-MCNC: 95 MG/DL (ref ?–100)
LYMPHOCYTES # BLD AUTO: 1.47 X10(3) UL (ref 1–4)
LYMPHOCYTES NFR BLD AUTO: 20.6 %
MCH RBC QN AUTO: 28.4 PG (ref 26–34)
MCHC RBC AUTO-ENTMCNC: 32.6 G/DL (ref 31–37)
MCV RBC AUTO: 87.1 FL
MICROALBUMIN UR-MCNC: 24 MG/DL
MICROALBUMIN/CREAT 24H UR-RTO: 138.7 UG/MG (ref ?–30)
MONOCYTES # BLD AUTO: 0.53 X10(3) UL (ref 0.1–1)
MONOCYTES NFR BLD AUTO: 7.4 %
NEUTROPHILS # BLD AUTO: 4.85 X10 (3) UL (ref 1.5–7.7)
NEUTROPHILS # BLD AUTO: 4.85 X10(3) UL (ref 1.5–7.7)
NEUTROPHILS NFR BLD AUTO: 67.9 %
NONHDLC SERPL-MCNC: 126 MG/DL (ref ?–130)
OSMOLALITY SERPL CALC.SUM OF ELEC: 305 MOSM/KG (ref 275–295)
PLATELET # BLD AUTO: 198 10(3)UL (ref 150–450)
POTASSIUM SERPL-SCNC: 4 MMOL/L (ref 3.5–5.1)
PROT SERPL-MCNC: 6.7 G/DL (ref 6.4–8.2)
RBC # BLD AUTO: 4.12 X10(6)UL
SODIUM SERPL-SCNC: 145 MMOL/L (ref 136–145)
TRIGL SERPL-MCNC: 179 MG/DL (ref 30–149)
TSI SER-ACNC: 1.34 MIU/ML (ref 0.36–3.74)
VIT B12 SERPL-MCNC: 259 PG/ML (ref 193–986)
VIT D+METAB SERPL-MCNC: 19.7 NG/ML (ref 30–100)
VLDLC SERPL CALC-MCNC: 29 MG/DL (ref 0–30)
WBC # BLD AUTO: 7.1 X10(3) UL (ref 4–11)

## 2024-06-04 PROCEDURE — 82607 VITAMIN B-12: CPT

## 2024-06-04 PROCEDURE — 80061 LIPID PANEL: CPT

## 2024-06-04 PROCEDURE — 85025 COMPLETE CBC W/AUTO DIFF WBC: CPT

## 2024-06-04 PROCEDURE — 82043 UR ALBUMIN QUANTITATIVE: CPT

## 2024-06-04 PROCEDURE — 82306 VITAMIN D 25 HYDROXY: CPT

## 2024-06-04 PROCEDURE — 80053 COMPREHEN METABOLIC PANEL: CPT

## 2024-06-04 PROCEDURE — 83036 HEMOGLOBIN GLYCOSYLATED A1C: CPT

## 2024-06-04 PROCEDURE — 3078F DIAST BP <80 MM HG: CPT | Performed by: NURSE PRACTITIONER

## 2024-06-04 PROCEDURE — 1159F MED LIST DOCD IN RCRD: CPT | Performed by: NURSE PRACTITIONER

## 2024-06-04 PROCEDURE — 99214 OFFICE O/P EST MOD 30 MIN: CPT | Performed by: NURSE PRACTITIONER

## 2024-06-04 PROCEDURE — 3074F SYST BP LT 130 MM HG: CPT | Performed by: NURSE PRACTITIONER

## 2024-06-04 PROCEDURE — 82570 ASSAY OF URINE CREATININE: CPT

## 2024-06-04 PROCEDURE — 95251 CONT GLUC MNTR ANALYSIS I&R: CPT | Performed by: NURSE PRACTITIONER

## 2024-06-04 PROCEDURE — 36415 COLL VENOUS BLD VENIPUNCTURE: CPT

## 2024-06-04 PROCEDURE — 1160F RVW MEDS BY RX/DR IN RCRD: CPT | Performed by: NURSE PRACTITIONER

## 2024-06-04 PROCEDURE — 84443 ASSAY THYROID STIM HORMONE: CPT

## 2024-06-04 RX ORDER — DAPAGLIFLOZIN 10 MG/1
10 TABLET, FILM COATED ORAL DAILY
Qty: 30 TABLET | Refills: 1 | Status: SHIPPED | OUTPATIENT
Start: 2024-06-04

## 2024-06-04 NOTE — PROGRESS NOTES
HPI:   Marcela Moreno is a 68 year old female who presents for follow up for the management of her diabetes.  Patient met with GI and will be having a MRI, endoscopy and colonoscopy.      Chief Complaint   Patient presents with    Diabetes     F/U - rolando pro removal       Diabetes: stable, needs improvement  Type: 2   Duration: 2000  Current Meds: Metformin ER 500mg po bid, Tresiba 36 units injection daily   SE: diarrhea with higher doses of Metformin  Long term insulin use: yes  Failed Meds: Novolog 70/30, Jardiance, Toujeo, Novolog, Humalog 75/25, Lantus, Janumet, Metformin immediate release, Levemir, Ozempic, Farxiga    Complications: Retinopathy, Proteinuria, CAD, CVA/Stroke, gastroparesis    Professional Freestyle Rolando CGM  Sensor placement date: 2024  Sensor removal date: 2024  Reason for CGM placement: evaluation of glucose patterns and trends  Analysis of data:  Sensor download: full report  in media  Average glucose : 150 mg/dl   GMI: 6.9%    CV (coefficient of variation) : 31.5%     23% time above 180mg /dl   2% time above 250 mg/dl  73% time in target range:  mg/dl   2% time below target range: 70mg/dl     Evaluation   1. Significant decrease in overnight glucose increasing likelihood of hypoglycemia  2. Daytime glucose mostly stable  3. Evening postprandial elevation  4. Normal glucose variability         Overall glucose control:   HGBA1C:    Lab Results   Component Value Date    A1C 8.2 (A) 2024    A1C 7.7 (A) 2024    A1C 7.7 (H) 2023     (H) 2023       Hypertension: stable, at goal  Blood Pressure: 116/76  Medication prescribed: lisinopril  SE: denies     Hyperlipidemia: stable   LDL: 87   Tri  Medication prescribed: rosuvastatin  SE: denies     Wt Readings from Last 3 Encounters:   24 181 lb (82.1 kg)   24 181 lb (82.1 kg)   24 180 lb (81.6 kg)     BP Readings from Last 3 Encounters:   24 116/76   24 130/78    05/21/24 132/88          Past History:   She  has a past medical history of Abdominal pain, Acute, but ill-defined, cerebrovascular disease, Arthritis, AVM (arteriovenous malformation) brain ([facility]), Back pain, Bad breath, Bloating, Calculus of kidney, Constipation, Diabetes mellitus ([facility]), Diarrhea, unspecified, Fatigue, Fever, Food intolerance, Frequent urination, Heartburn, High cholesterol, History of depression, Indigestion, Irregular bowel habits, Leaking of urine, Leg swelling, Nausea, Neuropathy, Osteoarthritis, Pain in joints, Pain with bowel movements, Presence of other cardiac implants and grafts, Problems with swallowing, Sleep disturbance, Sputum production, Stool incontinence, Stroke ([facility]) (04/18/2015), Stroke ([facility]) (04/01/2015), Type II or unspecified type diabetes mellitus without mention of complication, not stated as uncontrolled, Uncomfortable fullness after meals, Vitamin D deficiency, Vomiting, Wears glasses, and Weight gain.   Her family history includes Diabetes in her brother, mother, and sister; Heart Attack in her brother; Heart Disorder in her brother; Hypertension in her father and mother.   She  reports that she has never smoked. She has never used smokeless tobacco. She reports that she does not drink alcohol and does not use drugs.     She has No Known Allergies.     Current Outpatient Medications on File Prior to Visit   Medication Sig    oxybutynin 5 MG Oral Tab Take 1 tablet (5 mg total) by mouth in the morning and 1 tablet (5 mg total) before bedtime.    Mirabegron ER (MYRBETRIQ) 50 MG Oral Tablet 24 Hr Take 1 tablet (50 mg total) by mouth daily.    oxybutynin 5 MG Oral Tab     methylPREDNISolone (MEDROL) 4 MG Oral Tablet Therapy Pack As directed.    insulin degludec (TRESIBA FLEXTOUCH) 200 UNIT/ML Subcutaneous Solution Pen-injector Inject 36 units as directed daily.    OneTouch Delica Lancets 33G Does not apply Misc 1 Lancet by Finger stick route daily.    metFORMIN  MG Oral  Tablet 24 Hr Take 1 tablet (500 mg total) by mouth 2 (two) times daily with meals.    pantoprazole 40 MG Oral Tab EC Take 1 tablet (40 mg total) by mouth daily.    rosuvastatin 10 MG Oral Tab TAKE 1 TABLET(10 MG) BY MOUTH EVERY NIGHT    lisinopril 5 MG Oral Tab TAKE 1 TABLET(5 MG) BY MOUTH DAILY    timolol 0.5 % Ophthalmic Solution Place 1 drop into both eyes 2 (two) times daily.    Insulin Pen Needle (BD PEN NEEDLE TITO 2ND GEN) 32G X 4 MM Does not apply Misc USE AS DIRECTED 1 EACH INTO THE SKIN TWICE DAILY    Glucose Blood (ONETOUCH VERIO) In Vitro Strip TEST ONCE TO TWICE DAILY    benzonatate 200 MG Oral Cap Take 1 capsule (200 mg total) by mouth 3 (three) times daily as needed for cough. (Patient not taking: Reported on 6/3/2024)    dextromethorphan-guaifenesin ER (MUCINEX DM MAXIMUM STRENGTH)  MG Oral Tablet 12 Hr Take 1 tablet by mouth every 12 (twelve) hours. (Patient not taking: Reported on 6/3/2024)     No current facility-administered medications on file prior to visit.       CMP  (most recent labs)   Lab Results   Component Value Date/Time    GLU 86 11/20/2023 09:12 AM    BUN 22 11/20/2023 09:12 AM    CREATSERUM 0.80 11/20/2023 09:12 AM    GFRNAA 85 12/07/2021 09:09 AM    GFRAA 98 12/07/2021 09:09 AM    EGFRCR 81 11/20/2023 09:12 AM    CA 9.1 11/20/2023 09:12 AM    ALKPHO 77 11/20/2023 09:12 AM    AST 18 11/20/2023 09:12 AM    ALT 13 11/20/2023 09:12 AM    BILT 0.4 11/20/2023 09:12 AM    TP 6.8 11/20/2023 09:12 AM    ALB 3.3 (L) 11/20/2023 09:12 AM     11/20/2023 09:12 AM    K 3.9 11/20/2023 09:12 AM     11/20/2023 09:12 AM    CO2 29.0 11/20/2023 09:12 AM           Lipids  (most recent labs)   Lab Results   Component Value Date/Time    CHOLEST 157 11/20/2023 09:12 AM    TRIG 97 11/20/2023 09:12 AM    HDL 52 11/20/2023 09:12 AM    LDL 87 11/20/2023 09:12 AM    NONHDLC 105 11/20/2023 09:12 AM          Diabetes  (most recent labs)   Lab Results   Component Value Date/Time    A1C 8.2 (A)  05/21/2024 11:01 AM          Microalb (most recent labs)   Lab Results   Component Value Date/Time    MICROALBCREA 576.5 (H) 04/01/2024 01:58 PM        Lab results reviewed with patient.    REVIEW OF SYSTEMS:   GENERAL HEALTH: feels well otherwise  SKIN: denies any unusual skin lesions or rashes  RESPIRATORY: denies shortness of breath with exertion  CARDIOVASCULAR: denies chest pain on exertion  GI: c/o heartburn and occasional nausea and abdominal pain   NEURO: c/o numbness and tingling to bilateral feet, denies headaches  ENDO: denies polydipsia, polyuria or polyphagia, denies unexplained weight changes    EXAM:   /76   Pulse 82   Resp 20   Wt 181 lb (82.1 kg)   LMP  (LMP Unknown)   SpO2 97%   BMI 34.20 kg/m²  Estimated body mass index is 34.2 kg/m² as calculated from the following:    Height as of 6/3/24: 5' 1\" (1.549 m).    Weight as of this encounter: 181 lb (82.1 kg).   Physical Exam  Vitals reviewed.   Constitutional:       Appearance: Normal appearance.   Pulmonary:      Effort: Pulmonary effort is normal.   Neurological:      Mental Status: She is alert and oriented to person, place, and time.   Psychiatric:         Mood and Affect: Mood normal.         Behavior: Behavior normal.         ASSESSMENT AND PLAN:   As for her Diabetes, it is needs further observation, needs improvement, needs to follow diet more regularly.     Recommendations are: continue present meds, lose weight by increased dietary compliance and exercise, and check feet daily.    Patient to continue Metformin ER 500mg po bid and decrease Tresiba to 32 units injection daily.  Restart Farxiga 10mg daily.    Reviewed medication instructions for upcoming colonoscopy, see AVS for instructions.    Discussed the risk of and benefits of SGLT2 inhibitor class for treatment of type 2 Diabetes. This class of meds results in the renal excretion of glucose and can lower blood sugars.   eGfr: 81  Patient was instructed to drink at least 6  eight ounce glasses of water daily to avoid dehydration.    Discussed self monitoring blood glucose and the importance of monitoring.  Patient agreed to monitoring daily - alternating fasting in AM and 2 hours postprandial of one meal per day.  Hypoglycemia S&S, Rx, and when to call APRN/CDE reviewed using Rule of 15's. Stressed need to call if 2 readings below 80 mg/dl in 1 week for medication adjustment.   Reinforced healthy eating in healthy portions and increasing daily physical activity.  Reviewed ways to improve the protein in the urine:   Keep blood sugars in target range   Keep blood pressure in target range   Watch over the counter medicines like NSAID (non steroidal anti-inflammatory drugs) these can be harmful to  kidneys (examples include: , Motrin , Advil, ibuprofen, naprosyn, Aleve)   Continue ACEI and SGLT2 therapy - renoprotective       As for her hypertension, Blood Pressure is stable, no significant medication side effects noted.   PLAN: will continue present medications, reviewed diet, exercise and weight control, continue ACEI therapy.       As for her cholesterol, Lipids are stable, improved, no significant medication side effects noted.   PLAN: will continue present medications, reviewed diet, exercise and weight control, continue statin therapy.      DM Health Maintenance  Last dilated eye exam: Last Dilated Eye Exam: 24   Exam shows retinopathy? Eye Exam shows Diabetic Retinopathy?: Yes    Last diabetic foot exam: Last Foot Exam: 24      Date of last PHQ-2 depression screen: PHQ-2 - Date of last depression screenin2024    Patient  reports that she has never smoked. She has never used smokeless tobacco.  When is flu vaccine due? No recommendations at this time  When is pneumonia vaccine due? No recommendations at this time    The patient indicates understanding of these issues and agrees to the plan.  Refills addressed at time of office visit.    Diagnoses and all orders  for this visit:    Type 2 diabetes mellitus with hyperglycemia, with long-term current use of insulin (HCC)  -     dapagliflozin (FARXIGA) 10 MG Oral Tab; Take 1 tablet (10 mg total) by mouth daily.    Essential hypertension    Mixed hyperlipidemia    Microalbuminuria               Return in about 9 weeks (around 8/6/2024) for 30 minute appointment.    The risks and benefits of my recommendations, as well as other treatment options were discussed with the patient today. Questions were answered to the best of my knowledge.   30 min spent with patient and >50% time spent counseling and coordinating care related to their office visit.      Jessica MCCARTY, BC-ADM, [facility]

## 2024-06-04 NOTE — PATIENT INSTRUCTIONS
We are here to support you with Diabetes but please remember that you still need your primary care doctor for your routine health maintenance.   Your current A1C: 8.2%  This test provides us with your average blood sugar for the past 3 months.   The main goal of diabetes treatment is to keep your sugar from going too high. We measure your overall blood sugar trends with a Hemoglobin A1C test. (also called an A1C)  For most people the target is less than 7.0% but sometimes we make exceptions based on age, health history and other factors.   Keeping an A1C less than 7% helps prevents diabetes related health problems.   If your A1C goes too high, then we need to talk about changing your current diabetes treatment.    MEDICATIONS:   It is important to take all of your medications as prescribed.   Please call me if you cannot get the prescriptions filled or are having issues with refills.   Also, please call me if you have any issues with medication questions, side effects, dosing questions or problems with your blood sugar trends BEFORE CHANGING OR STOPPING ANY MEDICATIONS.   Continue Metformin ER 500mg 2x/day with breakfast and dinner  Decrease Tresiba to 32 units injection daily  Start Farxiga 10mg daily - drink 4-6 eight ounce glasses of water daily to avoid dehydration    Colonoscopy medication instructions:  Hold Metformin morning of procedure  Decrease Tresiba to 26 units 2 days before procedure, resume normal dose day after procedure in AM  Hold Farxiga 3 days before procedure    Blood sugar testing:   Always bring your glucose meter or blood sugar logbook to every appointment here at the diabetes center.  This allows me to safely make adjustments to your diabetes plan.   In order for me to determine any patterns in your blood sugars, you will need to test your blood sugar daily   It would be best to change up the times of day that you are testing your sugar.   Alternate testing before breakfast (fasting) and  testing blood sugar 2 hours after your meals.   Call if 2 readings below 80 mg/dl in 1 week for medication adjustment.     Blood sugar targets:  Before breakfast:   (preferably < 110)  2 hours After meals: less than 180 (preferably less than 150)   Call for persistent blood sugars < 75 or > 200.   Blood sugars greater than 200 are not acceptable to reach your goal of improving diabetes      Health Maintenance:   1. LABS: It is important to monitor your kidney function (blood and urine protein levels) , liver function tests and cholesterol levels when you have diabetes.     2. FOOT EXAMS:  daily foot inspections for foot wounds or skin changes are important for foot care. Any unusual changes should be reported immediately.    3. EYE EXAMS: Checking your eyes for diabetes changes is important and you should have a dilated eye exam done by an eye doctor EVERY year since these changes occur in the BACK of the eye and not visible by you.  Please let me know if you need provider list for eye doctor.     Lifestyle Therapy:    1. NUTRITION: Maintain optimal weight, calorie restriction if overweight, plant-based diet    2. PHYSICAL ACTIVITY: 150 minutes per week (30 minutes a day 5 days a week) of moderate exertion such as walking, stair climbing.  Include strength training 2-3 times per week.  Increase as tolerated.    3. SLEEP: Try and get 7-8 hours of sleep per night    4. BEHAVIOR:  Tobacco cessation and alcohol in moderation        Jessica MCCARTY, BC-[facility], [facility]  [address] [place] [address]  [address], [address] [place] [address]  [address] [place] [address]  Diabetes Services at [facility] [phone]  F [phone]

## 2024-06-04 NOTE — PROGRESS NOTES
REASON FOR VISIT:    Marcela Moreno is a 68 year old female who presents for a MA Supervisit and chronic conditions f/u.    HPI:    Patient Care Team: Patient Care Team:  Rhiannon Betancourt MD as PCP - General (Family Practice)  Christianne Carreon MD as Consulting Physician (GASTROENTEROLOGY)  Gopi Platt MD (SURGERY, GENERAL)  Yue Yin RD,LDN,CDE (Dietitian)  Jessica Hicks APRN (Nurse Practitioner Family)  Guerrero Moser MD (GASTROENTEROLOGY)    Patient Active Problem List   Diagnosis    Mixed hyperlipidemia    Type 2 diabetes mellitus with hyperglycemia, with long-term current use of insulin (HCC)    Status post stroke    Essential hypertension    H/O arteriovenous malformation (AVM)    Hypothyroidism    Class 1 obesity due to excess calories with serious comorbidity and body mass index (BMI) of 30.0 to 30.9 in adult    Overactive bladder    RLS (restless legs syndrome)    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 associated with type 2 diabetes mellitus (HCC)    Age-related osteoporosis without current pathological fracture         The patient complaints urinary incontinence. Feeling urgency to go very frequently, sometimes feeling of incomplete emptying of the bladder not worse  with sneezing, laughing, coughing, exercise and heavy lifting.  Started:few weeks ago but pt has it for few years.  Volume:large  Progression:worsening recently.  Relieving factors: decrease fluid, coffee intake.  Exacerbating factors: coffee, large fluid intake, nightly symptoms mainly.  Associated symptoms:no fever, chills, dysuria,  depression, fecal incontinence, sexual dysfunction, function decline, embarrassment, social disengagement.No weigh loss.No abdominal pain, no pelvic pain.No vaginal pressure.  No  surgery. Pt has worsening symptoms at night.    Pt has some left side abdominal pain, on and off, not sure if related to meals, not worse or better recently, h/o hernia,  kidney stones and diverticulosis.  Current Outpatient Medications   Medication Sig Dispense Refill    oxybutynin 5 MG Oral Tab Take 1 tablet (5 mg total) by mouth in the morning and 1 tablet (5 mg total) before bedtime. 60 tablet 3    Mirabegron ER (MYRBETRIQ) 50 MG Oral Tablet 24 Hr Take 1 tablet (50 mg total) by mouth daily. 30 tablet 1    methylPREDNISolone (MEDROL) 4 MG Oral Tablet Therapy Pack As directed. 21 each 0    insulin degludec (TRESIBA FLEXTOUCH) 200 UNIT/ML Subcutaneous Solution Pen-injector Inject 36 units as directed daily. 3 mL 0    OneTouch Delica Lancets 33G Does not apply Misc 1 Lancet by Finger stick route daily. 100 each 0    metFORMIN  MG Oral Tablet 24 Hr Take 1 tablet (500 mg total) by mouth 2 (two) times daily with meals. 180 tablet 1    pantoprazole 40 MG Oral Tab EC Take 1 tablet (40 mg total) by mouth daily. 30 tablet 6    rosuvastatin 10 MG Oral Tab TAKE 1 TABLET(10 MG) BY MOUTH EVERY NIGHT 90 tablet 3    lisinopril 5 MG Oral Tab TAKE 1 TABLET(5 MG) BY MOUTH DAILY 90 tablet 1    timolol 0.5 % Ophthalmic Solution Place 1 drop into both eyes 2 (two) times daily.      Insulin Pen Needle (BD PEN NEEDLE TITO 2ND GEN) 32G X 4 MM Does not apply Misc USE AS DIRECTED 1 EACH INTO THE SKIN TWICE DAILY 200 each 1    Glucose Blood (ONETOUCH VERIO) In Vitro Strip TEST ONCE TO TWICE DAILY 100 strip 1    oxybutynin 5 MG Oral Tab  (Patient not taking: Reported on 6/3/2024)      benzonatate 200 MG Oral Cap Take 1 capsule (200 mg total) by mouth 3 (three) times daily as needed for cough. (Patient not taking: Reported on 6/3/2024) 60 capsule 0    dextromethorphan-guaifenesin ER (MUCINEX DM MAXIMUM STRENGTH)  MG Oral Tablet 12 Hr Take 1 tablet by mouth every 12 (twelve) hours. (Patient not taking: Reported on 6/3/2024) 20 tablet 0           Latest Ref Rng & Units 5/21/2024    11:01 AM 2/22/2024     8:54 AM 11/20/2023     9:12 AM 7/3/2023    10:07 AM 4/3/2023    10:10 AM 12/8/2022    11:40 AM  9/26/2022     9:34 AM   Glucose and HbA1c   Glucose 70 - 99 mg/dL   86     195    HbA1c 4.3 - 5.6 % 8.2  7.7   5.8  6.0  7.4           Latest Ref Rng & Units 11/20/2023     9:12 AM 9/26/2022     9:34 AM 12/7/2021     9:09 AM 9/23/2021     9:22 AM 2/12/2021     9:11 AM 11/26/2019    10:16 AM 12/16/2017     7:05 AM   Cholesterol   Total Cholesterol <200 mg/dL 157  239  237  271  231  243  242    Triglycerides 30 - 149 mg/dL 97  109  169  161  141  138  120    HDL 40 - 59 mg/dL 52  55  47  51  52  52  50    LDL <100 mg/dL 87  165  156  190  151  163  168          Latest Ref Rng & Units 11/20/2023     9:12 AM 9/26/2022     9:34 AM 8/8/2022     2:09 PM 12/7/2021     9:09 AM 9/23/2021     9:22 AM 2/12/2021     9:11 AM 11/26/2019    10:16 AM   BUN and Cr   BUN 9 - 23 mg/dL 22  20  27  18  16  16  12    Creatinine 0.55 - 1.02 mg/dL 0.80  0.77  1.12  0.74  0.72  0.58  0.78          Latest Ref Rng & Units 11/20/2023     9:12 AM 9/26/2022     9:34 AM 8/8/2022     2:09 PM 12/7/2021     9:09 AM 9/23/2021     9:22 AM 2/12/2021     9:11 AM 11/26/2019    10:16 AM   AST and ALT   AST (SGOT) 15 - 37 U/L 18  12  20  11  11  9  13    ALT (SGPT) 13 - 56 U/L 13  16  17  13  14  14  15          Latest Ref Rng & Units 9/26/2022     9:34 AM 2/12/2021     9:11 AM 11/26/2019    10:16 AM 12/16/2017     7:05 AM 12/7/2015     7:14 AM 3/31/2015    10:40 AM   TSH and Free T4   TSH 0.358 - 3.740 mIU/mL 1.050  1.630  0.626  1.240  0.833  1.220    Free T4 0.9 - 1.8 ng/dL     1.6          General Health      In the past six months, have you lost more than 10 pounds without trying?: 2 - No  Has your appetite been poor?: No  Type of Diet: Diabetic  How does the patient maintain a good energy level?: Other  How would you describe your daily physical activity?: Moderate  How would you describe your current health state?: Fair  How do you maintain positive mental well-being?: Visiting Family  Have you had any immunizations at another office such as  Influenza, Hepatitis B, Tetanus, or Pneumococcal?: No     Functional Ability     Bathing or Showering: Able without help  Toileting: Able without help  Dressing: Able without help  Eating: Able without help  Driving: Able without help  Preparing your meals: Able without help  Managing money/bills: Able without help  Taking medications as prescribed: Able without help  Are you able to afford your medications?: Yes  Hearing Problems?: No     Functional Status     Hearing Problems?: No  Vision Problems? : No  Difficulty walking?: Yes  Difficulty dressing or bathing?: No  Problems with daily activities? : No  Memory Problems?: No      Fall/Risk Assessment      see flowsheet.           Depression Screening (PHQ-2/PHQ-9): Over the LAST 2 WEEKS         PHQ-9 Depression Screen     1. Little interest or pleasure in doing things: Not at all  2. Feeling down, depressed, or hopeless: Not at all  3.    4.    5.    6.    7.    8.    9.                 Advance Directives     Do you have a healthcare power of ?: No  Do you have a living will?: No     Cognitive Assessment     What day of the week is this?: Correct  What month is it?: Correct  What year is it?: Correct  Recall \"Ball\": Correct  Recall \"Flag\": Correct  Recall \"Tree\": Correct         PREVENTATIVE SERVICES   INDICATIONS AND SCHEDULE Internal Lab or Procedure   Diabetes Screening     HbgA1C   Annually HEMOGLOBIN A1C (%)   Date Value   05/21/2024 8.2 (A)       Fasting Blood Sugar (FSB)Annually Glucose (mg/dL)   Date Value   11/20/2023 86     GLUCOSE (mg/dL)   Date Value   10/09/2013 316 (H)      Cardiovascular Disease Screening    LDL Annually LDL Cholesterol (mg/dL)   Date Value   11/20/2023 87     LDL CHOLESTROL (mg/dL)   Date Value   10/09/2013 170 (H)       EKG - w/ Initial Preventative Physical Exam only, or if medically necessary Yes today   Colorectal Cancer Screening     Colonoscopy Screen every 10 years Health Maintenance   Topic Date Due    Colorectal  Cancer Screening  12/10/2029       Flex Sigmoidoscopy Screen every 5 years No results found for this or any previous visit.    Fecal Occult Blood Annually No results found for: \"FOB\", \"OCCULTSTOOL\"   Glaucoma Screening     Ophthalmology Visit Annually yes   Immunizations     Zoster (Not covered by Medicare Part B) No orders found for this or any previous visit.     SPECIFIC DISEASE MONITORING Internal Lab or Procedure     Annual Monitoring of Persistent Medications  (ACE/ARB, Digoxin, Diuretics)        Potassium  Annually Potassium (mmol/L)   Date Value   11/20/2023 3.9     POTASSIUM (mmol/L)   Date Value   10/09/2013 4.0       Creatinine  Annually CREATININE (mg/dL)   Date Value   10/09/2013 0.30 (L)     Creatinine (mg/dL)   Date Value   11/20/2023 0.80       Digoxin Serum Conc  Annually No results found for: \"DIGOXIN\"     Diabetes     HgbA1C  Annually HEMOGLOBIN A1C (%)   Date Value   05/21/2024 8.2 (A)       Creat/alb ratio  Annually CREATININE (mg/dL)   Date Value   10/09/2013 0.30 (L)     Creatinine (mg/dL)   Date Value   11/20/2023 0.80       LDL  Annually LDL Cholesterol (mg/dL)   Date Value   11/20/2023 87     LDL CHOLESTROL (mg/dL)   Date Value   10/09/2013 170 (H)       Dilated Eye exam  Annually     3/25/2024     4:23 PM   Data entered on:   Last Dilated Eye Exam 3/25/2024              ALLERGIES:   No Known Allergies  MEDICAL INFORMATION:   Past Medical History:    Abdominal pain    Acute, but ill-defined, cerebrovascular disease    Arthritis    AVM (arteriovenous malformation) brain ([facility])    being followed at [facility]    Back pain    Bad breath    Bloating    Calculus of kidney    Constipation    Diabetes mellitus (HCC)    Diarrhea, unspecified    Fatigue    Fever    Food intolerance    Frequent urination    Heartburn    High cholesterol    History of depression    Indigestion    Irregular bowel habits    Leaking of urine    Leg swelling    Nausea    Neuropathy    TOES SOMETIMES    Osteoarthritis     Pain in joints    Pain with bowel movements    Presence of other cardiac implants and grafts    Problems with swallowing    Sleep disturbance    Sputum production    Stool incontinence    Stroke (HCC)    s/p Brain surgery    Stroke (HCC)    LEFT SIDE EFFECTED- USING CANE    Type II or unspecified type diabetes mellitus without mention of complication, not stated as uncontrolled    Uncomfortable fullness after meals    Vitamin D deficiency    Vomiting    Wears glasses    Weight gain      Past Surgical History:   Procedure Laterality Date    Brain surgery  2015    avm   [facility]          X3    Cholecystectomy      Colonoscopy      Hernia surgery      Other surgical history      LEFT TIBIA FRACTURE- PLATE AND SCREWS        Family History   Problem Relation Age of Onset    Diabetes Mother     Hypertension Mother     Hypertension Father     Heart Disorder Brother         CAD stent in his 40s    Diabetes Brother     Diabetes Sister     Heart Attack Brother      Immunization History      Immunization History  Administered            Date(s) Administered    >=3 YRS FLUZONE TRI PRESERV FREE SINGLE DOSE (60621) FLU CLINIC                          2013      Covid-19 Vaccine Fashiontrot (J&J) 0.5ml                          2021      Covid-19 Vaccine Pfizer 30 mcg/0.3 ml                          2021      FLU VAC High Dose 65 YRS & Older PRSV Free (16237)                          12/10/2021  2022      FLULAVAL 6 months & older 0.5 ml Prefilled syringe (07510)                          2019      Fluvirin, 3 Years & >, Im                          10/02/2017      Influenza             2012  10/30/2014  2018      Pneumococcal (Prevnar 13)                          2015      Pneumovax 23          2016  12/10/2021      TDAP                  2015        SOCIAL HISTORY:   Social History     Socioeconomic History    Marital status:    Tobacco Use     Smoking status: Never    Smokeless tobacco: Never   Vaping Use    Vaping status: Never Used   Substance and Sexual Activity    Alcohol use: Never     Comment: rare    Drug use: No   Other Topics Concern    Caffeine Concern Yes     Comment: coffee 2 cups daily    Exercise No    Seat Belt Yes     Social Determinants of Health      [facility]    Social Connections        REVIEW OF SYSTEMS:   GENERAL: feels well otherwise  SKIN: denies any unusual skin lesions  EYES: denies blurred vision or double vision  HEENT: denies nasal congestion, sinus pain or ST  LUNGS: denies shortness of breath with exertion  CARDIOVASCULAR: denies chest pain on exertion  GI: denies abdominal pain, denies heartburn  : denies dysuria, vaginal discharge or itching  MUSCULOSKELETAL: denies back pain  NEURO: denies headaches  PSYCHE: denies depression or anxiety  HEMATOLOGIC: denies hx of anemia  ENDOCRINE: denies thyroid history  ALL/ASTHMA: denies hx of allergy or asthma    EXAM:   /78   Pulse 90   Resp 18   Ht 5' 1\" (1.549 m)   Wt 181 lb (82.1 kg)   LMP  (LMP Unknown)   SpO2 98%   BMI 34.20 kg/m²    >   BP Readings from Last 3 Encounters:   06/03/24 130/78   05/21/24 132/88   04/25/24 134/74     GENERAL: well developed, well nourished, in no apparent distress, obese  SKIN: no rashes, no suspicious lesions  HEENT: atraumatic, normocephalic, ears and throat are clear                Hearing Assessed via: Whispered Voice  EYES: PERRLA, EOMI, conjunctiva are clear normal optic disc  NECK: supple, no adenopathy, no bruits  CHEST: no chest tenderness  BREAST:deferred   LUNGS: clear to auscultation  CARDIO: RRR without murmur  GI: good BS's, no masses, HSM or tenderness  : deferred  RECTAL: deferred  MUSCULOSKELETAL: back is not tender, FROM of the back  EXTREMITIES: no cyanosis, clubbing or edema  NEURO: Oriented times three, cranial nerves are intact, motor and  sensory are grossly intact,   Pulsation pedal pulse exam of both lower legs/feet is normal as well.  FOOT: normal exam and diabetic monofilament testing normal on both feet    ASSESSMENT AND  RELEVANT CHRONIC CONDITIONS:   Marcela Moreno is a 68 year old female who presents for a Medicare Assessment.     Age-related osteoporosis without current pathological fracture:   high calcium and protein diet d/w the pt.Caltrate 600+D once a day recommended.Acitive life style and exercise 3 times a week d/w the pt. Handouts given.Dexa scan.        Type 2 diabetes mellitus with hyperglycemia, with long-term current use of insulin (HCC)  Severe proliferative diabetic retinopathy  of left eye associated with type 2 diabetes mellitus with  macular edema  (HCC): low glucose diet, HgbA1C is better now, sees DM clinic.Doing well.   low sugar, low salt and  lipid diet, exercise 3 times a week d/w the pt.    Mixed hyperlipidemia: doing well   low sugar, low salt and  lipid diet, exercise 3 times a week d/w the pt.    Essential hypertension: cont. Present meds, stable.   low sugar, low salt and  lipid diet, exercise 3 times a week d/w the pt.    Status post stroke, h/o AV malformation: stable, fall precautions d/w the pt.    Acquired hypothyroidism: stable, cont. Synthroid.    RLS (restless legs syndrome): stable.    Overactive bladder: worsening  Requested Prescriptions     Signed Prescriptions Disp Refills    oxybutynin 5 MG Oral Tab 60 tablet 3     Sig: Take 1 tablet (5 mg total) by mouth in the morning and 1 tablet (5 mg total) before bedtime.          Class 1 obesity due to excess calories with serious comorbidity and body mass index (BMI) of 30.0 to 30.9 in adult:   low sugar, low salt and  lipid diet, exercise 3 times a week d/w the pt.    Vit. D def:  Stable, cont. supplements and proper diet, recheck the level      Nephrolithiasis  -     CT ABDOMEN+PELVIS(ALL W+WO)(CPT=74178); Future; Expected date: 06/03/2024   Ventral  hernia without obstruction or gangrene  -     CT ABDOMEN+PELVIS(ALL W+WO)(CPT=74178); Future; Expected date: 06/03/2024  Diverticulosis  -     CT ABDOMEN+PELVIS(ALL W+WO)(CPT=74178); Future; Expected date: 06/03/2024  Generalized abdominal pain  -     CT ABDOMEN+PELVIS(ALL W+WO)(CPT=74178); Future; Expected date: 06/03/2024         Colonoscopy up to date.     The patient indicates understanding of these issues and agrees to the plan.  The patient is asked to return in 6 months for office visit  Diet counseling perfomed  Exercise counseling perfomed    SUGGESTED VACCINATIONS - Influenza, Pneumococcal, Zoster, Tetanus   Influenza: No recommendations at this time  Pneumonia: No recommendations at this time  Shingrix shingles vaccine is due

## 2024-06-06 DIAGNOSIS — R73.01 IMPAIRED FASTING GLUCOSE: Primary | ICD-10-CM

## 2024-06-06 LAB
EST. AVERAGE GLUCOSE BLD GHB EST-MCNC: 183 MG/DL (ref 68–126)
HBA1C MFR BLD: 8 % (ref ?–5.7)

## 2024-06-06 RX ORDER — ERGOCALCIFEROL 1.25 MG/1
50000 CAPSULE ORAL WEEKLY
Qty: 12 CAPSULE | Refills: 0 | Status: SHIPPED | OUTPATIENT
Start: 2024-06-06 | End: 2024-07-06

## 2024-06-07 ENCOUNTER — TELEPHONE (OUTPATIENT)
Dept: ENDOCRINOLOGY CLINIC | Facility: CLINIC | Age: 69
End: 2024-06-07

## 2024-06-07 DIAGNOSIS — E11.65 TYPE 2 DIABETES MELLITUS WITH HYPERGLYCEMIA, WITH LONG-TERM CURRENT USE OF INSULIN (HCC): Primary | ICD-10-CM

## 2024-06-07 DIAGNOSIS — Z79.4 TYPE 2 DIABETES MELLITUS WITH HYPERGLYCEMIA, WITH LONG-TERM CURRENT USE OF INSULIN (HCC): Primary | ICD-10-CM

## 2024-06-07 NOTE — TELEPHONE ENCOUNTER
Call to patient- symptoms started about 2 days ago which is when the patient started the Farxiga again- denies further symptoms- no urinary symptoms- advised that she follow up with GI for further evaluation, no further questions at this time

## 2024-06-07 NOTE — TELEPHONE ENCOUNTER
Patient left message that she has developed nausea and vomiting which she noted started after starting Farxiga, was wondering if she should hold the medication?- was going to call patient to advise to follow up with urgent care/ed related to symptoms unless thinking it is not related to Farxiga- any further recommendations?

## 2024-06-07 NOTE — TELEPHONE ENCOUNTER
Please contact patient to check on additional symptoms.  Farxiga does not normally cause nausea and vomiting and patient has been on previously without those side effects.  If patient not experiencing urinary symptoms most likely not related to Farxiga.  Patient is currently being evaluated by GI, please have patient reach out to them for further evaluation.    Jessica MCCARTY, BC-ADM, [facility]

## 2024-06-20 ENCOUNTER — HOSPITAL ENCOUNTER (OUTPATIENT)
Dept: CT IMAGING | Age: 69
Discharge: HOME | End: 2024-06-20
Attending: FAMILY MEDICINE

## 2024-06-20 DIAGNOSIS — K43.9 VENTRAL HERNIA WITHOUT OBSTRUCTION OR GANGRENE: ICD-10-CM

## 2024-06-20 DIAGNOSIS — K57.90 DIVERTICULOSIS: ICD-10-CM

## 2024-06-20 DIAGNOSIS — N20.0 KIDNEY STONE: Primary | ICD-10-CM

## 2024-06-20 DIAGNOSIS — R10.84 GENERALIZED ABDOMINAL PAIN: ICD-10-CM

## 2024-06-20 DIAGNOSIS — N20.0 NEPHROLITHIASIS: ICD-10-CM

## 2024-06-20 PROCEDURE — 74178 CT ABD&PLV WO CNTR FLWD CNTR: CPT | Performed by: FAMILY MEDICINE

## 2024-06-24 ENCOUNTER — TELEPHONE (OUTPATIENT)
Dept: SURGERY | Facility: CLINIC | Age: 69
End: 2024-06-24

## 2024-06-24 NOTE — TELEPHONE ENCOUNTER
Patient has a kidney stone and is requesting an appointment as soon as possible per Dr. Betancourt. Next available is not until 8/27. Please call.

## 2024-07-02 ENCOUNTER — OFFICE VISIT (OUTPATIENT)
Dept: SURGERY | Facility: CLINIC | Age: 69
End: 2024-07-02
Payer: MEDICARE

## 2024-07-02 ENCOUNTER — TELEPHONE (OUTPATIENT)
Dept: SURGERY | Facility: CLINIC | Age: 69
End: 2024-07-02

## 2024-07-02 DIAGNOSIS — N20.0 NEPHROLITHIASIS: Primary | ICD-10-CM

## 2024-07-02 DIAGNOSIS — N20.0 NEPHROLITHIASIS: ICD-10-CM

## 2024-07-02 DIAGNOSIS — N32.81 OVERACTIVE BLADDER: Primary | ICD-10-CM

## 2024-07-02 LAB
APPEARANCE: CLEAR
BILIRUBIN: NEGATIVE
GLUCOSE (URINE DIPSTICK): >=1000 MG/DL
KETONES (URINE DIPSTICK): NEGATIVE MG/DL
MULTISTIX LOT#: ABNORMAL NUMERIC
NITRITE, URINE: NEGATIVE
OCCULT BLOOD: NEGATIVE
PH, URINE: 5 (ref 4.5–8)
PROTEIN (URINE DIPSTICK): NEGATIVE MG/DL
SPECIFIC GRAVITY: 1.01 (ref 1–1.03)
URINE-COLOR: YELLOW
UROBILINOGEN,SEMI-QN: 0.2 MG/DL (ref 0–1.9)

## 2024-07-02 PROCEDURE — 1159F MED LIST DOCD IN RCRD: CPT | Performed by: SURGERY

## 2024-07-02 PROCEDURE — 99204 OFFICE O/P NEW MOD 45 MIN: CPT | Performed by: SURGERY

## 2024-07-02 PROCEDURE — 81003 URINALYSIS AUTO W/O SCOPE: CPT | Performed by: SURGERY

## 2024-07-02 PROCEDURE — 1160F RVW MEDS BY RX/DR IN RCRD: CPT | Performed by: SURGERY

## 2024-07-02 RX ORDER — MIRABEGRON 50 MG/1
50 TABLET, EXTENDED RELEASE ORAL DAILY
Qty: 90 TABLET | Refills: 5 | Status: SHIPPED | OUTPATIENT
Start: 2024-07-02

## 2024-07-02 NOTE — PROGRESS NOTES
Urology Clinic Note - New Patient    Referring Provider:  No referring provider defined for this encounter.     Primary Care Provider:  Rhiannon Betancourt MD     Chief Complaint:   Nephrolithiasis, overactive bladder    HPI:   Marcela Moreno is a 68 year old female with history of OA, diabetes, GERD, hyperlipidemia, depression, CVA referred for nephrolithiasis.    She was recently having intermittent left-sided abdominal pains a CT scan was performed.  CT scan 6/20/2024 shows an 8 mm nonobstructing left lower pole kidney stone.  On prior CT in 2022 the same stone was about 3 mm.  She does have intermittent left flank pain.  I discussed that this may or may not be related to this nonobstructing kidney stone.  Given growth over 2 years she is interested in having it treated so discussed ureteroscopy with laser lithotripsy and stent placement.    Of note, she is on oxybutynin 5 mg twice daily.  She has minimal urgency and frequency during the day but significant nocturia 3 times per night with occasional urge incontinence prior to making it to the bathroom.  This is improved on oxybutynin.  She does have significant dry mouth and constipation over show recommend switching to mirabegron.    Urinalysis (clinic dip): Greater than thousand glucose, small leukocyte esterase, otherwise negative      History:     Past Medical History:    Abdominal pain    Acute, but ill-defined, cerebrovascular disease    Arthritis    AVM (arteriovenous malformation) brain ([facility])    being followed at [facility]    Back pain    Bad breath    Bloating    Calculus of kidney    Constipation    Diabetes mellitus (HCC)    Diarrhea, unspecified    Fatigue    Fever    Food intolerance    Frequent urination    Heartburn    High cholesterol    History of depression    Indigestion    Irregular bowel habits    Leaking of urine    Leg swelling    Nausea    Neuropathy    TOES SOMETIMES    Osteoarthritis    Pain in joints    Pain with bowel movements     Presence of other cardiac implants and grafts    Problems with swallowing    Sleep disturbance    Sputum production    Stool incontinence    Stroke (HCC)    s/p Brain surgery    Stroke (HCC)    LEFT SIDE EFFECTED- USING CANE    Type II or unspecified type diabetes mellitus without mention of complication, not stated as uncontrolled    Uncomfortable fullness after meals    Vitamin D deficiency    Vomiting    Wears glasses    Weight gain       Past Surgical History:   Procedure Laterality Date    Brain surgery  2015    avm   [facility]          X3    Cholecystectomy      Colonoscopy      Hernia surgery      Other surgical history      LEFT TIBIA FRACTURE- PLATE AND SCREWS         Family History   Problem Relation Age of Onset    Diabetes Mother     Hypertension Mother     Hypertension Father     Heart Disorder Brother         CAD stent in his 40s    Diabetes Brother     Diabetes Sister     Heart Attack Brother        Social History     Socioeconomic History    Marital status:    Tobacco Use    Smoking status: Never    Smokeless tobacco: Never   Vaping Use    Vaping status: Never Used   Substance and Sexual Activity    Alcohol use: Never     Comment: rare    Drug use: No   Other Topics Concern    Caffeine Concern Yes     Comment: coffee 2 cups daily    Exercise No    Seat Belt Yes     Social Determinants of Health      Received [facility], [facility]    Social Connections       Medications (Active prior to today's visit):  Current Outpatient Medications   Medication Sig Dispense Refill    ergocalciferol 1.25 MG (74045 UT) Oral Cap Take 1 capsule (50,000 Units total) by mouth once a week. 12 capsule 0    dapagliflozin (FARXIGA) 10 MG Oral Tab Take 1 tablet (10 mg total) by mouth daily. 30 tablet 1    oxybutynin 5 MG Oral Tab Take 1 tablet (5 mg total) by mouth in the morning and 1 tablet (5 mg total) before bedtime. 60 tablet 3    Mirabegron ER  (MYRBETRIQ) 50 MG Oral Tablet 24 Hr Take 1 tablet (50 mg total) by mouth daily. 30 tablet 1    oxybutynin 5 MG Oral Tab       benzonatate 200 MG Oral Cap Take 1 capsule (200 mg total) by mouth 3 (three) times daily as needed for cough. (Patient not taking: Reported on 6/3/2024) 60 capsule 0    methylPREDNISolone (MEDROL) 4 MG Oral Tablet Therapy Pack As directed. 21 each 0    insulin degludec (TRESIBA FLEXTOUCH) 200 UNIT/ML Subcutaneous Solution Pen-injector Inject 36 units as directed daily. 3 mL 0    dextromethorphan-guaifenesin ER (MUCINEX DM MAXIMUM STRENGTH)  MG Oral Tablet 12 Hr Take 1 tablet by mouth every 12 (twelve) hours. (Patient not taking: Reported on 6/3/2024) 20 tablet 0    OneTouch Delica Lancets 33G Does not apply Misc 1 Lancet by Finger stick route daily. 100 each 0    metFORMIN  MG Oral Tablet 24 Hr Take 1 tablet (500 mg total) by mouth 2 (two) times daily with meals. 180 tablet 1    pantoprazole 40 MG Oral Tab EC Take 1 tablet (40 mg total) by mouth daily. 30 tablet 6    rosuvastatin 10 MG Oral Tab TAKE 1 TABLET(10 MG) BY MOUTH EVERY NIGHT 90 tablet 3    lisinopril 5 MG Oral Tab TAKE 1 TABLET(5 MG) BY MOUTH DAILY 90 tablet 1    timolol 0.5 % Ophthalmic Solution Place 1 drop into both eyes 2 (two) times daily.      Insulin Pen Needle (BD PEN NEEDLE TITO 2ND GEN) 32G X 4 MM Does not apply Misc USE AS DIRECTED 1 EACH INTO THE SKIN TWICE DAILY 200 each 1    Glucose Blood (ONETOUCH VERIO) In Vitro Strip TEST ONCE TO TWICE DAILY 100 strip 1       Allergies:  No Known Allergies      Review of Systems:   A comprehensive 10-point review of systems was completed.  Pertinent positives and negatives are noted in the the HPI.    Physical Exam:   CONSTITUTIONAL: Well developed, well nourished, in no acute distress  NEUROLOGIC: Alert and oriented  HEAD: Normocephalic, atraumatic  EYES: Sclera non-icteric  ENT: Hearing intact, moist mucous membranes  NECK: No obvious goiter or masses  RESPIRATORY:  Normal respiratory effort  SKIN: No evident rashes  ABDOMEN: Soft, non-tender, non-distended    Assessment & Plan:   Marcela Moreno is a 68 year old female with history of OA, diabetes, GERD, hyperlipidemia, depression, CVA referred for nephrolithiasis.    She was recently having intermittent left-sided abdominal pains a CT scan was performed.  CT scan 6/20/2024 shows an 8 mm nonobstructing left lower pole kidney stone.  On prior CT in 2022 the same stone was about 3 mm.  She does have intermittent left flank pain.  I discussed that this may or may not be related to this nonobstructing kidney stone.  Given growth over 2 years she is interested in having it treated so discussed ureteroscopy with laser lithotripsy and stent placement.    Of note, she is on oxybutynin 5 mg twice daily.  She has minimal urgency and frequency during the day but significant nocturia 3 times per night with occasional urge incontinence prior to making it to the bathroom.  This is improved on oxybutynin.  She does have significant dry mouth and constipation over show recommend switching to mirabegron.    -Stop oxybutynin, start mirabegron 50 mg daily  -OR for cystoscopy, left ureteroscopy, laser lithotripsy, stent placement      Thank you for this consult.    I have personally reviewed all relevant medical records, labs, and imaging.    Medical Decision Making  Nephrolithiasis: Undiagnosed new problem  Overactive bladder: Undiagnosed new problem    Amount and/or Complexity of Data Reviewed  External Data Reviewed: labs and notes.  Radiology: independent interpretation performed.    Risk  Prescription drug management.  Minor surgery with identified risk factors.        Gil Germain MD  Staff Urologist  [facility]  Office: [phone]

## 2024-07-02 NOTE — TELEPHONE ENCOUNTER
Hi,    Can you please schedule this patient for surgery.    Also, can you arrange for the patient to drop a urine sample for urine culture 1-2 weeks prior to the scheduled surgery date?     Thanks,  Gil     Urology Surgery Request  Surgeon: Gil Germain  Location (if known): [facility]  Procedure: Cystoscopy, LEFT ureteroscopy, laser lithotripsy, stent placement  Anesthesia: General   Time Frame: Next available  Time required: 60 minutes  Diagnosis: Nephrolithiasis  Special Equipment: C-arm    Antibiotics: per hospital protocol unless checked below   ___ Levaquin 500 mg IV   ___ Gemcitabine 2 g/100 mL NS bladder instillation to be given in OR    Estimated Post Op/Follow Up Appt:   1 week for cystoscopy/stent removal in clinic with me. Please schedule appointment at time of surgery scheduling.

## 2024-07-05 NOTE — TELEPHONE ENCOUNTER
Spoke with the patient.  Scheduling the surgery for 8/28/24. Also scheduled a  week f/u 9/5/24 @ 10:30am   Reviewed the pre-op instructions and will send via My Chart or mail to patient once confirmed.  Patient can contact me at [phone]

## 2024-07-07 NOTE — PROGRESS NOTES
José Miguel Medrano,    I have reviewed your urine test results. Tests show no significant abnormalities (no signs of infection in the urine). No changes to the plan as we had previously discussed. Please let me know if you have any questions.    Thanks,  Gil Germain MD

## 2024-07-09 DIAGNOSIS — E11.65 TYPE 2 DIABETES MELLITUS WITH HYPERGLYCEMIA, WITH LONG-TERM CURRENT USE OF INSULIN (HCC): ICD-10-CM

## 2024-07-09 DIAGNOSIS — Z79.4 TYPE 2 DIABETES MELLITUS WITH HYPERGLYCEMIA, WITH LONG-TERM CURRENT USE OF INSULIN (HCC): ICD-10-CM

## 2024-07-09 RX ORDER — BLOOD SUGAR DIAGNOSTIC
STRIP MISCELLANEOUS
Qty: 100 STRIP | Refills: 1 | Status: SHIPPED | OUTPATIENT
Start: 2024-07-09

## 2024-07-09 NOTE — TELEPHONE ENCOUNTER
Requested Prescriptions     Pending Prescriptions Disp Refills    Glucose Blood (ONETOUCH VERIO) In Vitro Strip [Pharmacy Med Name: ONE TOUCH VERIO TEST ST(NEW)100S] 100 strip 1     Sig: USE TO TEST ONCE TO TWICE DAILY     Your appointments       Date & Time Appointment Department ([place])    Jul 30, 2024 11:15 AM CDT EGD with LARONFI, PROCEDURE [facility] (--)    Please arrive 60 minutes prior to your scheduled procedure time.         Jul 30, 2024 11:30 AM CDT Colonoscopy with LARONFI, PROCEDURE [facility] (--)    Please arrive 60 minutes prior to your scheduled procedure time.         Aug 06, 2024 2:00 PM CDT Diabetes Pump follow up with Jessica Hicks APRN [address] ([facility] [place])        Aug 14, 2024 10:30 AM CDT Exam - New Patient with Hemanth Murillo MD [address] ([facility] [address])        Sep 05, 2024 10:30 AM CDT Procedure with Gil Germain MD [facility] ([facility] [place] [facility])              [address]  [address] [place] [address] [address]  [facility] [address] [phone] [address]  [facility]  [address]  [facility] [address] [phone] [facility]  [facility] [place] [facility]  [address]  [facility] [id number] [phone]    [facility]  No address on file  [phone]          Last A1c value was 8% done 6/4/2024.    Refill 8/1/23  LOV 6/4/24

## 2024-07-15 ENCOUNTER — HOSPITAL ENCOUNTER (EMERGENCY)
Facility: HOSPITAL | Age: 69
Discharge: HOME | End: 2024-07-15
Attending: EMERGENCY MEDICINE
Payer: MEDICARE

## 2024-07-15 ENCOUNTER — TELEPHONE (OUTPATIENT)
Dept: FAMILY MEDICINE CLINIC | Facility: CLINIC | Age: 69
End: 2024-07-15

## 2024-07-15 VITALS
BODY MASS INDEX: 33.99 KG/M2 | DIASTOLIC BLOOD PRESSURE: 70 MMHG | SYSTOLIC BLOOD PRESSURE: 168 MMHG | HEIGHT: 61 IN | WEIGHT: 180 LBS | TEMPERATURE: 98 F | RESPIRATION RATE: 22 BRPM | HEART RATE: 84 BPM | OXYGEN SATURATION: 98 %

## 2024-07-15 DIAGNOSIS — T45.2X1A VITAMIN D OVERDOSE, ACCIDENTAL OR UNINTENTIONAL, INITIAL ENCOUNTER: Primary | ICD-10-CM

## 2024-07-15 LAB
ALBUMIN SERPL-MCNC: 4 G/DL (ref 3.4–5)
ALBUMIN/GLOB SERPL: 1.1 {RATIO} (ref 1–2)
ALP LIVER SERPL-CCNC: 82 U/L
ALT SERPL-CCNC: 19 U/L
ANION GAP SERPL CALC-SCNC: 7 MMOL/L (ref 0–18)
AST SERPL-CCNC: 11 U/L (ref 15–37)
BASOPHILS # BLD AUTO: 0.05 X10(3) UL (ref 0–0.2)
BASOPHILS NFR BLD AUTO: 0.5 %
BILIRUB SERPL-MCNC: 0.6 MG/DL (ref 0.1–2)
BILIRUB UR QL STRIP.AUTO: NEGATIVE
BUN BLD-MCNC: 19 MG/DL (ref 9–23)
CALCIUM BLD-MCNC: 9.9 MG/DL (ref 8.5–10.1)
CHLORIDE SERPL-SCNC: 109 MMOL/L (ref 98–112)
CLARITY UR REFRACT.AUTO: CLEAR
CO2 SERPL-SCNC: 26 MMOL/L (ref 21–32)
CREAT BLD-MCNC: 1.05 MG/DL
EGFRCR SERPLBLD CKD-EPI 2021: 58 ML/MIN/1.73M2 (ref 60–?)
EOSINOPHIL # BLD AUTO: 0.26 X10(3) UL (ref 0–0.7)
EOSINOPHIL NFR BLD AUTO: 2.7 %
ERYTHROCYTE [DISTWIDTH] IN BLOOD BY AUTOMATED COUNT: 13.9 %
GLOBULIN PLAS-MCNC: 3.8 G/DL (ref 2.8–4.4)
GLUCOSE BLD-MCNC: 103 MG/DL (ref 70–99)
GLUCOSE UR STRIP.AUTO-MCNC: >1000 MG/DL
HCT VFR BLD AUTO: 40.4 %
HGB BLD-MCNC: 13.4 G/DL
IMM GRANULOCYTES # BLD AUTO: 0.02 X10(3) UL (ref 0–1)
IMM GRANULOCYTES NFR BLD: 0.2 %
KETONES UR STRIP.AUTO-MCNC: NEGATIVE MG/DL
LEUKOCYTE ESTERASE UR QL STRIP.AUTO: 25
LYMPHOCYTES # BLD AUTO: 2.6 X10(3) UL (ref 1–4)
LYMPHOCYTES NFR BLD AUTO: 26.7 %
MAGNESIUM SERPL-MCNC: 1.9 MG/DL (ref 1.6–2.6)
MCH RBC QN AUTO: 28.3 PG (ref 26–34)
MCHC RBC AUTO-ENTMCNC: 33.2 G/DL (ref 31–37)
MCV RBC AUTO: 85.4 FL
MONOCYTES # BLD AUTO: 0.65 X10(3) UL (ref 0.1–1)
MONOCYTES NFR BLD AUTO: 6.7 %
NEUTROPHILS # BLD AUTO: 6.17 X10 (3) UL (ref 1.5–7.7)
NEUTROPHILS # BLD AUTO: 6.17 X10(3) UL (ref 1.5–7.7)
NEUTROPHILS NFR BLD AUTO: 63.2 %
NITRITE UR QL STRIP.AUTO: NEGATIVE
OSMOLALITY SERPL CALC.SUM OF ELEC: 297 MOSM/KG (ref 275–295)
PH UR STRIP.AUTO: 5 [PH] (ref 5–8)
PHOSPHATE SERPL-MCNC: 3.6 MG/DL (ref 2.5–4.9)
PLATELET # BLD AUTO: 216 10(3)UL (ref 150–450)
POTASSIUM SERPL-SCNC: 3.9 MMOL/L (ref 3.5–5.1)
PROT SERPL-MCNC: 7.8 G/DL (ref 6.4–8.2)
RBC # BLD AUTO: 4.73 X10(6)UL
RBC UR QL AUTO: NEGATIVE
SODIUM SERPL-SCNC: 142 MMOL/L (ref 136–145)
SP GR UR STRIP.AUTO: 1.03 (ref 1–1.03)
UROBILINOGEN UR STRIP.AUTO-MCNC: NORMAL MG/DL
WBC # BLD AUTO: 9.8 X10(3) UL (ref 4–11)

## 2024-07-15 PROCEDURE — 96360 HYDRATION IV INFUSION INIT: CPT

## 2024-07-15 PROCEDURE — 83735 ASSAY OF MAGNESIUM: CPT | Performed by: EMERGENCY MEDICINE

## 2024-07-15 PROCEDURE — 84100 ASSAY OF PHOSPHORUS: CPT | Performed by: EMERGENCY MEDICINE

## 2024-07-15 PROCEDURE — 85025 COMPLETE CBC W/AUTO DIFF WBC: CPT | Performed by: EMERGENCY MEDICINE

## 2024-07-15 PROCEDURE — 80053 COMPREHEN METABOLIC PANEL: CPT | Performed by: EMERGENCY MEDICINE

## 2024-07-15 PROCEDURE — 93010 ELECTROCARDIOGRAM REPORT: CPT

## 2024-07-15 PROCEDURE — 87086 URINE CULTURE/COLONY COUNT: CPT | Performed by: EMERGENCY MEDICINE

## 2024-07-15 PROCEDURE — 81001 URINALYSIS AUTO W/SCOPE: CPT | Performed by: EMERGENCY MEDICINE

## 2024-07-15 PROCEDURE — 93005 ELECTROCARDIOGRAM TRACING: CPT

## 2024-07-15 PROCEDURE — 96361 HYDRATE IV INFUSION ADD-ON: CPT

## 2024-07-15 PROCEDURE — 99285 EMERGENCY DEPT VISIT HI MDM: CPT

## 2024-07-15 PROCEDURE — 99284 EMERGENCY DEPT VISIT MOD MDM: CPT

## 2024-07-15 RX ORDER — SODIUM CHLORIDE 9 MG/ML
INJECTION, SOLUTION INTRAVENOUS CONTINUOUS
Status: DISCONTINUED | OUTPATIENT
Start: 2024-07-15 | End: 2024-07-15

## 2024-07-15 NOTE — TELEPHONE ENCOUNTER
Patient called in because she took her last vitamin D yesterday, she was taking it daily instead of weekly, when she looked at the bottle she realized it was supposed to be weekly.   She has c/o stomach issue, nausea, vomiting, weakness in legs and tiredness  History of stomach issues  Denies frequent urination    LOV 6/3/24    Please advise

## 2024-07-15 NOTE — ED PROVIDER NOTES
Patient Seen in: [facility] Emergency Department      History     Chief Complaint   Patient presents with    Poisoning/Overdose     Stated Complaint: took vitamin d 50,000 units daily instead of weekly for the last 12 days, instr*    Subjective:   HPI    68-year-old female presents emergency room for evaluation of accidental vitamin D overdose.  Patient reports she has been taking vitamin D 50,000 units daily for the last 12 days, patient was supposed to take 50,000 units weekly, last dose was 2 days ago.  States she has felt nauseous and did vomit a few times this week.  Denies any abdominal pain.  Denies feeling nauseous or vomiting today.  States she did feel some generalized weakness and fatigue but feels she does feel better today patient called her primary care physician and was instructed go to the ER.  Patient denies palpitations, denies chest pain or shortness of breath.  Denies headache or neck pain.  Denies feel like there does appear denies visual change.  Denies any new focal weakness.  Patient does have a history of CVA with residual left-sided weakness.  Denies fevers or chills and denies urinary symptoms.    Objective:   Past Medical History:    Abdominal pain    Acute, but ill-defined, cerebrovascular disease    Arthritis    AVM (arteriovenous malformation) brain (HCC)    being followed at [facility]    Back pain    Bad breath    Bloating    Calculus of kidney    Constipation    Diabetes mellitus (HCC)    Diarrhea, unspecified    Fatigue    Fever    Food intolerance    Frequent urination    Heartburn    High blood pressure    High cholesterol    History of depression    Indigestion    Irregular bowel habits    Leaking of urine    Leg swelling    Nausea    Neuropathy    TOES SOMETIMES    Osteoarthritis    Pain in joints    Pain with bowel movements    Presence of other cardiac implants and grafts    Problems with swallowing    Sleep disturbance    Sputum production    Stool incontinence     Stroke (HCC)    s/p Brain surgery    Stroke (HCC)    LEFT SIDE EFFECTED- USING CANE    Type II or unspecified type diabetes mellitus without mention of complication, not stated as uncontrolled    Uncomfortable fullness after meals    Vitamin D deficiency    Vomiting    Wears glasses    Weight gain              Past Surgical History:   Procedure Laterality Date    Brain surgery  2015    avm   [facility]          X3    Cholecystectomy      Colonoscopy      Hernia surgery      Other surgical history      LEFT TIBIA FRACTURE- PLATE AND SCREWS                  Social History     Socioeconomic History    Marital status:    Tobacco Use    Smoking status: Never    Smokeless tobacco: Never   Vaping Use    Vaping status: Never Used   Substance and Sexual Activity    Alcohol use: Not Currently    Drug use: No   Other Topics Concern    Caffeine Concern Yes     Comment: coffee 2 cups daily    Exercise No    Seat Belt Yes     Social Determinants of Health      Received from [facility], [facility]    Social Connections              Review of Systems    Positive for stated Chief Complaint: Poisoning/Overdose    Other systems are as noted in HPI.  Constitutional and vital signs reviewed.      All other systems reviewed and negative except as noted above.    Physical Exam     ED Triage Vitals [07/15/24 1458]   /62   Pulse 81   Resp 12   Temp 98 °F (36.7 °C)   Temp src Oral   SpO2 97 %   O2 Device None (Room air)       Current Vitals:   Vital Signs  BP: (!) 168/70  Pulse: 84  Resp: 22  Temp: 98 °F (36.7 °C)  Temp src: Oral    Oxygen Therapy  SpO2: 98 %  O2 Device: None (Room air)            Physical Exam    GENERAL: Patient is awake, alert, well-appearing, in no acute distress.  HEENT:  no scleral icterus.  Mucous membranes are slightly dry, oropharynx is clear  HEART: Regular rate and rhythm, no murmurs.  LUNGS: Clear to auscultation bilaterally.  No Rales, no  rhonchi, no wheezing, no stridor.  ABDOMEN: Soft, nondistended,non tender  EXTREMITIES: No peripheral edema, no calf tenderness  NEUROLOGIC EXAM: Tongue midline, no facial drooping, no ptosis, muscle strength +5/5 bilateral upper and lower extremities       ED Course     Labs Reviewed   COMP METABOLIC PANEL (14) - Abnormal; Notable for the following components:       Result Value    Glucose 103 (*)     Creatinine 1.05 (*)     Calculated Osmolality 297 (*)     eGFR-Cr 58 (*)     AST 11 (*)     All other components within normal limits   URINALYSIS WITH CULTURE REFLEX - Abnormal; Notable for the following components:    Glucose Urine >1000 (*)     Protein Urine Trace (*)     Leukocyte Esterase Urine 25 (*)     Bacteria Urine Rare (*)     Squamous Epi. Cells Few (*)     All other components within normal limits   PHOSPHORUS - Normal   MAGNESIUM - Normal   CBC WITH DIFFERENTIAL WITH PLATELET    Narrative:     The following orders were created for panel order CBC With Differential With Platelet.  Procedure                               Abnormality         Status                     ---------                               -----------         ------                     CBC W/ DIFFERENTIAL[[id number]]                              Final result                 Please view results for these tests on the individual orders.   RAINBOW DRAW GOLD   RAINBOW DRAW BLUE   URINE CULTURE, ROUTINE   CBC W/ DIFFERENTIAL     EKG    Rate, intervals and axes as noted on EKG Report.  Rate: 77  Rhythm: Sinus Rhythm  Reading: Normal sinus rhythm.  No ST elevation                          MDM        Differential diagnosis before testing includes but not limited to dysrhythmia/arrhythmia, electrolyte abnormality, which is a medical condition that poses a threat to life/function    Past Medical History/comorbidities-CVA with residual left-sided weakness.  Diabetes        Discussion of management (consult/physicians, social work, pharmacy,ect) with  [place] Poison control    Medications Provided: IV normal saline    Course of Events during Emergency Room Visit include but blood work obtained.  [place] poison control was contacted and recommendations given, recommended checking CMP, calcium and Phos.  All of these were unremarkable, poison control was recontacted and no further recommendations and case was closed.  Patient was instructed to stay well-hydrated, follow-up with her primary care physician.  Return to ER if any change or worsening symptoms.  Patient is well-appearing and was discharged good condition with family    Shared decision making was utilized           Discharge  I have discussed with the patient the results of test, differential diagnosis, treatment plan, warning signs and symptoms which should prompt immediate return.  They expressed understanding of these instructions and agrees to the following plan provided.  They were given written discharge instructions and agrees to return for any concerns and voiced understanding and all questions were answered.    Note to patient: The 21st Century Cures Act makes medical notes like these available to patients in the interest of transparency. However, this is a medical document intended as peer to peer communication. It is written in medical language and may contain abbreviations or verbiage that are unfamiliar. It may appear blunt or direct. Medical documents are intended to carry relevant information, facts as evident, and the clinical opinion of the practitioner.                                            Medical Decision Making      Disposition and Plan     Clinical Impression:  1. Vitamin D overdose, accidental or unintentional, initial encounter         Disposition:  Discharge  7/15/2024  6:36 pm    Follow-up:  Rhiannon Betancourt MD  [address]  [facility] [address]  [phone]    Follow up in 2 day(s)            Medications Prescribed:  Discharge Medication List as of 7/15/2024  6:40 PM

## 2024-07-15 NOTE — TELEPHONE ENCOUNTER
Called patient and advised her to go to the ER for iv fluids and to have her liver checked.  Patient agreed to go to the ER

## 2024-07-15 NOTE — ED QUICK NOTES
Poison Control Case #[id number] Art  Most times this is handled well. Don't expect a lot.   Check CMP. Check Calcium, Serum. Check Phos. If abnormal, check EKG.  No monitoring needed.

## 2024-07-15 NOTE — ED INITIAL ASSESSMENT (HPI)
Patient accidentally took Vitamin D 50,000 Units daily instead of weekly for the last 12 days, instructed by her doctor to come to ER. Last dose was yesterday.  Vomiting, nausea X 1 week, bilateral leg weakness, tired feeling. Pain in left hip. Ambulatory.

## 2024-07-16 LAB
ATRIAL RATE: 77 BPM
P AXIS: 36 DEGREES
P-R INTERVAL: 168 MS
Q-T INTERVAL: 398 MS
QRS DURATION: 78 MS
QTC CALCULATION (BEZET): 450 MS
R AXIS: -17 DEGREES
T AXIS: 42 DEGREES
VENTRICULAR RATE: 77 BPM

## 2024-07-19 DIAGNOSIS — Z79.4 TYPE 2 DIABETES MELLITUS WITH HYPERGLYCEMIA, WITH LONG-TERM CURRENT USE OF INSULIN (HCC): ICD-10-CM

## 2024-07-19 DIAGNOSIS — E11.65 TYPE 2 DIABETES MELLITUS WITH HYPERGLYCEMIA, WITH LONG-TERM CURRENT USE OF INSULIN (HCC): ICD-10-CM

## 2024-07-19 DIAGNOSIS — R80.9 POSITIVE FOR MACROALBUMINURIA: ICD-10-CM

## 2024-07-19 RX ORDER — LISINOPRIL 5 MG/1
5 TABLET ORAL DAILY
Qty: 90 TABLET | Refills: 1 | Status: SHIPPED | OUTPATIENT
Start: 2024-07-19

## 2024-07-19 RX ORDER — PEN NEEDLE, DIABETIC 32GX 5/32"
NEEDLE, DISPOSABLE MISCELLANEOUS
Qty: 200 EACH | Refills: 1 | Status: SHIPPED | OUTPATIENT
Start: 2024-07-19

## 2024-07-19 NOTE — TELEPHONE ENCOUNTER
Received fax from [facility] requesting refill of lisinopril, pended  Requested Prescriptions     Pending Prescriptions Disp Refills    lisinopril 5 MG Oral Tab 90 tablet 1     Sig: Take 1 tablet (5 mg total) by mouth daily.     Your appointments       Date & Time Appointment Department ([place])    Jul 24, 2024 2:40 PM CDT Virtual Phone Visit with Rhiannon Betancourt MD [address] ([facility])    You have been scheduled for a Virtual Telephone visit with your provider. Please be available at your phone so that your physician can contact you, and be prepared with any questions or concerns. As always, your health is our priority.     We suggest confirming with your insurance regarding coverage prior to your appointment as some payors may no longer cover telephone visits. Depending on your insurance you may also be billed a copay at a later time.        Jul 30, 2024 11:15 AM CDT EGD with CODY, PROCEDURE [facility] (--)    Please arrive 60 minutes prior to your scheduled procedure time.         Jul 30, 2024 11:30 AM CDT Colonoscopy with CODY, PROCEDURE [facility] (--)    Please arrive 60 minutes prior to your scheduled procedure time.         Aug 06, 2024 2:00 PM CDT Diabetes Pump follow up with Jessica Hicks APRN [address] ([facility] [place])        Aug 14, 2024 10:30 AM CDT Exam - New Patient with Hemanth Murillo MD [address] ([address])        Sep 05, 2024 10:30 AM CDT Procedure with Gil Germain MD [facility] ([facility] [place] [facility])              [address]  [address] [place] [address]  [phone] [address]  [facility]  [address]  [facility] [id number] [phone] [facility], [facility] [address] [place]  [facility] [place]  [address]  [facility] [id number] [phone]    [address]  [facility] [id number] [phone] E[place] Marie address on file  [phone]          Last A1c value was 8% done 6/4/2024.    Refill 11/28/23  LOV 6/4/24

## 2024-07-19 NOTE — TELEPHONE ENCOUNTER
Requested Prescriptions     Pending Prescriptions Disp Refills    Insulin Pen Needle (BD PEN NEEDLE TITO 2ND GEN) 32G X 4 MM Does not apply Misc [Pharmacy Med Name: B-D TITO 2ND GEN PEN NDL [id number]] 200 each 1     Sig: USE TWICE DAILY AS DIRECTED     HGBA1C:    Lab Results   Component Value Date    A1C 8.0 (H) 06/04/2024    A1C 8.2 (A) 05/21/2024    A1C 7.7 (A) 02/22/2024     (H) 06/04/2024     Your Appointments      Wednesday July 24, 2024 2:40 PM  Virtual Phone Visit with Rhiannon Betancourt MD  [address] ([facility]) [address] [address] [address] [phone]   You have been scheduled for a Virtual Telephone visit with your provider. Please be available at your phone so that your physician can contact you, and be prepared with any questions or concerns. As always, your health is our priority.     We suggest confirming with your insurance regarding coverage prior to your appointment as some payors may no longer cover telephone visits. Depending on your insurance you may also be billed a copay at a later time.         Tuesday July 30, 2024  Gastroenterology Procedure, Gastroenterology Procedure with Guerrero Moser MD  [facility] (--) [address] [facility] [address] [phone]   Medication Instructions:  To ensure a safe procedure/surgery there are certain medications that may  need to be stopped prior to your procedure.  Please call your surgeon and prescribing physician for instructions on  stopping the following blood thinning medications prior to your  procedure/surgery.  Your physician will provide you with instructions on  whether or not you will need to stop and when to stop these medications. Coumadin Xarelto Pradaxa, Ticlid Aspirin Ibuprofen, Motrin, Aleve Meloxicam, Naproxen, Etodolac, Diclofenac * Any blood thinning medication  If you are taking any herbal supplements you need  to stop them at least 14  days before your procedure or at the time of our call whichever comes  first.  If you are taking any weight loss products (anorexiants), such as  Phentermine, you will need to stop taking them at least 10 days prior to  your procedure/surgery or at the time of the screening phone call  whichever comes first.  If you are taking a GLP-1 Agonist medication for weight loss such as  Liraglutide (Saxenda), Semaglutide (Ozempic),  Dulaglutide (Trulicity),   Exenatide (Byetta), Liraglutide (Victoza), Tirzepatide (Mounjaro) daily  hold the medication the day of your surgery/procedure, it you are taking  this medication weekly, hold it one week prior to your surgery/procedure.  If you are taking a SGLT2 inhibitor medication such as Canagliflozin  (Invokana), Dapagliflozin (Farxiga), Empagliflozin (Jardiance), or  Ertugliflozin (Steglatro) you will need to hold these medications for 4  days prior to the day of your surgery/procedure or as instructed during  the screening phone call. If you are unsure if you are taking any of the above types of medications,  please check with your provider.  Diabetes Patient Instructions  Diabetes Medications To keep your blood glucose in control before, during and after your  procedure/surgery, call the doctor who manages your insulin for  instructions for taking your insulin the day before surgery and the  morning of surgery  If you are taking oral medications for your diabetes, you will need to  hold the morning dose the day of your surgery/procedure If you are taking a SGLT2 Inhibitor medication such as   Canagliflozin(Invokana), Dapagliflozin (Farxiga), Empagliflozin  (Jardiance), or Ertugliflozin (Steglatro) you will need to hold these  medications for 4 days prior to the day of your surgery/procedure or as  instructed during the screening phone call.  Carefully monitor your blood  sugar while you are holding these medications and contact your prescribing   physician with any abnormal blood sugar levels.If you are taking oral  medications for your diabetes, you will need to hold the morning dose the  day of your surgery/procedure If you are taking a GLP-1 Agonist medication such as Liraglutide  (Saxenda), Semaglutide (Ozempic),  Dulaglutide (Trulicity),  Exenatide  (Byetta), Liraglutide (Victoza), Tirzepatide (Mounjaro) daily hold the  medication the day of your surgery/procedure, it you are taking this  medication weekly, hold it one week prior to your surgery/procedure.  If you are unsure if you are taking a SGLT2 Inhibitor or GLP-1 Agonist,  please check with your provider. Blood Glucose Monitoring    To maintain safe blood glucose levels, check your blood glucose: The evening before bed When awakening in the morning Then every 4 hours until admission to the hospital   If blood glucose is 70 or below  Take glucose tablets (15-16 grams of carbohydrate) or drink 4 ounces (1/2  cup) of a clear sugar-containing beverage such as apple juice or  lemon-lime soda (not diet) Recheck blood glucose in 15 minutes If blood glucose is not greater than 70 mg/dL in 15 minutes, repeat  treatment   You must inform surgical staff if you experienced any hypoglycemia  episodes when you arrive at the hospital       Masks are optional for all patients and visitors, unless otherwise indicated.     Tuesday July 30, 2024 11:15 AM  EGD with CODY, PROCEDURE  MIG[facility] Scott (--) No address on file  [phone]   Please arrive 60 minutes prior to your scheduled procedure time.          Tuesday July 30, 2024 11:30 AM  Colonoscopy with CODY, PROCEDURE  [facility] (--) No address on file  [phone]   Please arrive 60 minutes prior to your scheduled procedure time.          Tuesday August 06, 2024 2:00 PM  Diabetes Pump follow up with BIBIANA Grigsby  [facility] [address] ([facility] [place]) [address] [address] [facility]  [address]  [phone]        Wednesday August 14, 2024 10:30 AM  Exam - New Patient with Hemanth Murillo MD  [facility] [place], [address] [place] [address] ([address]) [address] [place] [address] [address] [facility] [address]  [facility] [address]  [phone]        Wednesday August 28, 2024  Urology Procedure with Gil Germain MD  [facility] (--) [address] [facility] [address]  [phone]   Masks are optional for all patients and visitors, unless otherwise indicated.     Thursday September 05, 2024 10:30 AM  Procedure with Gil Germain MD  [facility] [place], [facility] ([place] [address]) [address]  [facility] [address]  [phone]               Last Refill:  11-2-2023-200 each with 1 refills    Last OFFICE VISIT: 6-4-2024-

## 2024-07-20 RX ORDER — PANTOPRAZOLE SODIUM 40 MG/1
40 TABLET, DELAYED RELEASE ORAL DAILY
Qty: 30 TABLET | Refills: 6 | Status: SHIPPED | OUTPATIENT
Start: 2024-07-20

## 2024-07-20 RX ORDER — PANTOPRAZOLE SODIUM 40 MG/1
40 TABLET, DELAYED RELEASE ORAL DAILY
Qty: 30 TABLET | Refills: 6 | OUTPATIENT
Start: 2024-07-20

## 2024-07-24 ENCOUNTER — VIRTUAL PHONE E/M (OUTPATIENT)
Dept: FAMILY MEDICINE CLINIC | Facility: CLINIC | Age: 69
End: 2024-07-24
Payer: MEDICARE

## 2024-07-24 DIAGNOSIS — E67.3 HYPERVITAMINOSIS D: Primary | ICD-10-CM

## 2024-07-24 PROCEDURE — 99442 PHONE E/M BY PHYS 11-20 MIN: CPT | Performed by: FAMILY MEDICINE

## 2024-07-24 PROCEDURE — 1159F MED LIST DOCD IN RCRD: CPT | Performed by: FAMILY MEDICINE

## 2024-07-24 PROCEDURE — 1160F RVW MEDS BY RX/DR IN RCRD: CPT | Performed by: FAMILY MEDICINE

## 2024-07-24 NOTE — PROGRESS NOTES
Marcela Moreno verbally consents to a Virtual/Telephone Check-In service on 07/24/24.    Time spent: 11 min.      Marcela Moreno is a 68 year old female who presents for taking vit. D daily.    HPI:  Pt took vitamin D every day instead of every week, got some vomiting and nausea, went to ER, normal labs, IV fluids given.  Pt feels much better.    Currently patient presents with no symptoms.  Denies abdominal pain, jaundice, abnormal discoloration of the stool or skin, no anorexia, nausea or vomiting. No pruritis, no abdominal distention.No yellow discoloration of the sclera.      Wt Readings from Last 6 Encounters:   07/15/24 180 lb (81.6 kg)   06/04/24 181 lb (82.1 kg)   06/03/24 181 lb (82.1 kg)   07/12/24 180 lb (81.6 kg)   05/21/24 180 lb (81.6 kg)   04/25/24 180 lb (81.6 kg)     There is no height or weight on file to calculate BMI.     Cholesterol, Total (mg/dL)   Date Value   06/04/2024 180   11/20/2023 157   09/26/2022 239 (H)     CHOLESTEROL (mg/dL)   Date Value   10/09/2013 245 (H)     HDL Cholesterol (mg/dL)   Date Value   06/04/2024 54   11/20/2023 52   09/26/2022 55     HDL CHOL (mg/dL)   Date Value   10/09/2013 44 (L)     LDL Cholesterol (mg/dL)   Date Value   06/04/2024 95   11/20/2023 87   09/26/2022 165 (H)     LDL CHOLESTROL (mg/dL)   Date Value   10/09/2013 170 (H)     AST (U/L)   Date Value   07/15/2024 11 (L)   06/04/2024 13 (L)   11/20/2023 18   10/09/2013 17     ALT (U/L)   Date Value   07/15/2024 19   06/04/2024 14   11/20/2023 13   10/09/2013 19      Current Outpatient Medications   Medication Sig Dispense Refill    PANTOPRAZOLE 40 MG Oral Tab EC TAKE 1 TABLET(40 MG) BY MOUTH DAILY 30 tablet 6    Insulin Pen Needle (BD PEN NEEDLE TITO 2ND GEN) 32G X 4 MM Does not apply Misc USE TWICE DAILY AS DIRECTED 200 each 1    lisinopril 5 MG Oral Tab Take 1 tablet (5 mg total) by mouth daily. 90 tablet 1    Glucose Blood (ONETOUCH VERIO) In Vitro Strip USE TO TEST ONCE TO TWICE DAILY 100 strip 1     Mirabegron ER 50 MG Oral Tablet 24 Hr Take 1 tablet (50 mg total) by mouth daily. 90 tablet 5    dapagliflozin (FARXIGA) 10 MG Oral Tab Take 1 tablet (10 mg total) by mouth daily. (Patient taking differently: Take 1 tablet (10 mg total) by mouth daily.) 30 tablet 1    insulin degludec (TRESIBA FLEXTOUCH) 200 UNIT/ML Subcutaneous Solution Pen-injector Inject 36 units as directed daily. (Patient taking differently: Inject 32 units as directed daily.) 3 mL 0    OneTouch Delica Lancets 33G Does not apply Misc 1 Lancet by Finger stick route daily. 100 each 0    metFORMIN  MG Oral Tablet 24 Hr Take 1 tablet (500 mg total) by mouth 2 (two) times daily with meals. 180 tablet 1    rosuvastatin 10 MG Oral Tab TAKE 1 TABLET(10 MG) BY MOUTH EVERY NIGHT 90 tablet 3    timolol 0.5 % Ophthalmic Solution Place 1 drop into both eyes 2 (two) times daily.        Past Medical History:    Abdominal pain    Acute, but ill-defined, cerebrovascular disease    Arthritis    AVM (arteriovenous malformation) brain ([facility])    being followed at [facility]    Back pain    Bad breath    Bloating    Calculus of kidney    Constipation    Diabetes mellitus ([facility])    Diarrhea, unspecified    Fatigue    Fever    Food intolerance    Frequent urination    Heartburn    High blood pressure    High cholesterol    History of depression    Indigestion    Irregular bowel habits    Leaking of urine    Leg swelling    Nausea    Neuropathy    TOES SOMETIMES    Osteoarthritis    Pain in joints    Pain with bowel movements    Presence of other cardiac implants and grafts    Problems with swallowing    Sleep disturbance    Sputum production    Stool incontinence    Stroke ([facility])    s/p Brain surgery    Stroke ([facility])    LEFT SIDE EFFECTED- USING CANE    Type II or unspecified type diabetes mellitus without mention of complication, not stated as uncontrolled    Uncomfortable fullness after meals    Vitamin D deficiency    Vomiting    Wears glasses    Weight gain       Past Surgical History:   Procedure Laterality Date    Brain surgery  2015    avm   [facility]          X3    Cholecystectomy      Colonoscopy      Hernia surgery      Other surgical history      LEFT TIBIA FRACTURE- PLATE AND SCREWS  2017      Family History   Problem Relation Age of Onset    Diabetes Mother     Hypertension Mother     Hypertension Father     Heart Disorder Brother         CAD stent in his 40s    Diabetes Brother     Diabetes Sister     Heart Attack Brother       Social History:  Social History     Socioeconomic History    Marital status:    Tobacco Use    Smoking status: Never    Smokeless tobacco: Never   Vaping Use    Vaping status: Never Used   Substance and Sexual Activity    Alcohol use: Not Currently    Drug use: No   Other Topics Concern    Caffeine Concern Yes     Comment: coffee 2 cups daily    Exercise No    Seat Belt Yes     Social Determinants of Health      [facility]    Social Connections         REVIEW OF SYSTEMS:   GENERAL: feels tired, no lethargy, no fever, no chills  SKIN: denies any unusual skin lesions, rash.  HEENT:no abnormal taste  LUNGS: denies shortness of breath with exertion  CARDIOVASCULAR: denies chest pain on exertion  GI: as above.No heartburn.No melena, no rectal bleeding.No vomiting.  :no hematuria  MUSCULOSKELETAL: no myalgia      EXAM:   LMP  (LMP Unknown)   There is no height or weight on file to calculate BMI.   GENERAL: in no apparent distress  HEENT: normal voice  LUNGS: no SOB  PSYCH: normal mood.     ASSESSMENT AND PLAN:   Marcela Moreno is a 68 year old female who presents:   Diagnoses and all orders for this visit:    Hypervitaminosis D  -     Vitamin D; Future  -     Comp Metabolic Panel (14); Future     Resolved, ER visit d/w the pt.keep good hydration.

## 2024-07-25 ENCOUNTER — TELEPHONE (OUTPATIENT)
Dept: FAMILY MEDICINE CLINIC | Facility: CLINIC | Age: 69
End: 2024-07-25

## 2024-07-25 ENCOUNTER — LAB ENCOUNTER (OUTPATIENT)
Dept: LAB | Age: 69
End: 2024-07-25
Attending: FAMILY MEDICINE
Payer: MEDICARE

## 2024-07-25 DIAGNOSIS — R30.0 DYSURIA: Primary | ICD-10-CM

## 2024-07-25 DIAGNOSIS — E67.3 HYPERVITAMINOSIS D: ICD-10-CM

## 2024-07-25 DIAGNOSIS — R30.0 DYSURIA: ICD-10-CM

## 2024-07-25 LAB
ALBUMIN SERPL-MCNC: 4.2 G/DL (ref 3.2–4.8)
ALBUMIN/GLOB SERPL: 1.6 {RATIO} (ref 1–2)
ALP LIVER SERPL-CCNC: 72 U/L
ALT SERPL-CCNC: <7 U/L
ANION GAP SERPL CALC-SCNC: 8 MMOL/L (ref 0–18)
AST SERPL-CCNC: 15 U/L (ref ?–34)
BILIRUB SERPL-MCNC: 0.5 MG/DL (ref 0.2–1.1)
BILIRUB UR QL STRIP.AUTO: NEGATIVE
BUN BLD-MCNC: 16 MG/DL (ref 9–23)
CALCIUM BLD-MCNC: 9.2 MG/DL (ref 8.7–10.4)
CHLORIDE SERPL-SCNC: 107 MMOL/L (ref 98–112)
CLARITY UR REFRACT.AUTO: CLEAR
CO2 SERPL-SCNC: 25 MMOL/L (ref 21–32)
CREAT BLD-MCNC: 0.94 MG/DL
EGFRCR SERPLBLD CKD-EPI 2021: 67 ML/MIN/1.73M2 (ref 60–?)
FASTING STATUS PATIENT QL REPORTED: NO
GLOBULIN PLAS-MCNC: 2.7 G/DL (ref 2–3.5)
GLUCOSE BLD-MCNC: 134 MG/DL (ref 70–99)
GLUCOSE UR STRIP.AUTO-MCNC: >1000 MG/DL
KETONES UR STRIP.AUTO-MCNC: NEGATIVE MG/DL
LEUKOCYTE ESTERASE UR QL STRIP.AUTO: NEGATIVE
NITRITE UR QL STRIP.AUTO: NEGATIVE
OSMOLALITY SERPL CALC.SUM OF ELEC: 293 MOSM/KG (ref 275–295)
PH UR STRIP.AUTO: 5 [PH] (ref 5–8)
POTASSIUM SERPL-SCNC: 4.1 MMOL/L (ref 3.5–5.1)
PROT SERPL-MCNC: 6.9 G/DL (ref 5.7–8.2)
PROT UR STRIP.AUTO-MCNC: 30 MG/DL
RBC UR QL AUTO: NEGATIVE
SODIUM SERPL-SCNC: 140 MMOL/L (ref 136–145)
SP GR UR STRIP.AUTO: 1.03 (ref 1–1.03)
UROBILINOGEN UR STRIP.AUTO-MCNC: NORMAL MG/DL
VIT D+METAB SERPL-MCNC: 84.7 NG/ML (ref 30–100)
YEAST UR QL: PRESENT /HPF

## 2024-07-25 PROCEDURE — 81001 URINALYSIS AUTO W/SCOPE: CPT

## 2024-07-25 PROCEDURE — 87086 URINE CULTURE/COLONY COUNT: CPT

## 2024-07-25 PROCEDURE — 82306 VITAMIN D 25 HYDROXY: CPT

## 2024-07-25 PROCEDURE — 80053 COMPREHEN METABOLIC PANEL: CPT

## 2024-07-25 PROCEDURE — 36415 COLL VENOUS BLD VENIPUNCTURE: CPT

## 2024-07-25 NOTE — TELEPHONE ENCOUNTER
Pt here for labs, orders received from Dr. Betancourt to order UA & C/S for dysuria. Orders placed in EPIC.

## 2024-07-30 ENCOUNTER — ANESTHESIA (OUTPATIENT)
Dept: ENDOSCOPY | Facility: HOSPITAL | Age: 69
End: 2024-07-30
Payer: MEDICARE

## 2024-07-30 ENCOUNTER — ANESTHESIA EVENT (OUTPATIENT)
Dept: ENDOSCOPY | Facility: HOSPITAL | Age: 69
End: 2024-07-30
Payer: MEDICARE

## 2024-07-30 ENCOUNTER — HOSPITAL ENCOUNTER (OUTPATIENT)
Facility: HOSPITAL | Age: 69
Setting detail: HOSPITAL OUTPATIENT SURGERY
Discharge: HOME | End: 2024-07-30
Attending: INTERNAL MEDICINE | Admitting: INTERNAL MEDICINE
Payer: MEDICARE

## 2024-07-30 VITALS
SYSTOLIC BLOOD PRESSURE: 109 MMHG | OXYGEN SATURATION: 99 % | WEIGHT: 180 LBS | DIASTOLIC BLOOD PRESSURE: 54 MMHG | HEIGHT: 61 IN | BODY MASS INDEX: 33.99 KG/M2 | RESPIRATION RATE: 16 BRPM | HEART RATE: 91 BPM | TEMPERATURE: 98 F

## 2024-07-30 DIAGNOSIS — R11.14 BILIOUS VOMITING WITH NAUSEA: ICD-10-CM

## 2024-07-30 DIAGNOSIS — Z86.010 PERSONAL HISTORY OF COLONIC POLYPS: ICD-10-CM

## 2024-07-30 DIAGNOSIS — K21.00 GASTROESOPHAGEAL REFLUX DISEASE WITH ESOPHAGITIS WITHOUT HEMORRHAGE: ICD-10-CM

## 2024-07-30 LAB
GLUCOSE BLD-MCNC: 190 MG/DL (ref 70–99)
GLUCOSE BLD-MCNC: 67 MG/DL (ref 70–99)

## 2024-07-30 PROCEDURE — 0DB48ZX EXCISION OF ESOPHAGOGASTRIC JUNCTION, VIA NATURAL OR ARTIFICIAL OPENING ENDOSCOPIC, DIAGNOSTIC: ICD-10-PCS | Performed by: INTERNAL MEDICINE

## 2024-07-30 PROCEDURE — 88305 TISSUE EXAM BY PATHOLOGIST: CPT | Performed by: INTERNAL MEDICINE

## 2024-07-30 PROCEDURE — 0DB58ZX EXCISION OF ESOPHAGUS, VIA NATURAL OR ARTIFICIAL OPENING ENDOSCOPIC, DIAGNOSTIC: ICD-10-PCS | Performed by: INTERNAL MEDICINE

## 2024-07-30 PROCEDURE — 0DB78ZX EXCISION OF STOMACH, PYLORUS, VIA NATURAL OR ARTIFICIAL OPENING ENDOSCOPIC, DIAGNOSTIC: ICD-10-PCS | Performed by: INTERNAL MEDICINE

## 2024-07-30 PROCEDURE — 82962 GLUCOSE BLOOD TEST: CPT

## 2024-07-30 PROCEDURE — 0DJD8ZZ INSPECTION OF LOWER INTESTINAL TRACT, VIA NATURAL OR ARTIFICIAL OPENING ENDOSCOPIC: ICD-10-PCS | Performed by: INTERNAL MEDICINE

## 2024-07-30 RX ORDER — DEXTROSE MONOHYDRATE 25 G/50ML
50 INJECTION, SOLUTION INTRAVENOUS
Status: DISCONTINUED | OUTPATIENT
Start: 2024-07-30 | End: 2024-07-30

## 2024-07-30 RX ORDER — HYDROMORPHONE HYDROCHLORIDE 1 MG/ML
0.4 INJECTION, SOLUTION INTRAMUSCULAR; INTRAVENOUS; SUBCUTANEOUS EVERY 5 MIN PRN
Status: DISCONTINUED | OUTPATIENT
Start: 2024-07-30 | End: 2024-07-30

## 2024-07-30 RX ORDER — HYDROMORPHONE HYDROCHLORIDE 1 MG/ML
0.2 INJECTION, SOLUTION INTRAMUSCULAR; INTRAVENOUS; SUBCUTANEOUS EVERY 5 MIN PRN
Status: DISCONTINUED | OUTPATIENT
Start: 2024-07-30 | End: 2024-07-30

## 2024-07-30 RX ORDER — SODIUM CHLORIDE, SODIUM LACTATE, POTASSIUM CHLORIDE, CALCIUM CHLORIDE 600; 310; 30; 20 MG/100ML; MG/100ML; MG/100ML; MG/100ML
INJECTION, SOLUTION INTRAVENOUS CONTINUOUS
Status: DISCONTINUED | OUTPATIENT
Start: 2024-07-30 | End: 2024-07-30

## 2024-07-30 RX ORDER — HYDROMORPHONE HYDROCHLORIDE 1 MG/ML
0.6 INJECTION, SOLUTION INTRAMUSCULAR; INTRAVENOUS; SUBCUTANEOUS EVERY 5 MIN PRN
Status: DISCONTINUED | OUTPATIENT
Start: 2024-07-30 | End: 2024-07-30

## 2024-07-30 RX ORDER — NICOTINE POLACRILEX 4 MG
30 LOZENGE BUCCAL
Status: DISCONTINUED | OUTPATIENT
Start: 2024-07-30 | End: 2024-07-30

## 2024-07-30 RX ORDER — NICOTINE POLACRILEX 4 MG
15 LOZENGE BUCCAL
Status: DISCONTINUED | OUTPATIENT
Start: 2024-07-30 | End: 2024-07-30

## 2024-07-30 RX ORDER — NALOXONE HYDROCHLORIDE 0.4 MG/ML
0.08 INJECTION, SOLUTION INTRAMUSCULAR; INTRAVENOUS; SUBCUTANEOUS AS NEEDED
Status: DISCONTINUED | OUTPATIENT
Start: 2024-07-30 | End: 2024-07-30

## 2024-07-30 RX ORDER — LIDOCAINE HYDROCHLORIDE 10 MG/ML
INJECTION, SOLUTION EPIDURAL; INFILTRATION; INTRACAUDAL; PERINEURAL AS NEEDED
Status: DISCONTINUED | OUTPATIENT
Start: 2024-07-30 | End: 2024-07-30 | Stop reason: SURG

## 2024-07-30 RX ORDER — DEXTROSE, SODIUM CHLORIDE, SODIUM LACTATE, POTASSIUM CHLORIDE, AND CALCIUM CHLORIDE 5; .6; .31; .03; .02 G/100ML; G/100ML; G/100ML; G/100ML; G/100ML
INJECTION, SOLUTION INTRAVENOUS CONTINUOUS
Status: DISCONTINUED | OUTPATIENT
Start: 2024-07-30 | End: 2024-07-30

## 2024-07-30 RX ADMIN — SODIUM CHLORIDE, SODIUM LACTATE, POTASSIUM CHLORIDE, CALCIUM CHLORIDE: 600; 310; 30; 20 INJECTION, SOLUTION INTRAVENOUS at 11:16:00

## 2024-07-30 RX ADMIN — SODIUM CHLORIDE, SODIUM LACTATE, POTASSIUM CHLORIDE, CALCIUM CHLORIDE: 600; 310; 30; 20 INJECTION, SOLUTION INTRAVENOUS at 10:40:00

## 2024-07-30 RX ADMIN — LIDOCAINE HYDROCHLORIDE 25 MG: 10 INJECTION, SOLUTION EPIDURAL; INFILTRATION; INTRACAUDAL; PERINEURAL at 10:46:00

## 2024-07-30 NOTE — OPERATIVE REPORT
Marcela Moreno Patient Status:  Hospital Outpatient Surgery    10/22/1955 MRN [id number]   [facility] Attending Guerrero Moser MD   Date 2024 PCP Rhiannon Betancourt MD     PREOPERATIVE DIAGNOSIS/INDICATION: H/o polyps  POSTOPERTATIVE DIAGNOSIS: Diverticulosis  PROCEDURE PERFORMED: COLONOSCOPY  SEDATION: MAC sedation provided by General Anesthesia    TIME OUT WAS PERFORMED    INFORMED CONSENT: Risks, benefits and alternatives to the procedure were explained to the patient including but not limited to bleeding, infection, perforation, adverse drug reactions, pancreatitis and the need for hospitalization and surgery if this occurs, the patient understands and agrees to procedure.  PROCEDURE DESCRIPTION: After careful digital rectal examination a pediatric colonoscope was introduced into the patients rectum, advanced pass the recto sigmoid junction, into the descending colon, splenic flexure, transverse colon, hepatic flexure, ascending colon, cecum, confirmed by landmarks, including the appendiceal orifice and ileocecal valve. Careful examination of the above described areas was performed on withdrawal of the endoscope. Retroflexion was performed on the rectum. The patient tolerated the procedure well, there were no immediate complication immediately following the procedure, and the patient was transferred to recovery in stable condition.  QUALITY OF PREPARATION: Browning Bowel Preparation Scale:            -      Right colon 3, Transverse colon 3, Left colon 3   FINDINGS/THERAPEUTICS:  COLON: Mild diffuse diverticulosis  RECOMMENDATIONS:   Post Colonoscopy precautions, watch for bleeding, infection, perforation, adverse drug reactions   Repeat colonoscopy in 10 years.    Guerrero Moser MD  2024  11:19 AM

## 2024-07-30 NOTE — ANESTHESIA PREPROCEDURE EVALUATION
PRE-OP EVALUATION    Patient Name: Marcela Moreno    Admit Diagnosis: Personal history of colonic polyps [Z86.010]  Gastroesophageal reflux disease with esophagitis without hemorrhage [K21.00]  Bilious vomiting with nausea [R11.14]    Pre-op Diagnosis: Personal history of colonic polyps [Z86.010]  Gastroesophageal reflux disease with esophagitis without hemorrhage [K21.00]  Bilious vomiting with nausea [R11.14]    ESOPHAGOGASTRODUODENOSCOPY (EGD), COLONOSCOPY    Anesthesia Procedure: ESOPHAGOGASTRODUODENOSCOPY (EGD), COLONOSCOPY  COLONOSCOPY    Surgeons and Role:     * Guerrero Moser MD - Primary    Pre-op vitals reviewed.        Body mass index is 34.01 kg/m².    Current medications reviewed.  Hospital Medications:  No current facility-administered medications on file as of 7/30/2024.       Outpatient Medications:     Medications Prior to Admission   Medication Sig Dispense Refill Last Dose    Mirabegron ER 50 MG Oral Tablet 24 Hr Take 1 tablet (50 mg total) by mouth daily. 90 tablet 5     dapagliflozin (FARXIGA) 10 MG Oral Tab Take 1 tablet (10 mg total) by mouth daily. (Patient taking differently: Take 1 tablet (10 mg total) by mouth daily.) 30 tablet 1     insulin degludec (TRESIBA FLEXTOUCH) 200 UNIT/ML Subcutaneous Solution Pen-injector Inject 36 units as directed daily. (Patient taking differently: Inject 32 units as directed daily.) 3 mL 0     metFORMIN  MG Oral Tablet 24 Hr Take 1 tablet (500 mg total) by mouth 2 (two) times daily with meals. 180 tablet 1     rosuvastatin 10 MG Oral Tab TAKE 1 TABLET(10 MG) BY MOUTH EVERY NIGHT 90 tablet 3     timolol 0.5 % Ophthalmic Solution Place 1 drop into both eyes 2 (two) times daily.       PANTOPRAZOLE 40 MG Oral Tab EC TAKE 1 TABLET(40 MG) BY MOUTH DAILY 30 tablet 6     Insulin Pen Needle (BD PEN NEEDLE TITO 2ND GEN) 32G X 4 MM Does not apply Misc USE TWICE DAILY AS DIRECTED 200 each 1     lisinopril 5 MG Oral Tab Take 1 tablet (5 mg total) by mouth  daily. 90 tablet 1     Glucose Blood (ONETOUCH VERIO) In Vitro Strip USE TO TEST ONCE TO TWICE DAILY 100 strip 1     OneTouch Delica Lancets 33G Does not apply Misc 1 Lancet by Finger stick route daily. 100 each 0        Allergies: Patient has no known allergies.      Anesthesia Evaluation    Patient summary reviewed.    Anesthetic Complications           GI/Hepatic/Renal                                 Cardiovascular                (+) obesity  (+) hypertension                                     Endo/Other      (+) diabetes                            Pulmonary                    (+) sleep apnea       Neuro/Psych          (+) CVA                            Past Surgical History:   Procedure Laterality Date    Brain surgery  2015    avm   [facility]          X3    Cholecystectomy      Colonoscopy      Hernia surgery      Other surgical history      LEFT TIBIA FRACTURE- PLATE AND SCREWS       Social History     Socioeconomic History    Marital status:    Tobacco Use    Smoking status: Never    Smokeless tobacco: Never   Vaping Use    Vaping status: Never Used   Substance and Sexual Activity    Alcohol use: Not Currently    Drug use: No   Other Topics Concern    Caffeine Concern Yes     Comment: coffee 2 cups daily    Exercise No    Seat Belt Yes     History   Drug Use No     Available pre-op labs reviewed.  Lab Results   Component Value Date    WBC 9.8 07/15/2024    RBC 4.73 07/15/2024    HGB 13.4 07/15/2024    HCT 40.4 07/15/2024    MCV 85.4 07/15/2024    MCH 28.3 07/15/2024    MCHC 33.2 07/15/2024    RDW 13.9 07/15/2024    .0 07/15/2024     Lab Results   Component Value Date     2024    K 4.1 2024     2024    CO2 25.0 2024    BUN 16 2024    CREATSERUM 0.94 2024     (H) 2024    CA 9.2 2024            Airway      Mallampati: II  Mouth opening: <3 FB  TM distance: < 4 cm  Neck ROM: limited  Cardiovascular    Cardiovascular exam normal.  Rhythm: regular  Rate: normal     Dental             Pulmonary    Pulmonary exam normal.                 Other findings              ASA: 3   Plan: MAC  NPO status verified and patient meets guidelines.          Plan/risks discussed with: patient and significant other                Present on Admission:  **None**

## 2024-07-30 NOTE — ANESTHESIA POSTPROCEDURE EVALUATION
[facility]    Marcela Moreno Patient Status:  Hospital Outpatient Surgery   Age/Gender 68 year old female MRN [id number]   Location [facility] END[facility]COPY [facility]IN CENTER Attending Guerrero Moser MD   Hosp Day # 0 PCP Rhiannon Betancourt MD       Anesthesia Post-op Note    ESOPHAGOGASTRODUODENOSCOPY (EGD) with biopsies, COLONOSCOPY    Procedure Summary       Date: 07/30/24 Room / Location:  ENDOSCOPY 02 /  ENDOSCOPY    Anesthesia Start: 1046 Anesthesia Stop: 1118    Procedures:       ESOPHAGOGASTRODUODENOSCOPY (EGD) with biopsies, COLONOSCOPY      COLONOSCOPY Diagnosis:       Personal history of colonic polyps      Gastroesophageal reflux disease with esophagitis without hemorrhage      Bilious vomiting with nausea      (EG: hiatal hernia COLON: diverticulosis)    Surgeons: Guerrero Moser MD Anesthesiologist: Supa Egan MD    Anesthesia Type: MAC ASA Status: 3            Anesthesia Type: MAC    Vitals Value Taken Time   /43 07/30/24 1118   Temp 98.3 °F (36.8 °C) 07/30/24 1118   Pulse 85 07/30/24 1118   Resp 16 07/30/24 1118   SpO2 96 % 07/30/24 1118   Vitals shown include unfiled device data.    Patient Location: Endoscopy    Anesthesia Type: MAC    Airway Patency: patent    Postop Pain Control: adequate    Mental Status: mildly sedated but able to meaningfully participate in the post-anesthesia evaluation    Nausea/Vomiting: none    Cardiopulmonary/Hydration status: stable euvolemic    Complications: no apparent anesthesia related complications    Postop vital signs: stable    Dental Exam: Unchanged from Preop    Patient to be discharged home when criteria met.

## 2024-07-30 NOTE — DISCHARGE INSTRUCTIONS
Home Care Instructions for Colonoscopy and/or Gastroscopy With Sedation    Diet:  - Resume your regular diet as tolerated unless otherwise instructed.  - start with light meals to minimize bloating.  - Do not drink alcohol today.    Medication:  - If you have questions about resuming your normal medications, please contact your Primary Care Physician.    Activities:  - Take it easy today. Do not return to work today.  - Do not drive today.  - Do not operate any machinery today (including kitchen equipment).    Colonoscopy:  - You may notice some rectal \"spotting\" (a little blood on the toilet tissue) for a day or two after the exam. This is normal.  - If you experience any rectal bleeding (not spotting), persistent tenderness or sharp severe abdominal pains, oral temperature over 100 degrees Farenheit, light-headedness or dizziness, or any other problems, contact your doctor.    Gastroscopy:  - You may have a sore throat for 2-3 days following the exam. This is normal. Gargling with warm salt water (1/2 tsp salt to 1 glass warm water) or using throat lozenges will help.  - If you experience any sharp pain in your neck, abdomen or chest, vomiting of blood, oral temperature over 100 degrees Farenheit, light-headedness or dizziness, or any other problems, contact your doctor.    **If unable to reach your doctor, please go to the [facility] Emergency Room**    - Your referring physician will receive a full report of your examination.  - If you do not hear from your doctor's office within two weeks of your biopsy, please call them for your results.    Additional Comments/Instructions (if applicable):

## 2024-07-30 NOTE — OPERATIVE REPORT
Marcela Moreno Patient Status:  Hospital Outpatient Surgery    10/22/1955 MRN [id number]   [facility] ENDOSCOPY PAIN CENTER Attending Guerrero Moser MD   Date 2024 PCP Rhiannon Betancourt MD     PREOPERATIVE DIAGNOSIS/INDICATION: GERD  POSTOPERTATIVE DIAGNOSIS: Hiatal hernia, Irregular z line  PROCEDURE PERFORMED: EGD with biopsy  TIME OUT WAS PERFORMED    SEDATION: MAC sedation provided by General Anesthesia    INFORMED CONSENT: Risks, benefits and alternatives to the procedure were explained to the patient including but not limited to bleeding, infection, perforation, adverse drug reactions, pancreatitis and the need for hospitalization and surgery if this occurs, the patient understands and agrees to procedure.  PROCEDURE DESCRIPTION: A regular upper endoscope was introduced into the patient’s mouth, hypo pharynx, esophagus, stomach and the first and second portion of the duodenum, retroflexion of the endoscope was performed in the stomach. Careful examination of the above described areas was performed on withdrawal of the endoscope. The patient tolerated the procedure well, there were no immediate complication immediately following the procedure, and the patient was transferred to recovery in stable condition.  FINDINGS:  ESOPHAGUS: Normal  EGJ: 3 cm hiatal hernia, Hill grade 3, irregular z line  STOMACH: Normal  DUODENUM: Normal  THERAPEUTICS: Cold forceps biopsies were performed from esophagus, antrum, GEJ, duodenum  RECOMMENDATIONS:   Post EGD precautions, watch for bleeding, infection, perforation, adverse drug reactions   Follow biopsies.      Guerrero Moser MD  2024  11:18 AM

## 2024-07-30 NOTE — H&P
[facility]  Pre-op H and P    Marcela Moreno Patient Status:  Hospital Outpatient Surgery    10/22/1955 MRN [id number]   Location [facility] [place] [facility] Attending Guerrero Moser MD   Date 2024 PCP Rhiannon Betancourt MD     CC: Nausea, vomiting r/o gastroparesis, worse while on Ozempic  Poorly controlled diabetes  GERD erosive improved on PPI's  H/o colon adenoma   Diarrhea alternating with constipation  Normocytic anemia, normal iron    History of Present Illness:  Marcela Moreno is a a(n) 68 year old female. Nausea, vomiting r/o gastroparesis, worse while on Ozempic  Poorly controlled diabetes  GERD erosive improved on PPI's  H/o colon adenoma   Diarrhea alternating with constipation  Normocytic anemia, normal iron    History:  Past Medical History:    Abdominal pain    Acute, but ill-defined, cerebrovascular disease    Arthritis    AVM (arteriovenous malformation) brain (HCC)    being followed at [facility]    Back pain    Bad breath    Bloating    Calculus of kidney    Constipation    Diabetes mellitus (HCC)    Diarrhea, unspecified    Fatigue    Fever    Food intolerance    Frequent urination    Heartburn    High blood pressure    High cholesterol    History of depression    Indigestion    Irregular bowel habits    Leaking of urine    Leg swelling    Nausea    Neuropathy    TOES SOMETIMES    Osteoarthritis    Pain in joints    Pain with bowel movements    Presence of other cardiac implants and grafts    Problems with swallowing    Sleep disturbance    Sputum production    Stool incontinence    Stroke (HCC)    s/p Brain surgery    Stroke (HCC)    LEFT SIDE EFFECTED- USING CANE    Type II or unspecified type diabetes mellitus without mention of complication, not stated as uncontrolled    Uncomfortable fullness after meals    Vitamin D deficiency    Vomiting    Wears glasses    Weight gain     Past Surgical History:   Procedure Laterality Date    Brain surgery  2015     avm   [facility]          X3    Cholecystectomy      Colonoscopy      Hernia surgery      Other surgical history      LEFT TIBIA FRACTURE- PLATE AND SCREWS       Family History   Problem Relation Age of Onset    Diabetes Mother     Hypertension Mother     Hypertension Father     Heart Disorder Brother         CAD stent in his 40s    Diabetes Brother     Diabetes Sister     Heart Attack Brother       reports that she has never smoked. She has never used smokeless tobacco. She reports that she does not currently use alcohol. She reports that she does not use drugs.    Allergies:  No Known Allergies    Medications:    Current Facility-Administered Medications:     glucose (Dex4) 15 GM/59ML oral liquid 15 g, 15 g, Oral, Q15 Min PRN **OR** glucose (Glutose) 40% oral gel 15 g, 15 g, Oral, Q15 Min PRN **OR** glucose-vitamin C (Dex-4) chewable tab 4 tablet, 4 tablet, Oral, Q15 Min PRN **OR** dextrose 50% injection 50 mL, 50 mL, Intravenous, Q15 Min PRN **OR** glucose (Dex4) 15 GM/59ML oral liquid 30 g, 30 g, Oral, Q15 Min PRN **OR** glucose (Glutose) 40% oral gel 30 g, 30 g, Oral, Q15 Min PRN **OR** glucose-vitamin C (Dex-4) chewable tab 8 tablet, 8 tablet, Oral, Q15 Min PRN    lactated ringers infusion, , Intravenous, Continuous    dextrose in lactated ringers 5% infusion, , Intravenous, Continuous    Physical Exam:    Blood pressure 160/72, pulse 94, temperature 97.6 °F (36.4 °C), temperature source Temporal, resp. rate 18, height 5' 1\" (1.549 m), weight 180 lb (81.6 kg), SpO2 99%.    General: Appears alert, oriented x3 and in no acute distress.  CV: Normal rate   Lungs: Normal effort   Skin: Warm and dry.  Laboratory Data:  Lab Results   Component Value Date    PGLU 67 2024       Imaging:      Assessment/Plan/Procedure:  Patient Active Problem List   Diagnosis    Mixed hyperlipidemia    Type 2 diabetes mellitus with hyperglycemia, with long-term current use of insulin (HCC)    Status post stroke     Essential hypertension    H/O arteriovenous malformation (AVM)    Hypothyroidism    Class 1 obesity due to excess calories with serious comorbidity and body mass index (BMI) of 30.0 to 30.9 in adult    Overactive bladder    RLS (restless legs syndrome)    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 associated with type 2 diabetes mellitus (HCC)    Age-related osteoporosis without current pathological fracture       Nausea, vomiting r/o gastroparesis, worse while on Ozempic  Poorly controlled diabetes  GERD erosive improved on PPI's  H/o colon adenoma 2029  Diarrhea alternating with constipation  Normocytic anemia, normal iron    Plan:  EGD and colonoscopy    Guerrero Moser MD  7/30/2024  10:45 AM

## 2024-08-06 ENCOUNTER — OFFICE VISIT (OUTPATIENT)
Dept: ENDOCRINOLOGY CLINIC | Facility: CLINIC | Age: 69
End: 2024-08-06
Payer: MEDICARE

## 2024-08-06 VITALS
BODY MASS INDEX: 33 KG/M2 | OXYGEN SATURATION: 97 % | WEIGHT: 173 LBS | RESPIRATION RATE: 16 BRPM | HEART RATE: 80 BPM | SYSTOLIC BLOOD PRESSURE: 132 MMHG | DIASTOLIC BLOOD PRESSURE: 74 MMHG

## 2024-08-06 DIAGNOSIS — E78.2 MIXED HYPERLIPIDEMIA: ICD-10-CM

## 2024-08-06 DIAGNOSIS — I10 ESSENTIAL HYPERTENSION: ICD-10-CM

## 2024-08-06 DIAGNOSIS — Z79.4 TYPE 2 DIABETES MELLITUS WITH HYPERGLYCEMIA, WITH LONG-TERM CURRENT USE OF INSULIN (HCC): Primary | ICD-10-CM

## 2024-08-06 DIAGNOSIS — R80.9 MICROALBUMINURIA: ICD-10-CM

## 2024-08-06 DIAGNOSIS — E11.65 TYPE 2 DIABETES MELLITUS WITH HYPERGLYCEMIA, WITH LONG-TERM CURRENT USE OF INSULIN (HCC): Primary | ICD-10-CM

## 2024-08-06 LAB — HEMOGLOBIN A1C: 7 % (ref 4.3–5.6)

## 2024-08-06 PROCEDURE — 99214 OFFICE O/P EST MOD 30 MIN: CPT | Performed by: NURSE PRACTITIONER

## 2024-08-06 PROCEDURE — 83036 HEMOGLOBIN GLYCOSYLATED A1C: CPT | Performed by: NURSE PRACTITIONER

## 2024-08-06 RX ORDER — PEN NEEDLE, DIABETIC 32GX 5/32"
1 NEEDLE, DISPOSABLE MISCELLANEOUS DAILY
Qty: 100 EACH | Refills: 3 | Status: SHIPPED | OUTPATIENT
Start: 2024-08-06

## 2024-08-06 RX ORDER — DAPAGLIFLOZIN 10 MG/1
10 TABLET, FILM COATED ORAL DAILY
Qty: 90 TABLET | Refills: 1 | Status: SHIPPED | OUTPATIENT
Start: 2024-08-06

## 2024-08-06 NOTE — PROGRESS NOTES
HPI:   Marcela Moreno is a 68 year old female who presents for follow up for the management of her diabetes.  .      Chief Complaint   Patient presents with    Diabetes     Follow up-meter       Diabetes: improved, stable  Type: 2   Duration: 2000  Current Meds: Metformin ER 500mg po bid, Tresiba 32 units injection daily, Farxgia 10mg po daily   SE: diarrhea with higher doses of Metformin  *Patient tolerating Farxiga well  Long term insulin use: yes  Failed Meds: Novolog 70/30, Jardiance, Toujeo, Novolog, Humalog 75/25, Lantus, Janumet, Metformin immediate release, Levemir, Ozempic     Complications: Retinopathy, Proteinuria, CAD, CVA/Stroke, gastroparesis    Testing with: One Touch Verio   Monitoring blood glucose: daily  Average glucose readin mg/dl  Highest glucose readin mg/dl    Lowest glucose readin mg/dl  Hypoglycemia frequency:  denies         Overall glucose control:   HGBA1C:    Lab Results   Component Value Date    A1C 7.0 (A) 2024    A1C 8.0 (H) 2024    A1C 8.2 (A) 2024     (H) 2024       Hypertension: stable   Blood Pressure: 132/74  Medication prescribed: lisinopril  SE: denies     Hyperlipidemia: stable   LDL: 95   Tri  Medication prescribed: rosuvastatin  SE: denies     Wt Readings from Last 3 Encounters:   24 173 lb (78.5 kg)   24 180 lb (81.6 kg)   07/15/24 180 lb (81.6 kg)     BP Readings from Last 3 Encounters:   24 132/74   24 109/54   07/15/24 (!) 168/70          Past History:   She  has a past medical history of Abdominal pain, Acute, but ill-defined, cerebrovascular disease, Arthritis, AVM (arteriovenous malformation) brain (HCC), Back pain, Bad breath, Bloating, Calculus of kidney, Constipation, Diabetes mellitus (HCC), Diarrhea, unspecified, Fatigue, Fever, Food intolerance, Frequent urination, Heartburn, High blood pressure, High cholesterol, History of depression, Indigestion, Irregular bowel habits, Leaking  of urine, Leg swelling, Nausea, Neuropathy, Osteoarthritis, Pain in joints, Pain with bowel movements, Presence of other cardiac implants and grafts, Problems with swallowing, Sleep disturbance, Sputum production, Stool incontinence, Stroke ([facility]) (04/18/2015), Stroke ([facility]) (04/01/2015), Type II or unspecified type diabetes mellitus without mention of complication, not stated as uncontrolled, Uncomfortable fullness after meals, Vitamin D deficiency, Vomiting, Wears glasses, and Weight gain.   Her family history includes Diabetes in her brother, mother, and sister; Heart Attack in her brother; Heart Disorder in her brother; Hypertension in her father and mother.   She  reports that she has never smoked. She has never used smokeless tobacco. She reports that she does not currently use alcohol. She reports that she does not use drugs.     She has No Known Allergies.     Current Outpatient Medications on File Prior to Visit   Medication Sig    PANTOPRAZOLE 40 MG Oral Tab EC TAKE 1 TABLET(40 MG) BY MOUTH DAILY    lisinopril 5 MG Oral Tab Take 1 tablet (5 mg total) by mouth daily.    Glucose Blood (ONETOUCH VERIO) In Vitro Strip USE TO TEST ONCE TO TWICE DAILY    Mirabegron ER 50 MG Oral Tablet 24 Hr Take 1 tablet (50 mg total) by mouth daily.    insulin degludec (TRESIBA FLEXTOUCH) 200 UNIT/ML Subcutaneous Solution Pen-injector Inject 36 units as directed daily. (Patient taking differently: Inject 32 units as directed daily.)    OneTouch Delica Lancets 33G Does not apply Misc 1 Lancet by Finger stick route daily.    metFORMIN  MG Oral Tablet 24 Hr Take 1 tablet (500 mg total) by mouth 2 (two) times daily with meals.    rosuvastatin 10 MG Oral Tab TAKE 1 TABLET(10 MG) BY MOUTH EVERY NIGHT    timolol 0.5 % Ophthalmic Solution Place 1 drop into both eyes 2 (two) times daily.    [DISCONTINUED] Insulin Pen Needle (BD PEN NEEDLE TITO 2ND GEN) 32G X 4 MM Does not apply Misc USE TWICE DAILY AS DIRECTED    [DISCONTINUED]  dapagliflozin (FARXIGA) 10 MG Oral Tab Take 1 tablet (10 mg total) by mouth daily. (Patient taking differently: Take 1 tablet (10 mg total) by mouth daily.)     No current facility-administered medications on file prior to visit.       CMP  (most recent labs)   Lab Results   Component Value Date/Time     (H) 07/25/2024 12:33 PM    BUN 16 07/25/2024 12:33 PM    CREATSERUM 0.94 07/25/2024 12:33 PM    GFRNAA 85 12/07/2021 09:09 AM    GFRAA 98 12/07/2021 09:09 AM    EGFRCR 67 07/25/2024 12:33 PM    CA 9.2 07/25/2024 12:33 PM    ALKPHO 72 07/25/2024 12:33 PM    AST 15 07/25/2024 12:33 PM    ALT <7 (L) 07/25/2024 12:33 PM    BILT 0.5 07/25/2024 12:33 PM    TP 6.9 07/25/2024 12:33 PM    ALB 4.2 07/25/2024 12:33 PM     07/25/2024 12:33 PM    K 4.1 07/25/2024 12:33 PM     07/25/2024 12:33 PM    CO2 25.0 07/25/2024 12:33 PM           Lipids  (most recent labs)   Lab Results   Component Value Date/Time    CHOLEST 180 06/04/2024 08:50 AM    TRIG 179 (H) 06/04/2024 08:50 AM    HDL 54 06/04/2024 08:50 AM    LDL 95 06/04/2024 08:50 AM    NONHDLC 126 06/04/2024 08:50 AM          Diabetes  (most recent labs)   Lab Results   Component Value Date/Time    A1C 7.0 (A) 08/06/2024 02:15 PM          Microalb (most recent labs)   Lab Results   Component Value Date/Time    MICROALBCREA 138.7 (H) 06/04/2024 03:38 PM        Lab results reviewed with patient.    REVIEW OF SYSTEMS:   GENERAL HEALTH: feels well otherwise  SKIN: denies any unusual skin lesions or rashes  RESPIRATORY: denies shortness of breath with exertion  CARDIOVASCULAR: denies chest pain on exertion  GI: c/o heartburn and occasional nausea and abdominal pain   NEURO: c/o numbness and tingling to bilateral feet, denies headaches  ENDO: denies polydipsia, polyuria or polyphagia, denies unexplained weight changes    EXAM:   /74   Pulse 80   Resp 16   Wt 173 lb (78.5 kg)   LMP  (LMP Unknown)   SpO2 97%   BMI 32.69 kg/m²  Estimated body mass index is  32.69 kg/m² as calculated from the following:    Height as of 7/30/24: 5' 1\" (1.549 m).    Weight as of this encounter: 173 lb (78.5 kg).   Physical Exam  Vitals reviewed.   Constitutional:       Appearance: Normal appearance.   Pulmonary:      Effort: Pulmonary effort is normal.   Neurological:      Mental Status: She is alert and oriented to person, place, and time.   Psychiatric:         Mood and Affect: Mood normal.         Behavior: Behavior normal.         ASSESSMENT AND PLAN:   As for her Diabetes, it is stable, improved, no significant medication side effects noted.     Recommendations are: continue present meds, lose weight by increased dietary compliance and exercise, and check feet daily.    Patient to continue Metformin ER 500mg po bid, Tresiba to 32 units injection daily and Farxiga 10mg daily.    For upcoming procedure:  Stop Farxiga 4 days prior to procedure, hold Metformin morning of procedure, decrease Tresiba to 25 units injection daily start 2 doses prior to procedure  *May resume all normal diabetes medications and dosages following procedure  Discussed self monitoring blood glucose and the importance of monitoring.  Patient agreed to monitoring daily - alternating fasting in AM and 2 hours postprandial of one meal per day.  Hypoglycemia S&S, Rx, and when to call APRN/CDE reviewed using Rule of 15's. Stressed need to call if 2 readings below 80 mg/dl in 1 week for medication adjustment.   Reinforced healthy eating in healthy portions and increasing daily physical activity.  Reviewed ways to improve the protein in the urine:   Keep blood sugars in target range   Keep blood pressure in target range   Watch over the counter medicines like NSAID (non steroidal anti-inflammatory drugs) these can be harmful to  kidneys (examples include: , Motrin , Advil, ibuprofen, naprosyn, Aleve)   Continue ACEI and SGLT2 therapy - renoprotective       As for her hypertension, Blood Pressure is stable, no  significant medication side effects noted.   PLAN: will continue present medications, reviewed diet, exercise and weight control, continue ACEI therapy.       As for her cholesterol, Lipids are stable, improved, no significant medication side effects noted.   PLAN: will continue present medications, reviewed diet, exercise and weight control, continue statin therapy.      DM Health Maintenance  Last dilated eye exam: Last Dilated Eye Exam: 24   Exam shows retinopathy? Eye Exam shows Diabetic Retinopathy?: Yes    Last diabetic foot exam: Last Foot Exam: 24      Date of last PHQ-2 depression screen: PHQ-2 - Date of last depression screenin2024    Patient  reports that she has never smoked. She has never used smokeless tobacco.  When is flu vaccine due? No recommendations at this time  When is pneumonia vaccine due? No recommendations at this time    The patient indicates understanding of these issues and agrees to the plan.  Refills addressed at time of office visit.    Diagnoses and all orders for this visit:    Type 2 diabetes mellitus with hyperglycemia, with long-term current use of insulin (HCC)  -     HEMOGLOBIN A1C  -     dapagliflozin (FARXIGA) 10 MG Oral Tab; Take 1 tablet (10 mg total) by mouth daily.  -     Insulin Pen Needle (BD PEN NEEDLE TITO 2ND GEN) 32G X 4 MM Does not apply Misc; Inject 1 each into the skin daily.    Essential hypertension    Mixed hyperlipidemia    Microalbuminuria                 Return in about 3 months (around 2024) for 30 minute appointment.    The risks and benefits of my recommendations, as well as other treatment options were discussed with the patient today. Questions were answered to the best of my knowledge.   30 min spent with patient and >50% time spent counseling and coordinating care related to their office visit.      Jessica MCCARTY, BC-ADM, [facility]

## 2024-08-06 NOTE — PATIENT INSTRUCTIONS
We are here to support you with Diabetes but please remember that you still need your primary care doctor for your routine health maintenance.   Your current A1C: 7.0%  This test provides us with your average blood sugar for the past 3 months.   The main goal of diabetes treatment is to keep your sugar from going too high. We measure your overall blood sugar trends with a Hemoglobin A1C test. (also called an A1C)  For most people the target is less than 7.0% but sometimes we make exceptions based on age, health history and other factors.   Keeping an A1C less than 7% helps prevents diabetes related health problems.   If your A1C goes too high, then we need to talk about changing your current diabetes treatment.    MEDICATIONS:   It is important to take all of your medications as prescribed.   Please call me if you cannot get the prescriptions filled or are having issues with refills.   Also, please call me if you have any issues with medication questions, side effects, dosing questions or problems with your blood sugar trends BEFORE CHANGING OR STOPPING ANY MEDICATIONS.     Blood sugar testing:   Always bring your glucose meter or blood sugar logbook to every appointment here at the diabetes center.  This allows me to safely make adjustments to your diabetes plan.   In order for me to determine any patterns in your blood sugars, you will need to test your blood sugar daily   Call if 2 readings below 80 mg/dl in 1 week for medication adjustment.       Blood sugar targets:  Before breakfast:   (preferably < 110)  2 hours After meals: less than 180 (preferably less than 150)   Call for persistent blood sugars < 75 or > 200.   Blood sugars greater than 200 are not acceptable to reach your goal of improving diabetes      Health Maintenance:   1. LABS: It is important to monitor your kidney function (blood and urine protein levels) , liver function tests and cholesterol levels when you have diabetes.     2. FOOT  EXAMS:  daily foot inspections for foot wounds or skin changes are important for foot care. Any unusual changes should be reported immediately.    3. EYE EXAMS: Checking your eyes for diabetes changes is important and you should have a dilated eye exam done by an eye doctor EVERY year since these changes occur in the BACK of the eye and not visible by you.  Please let me know if you need provider list for eye doctor.     Lifestyle Therapy:    1. NUTRITION: Maintain optimal weight, calorie restriction if overweight, plant-based diet    2. PHYSICAL ACTIVITY: 150 minutes per week (30 minutes a day 5 days a week) of moderate exertion such as walking, stair climbing.  Include strength training 2-3 times per week.  Increase as tolerated.    3. SLEEP: Try and get 7-8 hours of sleep per night    4. BEHAVIOR:  Tobacco cessation and alcohol in moderation        Jessica MCCARTY, BC-[facility], [facility]  [address] [place] [address]  [address], [address] [place] [address]  [address] [place] [address]  Diabetes Services at [facility]  T [phone]  F [phone]

## 2024-08-14 ENCOUNTER — OFFICE VISIT (OUTPATIENT)
Dept: SURGERY | Facility: CLINIC | Age: 69
End: 2024-08-14
Payer: MEDICARE

## 2024-08-14 VITALS — TEMPERATURE: 98 F

## 2024-08-14 DIAGNOSIS — E66.09 CLASS 1 OBESITY DUE TO EXCESS CALORIES WITH SERIOUS COMORBIDITY AND BODY MASS INDEX (BMI) OF 30.0 TO 30.9 IN ADULT: ICD-10-CM

## 2024-08-14 DIAGNOSIS — K43.9 VENTRAL HERNIA WITHOUT OBSTRUCTION OR GANGRENE: Primary | ICD-10-CM

## 2024-08-14 PROCEDURE — 99204 OFFICE O/P NEW MOD 45 MIN: CPT | Performed by: SURGERY

## 2024-08-14 PROCEDURE — 1160F RVW MEDS BY RX/DR IN RCRD: CPT | Performed by: SURGERY

## 2024-08-14 PROCEDURE — 1159F MED LIST DOCD IN RCRD: CPT | Performed by: SURGERY

## 2024-08-14 PROCEDURE — 1170F FXNL STATUS ASSESSED: CPT | Performed by: SURGERY

## 2024-08-14 NOTE — H&P
New Patient Visit Note       Active Problems      1. Ventral hernia without obstruction or gangrene    2. Class 1 obesity due to excess calories with serious comorbidity and body mass index (BMI) of 30.0 to 30.9 in adult        Chief Complaint   Chief Complaint   Patient presents with    New Patient     NP- Ventral Hernia, 6/20/24 CT Abd/Pel        History of Present Illness     The patient presents to request of her primary care physician for evaluation of ventral hernia.  The patient has extensive past medical history as noted below.  Recently the patient is noted discomfort in the abdomen and a CT scan of the abdomen pelvis reveals a moderate-sized ventral hernia defect.  I reviewed the images and I discussed the findings with the patient and her spouse in context of her presenting symptoms.  I concur with the radiologist interpretation.  The patient has occasional pain and discomfort.  She has other miscellaneous complaints of discomfort in the abdomen and her torso.    The patient denies fever, chills, chest pain, shortness of breath, dyspnea. The patient also denies hematemesis, melena, or hematochezia. The patient denies change in bowel or bladder habits. There is no complaint of hematuria or dysuria.    After careful consideration of ventral hernia and the patient's underlying health status, I believe she will benefit from ventral hernia repair, however, I would prefer that she continue medically supervised weight loss.  The patient has recently lost approximately 10 pounds with medical guidance by her primary care physician.  The patient was on Ozempic but she had to discontinue due to side effects.    Allergies  Marcela has No Known Allergies.    Past Medical / Surgical / Social / Family History    The past medical and past surgical history have been reviewed by me today.    Past Medical History:    Abdominal pain    Acute, but ill-defined, cerebrovascular disease    Arthritis    AVM (arteriovenous  malformation) brain (HCC)    being followed at [facility]    Back pain    Bad breath    Bloating    Calculus of kidney    Constipation    Diabetes mellitus (HCC)    Diarrhea, unspecified    Fatigue    Fever    Food intolerance    Frequent urination    Heartburn    High blood pressure    High cholesterol    History of depression    Indigestion    Irregular bowel habits    Leaking of urine    Leg swelling    Nausea    Neuropathy    TOES SOMETIMES    Osteoarthritis    Pain in joints    Pain with bowel movements    Presence of other cardiac implants and grafts    Problems with swallowing    Sleep disturbance    Sputum production    Stool incontinence    Stroke (HCC)    s/p Brain surgery    Stroke (HCC)    LEFT SIDE EFFECTED- USING CANE    Type II or unspecified type diabetes mellitus without mention of complication, not stated as uncontrolled    Uncomfortable fullness after meals    Vitamin D deficiency    Vomiting    Wears glasses    Weight gain     Past Surgical History:   Procedure Laterality Date    Brain surgery  2015    avm   [facility]          X3    Cholecystectomy      Colonoscopy      Colonoscopy N/A 2024    Procedure: COLONOSCOPY;  Surgeon: Guerrero Moser MD;  Location:  ENDOSCOPY    Hernia surgery      Other surgical history      LEFT TIBIA FRACTURE- PLATE AND SCREWS         The family history and social history have been reviewed by me today.    Family History   Problem Relation Age of Onset    Diabetes Mother     Hypertension Mother     Hypertension Father     Heart Disorder Brother         CAD stent in his 40s    Diabetes Brother     Diabetes Sister     Heart Attack Brother      Social History     Socioeconomic History    Marital status:    Tobacco Use    Smoking status: Never    Smokeless tobacco: Never   Vaping Use    Vaping status: Never Used   Substance and Sexual Activity    Alcohol use: Not Currently    Drug use: No   Other Topics Concern    Caffeine Concern  Yes     Comment: coffee 2 cups daily    Exercise No    Seat Belt Yes        Current Outpatient Medications:     dapagliflozin (FARXIGA) 10 MG Oral Tab, Take 1 tablet (10 mg total) by mouth daily., Disp: 90 tablet, Rfl: 1    Insulin Pen Needle (BD PEN NEEDLE TITO 2ND GEN) 32G X 4 MM Does not apply Misc, Inject 1 each into the skin daily., Disp: 100 each, Rfl: 3    PANTOPRAZOLE 40 MG Oral Tab EC, TAKE 1 TABLET(40 MG) BY MOUTH DAILY, Disp: 30 tablet, Rfl: 6    lisinopril 5 MG Oral Tab, Take 1 tablet (5 mg total) by mouth daily., Disp: 90 tablet, Rfl: 1    Glucose Blood (ONETOUCH VERIO) In Vitro Strip, USE TO TEST ONCE TO TWICE DAILY, Disp: 100 strip, Rfl: 1    Mirabegron ER 50 MG Oral Tablet 24 Hr, Take 1 tablet (50 mg total) by mouth daily., Disp: 90 tablet, Rfl: 5    insulin degludec (TRESIBA FLEXTOUCH) 200 UNIT/ML Subcutaneous Solution Pen-injector, Inject 36 units as directed daily. (Patient taking differently: Inject 32 units as directed daily.), Disp: 3 mL, Rfl: 0    OneTouch Delica Lancets 33G Does not apply Misc, 1 Lancet by Finger stick route daily., Disp: 100 each, Rfl: 0    metFORMIN  MG Oral Tablet 24 Hr, Take 1 tablet (500 mg total) by mouth 2 (two) times daily with meals., Disp: 180 tablet, Rfl: 1    rosuvastatin 10 MG Oral Tab, TAKE 1 TABLET(10 MG) BY MOUTH EVERY NIGHT, Disp: 90 tablet, Rfl: 3    timolol 0.5 % Ophthalmic Solution, Place 1 drop into both eyes 2 (two) times daily., Disp: , Rfl:       Review of Systems  The Review of Systems has been reviewed by me during today.  Review of Systems   Constitutional:  Negative for chills, diaphoresis, fatigue, fever and unexpected weight change.   HENT:  Negative for hearing loss, nosebleeds, sore throat and trouble swallowing.    Respiratory:  Negative for apnea, cough, shortness of breath and wheezing.    Cardiovascular:  Negative for chest pain, palpitations and leg swelling.   Gastrointestinal:  Negative for abdominal distention, abdominal pain,  anal bleeding, blood in stool, constipation, diarrhea, nausea and vomiting.   Genitourinary:  Negative for difficulty urinating, dysuria, frequency and urgency.   Musculoskeletal:  Negative for arthralgias and myalgias.   Skin:  Negative for color change and rash.   Neurological:  Negative for tremors, syncope and weakness.   Hematological:  Negative for adenopathy. Does not bruise/bleed easily.   Psychiatric/Behavioral:  Negative for behavioral problems and sleep disturbance.        Physical Findings   Temp 97.9 °F (36.6 °C) (Temporal)   LMP  (LMP Unknown)   Physical Exam  Vitals and nursing note reviewed.   Constitutional:       General: She is not in acute distress.     Appearance: Normal appearance. She is well-developed. She is obese.   HENT:      Head: Normocephalic and atraumatic.   Eyes:      General: No scleral icterus.     Conjunctiva/sclera: Conjunctivae normal.   Neck:      Trachea: No tracheal deviation.   Cardiovascular:      Rate and Rhythm: Normal rate and regular rhythm.      Heart sounds: S1 normal and S2 normal. No murmur heard.  Pulmonary:      Effort: No accessory muscle usage or respiratory distress.      Breath sounds: No decreased breath sounds, wheezing, rhonchi or rales.   Abdominal:      General: There is no distension or abdominal bruit.      Palpations: Abdomen is soft. Abdomen is not rigid. There is no shifting dullness, fluid wave, hepatomegaly, splenomegaly, mass or pulsatile mass.      Tenderness: There is no abdominal tenderness. There is no guarding or rebound. Negative signs include Silver's sign and McBurney's sign.      Hernia: A hernia is present. Hernia is present in the ventral area. There is no hernia in the umbilical area.       Skin:     General: Skin is warm and dry.   Neurological:      Mental Status: She is alert and oriented to person, place, and time.   Psychiatric:         Speech: Speech normal.         Behavior: Behavior normal.         Thought Content: Thought  content normal.         Judgment: Judgment normal.             Assessment   1. Ventral hernia without obstruction or gangrene    2. Class 1 obesity due to excess calories with serious comorbidity and body mass index (BMI) of 30.0 to 30.9 in adult          Plan     The patient has a moderate size incarcerated, partially reducible ventral hernia.    The patient has prior history of multiple C-sections.    The patient has successfully lost 10 pounds recently under medical supervision; she was intolerant of Ozempic.    The patient may benefit from complex repair of her incarcerated ventral hernia with mesh and component separation.    I request the patient return to my attention in 3 months for continued follow-up as she continues medically supervised weight loss.    Ideally, I would prefer the patient lose approximately 10 to 20 pounds prior to considering complex ventral hernia repair.    The care plan was discussed with the patient and spouse and all questions answered.    The patient was provided ample opportunity to ask questions.  All of the patient's questions were answered in detail.  The patient voiced understanding of the care plan.     No orders of the defined types were placed in this encounter.      Imaging & Referrals   None    Follow Up  No follow-ups on file.    Hemanth Murillo MD

## 2024-08-18 DIAGNOSIS — N32.81 OVERACTIVE BLADDER: ICD-10-CM

## 2024-08-19 RX ORDER — OXYBUTYNIN CHLORIDE 5 MG/1
TABLET ORAL
Qty: 60 TABLET | Refills: 3 | OUTPATIENT
Start: 2024-08-19

## 2024-08-23 ENCOUNTER — LABORATORY ENCOUNTER (OUTPATIENT)
Dept: LAB | Age: 69
End: 2024-08-23
Attending: SURGERY
Payer: MEDICARE

## 2024-08-23 DIAGNOSIS — N20.0 NEPHROLITHIASIS: ICD-10-CM

## 2024-08-23 PROCEDURE — 87086 URINE CULTURE/COLONY COUNT: CPT

## 2024-08-26 NOTE — PROGRESS NOTES
Negative urine culture.    [employer] message sent to patient.    Future Appointments  9/5/2024   10:30 AM   Gil Germain MD           [id number]           [facility]  11/12/2024 11:30 AM   Jessica Hicks APRN        [id number]        [facility]  11/13/2024 10:00 AM   Hemanth Murillo MD      [id number]        [facility] [address]

## 2024-08-28 ENCOUNTER — ANESTHESIA EVENT (OUTPATIENT)
Dept: SURGERY | Facility: HOSPITAL | Age: 69
End: 2024-08-28
Payer: MEDICARE

## 2024-08-28 ENCOUNTER — ANESTHESIA (OUTPATIENT)
Dept: SURGERY | Facility: HOSPITAL | Age: 69
End: 2024-08-28
Payer: MEDICARE

## 2024-08-28 ENCOUNTER — TELEPHONE (OUTPATIENT)
Dept: SURGERY | Facility: CLINIC | Age: 69
End: 2024-08-28

## 2024-08-28 ENCOUNTER — HOSPITAL ENCOUNTER (OUTPATIENT)
Facility: HOSPITAL | Age: 69
Setting detail: HOSPITAL OUTPATIENT SURGERY
Discharge: HOME | End: 2024-08-28
Attending: SURGERY | Admitting: SURGERY
Payer: MEDICARE

## 2024-08-28 ENCOUNTER — APPOINTMENT (OUTPATIENT)
Dept: GENERAL RADIOLOGY | Facility: HOSPITAL | Age: 69
End: 2024-08-28
Attending: SURGERY
Payer: MEDICARE

## 2024-08-28 VITALS
SYSTOLIC BLOOD PRESSURE: 172 MMHG | HEIGHT: 61 IN | RESPIRATION RATE: 16 BRPM | DIASTOLIC BLOOD PRESSURE: 66 MMHG | TEMPERATURE: 97 F | BODY MASS INDEX: 32.97 KG/M2 | OXYGEN SATURATION: 100 % | WEIGHT: 174.63 LBS | HEART RATE: 81 BPM

## 2024-08-28 DIAGNOSIS — N20.0 NEPHROLITHIASIS: Primary | ICD-10-CM

## 2024-08-28 LAB
GLUCOSE BLD-MCNC: 178 MG/DL (ref 70–99)
GLUCOSE BLD-MCNC: 217 MG/DL (ref 70–99)

## 2024-08-28 PROCEDURE — 52332 CYSTOSCOPY AND TREATMENT: CPT | Performed by: SURGERY

## 2024-08-28 PROCEDURE — 0T778DZ DILATION OF LEFT URETER WITH INTRALUMINAL DEVICE, VIA NATURAL OR ARTIFICIAL OPENING ENDOSCOPIC: ICD-10-PCS | Performed by: SURGERY

## 2024-08-28 PROCEDURE — BT1F1ZZ FLUOROSCOPY OF LEFT KIDNEY, URETER AND BLADDER USING LOW OSMOLAR CONTRAST: ICD-10-PCS | Performed by: SURGERY

## 2024-08-28 PROCEDURE — 74420 UROGRAPHY RTRGR +-KUB: CPT | Performed by: SURGERY

## 2024-08-28 DEVICE — URETERAL STENT
Type: IMPLANTABLE DEVICE | Site: URETER | Status: FUNCTIONAL
Brand: ASCERTA™

## 2024-08-28 DEVICE — URETERAL STENT
Type: IMPLANTABLE DEVICE | Site: URETER | Status: NON-FUNCTIONAL
Brand: ASCERTA™
Removed: 2024-09-25

## 2024-08-28 RX ORDER — ROCURONIUM BROMIDE 10 MG/ML
INJECTION, SOLUTION INTRAVENOUS AS NEEDED
Status: DISCONTINUED | OUTPATIENT
Start: 2024-08-28 | End: 2024-08-28 | Stop reason: SURG

## 2024-08-28 RX ORDER — LIDOCAINE HYDROCHLORIDE 10 MG/ML
INJECTION, SOLUTION EPIDURAL; INFILTRATION; INTRACAUDAL; PERINEURAL AS NEEDED
Status: DISCONTINUED | OUTPATIENT
Start: 2024-08-28 | End: 2024-08-28 | Stop reason: SURG

## 2024-08-28 RX ORDER — NALOXONE HYDROCHLORIDE 0.4 MG/ML
0.08 INJECTION, SOLUTION INTRAMUSCULAR; INTRAVENOUS; SUBCUTANEOUS AS NEEDED
Status: DISCONTINUED | OUTPATIENT
Start: 2024-08-28 | End: 2024-08-28

## 2024-08-28 RX ORDER — KETOROLAC TROMETHAMINE 10 MG/1
10 TABLET, FILM COATED ORAL EVERY 8 HOURS PRN
Qty: 10 TABLET | Refills: 0 | Status: SHIPPED | OUTPATIENT
Start: 2024-08-28

## 2024-08-28 RX ORDER — DEXAMETHASONE SODIUM PHOSPHATE 4 MG/ML
VIAL (ML) INJECTION AS NEEDED
Status: DISCONTINUED | OUTPATIENT
Start: 2024-08-28 | End: 2024-08-28 | Stop reason: SURG

## 2024-08-28 RX ORDER — HYDROCODONE BITARTRATE AND ACETAMINOPHEN 5; 325 MG/1; MG/1
1 TABLET ORAL ONCE AS NEEDED
Status: DISCONTINUED | OUTPATIENT
Start: 2024-08-28 | End: 2024-08-28

## 2024-08-28 RX ORDER — HYDROMORPHONE HYDROCHLORIDE 1 MG/ML
0.2 INJECTION, SOLUTION INTRAMUSCULAR; INTRAVENOUS; SUBCUTANEOUS EVERY 5 MIN PRN
Status: DISCONTINUED | OUTPATIENT
Start: 2024-08-28 | End: 2024-08-28

## 2024-08-28 RX ORDER — METOCLOPRAMIDE HYDROCHLORIDE 5 MG/ML
10 INJECTION INTRAMUSCULAR; INTRAVENOUS EVERY 8 HOURS PRN
Status: DISCONTINUED | OUTPATIENT
Start: 2024-08-28 | End: 2024-08-28

## 2024-08-28 RX ORDER — TAMSULOSIN HYDROCHLORIDE 0.4 MG/1
0.4 CAPSULE ORAL EVERY EVENING
Qty: 14 CAPSULE | Refills: 0 | Status: SHIPPED | OUTPATIENT
Start: 2024-08-28 | End: 2024-08-28

## 2024-08-28 RX ORDER — ONDANSETRON 2 MG/ML
4 INJECTION INTRAMUSCULAR; INTRAVENOUS EVERY 6 HOURS PRN
Status: DISCONTINUED | OUTPATIENT
Start: 2024-08-28 | End: 2024-08-28

## 2024-08-28 RX ORDER — OXYBUTYNIN CHLORIDE 5 MG/1
5 TABLET ORAL 3 TIMES DAILY PRN
Qty: 15 TABLET | Refills: 0 | Status: SHIPPED | OUTPATIENT
Start: 2024-08-28 | End: 2024-08-28

## 2024-08-28 RX ORDER — LEVOFLOXACIN 5 MG/ML
750 INJECTION, SOLUTION INTRAVENOUS ONCE
Status: COMPLETED | OUTPATIENT
Start: 2024-08-28 | End: 2024-08-28

## 2024-08-28 RX ORDER — SULFAMETHOXAZOLE/TRIMETHOPRIM 800-160 MG
1 TABLET ORAL 2 TIMES DAILY
Qty: 4 TABLET | Refills: 0 | Status: SHIPPED | OUTPATIENT
Start: 2024-08-28 | End: 2024-08-30

## 2024-08-28 RX ORDER — ACETAMINOPHEN 500 MG
1000 TABLET ORAL ONCE AS NEEDED
Status: DISCONTINUED | OUTPATIENT
Start: 2024-08-28 | End: 2024-08-28

## 2024-08-28 RX ORDER — HYDROCODONE BITARTRATE AND ACETAMINOPHEN 5; 325 MG/1; MG/1
2 TABLET ORAL ONCE AS NEEDED
Status: DISCONTINUED | OUTPATIENT
Start: 2024-08-28 | End: 2024-08-28

## 2024-08-28 RX ORDER — DEXTROSE MONOHYDRATE 25 G/50ML
50 INJECTION, SOLUTION INTRAVENOUS
Status: DISCONTINUED | OUTPATIENT
Start: 2024-08-28 | End: 2024-08-28 | Stop reason: HOSPADM

## 2024-08-28 RX ORDER — HYDROMORPHONE HYDROCHLORIDE 1 MG/ML
0.6 INJECTION, SOLUTION INTRAMUSCULAR; INTRAVENOUS; SUBCUTANEOUS EVERY 5 MIN PRN
Status: DISCONTINUED | OUTPATIENT
Start: 2024-08-28 | End: 2024-08-28

## 2024-08-28 RX ORDER — ONDANSETRON 2 MG/ML
INJECTION INTRAMUSCULAR; INTRAVENOUS AS NEEDED
Status: DISCONTINUED | OUTPATIENT
Start: 2024-08-28 | End: 2024-08-28 | Stop reason: SURG

## 2024-08-28 RX ORDER — OXYBUTYNIN CHLORIDE 5 MG/1
5 TABLET ORAL 3 TIMES DAILY PRN
Qty: 35 TABLET | Refills: 1 | Status: SHIPPED | OUTPATIENT
Start: 2024-08-28

## 2024-08-28 RX ORDER — LABETALOL HYDROCHLORIDE 5 MG/ML
5 INJECTION, SOLUTION INTRAVENOUS EVERY 5 MIN PRN
Status: DISCONTINUED | OUTPATIENT
Start: 2024-08-28 | End: 2024-08-28

## 2024-08-28 RX ORDER — SODIUM CHLORIDE, SODIUM LACTATE, POTASSIUM CHLORIDE, CALCIUM CHLORIDE 600; 310; 30; 20 MG/100ML; MG/100ML; MG/100ML; MG/100ML
INJECTION, SOLUTION INTRAVENOUS CONTINUOUS
Status: DISCONTINUED | OUTPATIENT
Start: 2024-08-28 | End: 2024-08-28

## 2024-08-28 RX ORDER — POLYETHYLENE GLYCOL 3350 17 G/17G
17 POWDER, FOR SOLUTION ORAL DAILY PRN
Qty: 20 PACKET | Refills: 1 | Status: SHIPPED | OUTPATIENT
Start: 2024-08-28

## 2024-08-28 RX ORDER — NICOTINE POLACRILEX 4 MG
15 LOZENGE BUCCAL
Status: DISCONTINUED | OUTPATIENT
Start: 2024-08-28 | End: 2024-08-28 | Stop reason: HOSPADM

## 2024-08-28 RX ORDER — ACETAMINOPHEN 500 MG
1000 TABLET ORAL ONCE
Status: DISCONTINUED | OUTPATIENT
Start: 2024-08-28 | End: 2024-08-28 | Stop reason: HOSPADM

## 2024-08-28 RX ORDER — TAMSULOSIN HYDROCHLORIDE 0.4 MG/1
0.4 CAPSULE ORAL EVERY EVENING
Qty: 30 CAPSULE | Refills: 1 | Status: SHIPPED | OUTPATIENT
Start: 2024-08-28

## 2024-08-28 RX ORDER — INSULIN ASPART 100 [IU]/ML
INJECTION, SOLUTION INTRAVENOUS; SUBCUTANEOUS ONCE
Status: COMPLETED | OUTPATIENT
Start: 2024-08-28 | End: 2024-08-28

## 2024-08-28 RX ORDER — HYDROMORPHONE HYDROCHLORIDE 1 MG/ML
0.4 INJECTION, SOLUTION INTRAMUSCULAR; INTRAVENOUS; SUBCUTANEOUS EVERY 5 MIN PRN
Status: DISCONTINUED | OUTPATIENT
Start: 2024-08-28 | End: 2024-08-28

## 2024-08-28 RX ORDER — NICOTINE POLACRILEX 4 MG
30 LOZENGE BUCCAL
Status: DISCONTINUED | OUTPATIENT
Start: 2024-08-28 | End: 2024-08-28 | Stop reason: HOSPADM

## 2024-08-28 RX ADMIN — ROCURONIUM BROMIDE 50 MG: 10 INJECTION, SOLUTION INTRAVENOUS at 09:18:00

## 2024-08-28 RX ADMIN — LEVOFLOXACIN 750 MG: 5 INJECTION, SOLUTION INTRAVENOUS at 09:52:00

## 2024-08-28 RX ADMIN — ONDANSETRON 4 MG: 2 INJECTION INTRAMUSCULAR; INTRAVENOUS at 09:58:00

## 2024-08-28 RX ADMIN — SODIUM CHLORIDE, SODIUM LACTATE, POTASSIUM CHLORIDE, CALCIUM CHLORIDE: 600; 310; 30; 20 INJECTION, SOLUTION INTRAVENOUS at 09:13:00

## 2024-08-28 RX ADMIN — LIDOCAINE HYDROCHLORIDE 50 MG: 10 INJECTION, SOLUTION EPIDURAL; INFILTRATION; INTRACAUDAL; PERINEURAL at 09:18:00

## 2024-08-28 RX ADMIN — DEXAMETHASONE SODIUM PHOSPHATE 8 MG: 4 MG/ML VIAL (ML) INJECTION at 09:22:00

## 2024-08-28 NOTE — DISCHARGE INSTRUCTIONS
You had cystoscopy, ureteroscopy, and stent in the operating room today.    Your ureter was very narrow, so the small ureteroscope would not pass. We will allow the stent to stretch open the ureter for a few weeks and return to the operating room to treat your stone. We will cancel the stent removal appointment since we will keep it in until your next surgery.    Instructions:    - No heavy lifting or strenuous activity for 1 day. You may resume regular activity tomorrow.  With increased activity you may notice more blood in the urine from stent movement inside the bladder.    - My surgery scheduler will reach out to to set up your next surgery in a few weeks. If you do not hear from them after a few days or you need to change your appointment time or date, please contact us at [phone].    - With a ureteral stent in place you may have some discomfort on your side/flank. You can feel pain worsen when you urinate, so try to avoid holding urine and void every 2-3 hours. You also may notice increased blood in the urine as you increase your activity. If this happens increase your hydration and take it easy for a day. Warm baths/hot packs can help with the discomfort as well as your prescribed medications and Advil/Motrin (if you are able to take these).     - You may experience mild pain after the procedure for a few days.  If the pain becomes intolerable please contact our office or go to the nearest Emergency Room or Urgent Care. You should take over the counter ibuprofen (AKA motrin, advil) for mild pain (provided you do not have a medical condition such as stomach ulcers or kidney disease which prohibits you from taking these). You may alternate this with tylenol as well. If pain is still not relieved by tylenol and/or ibuprofen, you may take narcotic pain medication if prescribed (typically oxycodone or tramadol). If you are taking narcotic pain medication this can make you constipated, so you should take over  the counter stool softeners or miralax if prescribed.    - Warm packs over the lower abdomen or hot baths often help with discomfort after cystoscopy.    - If you take blood thinners (such as aspirin or plavix) please hold these medications until 3 days after surgery.     - You may experience burning and frequency of urination over the next few days. This will improve after a few days if you stay well hydrated. If you were prescribed phenazopyridine (Pyridium) this may relieve urinary discomfort but you can only take this for 3 days. Pyridium will make your urine orange.     - You are likely to see some blood in your urine (pink or light red urine) that should clear up within a few days. Staying well hydrated should help this clear up. If you notice the urine stays dark red or there are multiple large blood clots despite good hydration, please call the urology clinic ([phone]).     - Try to abstain from alcohol, coffee, tea, artificial sweeteners, and spicy food for the next 48 hours as these can irritate the bladder.     - If you develop fevers / chills, difficulty urinating, or abdominal pain that does not improve with pain medications, please call the office.     - Drink 1.5 to 2 liters of fluid today (water is preferable). If you are on a fluid restriction due to other medical reasons then you need to adhere to your fluid restriction recommendations.    Gil Germain MD  Staff Urologist  [facility]  Office: [phone]

## 2024-08-28 NOTE — ANESTHESIA PROCEDURE NOTES
Airway  Date/Time: 8/28/2024 9:20 AM  Urgency: elective    Airway not difficult    General Information and Staff    Patient location during procedure: OR  Anesthesiologist: Bairon Hunt MD  Resident/CRNA: Gil Cook CRNA  Performed: CRNA   Performed by: Gil Cook CRNA  Authorized by: Bairon Hunt MD      Indications and Patient Condition  Indications for airway management: anesthesia  Spontaneous Ventilation: absent  Sedation level: deep  Preoxygenated: yes  Patient position: sniffing  Mask difficulty assessment: 1 - vent by mask  Planned trial extubation    Final Airway Details  Final airway type: endotracheal airway      Successful airway: ETT  Cuffed: yes   Successful intubation technique: direct laryngoscopy  Facilitating devices/methods: intubating stylet  Endotracheal tube insertion site: oral  Blade: Priyanka  Blade size: #3  ETT size (mm): 7.0    Cormack-Lehane Classification: grade I - full view of glottis  Placement verified by: capnometry   Cuff volume (mL): 6  Measured from: lips  ETT to lips (cm): 21  Number of attempts at approach: 1  Number of other approaches attempted: 0    Additional Comments  Dentition per pre op

## 2024-08-28 NOTE — H&P
UROLOGY PRE-OPERATIVE HISTORY & PHYSICAL      Marcela Moreno Patient Status:  Hospital Outpatient Surgery    10/22/1955 MRN [id number]   [facility] SURGERY Attending Gil Germain MD   Hosp Day # 0 PCP Rhiannon Betancourt MD     Primary Care Provider: Rhiannon Betancourt MD     Chief Complaint:   Nephrolithiasis, overactive bladder     HPI:   Marcela Moreno is a 68 year old female with history of OA, diabetes, GERD, hyperlipidemia, depression, CVA referred for nephrolithiasis.     She was recently having intermittent left-sided abdominal pains a CT scan was performed.  CT scan 2024 shows an 8 mm nonobstructing left lower pole kidney stone.  On prior CT in  the same stone was about 3 mm.  She does have intermittent left flank pain.  I discussed that this may or may not be related to this nonobstructing kidney stone.  Given growth over 2 years she is interested in having it treated so discussed ureteroscopy with laser lithotripsy and stent placement.     Of note, she is on oxybutynin 5 mg twice daily.  She has minimal urgency and frequency during the day but significant nocturia 3 times per night with occasional urge incontinence prior to making it to the bathroom.  This is improved on oxybutynin.  She does have significant dry mouth and constipation over show recommend switching to mirabegron.    UCx negative on 24.    History:     Past Medical History:    Abdominal pain    Acute, but ill-defined, cerebrovascular disease    Arthritis    AVM (arteriovenous malformation) brain (HCC)    being followed at [facility]    Back pain    Back problem    Bad breath    Bloating    Calculus of kidney    Constipation    Diabetes mellitus (HCC)    Diarrhea, unspecified    Esophageal reflux    Fatigue    Fever    Food intolerance    Frequent urination    Heartburn    High blood pressure    High cholesterol    History of depression    Indigestion    Irregular bowel habits    Leaking of urine    Leg  swelling    Muscle weakness    Nausea    Neuropathy    TOES SOMETIMES    Osteoarthritis    Pain in joints    Pain with bowel movements    Presence of other cardiac implants and grafts    Problems with swallowing    Sleep disturbance    Sputum production    Stool incontinence    Stroke (HCC)    s/p Brain surgery    Stroke (HCC)    LEFT SIDE EFFECTED- USING CANE    Type II or unspecified type diabetes mellitus without mention of complication, not stated as uncontrolled    Uncomfortable fullness after meals    Vitamin D deficiency    Vomiting    Wears glasses    Weight gain       Past Surgical History:   Procedure Laterality Date    Brain surgery  2015    avm   [facility]          X3    Cholecystectomy      Colonoscopy      Colonoscopy N/A 2024    Procedure: COLONOSCOPY;  Surgeon: Guerrero Moser MD;  Location:  ENDOSCOPY    Hernia surgery      Other surgical history      LEFT TIBIA FRACTURE- PLATE AND SCREWS      Other surgical history Left     lower leg repair       Family History   Problem Relation Age of Onset    Diabetes Mother     Hypertension Mother     Hypertension Father     Heart Disorder Brother         CAD stent in his 40s    Diabetes Brother     Diabetes Sister     Heart Attack Brother        Social History     Socioeconomic History    Marital status:    Tobacco Use    Smoking status: Never    Smokeless tobacco: Never   Vaping Use    Vaping status: Never Used   Substance and Sexual Activity    Alcohol use: Not Currently    Drug use: No   Other Topics Concern    Caffeine Concern Yes     Comment: coffee 2 cups daily    Exercise No    Seat Belt Yes     Social Determinants of Health      [facility], [facility]    Social Connections       Medications:  Current Outpatient Medications   Medication Sig Dispense Refill    dapagliflozin (FARXIGA) 10 MG Oral Tab Take 1 tablet (10 mg total) by mouth daily. 90 tablet 1     Insulin Pen Needle (BD PEN NEEDLE TITO 2ND GEN) 32G X 4 MM Does not apply Misc Inject 1 each into the skin daily. 100 each 3    PANTOPRAZOLE 40 MG Oral Tab EC TAKE 1 TABLET(40 MG) BY MOUTH DAILY 30 tablet 6    lisinopril 5 MG Oral Tab Take 1 tablet (5 mg total) by mouth daily. 90 tablet 1    Glucose Blood (ONETOUCH VERIO) In Vitro Strip USE TO TEST ONCE TO TWICE DAILY 100 strip 1    Mirabegron ER 50 MG Oral Tablet 24 Hr Take 1 tablet (50 mg total) by mouth daily. 90 tablet 5    insulin degludec (TRESIBA FLEXTOUCH) 200 UNIT/ML Subcutaneous Solution Pen-injector Inject 36 units as directed daily. (Patient taking differently: Inject 32 units as directed daily.) 3 mL 0    OneTouch Delica Lancets 33G Does not apply Misc 1 Lancet by Finger stick route daily. 100 each 0    metFORMIN  MG Oral Tablet 24 Hr Take 1 tablet (500 mg total) by mouth 2 (two) times daily with meals. 180 tablet 1    rosuvastatin 10 MG Oral Tab TAKE 1 TABLET(10 MG) BY MOUTH EVERY NIGHT 90 tablet 3    timolol 0.5 % Ophthalmic Solution Place 1 drop into both eyes 2 (two) times daily.         Allergies:  No Known Allergies    Review of Systems:   A comprehensive 10-point review of systems was completed.  Pertinent positives and negatives are noted in the the HPI.    Physical Exam:   Vital Signs:  Height 5' 1\" (1.549 m), weight 175 lb (79.4 kg).     CONSTITUTIONAL: Well developed, well nourished, in no acute distress  NEUROLOGIC: Alert and oriented  HEAD: Normocephalic, atraumatic  EYES: Sclera non-icteric  ENT: Hearing intact, moist mucous membranes  NECK: No obvious goiter or masses  RESPIRATORY: Normal respiratory effort  SKIN: No evident rashes  ABDOMEN: Soft, non-tender, non-distended    Laboratory Data:  Lab Results   Component Value Date    WBC 9.8 07/15/2024    HGB 13.4 07/15/2024    .0 07/15/2024     Lab Results   Component Value Date     07/25/2024    K 4.1 07/25/2024     07/25/2024    CO2 25.0 07/25/2024    BUN 16  07/25/2024     (H) 07/25/2024    GFRAA 98 12/07/2021    AST 15 07/25/2024    ALT <7 (L) 07/25/2024    TP 6.9 07/25/2024    ALB 4.2 07/25/2024    PHOS 3.6 07/15/2024    CA 9.2 07/25/2024    MG 1.9 07/15/2024       Urinalysis Results (last three years):  Recent Labs     08/08/22  1539 11/06/23  1225 07/02/24  0859 07/15/24  1724 07/25/24  1316   COLORUR Yellow  --   --  Light-Yellow Light-Yellow   CLARITY Clear  --   --  Clear Clear   SPECGRAVITY 1.025 1.025 1.015 1.028 1.026   PHURINE 5.0 5.5 5.0 5.0 5.0   PROUR >=300*  --   --  Trace* 30*   GLUUR 500*  --   --  >1000* >1000*   KETUR 15*  --   --  Negative Negative   BILUR Negative  --   --  Negative Negative   BLOODURINE Small*  --   --  Negative Negative   NITRITE Negative Negative Negative Negative Negative   UROBILINOGEN 0.2  --   --  Normal Normal   LEUUR Trace*  --   --  25* Negative   WBCUR 6-10*  --   --  1-5 1-5   RBCUR 3-5*  --   --  0-2 0-2   BACUR Rare*  --   --  Rare* None Seen       Urine Culture Results (last three years):  Lab Results   Component Value Date    URINECUL  08/23/2024     10-50,000 cfu/ml Multiple species present-probable contamination.    URINECUL  07/25/2024     <10,000 cfu/ml Multiple species present- probable contamination.    URINECUL  07/15/2024     ,000 cfu/ml Multiple species present- probable contamination.    URINECUL 10,000 - 50,000 CFU/ML Mixed gram positive joselyn 07/02/2024    URINECUL No Growth at 18-24 hrs. 11/06/2023    URINECUL <10,000 CFU/ML Gram negative joselyn 09/01/2022    URINECUL  08/08/2022     <10,000 cfu/ml Mixture of Gram positive organisms isolated - probable contamination.    URINECUL 10,000 - 50,000 CFU/ML Citrobacter youngae (A) 08/08/2022    URINECUL 10,000 - 50,000 CFU/ML Klebsiella pneumoniae (A) 08/08/2022    URINECUL (A) 10/14/2016     50,000 CFU/ML Streptococcus agalactiae (Group B beta strep)    URINECUL 30,000 CFU/ML Mixed gram positive joselyn 10/14/2016    URINECUL No Growth 1 Day  07/09/2015       PSA:  No results found for: \"PSA\", \"PERCENTPSA\", \"PSAS\", \"PSAULTRA\"     Imaging (last three days):  No results found.     Assessment:   Marcela Moreno is a 68 year old female with history of OA, diabetes, GERD, hyperlipidemia, depression, CVA referred for nephrolithiasis.     She was recently having intermittent left-sided abdominal pains a CT scan was performed.  CT scan 6/20/2024 shows an 8 mm nonobstructing left lower pole kidney stone.  On prior CT in 2022 the same stone was about 3 mm.  She does have intermittent left flank pain.  I discussed that this may or may not be related to this nonobstructing kidney stone.  Given growth over 2 years she is interested in having it treated so discussed ureteroscopy with laser lithotripsy and stent placement.     Of note, she is on oxybutynin 5 mg twice daily.  She has minimal urgency and frequency during the day but significant nocturia 3 times per night with occasional urge incontinence prior to making it to the bathroom.  This is improved on oxybutynin.  She does have significant dry mouth and constipation over show recommend switching to mirabegron.    Plan:   - OR for cystoscopy, left ureteroscopy, laser lithotripsy, stent   - Informed consent obtained - risks and benefits explained, and all questions answered    I have personally reviewed all relevant medical records, labs, and imaging.     Gil Germain MD  Staff Urologist  [facility]  Office: [phone]

## 2024-08-28 NOTE — OPERATIVE REPORT
Urology Operative Note    Attending Surgeon: Gil Germain MD    Assistant Surgeon: None    Patient Name: Marcela Moreno    Date of Surgery: 8/28/2024    Preoperative Diagnosis: Left nephrolithiasis    Postoperative Diagnosis: Same    Procedure Performed:   Cystoscopy, LEFT ureteroscopy, retrograde pyelogram, ureteral stent placement    Indication:  Patient is a 68 year old female who presented with an 8mm LLP stone. The patient was counseled on options, risks, and benefits and elected to undergo the above procedure. We discussed risks including, but not limited to, bleeding, infection, damage to surrounding structures, need for repeat procedure(s). The patient understood these risks and wished to proceed with surgery.    Findings:  Cystoscopy - normal urethra without strictures, normal bladder with cystocele present. Two areas of ureteral narrowing in distal ureter and proximal ureter, able to pass access sheath beyond distal ureteral narrowing but unable to pass proximal ureteral narrow area (2-3 cm in length) with ureteroscope.    Procedure:  The patient was taken to the operating room and a timeout was performed confirming the correct patient and procedure. The patient was prepped and draped in lithotomy position after undergoing general anesthesia. Pre-operative prophylactic antibiotics were given in the form of Levofloxacin.    The cystoscope was inserted per urethra and the bladder was inspected and drained. The left ureter was cannulated with a Sensor wire, and the wire was passed into the renal pelvis which I confirmed using fluoroscopy.     A 11/13 Guatemalan ureteral access sheath was inserted over the first wire. It was advanced with moderate resistance only into the distal ureter and no further.   The inner cannula was removed and a second wire was inserted through the sheath and into the kidney, which was confirmed fluoroscopically.  The access sheath was removed and the second safety wire was secured to  the drape.  The access sheath was then reinserted over the first working wire up to the distal ureter. The wire and inner cannula of the access sheath were removed, leaving the safety wire in place alongside the access sheath.     The flexible ureteroscope was advanced into the sheath and up into the proximal ureter There was another area of narrowing in the proximal ureter that I could not bypass with the scope, even over a wire. Retrograde pyelogram showed a 2-3 cm ureteral stricture. I removed the scope leaving a wire in place.    A 6-Slovak x 24 cm JJ ureteral stent was inserted over the remaining wire. The proximal coil was formed in the kidney under fluoroscopic guidance. The distal coil was formed within the bladder under direct cystoscopic visualization. The stent string was removed prior to stent placement.    The bladder was drained and the cystoscope was removed. The patient was awoken from anesthesia and transferred to PACU in stable condition. The patient tolerated the procedure well. All instrument/supply counts were correct at the end of the case.    Specimens:   None    Estimated Blood Loss:  1 mL    Tubes/Drains:  6-Slovak x 24 cm JJ left ureteral stent    Complications:   None immediate    Condition from OR:  Stable    Plan:   Return for ureteroscopy in a few weeks after passive dilation by the stent.      Gil Germain MD  Staff Urologist  [facility]  Office: [phone]

## 2024-08-28 NOTE — ANESTHESIA PREPROCEDURE EVALUATION
PRE-OP EVALUATION    Patient Name: Marcela Moreno    Admit Diagnosis: Nephrolithiasis [N20.0]    Pre-op Diagnosis: Nephrolithiasis [N20.0]    CYSTOSCOPY, LEFT URETEROSCOPY, LASER LITHOTRIPSY, STENT PLACEMENT    Anesthesia Procedure: CYSTOSCOPY, LEFT URETEROSCOPY, LASER LITHOTRIPSY, STENT PLACEMENT (Left)    Surgeons and Role:     * Gil Germain MD - Primary    Pre-op vitals reviewed.  Temp: 96.9 °F (36.1 °C)  Pulse: 80  Resp: 16  BP: 152/73  SpO2: 99 %  Body mass index is 32.99 kg/m².    Current medications reviewed.  Hospital Medications:   [Transfer Hold] acetaminophen (Tylenol Extra Strength) tab 1,000 mg  1,000 mg Oral Once    [Transfer Hold] glucose (Dex4) 15 GM/59ML oral liquid 15 g  15 g Oral Q15 Min PRN    Or    [Transfer Hold] glucose (Glutose) 40% oral gel 15 g  15 g Oral Q15 Min PRN    Or    [Transfer Hold] glucose-vitamin C (Dex-4) chewable tab 4 tablet  4 tablet Oral Q15 Min PRN    Or    [Transfer Hold] dextrose 50% injection 50 mL  50 mL Intravenous Q15 Min PRN    Or    [Transfer Hold] glucose (Dex4) 15 GM/59ML oral liquid 30 g  30 g Oral Q15 Min PRN    Or    [Transfer Hold] glucose (Glutose) 40% oral gel 30 g  30 g Oral Q15 Min PRN    Or    [Transfer Hold] glucose-vitamin C (Dex-4) chewable tab 8 tablet  8 tablet Oral Q15 Min PRN    lactated ringers infusion   Intravenous Continuous    [COMPLETED] insulin aspart (NovoLOG) 100 Units/mL vial 2-10 Units  2-10 Units Subcutaneous Once    levoFLOXacin in dextrose 5% (Levaquin) 750 mg/150mL IVPB premix 750 mg  750 mg Intravenous Once       Outpatient Medications:     Medications Prior to Admission   Medication Sig Dispense Refill Last Dose    dapagliflozin (FARXIGA) 10 MG Oral Tab Take 1 tablet (10 mg total) by mouth daily. 90 tablet 1 8/24/2024    Insulin Pen Needle (BD PEN NEEDLE TITO 2ND GEN) 32G X 4 MM Does not apply Misc Inject 1 each into the skin daily. 100 each 3     PANTOPRAZOLE 40 MG Oral Tab EC TAKE 1 TABLET(40 MG) BY MOUTH DAILY 30 tablet 6  8/27/2024    lisinopril 5 MG Oral Tab Take 1 tablet (5 mg total) by mouth daily. 90 tablet 1 8/27/2024    Glucose Blood (ONETOUCH VERIO) In Vitro Strip USE TO TEST ONCE TO TWICE DAILY 100 strip 1     Mirabegron ER 50 MG Oral Tablet 24 Hr Take 1 tablet (50 mg total) by mouth daily. 90 tablet 5 8/27/2024    insulin degludec (TRESIBA FLEXTOUCH) 200 UNIT/ML Subcutaneous Solution Pen-injector Inject 36 units as directed daily. (Patient taking differently: Inject 32 units as directed daily.) 3 mL 0 8/27/2024    OneTouch Delica Lancets 33G Does not apply Misc 1 Lancet by Finger stick route daily. 100 each 0     metFORMIN  MG Oral Tablet 24 Hr Take 1 tablet (500 mg total) by mouth 2 (two) times daily with meals. 180 tablet 1 8/27/2024    rosuvastatin 10 MG Oral Tab TAKE 1 TABLET(10 MG) BY MOUTH EVERY NIGHT 90 tablet 3 8/27/2024    timolol 0.5 % Ophthalmic Solution Place 1 drop into both eyes 2 (two) times daily.   8/27/2024       Allergies: Patient has no known allergies.      Anesthesia Evaluation    Patient summary reviewed.    Anesthetic Complications  (-) history of anesthetic complications         GI/Hepatic/Renal      (+) GERD                           Cardiovascular        Exercise tolerance: good     MET: >4    (-) obesity  (+) hypertension     (-) CAD                  (-) angina     (-) PACHECO         Endo/Other      (+) diabetes                            Pulmonary      (-) asthma  (-) COPD       (-) shortness of breath     (-) sleep apnea       Neuro/Psych          (+) CVA                    Patient Active Problem List:     Mixed hyperlipidemia     Type 2 diabetes mellitus with hyperglycemia, with long-term current use of insulin (HCC)     Status post stroke     Essential hypertension     H/O arteriovenous malformation (AVM)     Hypothyroidism     Class 1 obesity due to excess calories with serious comorbidity and body mass index (BMI) of 30.0 to 30.9 in adult     Overactive bladder     RLS (restless legs  syndrome)     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 associated with type 2 diabetes mellitus (HCC)     Age-related osteoporosis without current pathological fracture     Ventral hernia without obstruction or gangrene            Past Surgical History:   Procedure Laterality Date    Brain surgery  2015    avm   [facility]          X3    Cholecystectomy      Colonoscopy      Colonoscopy N/A 2024    Procedure: COLONOSCOPY;  Surgeon: Guerrero Moser MD;  Location:  ENDOSCOPY    Hernia surgery      Other surgical history      LEFT TIBIA FRACTURE- PLATE AND SCREWS      Other surgical history Left     lower leg repair     Social History     Socioeconomic History    Marital status:    Tobacco Use    Smoking status: Never    Smokeless tobacco: Never   Vaping Use    Vaping status: Never Used   Substance and Sexual Activity    Alcohol use: Not Currently    Drug use: No   Other Topics Concern    Caffeine Concern Yes     Comment: coffee 2 cups daily    Exercise No    Seat Belt Yes     History   Drug Use No     Available pre-op labs reviewed.  Lab Results   Component Value Date    WBC 9.8 07/15/2024    RBC 4.73 07/15/2024    HGB 13.4 07/15/2024    HCT 40.4 07/15/2024    MCV 85.4 07/15/2024    MCH 28.3 07/15/2024    MCHC 33.2 07/15/2024    RDW 13.9 07/15/2024    .0 07/15/2024     Lab Results   Component Value Date     2024    K 4.1 2024     2024    CO2 25.0 2024    BUN 16 2024    CREATSERUM 0.94 2024     (H) 2024    CA 9.2 2024            Airway      Mallampati: II  Mouth opening: >3 FB  TM distance: 4 - 6 cm  Neck ROM: full Cardiovascular    Cardiovascular exam normal.  Rhythm: regular  Rate: normal     Dental  Comment: Patient denies any loose/missing/cracked teeth. No gross abnormalities or loose teeth noted on exam.      Dentition appears grossly intact          Pulmonary    Pulmonary exam normal.                 Other findings              ASA: 3   Plan: general  NPO status verified and patient meets guidelines.    Post-procedure pain management plan discussed with surgeon and patient.    Comment:     A detailed discussion about the anesthetic plan was held with Marcela Moreno in the preoperative area. Discussed benefits and risks of general anesthesia including, reasonable expectations of post-operative pain, nausea, vomiting, injury to lips, gums, tongue, teeth, eyes, awareness under anesthesia, cardiac, pulmonary, aspiration, stroke, and death. All questions were answered appropriately and patient demonstrated understanding of realistic expectations and risks of undergoing anesthesia. Marcela Irma Moreno consents to receiving anesthesia and wishes to proceed.      Plan/risks discussed with: patient                Present on Admission:  **None**

## 2024-08-28 NOTE — TELEPHONE ENCOUNTER
Hi,    Can you please schedule this patient for surgery.    Also, can you arrange for the patient to drop a urine sample for urine culture 1-2 weeks prior to the scheduled surgery date?     Thanks,  Gil     Urology Surgery Request  Surgeon: Gil Germain  Location (if known): [facility]  Procedure: Cystoscopy, LEFT ureteroscopy, laser lithotripsy, stent exchange  Anesthesia: General   Time Frame: Next 2-4 weeks   Time required: 60 minutes  Diagnosis: Nephrolithiasis  Special Equipment: C-arm    Antibiotics: per hospital protocol unless checked below   ___ Levaquin 500 mg IV   ___ Gemcitabine 2 g/100 mL NS bladder instillation to be given in OR    Estimated Post Op/Follow Up Appt:   1 week for cystoscopy/stent removal in clinic with me. Please schedule appointment at time of surgery scheduling.

## 2024-08-29 NOTE — TELEPHONE ENCOUNTER
Spoke with pt regarding possibility of 09/25/24.  LVM as well that the date was pending Dr. Germain approval.  Patient can contact me at [phone] to confirm and discuss.

## 2024-09-03 DIAGNOSIS — E11.65 TYPE 2 DIABETES MELLITUS WITH HYPERGLYCEMIA, WITH LONG-TERM CURRENT USE OF INSULIN (HCC): ICD-10-CM

## 2024-09-03 DIAGNOSIS — Z79.4 TYPE 2 DIABETES MELLITUS WITH HYPERGLYCEMIA, WITH LONG-TERM CURRENT USE OF INSULIN (HCC): ICD-10-CM

## 2024-09-03 RX ORDER — INSULIN DEGLUDEC 200 U/ML
INJECTION, SOLUTION SUBCUTANEOUS
Qty: 18 ML | Refills: 0 | OUTPATIENT
Start: 2024-09-03

## 2024-09-03 RX ORDER — INSULIN DEGLUDEC 200 U/ML
INJECTION, SOLUTION SUBCUTANEOUS
Qty: 18 ML | Refills: 0 | Status: SHIPPED | OUTPATIENT
Start: 2024-09-03

## 2024-09-03 NOTE — TELEPHONE ENCOUNTER
Requested Prescriptions     Pending Prescriptions Disp Refills    insulin degludec (TRESIBA FLEXTOUCH) 200 UNIT/ML Subcutaneous Solution Pen-injector [Pharmacy Med Name: TRESIBA FLEXTOUCH PEN(U-200)INJ 3ML] 18 mL 0     Sig: INJECT 32 UNITS UNDER THE SKIN DAILY     HGBA1C:    Lab Results   Component Value Date    A1C 7.0 (A) 08/06/2024    A1C 8.0 (H) 06/04/2024    A1C 8.2 (A) 05/21/2024     (H) 06/04/2024     Your Appointments      Tuesday November 12, 2024 11:30 AM  [place] [facility] Follow Up with BIBIANA Grigsby  [address] ([facility] [place]) [address] [facility] [address]  [phone]        Wednesday November 13, 2024 10:00 AM  Exam - Established with Hemanth Murillo MD  [address] ([address]) [address] [place] [id number] [phone]               Last OFFICE VISIT: 8/6/2024-    Last Refill:  4--3 ml with 0 refills

## 2024-09-03 NOTE — TELEPHONE ENCOUNTER
Patient requested Tresiba re-fill- pended prescription     Last office visit and Provider: 8/6/24-   Future Appointments   Date Time Provider Department Center   11/12/2024 11:30 AM Jessica Hicks APRN [facility]   11/13/2024 10:00 AM Hemanth Murillo MD [facility] [address]   Last A1c value was 7% done 8/6/2024.

## 2024-09-04 ENCOUNTER — TELEPHONE (OUTPATIENT)
Dept: SURGERY | Facility: CLINIC | Age: 69
End: 2024-09-04

## 2024-09-04 NOTE — TELEPHONE ENCOUNTER
RN called patient to answer her questions/concerns re: surgery and post op  No answer. Left message.    S/P cysto, left ureteroscopy, laser lithotripsy, stent placement on 8/28  Scheduled for lithotripsy, stent exchange on 9/25 and stent removal at office on 10/3    Continue with ketorolac, as needed  Miralax for constipation  Oxybutynin for bladder spasms  Tamsulosin to help pass the stones.

## 2024-09-04 NOTE — TELEPHONE ENCOUNTER
Patient has questions regarding the one week stent removal in regards to the amount of pain (surgery on 09/25/24).  Patient also stated that she is not feeling well after the procedure and state the she is feeling some pressure.  Please contact her to discuss.

## 2024-09-05 NOTE — TELEPHONE ENCOUNTER
This encounter is now closed.     RN called patient. She is asking for appt with Dr Germain before her surgery. Appt offered and she agreed on 9/10 appt.

## 2024-09-05 NOTE — TELEPHONE ENCOUNTER
Patient is returning the nurses call. Please contact her to discuss issues from the past procedure

## 2024-09-10 ENCOUNTER — OFFICE VISIT (OUTPATIENT)
Dept: SURGERY | Facility: CLINIC | Age: 69
End: 2024-09-10

## 2024-09-10 DIAGNOSIS — N20.0 NEPHROLITHIASIS: ICD-10-CM

## 2024-09-10 DIAGNOSIS — R82.90 URINE FINDING: Primary | ICD-10-CM

## 2024-09-10 LAB
APPEARANCE: CLEAR
BILIRUBIN: NEGATIVE
GLUCOSE (URINE DIPSTICK): NEGATIVE MG/DL
KETONES (URINE DIPSTICK): NEGATIVE MG/DL
MULTISTIX LOT#: ABNORMAL NUMERIC
NITRITE, URINE: NEGATIVE
PH, URINE: 5.5 (ref 4.5–8)
PROTEIN (URINE DIPSTICK): NEGATIVE MG/DL
SPECIFIC GRAVITY: 1.02 (ref 1–1.03)
URINE-COLOR: YELLOW
UROBILINOGEN,SEMI-QN: 0.2 MG/DL (ref 0–1.9)

## 2024-09-10 PROCEDURE — 99213 OFFICE O/P EST LOW 20 MIN: CPT | Performed by: SURGERY

## 2024-09-10 PROCEDURE — 81003 URINALYSIS AUTO W/O SCOPE: CPT | Performed by: SURGERY

## 2024-09-10 PROCEDURE — 51798 US URINE CAPACITY MEASURE: CPT | Performed by: SURGERY

## 2024-09-10 PROCEDURE — 1160F RVW MEDS BY RX/DR IN RCRD: CPT | Performed by: SURGERY

## 2024-09-10 PROCEDURE — 1159F MED LIST DOCD IN RCRD: CPT | Performed by: SURGERY

## 2024-09-10 RX ORDER — OXYBUTYNIN CHLORIDE 5 MG/1
5 TABLET, EXTENDED RELEASE ORAL DAILY
Qty: 30 TABLET | Refills: 1 | Status: SHIPPED | OUTPATIENT
Start: 2024-09-10

## 2024-09-10 NOTE — PROGRESS NOTES
Urology Clinic Note    Primary Care Provider:  Rhiannon Betancourt MD     Chief Complaint:   Nephrolithiasis, overactive bladder     HPI:   Marcela Moreno is a 68 year old female with history of OA, diabetes, GERD, hyperlipidemia, depression, CVA referred for nephrolithiasis.     She was recently having intermittent left-sided abdominal pains a CT scan was performed.  CT scan 6/20/2024 shows an 8 mm nonobstructing left lower pole kidney stone.  On prior CT in 2022 the same stone was about 3 mm.  She does have intermittent left flank pain.  I discussed that this may or may not be related to this nonobstructing kidney stone.  Given growth over 2 years she is interested in having it treated so discussed ureteroscopy with laser lithotripsy and stent placement.     Of note, she is on oxybutynin 5 mg twice daily.  She has minimal urgency and frequency during the day but significant nocturia 3 times per night with occasional urge incontinence prior to making it to the bathroom.  This is improved on oxybutynin.  She does have significant dry mouth and constipation over show recommend switching to mirabegron.    I brought her to the OR on 8/28/2024 for cystoscopy, left ureteroscopy, ureteral stent placement.  She had 2 areas of ureteral narrowing in the distal and proximal ureter that I was unable to pass with the access sheath or scope.  I left a stent in place for passive ureteral dilation and she is scheduled for ureteroscopy again on 9/25/2024.    She is having some urinary urgency and frequency since the stent has been in place.  She is taking oxybutynin 5 mg twice daily with some relief of her symptoms but some dry mouth.  Mirabegron was prescribed and too expensive.  Will trial 5 mg extended release oxybutynin.    Postvoid residual bladder scan: 21 cc    Urinalysis: Moderate leukocyte esterase, large blood, otherwise negative    History:     Past Medical History:    Abdominal pain    Acute, but ill-defined,  cerebrovascular disease    Arthritis    AVM (arteriovenous malformation) brain (HCC)    being followed at [facility]    Back pain    Back problem    Bad breath    Bloating    Calculus of kidney    Constipation    Diabetes mellitus (HCC)    Diarrhea, unspecified    Esophageal reflux    Fatigue    Fever    Food intolerance    Frequent urination    Heartburn    High blood pressure    High cholesterol    History of depression    Indigestion    Irregular bowel habits    Leaking of urine    Leg swelling    Muscle weakness    Nausea    Neuropathy    TOES SOMETIMES    Osteoarthritis    Pain in joints    Pain with bowel movements    Presence of other cardiac implants and grafts    Problems with swallowing    Sleep disturbance    Sputum production    Stool incontinence    Stroke (HCC)    s/p Brain surgery    Stroke (HCC)    LEFT SIDE EFFECTED- USING CANE    Type II or unspecified type diabetes mellitus without mention of complication, not stated as uncontrolled    Uncomfortable fullness after meals    Vitamin D deficiency    Vomiting    Wears glasses    Weight gain       Past Surgical History:   Procedure Laterality Date    Brain surgery  2015    avm   [facility]          X3    Cholecystectomy      Colonoscopy      Colonoscopy N/A 2024    Procedure: COLONOSCOPY;  Surgeon: Guerrero Moser MD;  Location:  ENDOSCOPY    Hernia surgery      Other surgical history      LEFT TIBIA FRACTURE- PLATE AND SCREWS      Other surgical history Left     lower leg repair       Family History   Problem Relation Age of Onset    Diabetes Mother     Hypertension Mother     Hypertension Father     Heart Disorder Brother         CAD stent in his 40s    Diabetes Brother     Diabetes Sister     Heart Attack Brother        Social History     Socioeconomic History    Marital status:    Tobacco Use    Smoking status: Never    Smokeless tobacco: Never   Vaping Use    Vaping status: Never Used   Substance and Sexual  Activity    Alcohol use: Not Currently    Drug use: No   Other Topics Concern    Caffeine Concern Yes     Comment: coffee 2 cups daily    Exercise No    Seat Belt Yes     Social Determinants of Health      Received [facility], [facility]    Social Connections       Medications (Active prior to today's visit):  Current Outpatient Medications   Medication Sig Dispense Refill    insulin degludec (TRESIBA FLEXTOUCH) 200 UNIT/ML Subcutaneous Solution Pen-injector Inject daily as directed up to TDD = 30 units 18 mL 0    Ketorolac Tromethamine 10 MG Oral Tab Take 1 tablet (10 mg total) by mouth every 8 (eight) hours as needed for Pain (Use sparingly and with plenty of water). 10 tablet 0    Polyethylene Glycol 3350 (MIRALAX) 17 g Oral Powd Pack Take 17 g by mouth daily as needed (Take to avoid constipation, especially if taking narcotic pain medications.). 20 packet 1    oxybutynin 5 MG Oral Tab Take 1 tablet (5 mg total) by mouth 3 (three) times daily as needed (Bladder spasms). Stop 12 hours before Mccallum catheter removal in clinic (if applicable) 35 tablet 1    tamsulosin 0.4 MG Oral Cap Take 1 capsule (0.4 mg total) by mouth every evening. 30 capsule 1    dapagliflozin (FARXIGA) 10 MG Oral Tab Take 1 tablet (10 mg total) by mouth daily. 90 tablet 1    Insulin Pen Needle (BD PEN NEEDLE TITO 2ND GEN) 32G X 4 MM Does not apply Misc Inject 1 each into the skin daily. 100 each 3    PANTOPRAZOLE 40 MG Oral Tab EC TAKE 1 TABLET(40 MG) BY MOUTH DAILY 30 tablet 6    lisinopril 5 MG Oral Tab Take 1 tablet (5 mg total) by mouth daily. 90 tablet 1    Glucose Blood (ONETOUCH VERIO) In Vitro Strip USE TO TEST ONCE TO TWICE DAILY 100 strip 1    Mirabegron ER 50 MG Oral Tablet 24 Hr Take 1 tablet (50 mg total) by mouth daily. 90 tablet 5    OneTouch Delica Lancets 33G Does not apply Misc 1 Lancet by Finger stick route daily. 100 each 0    metFORMIN  MG Oral Tablet 24 Hr Take 1 tablet  (500 mg total) by mouth 2 (two) times daily with meals. 180 tablet 1    rosuvastatin 10 MG Oral Tab TAKE 1 TABLET(10 MG) BY MOUTH EVERY NIGHT 90 tablet 3    timolol 0.5 % Ophthalmic Solution Place 1 drop into both eyes 2 (two) times daily.         Allergies:  No Known Allergies    Review of Systems:   A comprehensive 10-point review of systems was completed.  Pertinent positives and negatives are noted in the the HPI.    Physical Exam:   CONSTITUTIONAL: Well developed, well nourished, in no acute distress  NEUROLOGIC: Alert and oriented  HEAD: Normocephalic, atraumatic  EYES: Sclera non-icteric  ENT: Hearing intact, moist mucous membranes  NECK: No obvious goiter or masses  RESPIRATORY: Normal respiratory effort  SKIN: No evident rashes  ABDOMEN: Soft, non-tender, non-distended    Assessment & Plan:   Marcela Moreon is a 68 year old female with history of OA, diabetes, GERD, hyperlipidemia, depression, CVA referred for nephrolithiasis.     She was recently having intermittent left-sided abdominal pains a CT scan was performed.  CT scan 6/20/2024 shows an 8 mm nonobstructing left lower pole kidney stone.  On prior CT in 2022 the same stone was about 3 mm.  She does have intermittent left flank pain.  I discussed that this may or may not be related to this nonobstructing kidney stone.  Given growth over 2 years she is interested in having it treated so discussed ureteroscopy with laser lithotripsy and stent placement.     Of note, she is on oxybutynin 5 mg twice daily.  She has minimal urgency and frequency during the day but significant nocturia 3 times per night with occasional urge incontinence prior to making it to the bathroom.  This is improved on oxybutynin.  She does have significant dry mouth and constipation over show recommend switching to mirabegron.    I brought her to the OR on 8/28/2024 for cystoscopy, left ureteroscopy, ureteral stent placement.  She had 2 areas of ureteral narrowing in the distal  and proximal ureter that I was unable to pass with the access sheath or scope.  I left a stent in place for passive ureteral dilation and she is scheduled for ureteroscopy again on 9/25/2024.    She is having some urinary urgency and frequency since the stent has been in place.  She is taking oxybutynin 5 mg twice daily with some relief of her symptoms but some dry mouth.  Mirabegron was prescribed and too expensive.  Will trial 5 mg extended release oxybutynin.    -Oxybutynin 5 mg extended release daily  -OR for cystoscopy, left ureteroscopy, laser lithotripsy, stent exchange as scheduled  -Follow-up urine culture    In total, 20 minutes were spent on this patient encounter (including chart review, patient history, physical, and counseling, documentation, and communication).    Gil Germain MD  Staff Urologist  [facility]  Office: [phone]

## 2024-09-12 RX ORDER — SULFAMETHOXAZOLE/TRIMETHOPRIM 800-160 MG
1 TABLET ORAL 2 TIMES DAILY
Qty: 14 TABLET | Refills: 0 | Status: SHIPPED | OUTPATIENT
Start: 2024-09-12 | End: 2024-09-12 | Stop reason: ALTCHOICE

## 2024-09-17 DIAGNOSIS — E11.65 TYPE 2 DIABETES MELLITUS WITH HYPERGLYCEMIA, WITH LONG-TERM CURRENT USE OF INSULIN (HCC): ICD-10-CM

## 2024-09-17 DIAGNOSIS — Z79.4 TYPE 2 DIABETES MELLITUS WITH HYPERGLYCEMIA, WITH LONG-TERM CURRENT USE OF INSULIN (HCC): ICD-10-CM

## 2024-09-17 RX ORDER — METFORMIN HCL 500 MG
500 TABLET, EXTENDED RELEASE 24 HR ORAL 2 TIMES DAILY WITH MEALS
Qty: 180 TABLET | Refills: 1 | Status: SHIPPED | OUTPATIENT
Start: 2024-09-17

## 2024-09-17 NOTE — TELEPHONE ENCOUNTER
Requested Prescriptions     Pending Prescriptions Disp Refills    METFORMIN  MG Oral Tablet 24 Hr [Pharmacy Med Name: METFORMIN ER 500MG 24HR TABS] 180 tablet 1     Sig: TAKE 1 TABLET(500 MG) BY MOUTH TWICE DAILY WITH MEALS       HGBA1C:    Lab Results   Component Value Date    A1C 7.0 (A) 08/06/2024    A1C 8.0 (H) 06/04/2024    A1C 8.2 (A) 05/21/2024     (H) 06/04/2024     Your Appointments      Wednesday September 25, 2024  Urology Procedure with Gil Germain MD  [facility] (--) [address] [facility] [address]  [phone]      Diabetes Patient Instructions  Diabetes Medications To keep your blood glucose in control before, during and after your  procedure/surgery, call the doctor who manages your insulin for  instructions for taking your insulin the day before surgery and the  morning of surgery  If you are taking oral medications for your diabetes, you will need to  hold the morning dose the day of your surgery/procedure If you are taking a SGLT2 Inhibitor medication such as   Canagliflozin(Invokana), Dapagliflozin (Farxiga), Empagliflozin  (Jardiance), or Ertugliflozin (Steglatro) you will need to hold these  medications for 4 days prior to the day of your surgery/procedure or as  instructed during the screening phone call.  Carefully monitor your blood  sugar while you are holding these medications and contact your prescribing  physician with any abnormal blood sugar levels.If you are taking oral  medications for your diabetes, you will need to hold the morning dose the  day of your surgery/procedure If you are taking a GLP-1 Agonist medication such as Liraglutide  (Saxenda), Semaglutide (Ozempic),  Dulaglutide (Trulicity),  Exenatide  (Byetta), Liraglutide (Victoza), Tirzepatide (Mounjaro) daily hold the  medication the day of your surgery/procedure, it you are taking this  medication weekly, hold it one week prior to your surgery/procedure.  If you are unsure if you are  taking a SGLT2 Inhibitor or GLP-1 Agonist,  please check with your provider. Blood Glucose Monitoring    To maintain safe blood glucose levels, check your blood glucose: The evening before bed When awakening in the morning Then every 4 hours until admission to the hospital   If blood glucose is 70 or below  Take glucose tablets (15-16 grams of carbohydrate) or drink 4 ounces (1/2  cup) of a clear sugar-containing beverage such as apple juice or  lemon-lime soda (not diet) Recheck blood glucose in 15 minutes If blood glucose is not greater than 70 mg/dL in 15 minutes, repeat  treatment   You must inform surgical staff if you experienced any hypoglycemia  episodes when you arrive at the hospital  FOR YOUR SAFETY FOOD AND BEVERAGE INTAKE BEFORE SURGERY  The day before your surgery: DO: Do drink 12 ounces of Gatorade (not red colored) 12 hours before your  scheduled surgery time  On the day of your surgery: DO: Do drink 12 ounces of Gatorade (not red colored) - must be completed 4  hours prior to your scheduled surgery time Along with your Gatorade, take acetaminophen 1000 mg (2 Extra Strength  Tylenol tablets) by mouth, unless instructed otherwise by your surgeon or  [facility] Staff DO NOT: Do not eat any solid foods after 11:00p.m. the night before your surgery Do not drink any other liquids (including water) before your surgery -  besides the 12 ounces of Gatorade Do not chew gum or eat any candies before your surgery  Why does your anesthesiologist require you to drink Gatorade before  surgery? To increase your comfort before surgery To decrease your nausea after surgery The carbohydrates in Gatorade help reduce your body’s stress response to  surgery  Why does your anesthesiologist ask you to take acetaminophen before  surgery? The acetaminophen will help you reduce your pain after surgery Acetaminophen helps reduce the amount of other medications, such as  narcotics, you need to help manage your pain      Masks are optional for all patients and visitors, unless otherwise indicated.     Thursday October 03, 2024 1:00 PM  Procedure with Gil Germain MD  [facility], [facility] ([facility] 4) [address]  [facility] [address]  [phone]        Tuesday November 12, 2024 11:30 AM  Apn/Md Diabetic Follow Up with BIBIANA Grigsby  [address] ([facility] [place]) [address] [facility] [address]  [facility] [address]  [phone]        Wednesday November 13, 2024 10:00 AM  Exam - Established with Hemanth Murillo MD  [facility], [address] [place] [address] ([address]) [address] [place] [address] [address] [facility] [address]  [facility] [address]  [phone]               Last OFFICE VISIT: 8/6/2024-    Last Refill:  3--180 tabs with 1 refills -

## 2024-09-25 ENCOUNTER — APPOINTMENT (OUTPATIENT)
Dept: GENERAL RADIOLOGY | Facility: HOSPITAL | Age: 69
End: 2024-09-25
Attending: SURGERY
Payer: MEDICARE

## 2024-09-25 ENCOUNTER — HOSPITAL ENCOUNTER (OUTPATIENT)
Facility: HOSPITAL | Age: 69
Setting detail: HOSPITAL OUTPATIENT SURGERY
Discharge: HOME | End: 2024-09-25
Attending: SURGERY | Admitting: SURGERY
Payer: MEDICARE

## 2024-09-25 ENCOUNTER — ANESTHESIA (OUTPATIENT)
Dept: SURGERY | Facility: HOSPITAL | Age: 69
End: 2024-09-25
Payer: MEDICARE

## 2024-09-25 ENCOUNTER — ANESTHESIA EVENT (OUTPATIENT)
Dept: SURGERY | Facility: HOSPITAL | Age: 69
End: 2024-09-25
Payer: MEDICARE

## 2024-09-25 VITALS
SYSTOLIC BLOOD PRESSURE: 141 MMHG | HEART RATE: 78 BPM | WEIGHT: 173 LBS | DIASTOLIC BLOOD PRESSURE: 60 MMHG | BODY MASS INDEX: 32.66 KG/M2 | HEIGHT: 61 IN | TEMPERATURE: 97 F | RESPIRATION RATE: 17 BRPM | OXYGEN SATURATION: 99 %

## 2024-09-25 DIAGNOSIS — N20.0 NEPHROLITHIASIS: ICD-10-CM

## 2024-09-25 LAB
GLUCOSE BLD-MCNC: 97 MG/DL (ref 70–99)
GLUCOSE BLD-MCNC: 99 MG/DL (ref 70–99)

## 2024-09-25 PROCEDURE — 0TC18ZZ EXTIRPATION OF MATTER FROM LEFT KIDNEY, VIA NATURAL OR ARTIFICIAL OPENING ENDOSCOPIC: ICD-10-PCS | Performed by: SURGERY

## 2024-09-25 PROCEDURE — 0T778DZ DILATION OF LEFT URETER WITH INTRALUMINAL DEVICE, VIA NATURAL OR ARTIFICIAL OPENING ENDOSCOPIC: ICD-10-PCS | Performed by: SURGERY

## 2024-09-25 PROCEDURE — 74420 UROGRAPHY RTRGR +-KUB: CPT | Performed by: SURGERY

## 2024-09-25 PROCEDURE — BT1F1ZZ FLUOROSCOPY OF LEFT KIDNEY, URETER AND BLADDER USING LOW OSMOLAR CONTRAST: ICD-10-PCS | Performed by: SURGERY

## 2024-09-25 PROCEDURE — 52356 CYSTO/URETERO W/LITHOTRIPSY: CPT | Performed by: SURGERY

## 2024-09-25 RX ORDER — HYDROMORPHONE HYDROCHLORIDE 1 MG/ML
0.4 INJECTION, SOLUTION INTRAMUSCULAR; INTRAVENOUS; SUBCUTANEOUS EVERY 5 MIN PRN
Status: DISCONTINUED | OUTPATIENT
Start: 2024-09-25 | End: 2024-09-25

## 2024-09-25 RX ORDER — LIDOCAINE HYDROCHLORIDE 10 MG/ML
INJECTION, SOLUTION EPIDURAL; INFILTRATION; INTRACAUDAL; PERINEURAL AS NEEDED
Status: DISCONTINUED | OUTPATIENT
Start: 2024-09-25 | End: 2024-09-25 | Stop reason: SURG

## 2024-09-25 RX ORDER — MEPERIDINE HYDROCHLORIDE 25 MG/ML
12.5 INJECTION INTRAMUSCULAR; INTRAVENOUS; SUBCUTANEOUS AS NEEDED
Status: DISCONTINUED | OUTPATIENT
Start: 2024-09-25 | End: 2024-09-25

## 2024-09-25 RX ORDER — HYDROMORPHONE HYDROCHLORIDE 1 MG/ML
0.2 INJECTION, SOLUTION INTRAMUSCULAR; INTRAVENOUS; SUBCUTANEOUS EVERY 5 MIN PRN
Status: DISCONTINUED | OUTPATIENT
Start: 2024-09-25 | End: 2024-09-25

## 2024-09-25 RX ORDER — METOCLOPRAMIDE HYDROCHLORIDE 5 MG/ML
INJECTION INTRAMUSCULAR; INTRAVENOUS AS NEEDED
Status: DISCONTINUED | OUTPATIENT
Start: 2024-09-25 | End: 2024-09-25 | Stop reason: SURG

## 2024-09-25 RX ORDER — SULFAMETHOXAZOLE/TRIMETHOPRIM 800-160 MG
1 TABLET ORAL 2 TIMES DAILY
COMMUNITY
Start: 2024-09-12

## 2024-09-25 RX ORDER — NICOTINE POLACRILEX 4 MG
30 LOZENGE BUCCAL
Status: DISCONTINUED | OUTPATIENT
Start: 2024-09-25 | End: 2024-09-25 | Stop reason: HOSPADM

## 2024-09-25 RX ORDER — HYDROCODONE BITARTRATE AND ACETAMINOPHEN 5; 325 MG/1; MG/1
2 TABLET ORAL ONCE AS NEEDED
Status: DISCONTINUED | OUTPATIENT
Start: 2024-09-25 | End: 2024-09-25

## 2024-09-25 RX ORDER — ONDANSETRON 2 MG/ML
INJECTION INTRAMUSCULAR; INTRAVENOUS
Status: COMPLETED
Start: 2024-09-25 | End: 2024-09-25

## 2024-09-25 RX ORDER — SULFAMETHOXAZOLE/TRIMETHOPRIM 800-160 MG
1 TABLET ORAL 2 TIMES DAILY
Qty: 4 TABLET | Refills: 0 | Status: SHIPPED | OUTPATIENT
Start: 2024-09-25 | End: 2024-09-27

## 2024-09-25 RX ORDER — ROCURONIUM BROMIDE 10 MG/ML
INJECTION, SOLUTION INTRAVENOUS AS NEEDED
Status: DISCONTINUED | OUTPATIENT
Start: 2024-09-25 | End: 2024-09-25 | Stop reason: SURG

## 2024-09-25 RX ORDER — ONDANSETRON 2 MG/ML
4 INJECTION INTRAMUSCULAR; INTRAVENOUS EVERY 6 HOURS PRN
Status: DISCONTINUED | OUTPATIENT
Start: 2024-09-25 | End: 2024-09-25

## 2024-09-25 RX ORDER — KETOROLAC TROMETHAMINE 10 MG/1
10 TABLET, FILM COATED ORAL EVERY 8 HOURS PRN
Qty: 10 TABLET | Refills: 0 | Status: SHIPPED | OUTPATIENT
Start: 2024-09-25

## 2024-09-25 RX ORDER — ACETAMINOPHEN 500 MG
1000 TABLET ORAL ONCE AS NEEDED
Status: DISCONTINUED | OUTPATIENT
Start: 2024-09-25 | End: 2024-09-25

## 2024-09-25 RX ORDER — ACETAMINOPHEN 500 MG
1000 TABLET ORAL ONCE
Status: DISCONTINUED | OUTPATIENT
Start: 2024-09-25 | End: 2024-09-25 | Stop reason: HOSPADM

## 2024-09-25 RX ORDER — LABETALOL HYDROCHLORIDE 5 MG/ML
INJECTION, SOLUTION INTRAVENOUS AS NEEDED
Status: DISCONTINUED | OUTPATIENT
Start: 2024-09-25 | End: 2024-09-25 | Stop reason: SURG

## 2024-09-25 RX ORDER — METOCLOPRAMIDE HYDROCHLORIDE 5 MG/ML
10 INJECTION INTRAMUSCULAR; INTRAVENOUS EVERY 8 HOURS PRN
Status: DISCONTINUED | OUTPATIENT
Start: 2024-09-25 | End: 2024-09-25

## 2024-09-25 RX ORDER — LABETALOL HYDROCHLORIDE 5 MG/ML
5 INJECTION, SOLUTION INTRAVENOUS EVERY 5 MIN PRN
Status: DISCONTINUED | OUTPATIENT
Start: 2024-09-25 | End: 2024-09-25

## 2024-09-25 RX ORDER — KETOROLAC TROMETHAMINE 30 MG/ML
INJECTION, SOLUTION INTRAMUSCULAR; INTRAVENOUS AS NEEDED
Status: DISCONTINUED | OUTPATIENT
Start: 2024-09-25 | End: 2024-09-25 | Stop reason: SURG

## 2024-09-25 RX ORDER — SODIUM CHLORIDE, SODIUM LACTATE, POTASSIUM CHLORIDE, CALCIUM CHLORIDE 600; 310; 30; 20 MG/100ML; MG/100ML; MG/100ML; MG/100ML
INJECTION, SOLUTION INTRAVENOUS CONTINUOUS
Status: DISCONTINUED | OUTPATIENT
Start: 2024-09-25 | End: 2024-09-25

## 2024-09-25 RX ORDER — NALOXONE HYDROCHLORIDE 0.4 MG/ML
80 INJECTION, SOLUTION INTRAMUSCULAR; INTRAVENOUS; SUBCUTANEOUS AS NEEDED
Status: DISCONTINUED | OUTPATIENT
Start: 2024-09-25 | End: 2024-09-25

## 2024-09-25 RX ORDER — DEXTROSE MONOHYDRATE 25 G/50ML
50 INJECTION, SOLUTION INTRAVENOUS
Status: DISCONTINUED | OUTPATIENT
Start: 2024-09-25 | End: 2024-09-25 | Stop reason: HOSPADM

## 2024-09-25 RX ORDER — DIPHENHYDRAMINE HYDROCHLORIDE 50 MG/ML
12.5 INJECTION INTRAMUSCULAR; INTRAVENOUS AS NEEDED
Status: DISCONTINUED | OUTPATIENT
Start: 2024-09-25 | End: 2024-09-25

## 2024-09-25 RX ORDER — ACETAMINOPHEN 325 MG/1
650 TABLET ORAL ONCE
Status: DISCONTINUED | OUTPATIENT
Start: 2024-09-25 | End: 2024-09-25

## 2024-09-25 RX ORDER — NICOTINE POLACRILEX 4 MG
15 LOZENGE BUCCAL
Status: DISCONTINUED | OUTPATIENT
Start: 2024-09-25 | End: 2024-09-25 | Stop reason: HOSPADM

## 2024-09-25 RX ORDER — PHENYLEPHRINE HCL 10 MG/ML
VIAL (ML) INJECTION AS NEEDED
Status: DISCONTINUED | OUTPATIENT
Start: 2024-09-25 | End: 2024-09-25 | Stop reason: SURG

## 2024-09-25 RX ORDER — ONDANSETRON 2 MG/ML
INJECTION INTRAMUSCULAR; INTRAVENOUS AS NEEDED
Status: DISCONTINUED | OUTPATIENT
Start: 2024-09-25 | End: 2024-09-25 | Stop reason: SURG

## 2024-09-25 RX ORDER — HYDROCODONE BITARTRATE AND ACETAMINOPHEN 5; 325 MG/1; MG/1
1 TABLET ORAL ONCE AS NEEDED
Status: DISCONTINUED | OUTPATIENT
Start: 2024-09-25 | End: 2024-09-25

## 2024-09-25 RX ORDER — METOCLOPRAMIDE HYDROCHLORIDE 5 MG/ML
INJECTION INTRAMUSCULAR; INTRAVENOUS
Status: DISCONTINUED
Start: 2024-09-25 | End: 2024-09-25 | Stop reason: WASHOUT

## 2024-09-25 RX ORDER — HYDROMORPHONE HYDROCHLORIDE 1 MG/ML
0.6 INJECTION, SOLUTION INTRAMUSCULAR; INTRAVENOUS; SUBCUTANEOUS EVERY 5 MIN PRN
Status: DISCONTINUED | OUTPATIENT
Start: 2024-09-25 | End: 2024-09-25

## 2024-09-25 RX ADMIN — SODIUM CHLORIDE, SODIUM LACTATE, POTASSIUM CHLORIDE, CALCIUM CHLORIDE: 600; 310; 30; 20 INJECTION, SOLUTION INTRAVENOUS at 08:53:00

## 2024-09-25 RX ADMIN — ONDANSETRON 4 MG: 2 INJECTION INTRAMUSCULAR; INTRAVENOUS at 08:55:00

## 2024-09-25 RX ADMIN — METOCLOPRAMIDE HYDROCHLORIDE 10 MG: 5 INJECTION INTRAMUSCULAR; INTRAVENOUS at 08:55:00

## 2024-09-25 RX ADMIN — LIDOCAINE HYDROCHLORIDE 50 MG: 10 INJECTION, SOLUTION EPIDURAL; INFILTRATION; INTRACAUDAL; PERINEURAL at 08:59:00

## 2024-09-25 RX ADMIN — LABETALOL HYDROCHLORIDE 5 MG: 5 INJECTION, SOLUTION INTRAVENOUS at 09:02:00

## 2024-09-25 RX ADMIN — KETOROLAC TROMETHAMINE 15 MG: 30 INJECTION, SOLUTION INTRAMUSCULAR; INTRAVENOUS at 09:39:00

## 2024-09-25 RX ADMIN — ROCURONIUM BROMIDE 50 MG: 10 INJECTION, SOLUTION INTRAVENOUS at 08:59:00

## 2024-09-25 RX ADMIN — PHENYLEPHRINE HCL 50 MCG: 10 MG/ML VIAL (ML) INJECTION at 09:26:00

## 2024-09-25 NOTE — ANESTHESIA PROCEDURE NOTES
Airway  Date/Time: 9/25/2024 8:59 AM  Urgency: elective    Airway not difficult    General Information and Staff    Patient location during procedure: OR  Anesthesiologist: Seferino Odom MD  Performed: anesthesiologist   Performed by: Seferino Odom MD  Authorized by: Seferino Odom MD      Indications and Patient Condition  Indications for airway management: anesthesia  Spontaneous Ventilation: absent  Sedation level: deep  Preoxygenated: yes  Patient position: sniffing  Mask difficulty assessment: 0 - not attempted    Final Airway Details  Final airway type: endotracheal airway      Successful airway: ETT  Cuffed: yes   Successful intubation technique: Video laryngoscopy  Facilitating devices/methods: intubating stylet  Endotracheal tube insertion site: oral  Blade: GlideScope  Blade size: #3  ETT size (mm): 7.0    Cormack-Lehane Classification: grade I - full view of glottis  Placement verified by: capnometry   Cuff volume (mL): 9  Measured from: lips  ETT to lips (cm): 22  Number of attempts at approach: 1  Number of other approaches attempted: 0

## 2024-09-25 NOTE — DISCHARGE INSTRUCTIONS
You had cystoscopy, ureteroscopy, and stent in the operating room today.    Instructions:    - No heavy lifting or strenuous activity for 1 day. You may resume regular activity tomorrow.  With increased activity you may notice more blood in the urine from stent movement inside the bladder.    - Your follow-up appointment is listed below. Please contact us at [phone] if you need to change your appointment.    Your appointments       Date & Time Appointment Department ([facility])    Oct 04, 2024 8:00 AM CDT Procedure with Gil Germain MD [facility] ([place] [address])        Nov 12, 2024 11:30 AM CST Apn/Md Diabetic Follow Up with Jessica Hicks APRN [address] ([facility] [place])        Nov 13, 2024 10:00 AM CST Exam - Established with Hemanth Murillo MD [address] ([address])              [address]  [address] [place] [address] [address]  [facility] [address] [phone] [address]  [facility] [place]  [address]  [facility] [address] [phone] [facility] 4  [address] [facility] [address]  [facility] [address] [phone]           - You have a stent (small plastic tube) inside your kidney and ureter to allow the swelling from surgery to resolve. This is only temporary and must be removed, so you should not forget about it. We will remove your stent via a brief cystoscopy in clinic when you return. If you have to miss this appointment for some reason please make sure you re-schedule it.     - With a ureteral stent in place you may have some discomfort on your side/flank. You can feel pain worsen when you urinate, so try to avoid  holding urine and void every 2-3 hours. You also may notice increased blood in the urine as you increase your activity. If this happens increase your hydration and take it easy for a day. Warm baths/hot packs can help with the discomfort as well as your prescribed medications and Advil/Motrin (if you are able to take these).     - You may experience mild pain after the procedure for a few days.  If the pain becomes intolerable please contact our office or go to the nearest Emergency Room or Urgent Care. You should take over the counter ibuprofen (AKA motrin, advil) for mild pain (provided you do not have a medical condition such as stomach ulcers or kidney disease which prohibits you from taking these). You may alternate this with tylenol as well. If pain is still not relieved by tylenol and/or ibuprofen, you may take narcotic pain medication if prescribed (typically oxycodone or tramadol). If you are taking narcotic pain medication this can make you constipated, so you should take over the counter stool softeners or miralax if prescribed.    - Warm packs over the lower abdomen or hot baths often help with discomfort after cystoscopy.    - If you take blood thinners (such as aspirin or plavix) please hold these medications until 3 days after surgery.     - You may experience burning and frequency of urination over the next few days. This will improve after a few days if you stay well hydrated.     - You are likely to see some blood in your urine (pink or light red urine) that should clear up within a few days. Staying well hydrated should help this clear up. If you notice the urine stays dark red or there are multiple large blood clots despite good hydration, please call the urology clinic ([phone]).     - Try to abstain from alcohol, coffee, tea, artificial sweeteners, and spicy food for the next 48 hours as these can irritate the bladder.     - If you develop fevers / chills, difficulty urinating, or  abdominal pain that does not improve with pain medications, please call the office.     - Drink 1.5 to 2 liters of fluid today (water is preferable). If you are on a fluid restriction due to other medical reasons then you need to adhere to your fluid restriction recommendations.    Gil Germain MD  Staff Urologist  [facility]  Office: [phone]

## 2024-09-25 NOTE — OPERATIVE REPORT
Urology Operative Note    Attending Surgeon: Gil Germain MD    Assistant Surgeon: None    Patient Name: Marcela Moreno    Date of Surgery: 9/25/2024    Preoperative Diagnosis: Left nephrolithiasis    Postoperative Diagnosis: Same    Procedure Performed:   Cystoscopy, LEFT ureteroscopy, laser lithotripsy, basket stone extraction, retrograde pyelogram, ureteral stent exchange    Indication:  Patient is a 68 year old female who presented with an 8mm left lower pole renal stone, stent placed a few weeks ago given ureter too narrow to pass scope. The patient was counseled on options, risks, and benefits and elected to undergo the above procedure. We discussed risks including, but not limited to, bleeding, infection, damage to surrounding structures, need for repeat procedure(s). The patient understood these risks and wished to proceed with surgery.    Findings:  Cystoscopy - normal urethra without strictures, normal bladder. Stone(s) in left kidney fully treated.    Procedure:  The patient was taken to the operating room and a timeout was performed confirming the correct patient and procedure. The patient was prepped and draped in lithotomy position after undergoing general anesthesia. Pre-operative prophylactic antibiotics were given in the form of Ancef.    The cystoscope was inserted per urethra and the bladder was inspected and drained. The left ureteral stent was grasped and pulled to the urethral meatus with a cystoscopic grasper. The stent was cannulated with a Sensor wire, and the wire was passed into the renal pelvis which I confirmed using fluoroscopy. The stent was fully removed.    A 12/14 Guyanese ureteral access sheath was inserted over the first wire. It was advanced without resistance to the mid ureter. The inner cannula was removed and a second wire was inserted through the sheath and into the kidney, which was confirmed fluoroscopically.  The access sheath was removed and the second safety wire was  secured to the drape.  The access sheath was then reinserted over the first working wire up to the proximal ureter. The wire and inner cannula of the access sheath were removed, leaving the safety wire in place alongside the access sheath.     The flexible ureteroscope was advanced into the sheath and up into the renal pelvis. A stone was seen in the lower pole of the kidney. The stone was fragmented using a laser, and then the fragments were extracted using a zero tip basket. Any smaller remaining stone fragments were lasered to dust, and thoroughly irrigated. All renal calyces were surveyed once more, and no large fragments were seen. A retrograde pyelogram was performed through the scope and showed no extravasation. The ureter was inspected while slowly removing the scope and access sheath, and it appeared healthy without stone fragments seen.    A 6-Grenadian x 24 cm JJ ureteral stent was inserted over the remaining wire. The proximal coil was formed in the kidney under fluoroscopic guidance. The distal coil was formed within the bladder using the pusher and fluoroscopy. The stent string was removed prior to stent placement.    The bladder was drained and the cystoscope was removed. The patient was awoken from anesthesia and transferred to PACU in stable condition. The patient tolerated the procedure well. All instrument/supply counts were correct at the end of the case.    Specimens:   Stone fragments for chemical analysis    Estimated Blood Loss:  1 mL    Tubes/Drains:  6-Grenadian x 24 cm JJ left ureteral stent    Complications:   None immediate    Condition from OR:  Stable    Plan:   The patient will follow up in the office for stent removal in 1 week.      Gil Germain MD  Staff Urologist  [facility]  Office: [phone]

## 2024-09-25 NOTE — H&P
UROLOGY PRE-OPERATIVE HISTORY & PHYSICAL      Marcela Moreno Patient Status:  Hospital Outpatient Surgery    10/22/1955 MRN [id number]   Location [facility] SURGERY Attending Gil Germain MD   Hosp Day # 0 PCP Rhiannon Betancourt MD     Primary Care Provider: Rhiannon Betancourt MD     Chief Complaint:   Nephrolithiasis, overactive bladder     HPI:   Marcela Moreno is a 68 year old female with history of OA, diabetes, GERD, hyperlipidemia, depression, CVA referred for nephrolithiasis.     She was recently having intermittent left-sided abdominal pains a CT scan was performed.  CT scan 2024 shows an 8 mm nonobstructing left lower pole kidney stone.  On prior CT in  the same stone was about 3 mm.  She does have intermittent left flank pain.  I discussed that this may or may not be related to this nonobstructing kidney stone.  Given growth over 2 years she is interested in having it treated so discussed ureteroscopy with laser lithotripsy and stent placement.     Of note, she is on oxybutynin 5 mg twice daily.  She has minimal urgency and frequency during the day but significant nocturia 3 times per night with occasional urge incontinence prior to making it to the bathroom.  This is improved on oxybutynin.  She does have significant dry mouth and constipation over show recommend switching to mirabegron.     I brought her to the OR on 2024 for cystoscopy, left ureteroscopy, ureteral stent placement.  She had 2 areas of ureteral narrowing in the distal and proximal ureter that I was unable to pass with the access sheath or scope.  I left a stent in place for passive ureteral dilation and she is scheduled for ureteroscopy again on 2024.     She is having some urinary urgency and frequency since the stent has been in place.  She is taking oxybutynin 5 mg twice daily with some relief of her symptoms but some dry mouth.  Mirabegron was prescribed and too expensive.  Will trial 5 mg extended release  oxybutynin.    UCx grew >100K mixed joselyn - treated with a course of Bactrim given upcoming procedure.    History:     Past Medical History:    Abdominal pain    Acute, but ill-defined, cerebrovascular disease    Arthritis    AVM (arteriovenous malformation) brain ([facility])    being followed at [facility]    Back pain    Back problem    Bad breath    Bloating    Calculus of kidney    Constipation    Diabetes mellitus ([facility])    Diarrhea, unspecified    Esophageal reflux    Fatigue    Fever    Food intolerance    Frequent urination    Heartburn    High blood pressure    High cholesterol    History of depression    Indigestion    Irregular bowel habits    Leaking of urine    Leg swelling    Muscle weakness    Nausea    Neuropathy    TOES SOMETIMES    Osteoarthritis    Pain in joints    Pain with bowel movements    Presence of other cardiac implants and grafts    Problems with swallowing    Sleep disturbance    Sputum production    Stool incontinence    Stroke ([facility])    s/p Brain surgery    Stroke ([facility])    LEFT SIDE EFFECTED- USING CANE    Type II or unspecified type diabetes mellitus without mention of complication, not stated as uncontrolled    Uncomfortable fullness after meals    Vitamin D deficiency    Vomiting    Wears glasses    Weight gain       Past Surgical History:   Procedure Laterality Date    Brain surgery  2015    avm   [facility]          X3    Cholecystectomy      Colonoscopy      Colonoscopy N/A 2024    Procedure: COLONOSCOPY;  Surgeon: Guerrero Moser MD;  Location:  ENDOSCOPY    Cystoscopy,insert ureteral stent      Hernia surgery      Other surgical history      LEFT TIBIA FRACTURE- PLATE AND SCREWS      Other surgical history Left     lower leg repair       Family History   Problem Relation Age of Onset    Diabetes Mother     Hypertension Mother     Hypertension Father     Heart Disorder Brother         CAD stent in his 40s    Diabetes Brother     Diabetes Sister     Heart  Attack Brother        Social History     Socioeconomic History    Marital status:    Tobacco Use    Smoking status: Never    Smokeless tobacco: Never   Vaping Use    Vaping status: Never Used   Substance and Sexual Activity    Alcohol use: Not Currently    Drug use: No   Other Topics Concern    Caffeine Concern Yes     Comment: coffee 2 cups daily    Exercise No    Seat Belt Yes     Social Determinants of Health      [facility]    Social Connections       Medications:  Current Outpatient Medications   Medication Sig Dispense Refill    oxybutynin ER 5 MG Oral Tablet 24 Hr Take 1 tablet (5 mg total) by mouth daily. 30 tablet 1    insulin degludec (TRESIBA FLEXTOUCH) 200 UNIT/ML Subcutaneous Solution Pen-injector Inject daily as directed up to TDD = 30 units (Patient taking differently: 32 Units every morning.) 18 mL 0    PANTOPRAZOLE 40 MG Oral Tab EC TAKE 1 TABLET(40 MG) BY MOUTH DAILY 30 tablet 6    lisinopril 5 MG Oral Tab Take 1 tablet (5 mg total) by mouth daily. 90 tablet 1    rosuvastatin 10 MG Oral Tab TAKE 1 TABLET(10 MG) BY MOUTH EVERY NIGHT 90 tablet 3    timolol 0.5 % Ophthalmic Solution Place 1 drop into both eyes 2 (two) times daily.      METFORMIN  MG Oral Tablet 24 Hr TAKE 1 TABLET(500 MG) BY MOUTH TWICE DAILY WITH MEALS 180 tablet 1    Ketorolac Tromethamine 10 MG Oral Tab Take 1 tablet (10 mg total) by mouth every 8 (eight) hours as needed for Pain (Use sparingly and with plenty of water). (Patient not taking: Reported on 9/10/2024) 10 tablet 0    Polyethylene Glycol 3350 (MIRALAX) 17 g Oral Powd Pack Take 17 g by mouth daily as needed (Take to avoid constipation, especially if taking narcotic pain medications.). (Patient not taking: Reported on 9/10/2024) 20 packet 1    tamsulosin 0.4 MG Oral Cap Take 1 capsule (0.4 mg total) by mouth every evening. (Patient taking differently: Take 1 capsule (0.4 mg total) by mouth every  evening. Not sure if she is taking) 30 capsule 1    dapagliflozin (FARXIGA) 10 MG Oral Tab Take 1 tablet (10 mg total) by mouth daily. (Patient not taking: Reported on 9/10/2024) 90 tablet 1    Insulin Pen Needle (BD PEN NEEDLE TITO 2ND GEN) 32G X 4 MM Does not apply Misc Inject 1 each into the skin daily. 100 each 3    Glucose Blood (ONETOUCH VERIO) In Vitro Strip USE TO TEST ONCE TO TWICE DAILY 100 strip 1    OneTouch Delica Lancets 33G Does not apply Misc 1 Lancet by Finger stick route daily. 100 each 0       Allergies:  No Known Allergies    Review of Systems:   A comprehensive 10-point review of systems was completed.  Pertinent positives and negatives are noted in the the HPI.    Physical Exam:   Vital Signs:  Height 5' 1\" (1.549 m), weight 174 lb (78.9 kg).     CONSTITUTIONAL: Well developed, well nourished, in no acute distress  NEUROLOGIC: Alert and oriented  HEAD: Normocephalic, atraumatic  EYES: Sclera non-icteric  ENT: Hearing intact, moist mucous membranes  NECK: No obvious goiter or masses  RESPIRATORY: Normal respiratory effort  SKIN: No evident rashes  ABDOMEN: Soft, non-tender, non-distended    Laboratory Data:  Lab Results   Component Value Date    WBC 9.8 07/15/2024    HGB 13.4 07/15/2024    .0 07/15/2024     Lab Results   Component Value Date     07/25/2024    K 4.1 07/25/2024     07/25/2024    CO2 25.0 07/25/2024    BUN 16 07/25/2024     (H) 07/25/2024    GFRAA 98 12/07/2021    AST 15 07/25/2024    ALT <7 (L) 07/25/2024    TP 6.9 07/25/2024    ALB 4.2 07/25/2024    PHOS 3.6 07/15/2024    CA 9.2 07/25/2024    MG 1.9 07/15/2024       Urinalysis Results (last three years):  Recent Labs     08/08/22  1539 11/06/23  1225 07/02/24  0859 07/15/24  1724 07/25/24  1316 09/10/24  1219   COLORUR Yellow  --   --  Light-Yellow Light-Yellow  --    CLARITY Clear  --   --  Clear Clear  --    SPECGRAVITY 1.025 1.025 1.015 1.028 1.026 1.020   PHURINE 5.0 5.5 5.0 5.0 5.0 5.5   PROUR  >=300*  --   --  Trace* 30*  --    GLUUR 500*  --   --  >1000* >1000*  --    KETUR 15*  --   --  Negative Negative  --    BILUR Negative  --   --  Negative Negative  --    BLOODURINE Small*  --   --  Negative Negative  --    NITRITE Negative Negative Negative Negative Negative Negative   UROBILINOGEN 0.2  --   --  Normal Normal  --    LEUUR Trace*  --   --  25* Negative  --    WBCUR 6-10*  --   --  1-5 1-5  --    RBCUR 3-5*  --   --  0-2 0-2  --    BACUR Rare*  --   --  Rare* None Seen  --        Urine Culture Results (last three years):  Lab Results   Component Value Date    URINECUL  09/10/2024     >100,000 cfu/ml Multiple species present- probable contamination.    URINECUL  08/23/2024     10-50,000 cfu/ml Multiple species present-probable contamination.    URINECUL  07/25/2024     <10,000 cfu/ml Multiple species present- probable contamination.    URINECUL  07/15/2024     ,000 cfu/ml Multiple species present- probable contamination.    URINECUL 10,000 - 50,000 CFU/ML Mixed gram positive joselyn 07/02/2024    URINECUL No Growth at 18-24 hrs. 11/06/2023    URINECUL <10,000 CFU/ML Gram negative joselyn 09/01/2022    URINECUL  08/08/2022     <10,000 cfu/ml Mixture of Gram positive organisms isolated - probable contamination.    URINECUL 10,000 - 50,000 CFU/ML Citrobacter youngae (A) 08/08/2022    URINECUL 10,000 - 50,000 CFU/ML Klebsiella pneumoniae (A) 08/08/2022    URINECUL (A) 10/14/2016     50,000 CFU/ML Streptococcus agalactiae (Group B beta strep)    URINECUL 30,000 CFU/ML Mixed gram positive joselyn 10/14/2016    URINECUL No Growth 1 Day 07/09/2015       PSA:  No results found for: \"PSA\", \"PERCENTPSA\", \"PSAS\", \"PSAULTRA\"     Imaging (last three days):  No results found.     Assessment:   Marcela Moreno is a 68 year old female with history of OA, diabetes, GERD, hyperlipidemia, depression, CVA referred for nephrolithiasis.     She was recently having intermittent left-sided abdominal pains a CT scan was  performed.  CT scan 6/20/2024 shows an 8 mm nonobstructing left lower pole kidney stone.  On prior CT in 2022 the same stone was about 3 mm.  She does have intermittent left flank pain.  I discussed that this may or may not be related to this nonobstructing kidney stone.  Given growth over 2 years she is interested in having it treated so discussed ureteroscopy with laser lithotripsy and stent placement.     Of note, she is on oxybutynin 5 mg twice daily.  She has minimal urgency and frequency during the day but significant nocturia 3 times per night with occasional urge incontinence prior to making it to the bathroom.  This is improved on oxybutynin.  She does have significant dry mouth and constipation over show recommend switching to mirabegron.     I brought her to the OR on 8/28/2024 for cystoscopy, left ureteroscopy, ureteral stent placement.  She had 2 areas of ureteral narrowing in the distal and proximal ureter that I was unable to pass with the access sheath or scope.  I left a stent in place for passive ureteral dilation and she is scheduled for ureteroscopy again on 9/25/2024.     She is having some urinary urgency and frequency since the stent has been in place.  She is taking oxybutynin 5 mg twice daily with some relief of her symptoms but some dry mouth.  Mirabegron was prescribed and too expensive.  Will trial 5 mg extended release oxybutynin.    Plan:   - OR for cystoscopy, LEFT ureteroscopy, laser lithotripsy, stent exchange   - Informed consent obtained - risks and benefits explained, and all questions answered    I have personally reviewed all relevant medical records, labs, and imaging.     Gil Germain MD  Staff Urologist  [facility]  Office: [phone]

## 2024-09-25 NOTE — ANESTHESIA PREPROCEDURE EVALUATION
PRE-OP EVALUATION    Patient Name: Marcela Moreno    Admit Diagnosis: Nephrolithiasis [N20.0]    Pre-op Diagnosis: Nephrolithiasis [N20.0]    CYSTOSCOPY, LEFT URETEROSCOPY, LASER LITHOTRIPSY,LEFT STENT EXCHANGE    Anesthesia Procedure: CYSTOSCOPY, LEFT URETEROSCOPY, LASER LITHOTRIPSY,LEFT STENT EXCHANGE (Left)    Surgeons and Role:     * Gil Germain MD - Primary    Pre-op vitals reviewed.  Temp: 97.9 °F (36.6 °C)  Pulse: 74  Resp: 16  BP: 167/68  SpO2: 100 %  Body mass index is 32.69 kg/m².    Current medications reviewed.  Hospital Medications:  • [Transfer Hold] acetaminophen (Tylenol Extra Strength) tab 1,000 mg  1,000 mg Oral Once   • [Transfer Hold] glucose (Dex4) 15 GM/59ML oral liquid 15 g  15 g Oral Q15 Min PRN    Or   • [Transfer Hold] glucose (Glutose) 40% oral gel 15 g  15 g Oral Q15 Min PRN    Or   • [Transfer Hold] glucose-vitamin C (Dex-4) chewable tab 4 tablet  4 tablet Oral Q15 Min PRN    Or   • [Transfer Hold] dextrose 50% injection 50 mL  50 mL Intravenous Q15 Min PRN    Or   • [Transfer Hold] glucose (Dex4) 15 GM/59ML oral liquid 30 g  30 g Oral Q15 Min PRN    Or   • [Transfer Hold] glucose (Glutose) 40% oral gel 30 g  30 g Oral Q15 Min PRN    Or   • [Transfer Hold] glucose-vitamin C (Dex-4) chewable tab 8 tablet  8 tablet Oral Q15 Min PRN   • lactated ringers infusion   Intravenous Continuous   • ceFAZolin (Ancef) 2g in 10mL IV syringe premix  2 g Intravenous Once       Outpatient Medications:     Medications Prior to Admission   Medication Sig Dispense Refill Last Dose   • sulfamethoxazole-trimethoprim -160 MG Oral Tab per tablet Take 1 tablet by mouth 2 (two) times daily.   9/21/2024   • METFORMIN  MG Oral Tablet 24 Hr TAKE 1 TABLET(500 MG) BY MOUTH TWICE DAILY WITH MEALS 180 tablet 1 9/24/2024 at 1700   • oxybutynin ER 5 MG Oral Tablet 24 Hr Take 1 tablet (5 mg total) by mouth daily. 30 tablet 1 9/24/2024 at 1000   • insulin degludec (TRESIBA FLEXTOUCH) 200 UNIT/ML Subcutaneous  Solution Pen-injector Inject daily as directed up to TDD = 30 units (Patient taking differently: 32 Units every morning.) 18 mL 0 9/24/2024 at 1100   • Ketorolac Tromethamine 10 MG Oral Tab Take 1 tablet (10 mg total) by mouth every 8 (eight) hours as needed for Pain (Use sparingly and with plenty of water). 10 tablet 0 Past Month   • PANTOPRAZOLE 40 MG Oral Tab EC TAKE 1 TABLET(40 MG) BY MOUTH DAILY 30 tablet 6 9/24/2024 at 1000   • lisinopril 5 MG Oral Tab Take 1 tablet (5 mg total) by mouth daily. 90 tablet 1 9/24/2024 at 1000   • rosuvastatin 10 MG Oral Tab TAKE 1 TABLET(10 MG) BY MOUTH EVERY NIGHT 90 tablet 3 9/24/2024 at 2100   • timolol 0.5 % Ophthalmic Solution Place 1 drop into both eyes 2 (two) times daily.   9/25/2024 at 0600   • Polyethylene Glycol 3350 (MIRALAX) 17 g Oral Powd Pack Take 17 g by mouth daily as needed (Take to avoid constipation, especially if taking narcotic pain medications.). (Patient not taking: Reported on 9/10/2024) 20 packet 1    • tamsulosin 0.4 MG Oral Cap Take 1 capsule (0.4 mg total) by mouth every evening. (Patient taking differently: Take 1 capsule (0.4 mg total) by mouth every evening. Not sure if she is taking) 30 capsule 1    • dapagliflozin (FARXIGA) 10 MG Oral Tab Take 1 tablet (10 mg total) by mouth daily. (Patient not taking: Reported on 9/10/2024) 90 tablet 1 8/24/2024   • Insulin Pen Needle (BD PEN NEEDLE TITO 2ND GEN) 32G X 4 MM Does not apply Misc Inject 1 each into the skin daily. 100 each 3    • Glucose Blood (ONETOUCH VERIO) In Vitro Strip USE TO TEST ONCE TO TWICE DAILY 100 strip 1    • OneTouch Delica Lancets 33G Does not apply Misc 1 Lancet by Finger stick route daily. 100 each 0        Allergies: Patient has no known allergies.      Anesthesia Evaluation    Patient summary reviewed.    Anesthetic Complications  (-) history of anesthetic complications         GI/Hepatic/Renal      (+) GERD       (+) chronic renal disease (stones)                     Cardiovascular      ECG reviewed.  Exercise tolerance: good     MET: >4      (+) hypertension                       (-) angina     (-) PACHECO  (-) orthopnea  (-) PND     Endo/Other      (+) diabetes  type 2, using insulin      (-) anemia                   Pulmonary               (-) shortness of breath  (-) recent URI          Neuro/Psych          (+) CVA                          Past Surgical History:   Procedure Laterality Date   • Brain surgery  2015    avm   [facility]   •       X3   • Cholecystectomy     • Colonoscopy N/A 2024    Procedure: COLONOSCOPY;  Surgeon: Guerrero Moser MD;  Location:  ENDOSCOPY   • Cystoscopy,insert ureteral stent     • Hernia surgery     • Other surgical history      LEFT TIBIA FRACTURE- PLATE AND SCREWS       Social History     Socioeconomic History   • Marital status:    Tobacco Use   • Smoking status: Never   • Smokeless tobacco: Never   Vaping Use   • Vaping status: Never Used   Substance and Sexual Activity   • Alcohol use: Not Currently   • Drug use: No   Other Topics Concern   • Caffeine Concern Yes     Comment: coffee 2 cups daily   • Exercise No   • Seat Belt Yes     History   Drug Use No     Available pre-op labs reviewed.  Lab Results   Component Value Date    WBC 9.8 07/15/2024    RBC 4.73 07/15/2024    HGB 13.4 07/15/2024    HCT 40.4 07/15/2024    MCV 85.4 07/15/2024    MCH 28.3 07/15/2024    MCHC 33.2 07/15/2024    RDW 13.9 07/15/2024    .0 07/15/2024     Lab Results   Component Value Date     2024    K 4.1 2024     2024    CO2 25.0 2024    BUN 16 2024    CREATSERUM 0.94 2024     (H) 2024    CA 9.2 2024            Airway      Mallampati: II  Mouth opening: 3 FB  TM distance: 4 - 6 cm  Neck ROM: full Cardiovascular    Cardiovascular exam normal.  Rhythm: regular  Rate: normal  (-) murmur   Dental    Dentition appears grossly intact         Pulmonary    Pulmonary  exam normal.  Breath sounds clear to auscultation bilaterally.        (-) wheezes       Other findings          ASA: 2   Plan: general  NPO status verified and patient meets guidelines.  Patient has not taken beta blockers in last 24 hours.        Plan/risks discussed with: patient            We discussed GA w/ETT and possible scratchy throat and rarely dental damage.  We discussed analgesic plan and PONV prophylaxis.  The patient's questions were answered and consent was attained.

## 2024-09-25 NOTE — ANESTHESIA POSTPROCEDURE EVALUATION
[facility]    Marcela Moreno Patient Status:  Hospital Outpatient Surgery   Age/Gender 68 year old female MRN [id number]   Location [facility] SURGERY Attending Gil Germain MD   Hosp Day # 0 PCP Rhiannon Betancourt MD       Anesthesia Post-op Note    CYSTOSCOPY, LEFT URETEROSCOPY, LEFT RETROGRADE PYELOGRAM, LASER LITHOTRIPSY,LEFT STENT EXCHANGE    Procedure Summary       Date: 09/25/24 Room / Location:  MAIN OR 07 / [facility] MAIN OR    Anesthesia Start: 0853 Anesthesia Stop: 0948    Procedure: CYSTOSCOPY, LEFT URETEROSCOPY, LEFT RETROGRADE PYELOGRAM, LASER LITHOTRIPSY,LEFT STENT EXCHANGE (Left: Ureter) Diagnosis:       Nephrolithiasis      (Nephrolithiasis [N20.0])    Surgeons: Gil Germain MD Responsible Provider: Seferino Odom MD    Anesthesia Type: general ASA Status: 2            Anesthesia Type: general    Vitals Value Taken Time   /50 09/25/24 0955   Temp 97.9 °F (36.6 °C) 09/25/24 0950   Pulse 74 09/25/24 0959   Resp 16 09/25/24 0959   SpO2 96 % 09/25/24 0959   Vitals shown include unfiled device data.    Patient Location: PACU    Anesthesia Type: general    Airway Patency: patent and extubated    Postop Pain Control: adequate    Mental Status: preanesthetic baseline    Nausea/Vomiting: none    Cardiopulmonary/Hydration status: stable euvolemic    Complications: no apparent anesthesia related complications    Postop vital signs: stable    Dental Exam: Unchanged from Preop    Patient to be discharged from PACU when criteria met.

## 2024-10-04 ENCOUNTER — PROCEDURE (OUTPATIENT)
Dept: SURGERY | Facility: CLINIC | Age: 69
End: 2024-10-04

## 2024-10-04 DIAGNOSIS — N20.0 NEPHROLITHIASIS: Primary | ICD-10-CM

## 2024-10-04 LAB
CAOX DIHYDRATE: 10 %
CAOX MONOHYDRATE: 90 %
GLUCOSE (URINE DIPSTICK): NEGATIVE MG/DL
MULTISTIX LOT#: ABNORMAL NUMERIC
NITRITE, URINE: NEGATIVE
PH, URINE: 5.5 (ref 4.5–8)
PROTEIN (URINE DIPSTICK): >=300 MG/DL
SPECIFIC GRAVITY: 1.02 (ref 1–1.03)
UROBILINOGEN,SEMI-QN: 0.2 MG/DL (ref 0–1.9)
WEIGHT-STONE: 51 MG

## 2024-10-04 PROCEDURE — 52310 CYSTOSCOPY AND TREATMENT: CPT | Performed by: SURGERY

## 2024-10-04 PROCEDURE — 81003 URINALYSIS AUTO W/O SCOPE: CPT | Performed by: SURGERY

## 2024-10-04 RX ORDER — SULFAMETHOXAZOLE/TRIMETHOPRIM 800-160 MG
1 TABLET ORAL ONCE
Status: COMPLETED | OUTPATIENT
Start: 2024-10-04 | End: 2024-10-04

## 2024-10-04 RX ADMIN — SULFAMETHOXAZOLE/TRIMETHOPRIM 1 TABLET: 800-160 MG TABLET ORAL at 08:29:00

## 2024-10-04 NOTE — PROCEDURES
Clinic Procedure Note    INDICATIONS:   Marcela Moreno is a 68 year old female with history of OA, diabetes, GERD, hyperlipidemia, depression, CVA referred for nephrolithiasis.     She was recently having intermittent left-sided abdominal pains a CT scan was performed.  CT scan 6/20/2024 shows an 8 mm nonobstructing left lower pole kidney stone.  On prior CT in 2022 the same stone was about 3 mm.  She does have intermittent left flank pain.  I discussed that this may or may not be related to this nonobstructing kidney stone.  Given growth over 2 years she is interested in having it treated so discussed ureteroscopy with laser lithotripsy and stent placement.     Of note, she is on oxybutynin 5 mg twice daily.  She has minimal urgency and frequency during the day but significant nocturia 3 times per night with occasional urge incontinence prior to making it to the bathroom.  This is improved on oxybutynin.  She does have significant dry mouth and constipation over show recommend switching to mirabegron.     I brought her to the OR on 8/28/2024 for cystoscopy, left ureteroscopy, ureteral stent placement.  She had 2 areas of ureteral narrowing in the distal and proximal ureter that I was unable to pass with the access sheath or scope.  I left a stent in place for passive ureteral dilation and she is scheduled for ureteroscopy again on 9/25/2024.     She is having some urinary urgency and frequency since the stent has been in place.  She is taking oxybutynin 5 mg twice daily with some relief of her symptoms but some dry mouth.  Mirabegron was prescribed and too expensive.  Will trial 5 mg extended release oxybutynin.    I brought her back to the OR on 9/25/2024 for left ureteroscopy, laser lithotripsy, stent exchange.  Left kidney stone fully treated.    Stone analysis:  Pending    PROCEDURE:       1. Flexible cystoscopy, removal of left ureteral stent ([id number])    DATE OF PROCEDURE: 10/4/2024     PRE-PROCEDURE  DIAGNOSIS: Nephrolithiasis    POST-PROCEDURE DIAGNOSIS: Same     SURGEON: Gil Germain MD    FINDINGS:  Stent successfully removed in its entirety.     PROCEDURE:   Patient was brought to the procedure suite and a time-out was performed identifiying the patient,  and procedure to be performed. The risks and benefits of the procedure were once again discussed with the patient including bleeding, infection, and dysuria. The patient agreed to proceed. The patient did not have any signs or symptoms of active UTI. Prophylactic PO antibiotics were given in clinic.    The patient was placed in supine position on the table and the genital area was prepped and draped in the standard sterile fashion. Urojet was used prior for local anesthesia effect. A flexible cystoscope was inserted per urethra. There were no urethral strictures present. The bladder was entered and the distal coil of the stent was seen protruding from the ureteral orifice. The stent was grasped with the flexible stent grasper, and then the stent and cystoscope were both removed. The stent was visualized outside the body and confirmed to be intact and fully removed.     There were no complications and the patient tolerated the procedure well.    IMPRESSION:  Successful stent removal after ureteroscopy today.    PLAN:   -Renal/bladder ultrasound in 6 weeks to ensure no silent hydronephrosis  -24 hour urine study for metabolic workup    Gil Germain MD  Staff Urologist  [facility]  Office: [phone]

## 2024-10-22 RX ORDER — OXYBUTYNIN CHLORIDE 5 MG/1
5 TABLET, EXTENDED RELEASE ORAL DAILY
Qty: 90 TABLET | Refills: 0 | OUTPATIENT
Start: 2024-10-22

## 2024-11-01 ENCOUNTER — APPOINTMENT (OUTPATIENT)
Dept: GENERAL RADIOLOGY | Age: 69
End: 2024-11-01
Attending: EMERGENCY MEDICINE
Payer: COMMERCIAL

## 2024-11-01 ENCOUNTER — HOSPITAL ENCOUNTER (OUTPATIENT)
Age: 69
Discharge: HOME | End: 2024-11-01
Attending: EMERGENCY MEDICINE
Payer: COMMERCIAL

## 2024-11-01 VITALS
OXYGEN SATURATION: 100 % | BODY MASS INDEX: 32.66 KG/M2 | DIASTOLIC BLOOD PRESSURE: 65 MMHG | WEIGHT: 173 LBS | RESPIRATION RATE: 18 BRPM | SYSTOLIC BLOOD PRESSURE: 156 MMHG | HEART RATE: 76 BPM | HEIGHT: 61 IN | TEMPERATURE: 97 F

## 2024-11-01 DIAGNOSIS — S16.1XXA STRAIN OF NECK MUSCLE, INITIAL ENCOUNTER: Primary | ICD-10-CM

## 2024-11-01 DIAGNOSIS — S39.012A BACK STRAIN, INITIAL ENCOUNTER: ICD-10-CM

## 2024-11-01 PROCEDURE — 72110 X-RAY EXAM L-2 SPINE 4/>VWS: CPT | Performed by: EMERGENCY MEDICINE

## 2024-11-01 PROCEDURE — 72050 X-RAY EXAM NECK SPINE 4/5VWS: CPT | Performed by: EMERGENCY MEDICINE

## 2024-11-01 PROCEDURE — 99213 OFFICE O/P EST LOW 20 MIN: CPT

## 2024-11-01 PROCEDURE — 99214 OFFICE O/P EST MOD 30 MIN: CPT

## 2024-11-01 RX ORDER — DIAZEPAM 5 MG/1
5 TABLET ORAL NIGHTLY PRN
Qty: 10 TABLET | Refills: 0 | Status: SHIPPED | OUTPATIENT
Start: 2024-11-01 | End: 2024-11-11
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
## 2024-11-12 ENCOUNTER — OFFICE VISIT (OUTPATIENT)
Dept: ENDOCRINOLOGY CLINIC | Facility: CLINIC | Age: 69
End: 2024-11-12
Payer: MEDICARE

## 2024-11-12 VITALS
WEIGHT: 172 LBS | SYSTOLIC BLOOD PRESSURE: 110 MMHG | OXYGEN SATURATION: 8 % | RESPIRATION RATE: 18 BRPM | DIASTOLIC BLOOD PRESSURE: 64 MMHG | HEART RATE: 70 BPM | BODY MASS INDEX: 33 KG/M2

## 2024-11-12 DIAGNOSIS — I10 ESSENTIAL HYPERTENSION: ICD-10-CM

## 2024-11-12 DIAGNOSIS — E78.2 MIXED HYPERLIPIDEMIA: ICD-10-CM

## 2024-11-12 DIAGNOSIS — E11.65 TYPE 2 DIABETES MELLITUS WITH HYPERGLYCEMIA, WITH LONG-TERM CURRENT USE OF INSULIN (HCC): Primary | ICD-10-CM

## 2024-11-12 DIAGNOSIS — Z79.4 TYPE 2 DIABETES MELLITUS WITH HYPERGLYCEMIA, WITH LONG-TERM CURRENT USE OF INSULIN (HCC): Primary | ICD-10-CM

## 2024-11-12 DIAGNOSIS — R80.9 MICROALBUMINURIA: ICD-10-CM

## 2024-11-12 LAB
CARTRIDGE LOT#: ABNORMAL NUMERIC
HEMOGLOBIN A1C: 7.5 % (ref 4.3–5.6)

## 2024-11-12 PROCEDURE — 99214 OFFICE O/P EST MOD 30 MIN: CPT | Performed by: NURSE PRACTITIONER

## 2024-11-12 PROCEDURE — 83036 HEMOGLOBIN GLYCOSYLATED A1C: CPT | Performed by: NURSE PRACTITIONER

## 2024-11-12 RX ORDER — INSULIN DEGLUDEC 200 U/ML
INJECTION, SOLUTION SUBCUTANEOUS
Qty: 18 ML | Refills: 0 | Status: SHIPPED | OUTPATIENT
Start: 2024-11-12

## 2024-11-12 RX ORDER — DAPAGLIFLOZIN 10 MG/1
10 TABLET, FILM COATED ORAL DAILY
Qty: 90 TABLET | Refills: 1 | Status: SHIPPED | OUTPATIENT
Start: 2024-11-12

## 2024-11-12 NOTE — PROGRESS NOTES
HPI:   Marcela Moreno is a 69 year old female who presents for follow up for the management of her diabetes.  POC in office glucose 97 mg/dl.      Chief Complaint   Patient presents with    Diabetes     Follow up-Meter-brought meter       Diabetes: improved, stable  Type: 2   Duration: 2000  Current Meds: Metformin ER 500mg po bid, Tresiba 30 units injection daily, Farxgia 10mg po daily - hasn't taken due to recent procedures and never restarted  SE: diarrhea with higher doses of Metformin  Long term insulin use: yes  Failed Meds: Novolog 70/30, Jardiance, Toujeo, Novolog, Humalog 75/25, Lantus, Janumet, Metformin immediate release, Levemir, Ozempic     Complications: Retinopathy, Proteinuria, CAD, CVA/Stroke, gastroparesis    Testing with: One Touch Verio   Monitoring blood glucose: daily  Average glucose readin mg/dl  Highest glucose readin mg/dl    Lowest glucose readin mg/dl  Hypoglycemia frequency:  denies         Overall glucose control:   HGBA1C:    Lab Results   Component Value Date    A1C 7.5 (A) 2024    A1C 7.0 (A) 2024    A1C 8.0 (H) 2024     (H) 2024       Hypertension: stable, at goal   Blood Pressure: 110/64  Medication prescribed: lisinopril  SE: denies     Hyperlipidemia: stable   LDL: 95   Tri  Medication prescribed: rosuvastatin  SE: denies     Wt Readings from Last 3 Encounters:   24 172 lb (78 kg)   24 173 lb (78.5 kg)   24 173 lb (78.5 kg)     BP Readings from Last 3 Encounters:   24 110/64   24 156/65   24 141/60          Past History:   She  has a past medical history of Abdominal pain, Arthritis, AVM (arteriovenous malformation) brain (HCC), Back pain, Back problem, Bloating, Calculus of kidney, Constipation, Diabetes mellitus (HCC), Diarrhea, unspecified, Esophageal reflux, Fatigue, Food intolerance, Frequent urination, Heartburn, High blood pressure, High cholesterol, History of depression,  Indigestion, Irregular bowel habits, Leaking of urine, Leg swelling, Muscle weakness, Neuropathy, Osteoarthritis, Pain in joints, Pain with bowel movements, Problems with swallowing, Sleep disturbance, Sputum production, Stool incontinence, Stroke ([facility]) (04/01/2015), Type II or unspecified type diabetes mellitus without mention of complication, not stated as uncontrolled, Uncomfortable fullness after meals, Vitamin D deficiency, and Weight gain.   Her family history includes Diabetes in her brother, mother, and sister; Heart Attack in her brother; Heart Disorder in her brother; Hypertension in her father and mother.   She  reports that she has never smoked. She has never used smokeless tobacco. She reports that she does not currently use alcohol. She reports that she does not use drugs.     She has No Known Allergies.     Current Outpatient Medications on File Prior to Visit   Medication Sig    Ketorolac Tromethamine 10 MG Oral Tab Take 1 tablet (10 mg total) by mouth every 8 (eight) hours as needed for Pain (Use sparingly and with plenty of water).    METFORMIN  MG Oral Tablet 24 Hr TAKE 1 TABLET(500 MG) BY MOUTH TWICE DAILY WITH MEALS    oxybutynin ER 5 MG Oral Tablet 24 Hr Take 1 tablet (5 mg total) by mouth daily.    Polyethylene Glycol 3350 (MIRALAX) 17 g Oral Powd Pack Take 17 g by mouth daily as needed (Take to avoid constipation, especially if taking narcotic pain medications.).    tamsulosin 0.4 MG Oral Cap Take 1 capsule (0.4 mg total) by mouth every evening.    Insulin Pen Needle (BD PEN NEEDLE TITO 2ND GEN) 32G X 4 MM Does not apply Misc Inject 1 each into the skin daily.    PANTOPRAZOLE 40 MG Oral Tab EC TAKE 1 TABLET(40 MG) BY MOUTH DAILY    lisinopril 5 MG Oral Tab Take 1 tablet (5 mg total) by mouth daily.    Glucose Blood (ONETOUCH VERIO) In Vitro Strip USE TO TEST ONCE TO TWICE DAILY    OneTouch Delica Lancets 33G Does not apply Misc 1 Lancet by Finger stick route daily.    rosuvastatin 10 MG  Oral Tab TAKE 1 TABLET(10 MG) BY MOUTH EVERY NIGHT    timolol 0.5 % Ophthalmic Solution Place 1 drop into both eyes 2 (two) times daily.    [DISCONTINUED] insulin degludec (TRESIBA FLEXTOUCH) 200 UNIT/ML Subcutaneous Solution Pen-injector Inject daily as directed up to TDD = 30 units    [DISCONTINUED] dapagliflozin (FARXIGA) 10 MG Oral Tab Take 1 tablet (10 mg total) by mouth daily.     No current facility-administered medications on file prior to visit.       CMP  (most recent labs)   Lab Results   Component Value Date/Time     (H) 07/25/2024 12:33 PM    BUN 16 07/25/2024 12:33 PM    CREATSERUM 0.94 07/25/2024 12:33 PM    GFRNAA 85 12/07/2021 09:09 AM    GFRAA 98 12/07/2021 09:09 AM    EGFRCR 67 07/25/2024 12:33 PM    CA 9.2 07/25/2024 12:33 PM    ALKPHO 72 07/25/2024 12:33 PM    AST 15 07/25/2024 12:33 PM    ALT <7 (L) 07/25/2024 12:33 PM    BILT 0.5 07/25/2024 12:33 PM    TP 6.9 07/25/2024 12:33 PM    ALB 4.2 07/25/2024 12:33 PM     07/25/2024 12:33 PM    K 4.1 07/25/2024 12:33 PM     07/25/2024 12:33 PM    CO2 25.0 07/25/2024 12:33 PM           Lipids  (most recent labs)   Lab Results   Component Value Date/Time    CHOLEST 180 06/04/2024 08:50 AM    TRIG 179 (H) 06/04/2024 08:50 AM    HDL 54 06/04/2024 08:50 AM    LDL 95 06/04/2024 08:50 AM    NONHDLC 126 06/04/2024 08:50 AM          Diabetes  (most recent labs)   Lab Results   Component Value Date/Time    A1C 7.5 (A) 11/12/2024 11:37 AM          Microalb (most recent labs)   Lab Results   Component Value Date/Time    MICROALBCREA 138.7 (H) 06/04/2024 03:38 PM        Lab results reviewed with patient.    REVIEW OF SYSTEMS:   GENERAL HEALTH: feels well otherwise  SKIN: denies any unusual skin lesions or rashes  RESPIRATORY: denies shortness of breath with exertion  CARDIOVASCULAR: denies chest pain on exertion  GI: c/o heartburn and occasional nausea and abdominal pain   NEURO: c/o numbness and tingling to bilateral feet, denies  headaches  ENDO: denies polydipsia, polyuria or polyphagia, denies unexplained weight changes    EXAM:   /64   Pulse 70   Resp 18   Wt 172 lb (78 kg)   LMP  (LMP Unknown)   SpO2 (!) 8%   BMI 32.50 kg/m²  Estimated body mass index is 32.5 kg/m² as calculated from the following:    Height as of 11/1/24: 5' 1\" (1.549 m).    Weight as of this encounter: 172 lb (78 kg).   Physical Exam  Vitals reviewed.   Constitutional:       Appearance: Normal appearance.   Pulmonary:      Effort: Pulmonary effort is normal.   Neurological:      Mental Status: She is alert and oriented to person, place, and time.   Psychiatric:         Mood and Affect: Mood normal.         Behavior: Behavior normal.         ASSESSMENT AND PLAN:   As for her Diabetes, it is stable, no significant medication side effects noted, needs further observation, needs improvement, needs to follow diet more regularly.     Recommendations are: continue present meds, lose weight by increased dietary compliance and exercise, and check feet daily.    Patient to continue Metformin ER 500mg po bid and Tresiba  30 units injection daily.  Patient to restart Farxiga 10mg po daily.    Reminded to drink 4-6 eight ounce glasses of water daily to avoid dehydration.    Discussed self monitoring blood glucose and the importance of monitoring.  Patient agreed to monitoring daily - alternating fasting in AM and 2 hours postprandial of one meal per day.  Hypoglycemia S&S, Rx, and when to call APRN/CDE reviewed using Rule of 15's. Stressed need to call if 2 readings below 80 mg/dl in 1 week for medication adjustment.   Reinforced healthy eating in healthy portions and increasing daily physical activity.  Patient to meet with dietitian to assist in weight loss goals.  Reviewed ways to improve the protein in the urine:   Keep blood sugars in target range   Keep blood pressure in target range   Watch over the counter medicines like NSAID (non steroidal anti-inflammatory  drugs) these can be harmful to  kidneys (examples include: , Motrin , Advil, ibuprofen, naprosyn, Aleve)   Continue ACEI and SGLT2 therapy - renoprotective       As for her hypertension, Blood Pressure is stable, no significant medication side effects noted.   PLAN: will continue present medications, reviewed diet, exercise and weight control, continue ACEI therapy.         As for her cholesterol, Lipids are stable, improved, no significant medication side effects noted.   PLAN: will continue present medications, reviewed diet, exercise and weight control, continue statin therapy.        DM Health Maintenance  Last dilated eye exam: Last Dilated Eye Exam: 24   Exam shows retinopathy? Eye Exam shows Diabetic Retinopathy?: Yes    Last diabetic foot exam: Last Foot Exam: 24      Date of last PHQ-2 depression screen: PHQ-2 - Date of last depression screenin2024    Patient  reports that she has never smoked. She has never used smokeless tobacco.  When is flu vaccine due? Influenza Vaccine(1) due on 10/01/2024  When is pneumonia vaccine due? No recommendations at this time    The patient indicates understanding of these issues and agrees to the plan.  Refills addressed at time of office visit.    Diagnoses and all orders for this visit:    Type 2 diabetes mellitus with hyperglycemia, with long-term current use of insulin (HCC)  -     POC Hemoglobin A1C  -     OP REFERRAL TO DIAB NUTRITION MANAGEMENT 3 HRS  -     dapagliflozin (FARXIGA) 10 MG Oral Tab; Take 1 tablet (10 mg total) by mouth daily.  -     insulin degludec (TRESIBA FLEXTOUCH) 200 UNIT/ML Subcutaneous Solution Pen-injector; Inject daily as directed up to TDD = 30 units    Essential hypertension    Mixed hyperlipidemia    Microalbuminuria                   Return in about 3 months (around 2025) for 45 minute appointment.    The risks and benefits of my recommendations, as well as other treatment options were discussed with the patient  today. Questions were answered to the best of my knowledge.   25 min spent with patient and >50% time spent counseling and coordinating care related to their office visit.      Jessica MCCARTY, BC-ADM, [facility]

## 2024-11-12 NOTE — PATIENT INSTRUCTIONS
We are here to support you with Diabetes but please remember that you still need your primary care doctor for your routine health maintenance.   Your current A1C: 7.5%  This test provides us with your average blood sugar for the past 3 months.   The main goal of diabetes treatment is to keep your sugar from going too high. We measure your overall blood sugar trends with a Hemoglobin A1C test. (also called an A1C)  For most people the target is less than 7.0% but sometimes we make exceptions based on age, health history and other factors.   Keeping an A1C less than 7% helps prevents diabetes related health problems.   If your A1C goes too high, then we need to talk about changing your current diabetes treatment.    MEDICATIONS:   It is important to take all of your medications as prescribed.   Please call me if you cannot get the prescriptions filled or are having issues with refills.   Also, please call me if you have any issues with medication questions, side effects, dosing questions or problems with your blood sugar trends BEFORE CHANGING OR STOPPING ANY MEDICATIONS.   Continue Metformin ER 500mg 2x/day with breakfast and dinner  Continue Tresiba 30 units injection daily  Restart Farxiga 10mg daily - drink 4-6 eight ounce glasses of water daily to avoid dehydration    Blood sugar testing:   Always bring your glucose meter or blood sugar logbook to every appointment here at the diabetes center.  This allows me to safely make adjustments to your diabetes plan.   In order for me to determine any patterns in your blood sugars, you will need to test your blood sugar  daily   It would be best to change up the times of day that you are testing your sugar.   Alternate testing before breakfast (fasting) and testing blood sugar 2 hours after your meals.     Blood sugar targets:  Before breakfast:   (preferably < 110)  2 hours After meals: less than 180 (preferably less than 150)   Call for persistent blood sugars <  75 or > 200.   Blood sugars greater than 200 are not acceptable to reach your goal of improving diabetes      Health Maintenance:   1. LABS: It is important to monitor your kidney function (blood and urine protein levels) , liver function tests and cholesterol levels when you have diabetes.     2. FOOT EXAMS:  daily foot inspections for foot wounds or skin changes are important for foot care. Any unusual changes should be reported immediately.    3. EYE EXAMS: Checking your eyes for diabetes changes is important and you should have a dilated eye exam done by an eye doctor EVERY year since these changes occur in the BACK of the eye and not visible by you.  Please let me know if you need provider list for eye doctor.     Lifestyle Therapy:    1. NUTRITION: Maintain optimal weight, calorie restriction if overweight, plant-based diet    2. PHYSICAL ACTIVITY: 150 minutes per week (30 minutes a day 5 days a week) of moderate exertion such as walking, stair climbing.  Include strength training 2-3 times per week.  Increase as tolerated.    3. SLEEP: Try and get 7-8 hours of sleep per night    4. BEHAVIOR:  Tobacco cessation and alcohol in moderation      Reminders:  Meet with dietitian - see referral    Jessica MCCARTY, BC-[facility], [facility]  [address], [place] [address]  [address], [address] [place] [address]  [address] [place] [address]  Diabetes Services at [facility]  T [phone]  F [phone]

## 2024-11-13 ENCOUNTER — OFFICE VISIT (OUTPATIENT)
Dept: SURGERY | Facility: CLINIC | Age: 69
End: 2024-11-13
Payer: MEDICARE

## 2024-11-13 VITALS — RESPIRATION RATE: 20 BRPM | OXYGEN SATURATION: 98 % | TEMPERATURE: 98 F | HEART RATE: 75 BPM

## 2024-11-13 DIAGNOSIS — K43.9 VENTRAL HERNIA WITHOUT OBSTRUCTION OR GANGRENE: Primary | ICD-10-CM

## 2024-11-13 PROCEDURE — 1159F MED LIST DOCD IN RCRD: CPT | Performed by: SURGERY

## 2024-11-13 PROCEDURE — 1170F FXNL STATUS ASSESSED: CPT | Performed by: SURGERY

## 2024-11-13 PROCEDURE — 1160F RVW MEDS BY RX/DR IN RCRD: CPT | Performed by: SURGERY

## 2024-11-13 PROCEDURE — 99213 OFFICE O/P EST LOW 20 MIN: CPT | Performed by: SURGERY

## 2024-11-13 NOTE — PROGRESS NOTES
Follow Up Visit Note       Active Problems      1. Ventral hernia without obstruction or gangrene          Chief Complaint   Chief Complaint   Patient presents with    [facility] Care     EP- 3 months f/u Ventral Hernia- discuss surgery          History of Present Illness    The patient presents for continued follow-up of sizable incarcerated ventral hernia.  Since our last encounter the patient has experienced an approximately 8-10 pound weight loss.  The patient does experience intermittent pain and discomfort due to her hernia.  There is pain at the site of the hernia with some radiation outward and laterally.  No other new complaints offered.  The patient wishes to consider definitive surgical repair for symptom relief.    The patient denies fever, chills, chest pain, shortness of breath, dyspnea. The patient also denies hematemesis, melena, or hematochezia. The patient denies change in bowel or bladder habits. There is no complaint of hematuria or dysuria.    I discussed the risk, benefits, alternatives of surgery.  I discussed the anticipated postoperative recovery including dietary, activity, exercise, and lifestyle recommendations.  The patient's questions regarding surgical procedure were answered and the patient voiced understanding of the care plan.    Allergies  Marcela has No Known Allergies.    Past Medical / Surgical / Social / Family History    The past medical and past surgical history have been reviewed by me today.    Past Medical History:    Abdominal pain    Arthritis    AVM (arteriovenous malformation) brain (HCC)    being followed at [facility]    Back pain    Back problem    Bloating    Calculus of kidney    Constipation    Diabetes mellitus (HCC)    Diarrhea, unspecified    Esophageal reflux    Fatigue    Food intolerance    Frequent urination    Heartburn    High blood pressure    High cholesterol    History of depression    Indigestion    Irregular bowel habits    Leaking of urine    Leg  swelling    Muscle weakness    Neuropathy    TOES SOMETIMES    Osteoarthritis    Pain in joints    Pain with bowel movements    Problems with swallowing    Sleep disturbance    Sputum production    Stool incontinence    Stroke (HCC)    LEFT SIDE EFFECTED- USING CANE    Type II or unspecified type diabetes mellitus without mention of complication, not stated as uncontrolled    Uncomfortable fullness after meals    Vitamin D deficiency    Weight gain     Past Surgical History:   Procedure Laterality Date    Brain surgery  2015    avm   [facility]          X3    Cholecystectomy      Colonoscopy N/A 2024    Procedure: COLONOSCOPY;  Surgeon: Guerrero Moser MD;  Location:  ENDOSCOPY    Cystoscopy,insert ureteral stent      Hernia surgery      Other surgical history      LEFT TIBIA FRACTURE- PLATE AND SCREWS         The family history and social history have been reviewed by me today.    Family History   Problem Relation Age of Onset    Diabetes Mother     Hypertension Mother     Hypertension Father     Heart Disorder Brother         CAD stent in his 40s    Diabetes Brother     Diabetes Sister     Heart Attack Brother      Social History     Socioeconomic History    Marital status:    Tobacco Use    Smoking status: Never    Smokeless tobacco: Never   Vaping Use    Vaping status: Never Used   Substance and Sexual Activity    Alcohol use: Not Currently    Drug use: No   Other Topics Concern    Caffeine Concern Yes     Comment: coffee 2 cups daily    Exercise No    Seat Belt Yes        Current Outpatient Medications:     dapagliflozin (FARXIGA) 10 MG Oral Tab, Take 1 tablet (10 mg total) by mouth daily., Disp: 90 tablet, Rfl: 1    insulin degludec (TRESIBA FLEXTOUCH) 200 UNIT/ML Subcutaneous Solution Pen-injector, Inject daily as directed up to TDD = 30 units, Disp: 18 mL, Rfl: 0    Ketorolac Tromethamine 10 MG Oral Tab, Take 1 tablet (10 mg total) by mouth every 8 (eight) hours as  needed for Pain (Use sparingly and with plenty of water)., Disp: 10 tablet, Rfl: 0    METFORMIN  MG Oral Tablet 24 Hr, TAKE 1 TABLET(500 MG) BY MOUTH TWICE DAILY WITH MEALS, Disp: 180 tablet, Rfl: 1    oxybutynin ER 5 MG Oral Tablet 24 Hr, Take 1 tablet (5 mg total) by mouth daily., Disp: 30 tablet, Rfl: 1    Polyethylene Glycol 3350 (MIRALAX) 17 g Oral Powd Pack, Take 17 g by mouth daily as needed (Take to avoid constipation, especially if taking narcotic pain medications.)., Disp: 20 packet, Rfl: 1    tamsulosin 0.4 MG Oral Cap, Take 1 capsule (0.4 mg total) by mouth every evening., Disp: 30 capsule, Rfl: 1    Insulin Pen Needle (BD PEN NEEDLE TITO 2ND GEN) 32G X 4 MM Does not apply Misc, Inject 1 each into the skin daily., Disp: 100 each, Rfl: 3    PANTOPRAZOLE 40 MG Oral Tab EC, TAKE 1 TABLET(40 MG) BY MOUTH DAILY, Disp: 30 tablet, Rfl: 6    lisinopril 5 MG Oral Tab, Take 1 tablet (5 mg total) by mouth daily., Disp: 90 tablet, Rfl: 1    Glucose Blood (ONETOUCH VERIO) In Vitro Strip, USE TO TEST ONCE TO TWICE DAILY, Disp: 100 strip, Rfl: 1    OneTouch Delica Lancets 33G Does not apply Misc, 1 Lancet by Finger stick route daily., Disp: 100 each, Rfl: 0    rosuvastatin 10 MG Oral Tab, TAKE 1 TABLET(10 MG) BY MOUTH EVERY NIGHT, Disp: 90 tablet, Rfl: 3    timolol 0.5 % Ophthalmic Solution, Place 1 drop into both eyes 2 (two) times daily., Disp: , Rfl:      Review of Systems  The Review of Systems has been reviewed by me during today.  Review of Systems   Constitutional:  Negative for chills, diaphoresis, fatigue, fever and unexpected weight change.   HENT:  Negative for hearing loss, nosebleeds, sore throat and trouble swallowing.    Respiratory:  Negative for apnea, cough, shortness of breath and wheezing.    Cardiovascular:  Negative for chest pain, palpitations and leg swelling.   Gastrointestinal:  Negative for abdominal distention, abdominal pain, anal bleeding, blood in stool, constipation, diarrhea,  nausea and vomiting.   Genitourinary:  Negative for difficulty urinating, dysuria, frequency and urgency.   Musculoskeletal:  Negative for arthralgias and myalgias.   Skin:  Negative for color change and rash.   Neurological:  Negative for tremors, syncope and weakness.   Hematological:  Negative for adenopathy. Does not bruise/bleed easily.   Psychiatric/Behavioral:  Negative for behavioral problems and sleep disturbance.         Physical Findings   Pulse 75   Temp 98 °F (36.7 °C) (Temporal)   Resp 20   LMP  (LMP Unknown)   SpO2 98%   Physical Exam  Vitals and nursing note reviewed.   Constitutional:       General: She is not in acute distress.     Appearance: Normal appearance. She is well-developed. She is obese.   HENT:      Head: Normocephalic and atraumatic.   Eyes:      General: No scleral icterus.     Conjunctiva/sclera: Conjunctivae normal.   Neck:      Trachea: No tracheal deviation.   Cardiovascular:      Rate and Rhythm: Normal rate and regular rhythm.      Heart sounds: S1 normal and S2 normal. No murmur heard.  Pulmonary:      Effort: No accessory muscle usage or respiratory distress.      Breath sounds: No decreased breath sounds, wheezing, rhonchi or rales.   Abdominal:      General: Abdomen is protuberant. There is no distension or abdominal bruit.      Palpations: Abdomen is soft. Abdomen is not rigid. There is no shifting dullness, fluid wave, hepatomegaly, splenomegaly, mass or pulsatile mass.      Tenderness: There is no abdominal tenderness. There is no guarding or rebound. Negative signs include Silver's sign and McBurney's sign.      Hernia: A hernia is present. Hernia is present in the ventral area. There is no hernia in the umbilical area.       Skin:     General: Skin is warm and dry.   Neurological:      Mental Status: She is alert and oriented to person, place, and time.   Psychiatric:         Speech: Speech normal.         Behavior: Behavior normal.         Thought Content: Thought  content normal.         Judgment: Judgment normal.          Assessment   1. Ventral hernia without obstruction or gangrene        Plan     The patient will be scheduled for robotic repair of complex ventral hernia with mesh and transversus abdominis component separation.    Perioperative medical risk assessment and medication management will be requested from the patient's primary care physician, Dr. Betancourt.    The bryant-operative care plan was discussed with the patient, who voices understanding.  Activity and lifting recommendations were discussed in length.     The risks, benefits, and alternatives to the procedure were explained to the patient.  The risks explained include, but are not limited to, bleeding, infection, pain wound complications, recurrence, incorrect diagnosis, injury to adjacent organs and structures. We also discussed the possibile need for further therapeutic, diagnostic, or surgical intervention.  The patient voiced understanding, and after all questions were answered to the patient's satisfaction, the patient provided willing and informed consent to proceed.     No orders of the defined types were placed in this encounter.      Imaging & Referrals   None    Follow Up  No follow-ups on file.    Hemanth Murillo MD    Date of Surgery:     DX:     ICD-10-CM   1. Ventral hernia without obstruction or gangrene  K43.9        Surgery Site :    [] Open  [] Laparoscopic  [x] Robotic    [] Pilonidal Cystectomy  [] Excision of Lipomas     [] Sigmoidectomies    [] Hemorrhoidectomy   [] Transanal Hemorrhoidal Dearterialization [] Rectal Exam Under Anesthesia  [] Ureteral stent, Urologist  [] Hernia   [x] Ventral  [] Umbilical  [] Inguinal  [] Incisional  [] Paraesophageal [x] Component Separation (TAR)   [] Cholecystectomy  [] Appendectomy    [] Port placement      Anesthesia: ANESTHESIA TYPE: Gen    Location:  [] [place] OR   [] [facility] Out Pt Surgery  [] [facility] OR  [x] [place] OR   []  [facility]    Admission Status: EDW OR ADMIT LOCATION: ADMIT    SA Needed: Yes/No: Yes: General Surgery PA       Clearance Needed:  [x]Medical Clearance Dr. Betancourt  []Cardiac Clearance:   []Anticoagulation Management:    []Renal:   []Neuro:     Hx sleep apnea:          Pacemaker:        Latex allergies:      Pre-Admission Testing:  Surgeon's Personalized order Set.   Prophylactic Antibiotics Order: Prophylactic antibiotic protocol    Equipment:   [] C-Arm  Other:      First Case.  Allow 5 hours.    Hemanth Murillo MD FACS  Trauma Medical Director, [facility] General Surgery    The 21st Century Cures Act makes medical notes like these available to patients in the interest of transparency. Please be advised this is a medical document. Medical documents are intended to carry relevant information, facts as evident, and the clinical opinion of the practitioner. The medical note is intended as peer to peer communication and may appear blunt or direct. It is written in medical language and may contain abbreviations or verbiage that are unfamiliar.    This note was prepared using Dragon Medical voice recognition dictation software. As a result, errors may occur. When identified, these errors have been corrected. While every attempt is made to correct errors during dictation, discrepancies may still exist.

## 2024-11-14 ENCOUNTER — TELEPHONE (OUTPATIENT)
Facility: LOCATION | Age: 69
End: 2024-11-14

## 2024-11-19 ENCOUNTER — TELEPHONE (OUTPATIENT)
Facility: LOCATION | Age: 69
End: 2024-11-19

## 2024-11-19 DIAGNOSIS — K43.9 VENTRAL HERNIA WITHOUT OBSTRUCTION OR GANGRENE: Primary | ICD-10-CM

## 2024-12-27 ENCOUNTER — APPOINTMENT (OUTPATIENT)
Dept: GENERAL RADIOLOGY | Facility: HOSPITAL | Age: 69
End: 2024-12-27
Attending: EMERGENCY MEDICINE
Payer: MEDICARE

## 2024-12-27 ENCOUNTER — HOSPITAL ENCOUNTER (INPATIENT)
Facility: HOSPITAL | Age: 69
LOS: 4 days | Discharge: HOME | End: 2024-12-31
Attending: EMERGENCY MEDICINE | Admitting: HOSPITALIST
Payer: MEDICARE

## 2024-12-27 ENCOUNTER — NURSE TRIAGE (OUTPATIENT)
Dept: INTERNAL MEDICINE CLINIC | Facility: CLINIC | Age: 69
End: 2024-12-27

## 2024-12-27 DIAGNOSIS — L03.031 CELLULITIS AND ABSCESS OF TOE OF RIGHT FOOT: Primary | ICD-10-CM

## 2024-12-27 DIAGNOSIS — L02.611 CELLULITIS AND ABSCESS OF TOE OF RIGHT FOOT: Primary | ICD-10-CM

## 2024-12-27 DIAGNOSIS — M86.9 OSTEOMYELITIS OF RIGHT FOOT, UNSPECIFIED TYPE (HCC): ICD-10-CM

## 2024-12-27 LAB
ALBUMIN SERPL-MCNC: 4.2 G/DL (ref 3.2–4.8)
ALBUMIN/GLOB SERPL: 1.4 {RATIO} (ref 1–2)
ALP LIVER SERPL-CCNC: 100 U/L
ALT SERPL-CCNC: <7 U/L
ANION GAP SERPL CALC-SCNC: 10 MMOL/L (ref 0–18)
AST SERPL-CCNC: 15 U/L (ref ?–34)
BASOPHILS # BLD AUTO: 0.04 X10(3) UL (ref 0–0.2)
BASOPHILS NFR BLD AUTO: 0.5 %
BILIRUB SERPL-MCNC: 0.5 MG/DL (ref 0.2–1.1)
BUN BLD-MCNC: 21 MG/DL (ref 9–23)
CALCIUM BLD-MCNC: 9.4 MG/DL (ref 8.7–10.4)
CHLORIDE SERPL-SCNC: 106 MMOL/L (ref 98–112)
CO2 SERPL-SCNC: 24 MMOL/L (ref 21–32)
CREAT BLD-MCNC: 1.1 MG/DL
EGFRCR SERPLBLD CKD-EPI 2021: 55 ML/MIN/1.73M2 (ref 60–?)
EOSINOPHIL # BLD AUTO: 0.28 X10(3) UL (ref 0–0.7)
EOSINOPHIL NFR BLD AUTO: 3.5 %
ERYTHROCYTE [DISTWIDTH] IN BLOOD BY AUTOMATED COUNT: 13.1 %
ERYTHROCYTE [SEDIMENTATION RATE] IN BLOOD: 32 MM/HR
GLOBULIN PLAS-MCNC: 2.9 G/DL (ref 2–3.5)
GLUCOSE BLD-MCNC: 184 MG/DL (ref 70–99)
HCT VFR BLD AUTO: 31.5 %
HGB BLD-MCNC: 10.5 G/DL
IMM GRANULOCYTES # BLD AUTO: 0.02 X10(3) UL (ref 0–1)
IMM GRANULOCYTES NFR BLD: 0.2 %
LYMPHOCYTES # BLD AUTO: 2.32 X10(3) UL (ref 1–4)
LYMPHOCYTES NFR BLD AUTO: 28.8 %
MCH RBC QN AUTO: 29 PG (ref 26–34)
MCHC RBC AUTO-ENTMCNC: 33.3 G/DL (ref 31–37)
MCV RBC AUTO: 87 FL
MONOCYTES # BLD AUTO: 0.62 X10(3) UL (ref 0.1–1)
MONOCYTES NFR BLD AUTO: 7.7 %
NEUTROPHILS # BLD AUTO: 4.78 X10 (3) UL (ref 1.5–7.7)
NEUTROPHILS # BLD AUTO: 4.78 X10(3) UL (ref 1.5–7.7)
NEUTROPHILS NFR BLD AUTO: 59.3 %
OSMOLALITY SERPL CALC.SUM OF ELEC: 298 MOSM/KG (ref 275–295)
PLATELET # BLD AUTO: 206 10(3)UL (ref 150–450)
POTASSIUM SERPL-SCNC: 4.3 MMOL/L (ref 3.5–5.1)
PROT SERPL-MCNC: 7.1 G/DL (ref 5.7–8.2)
RBC # BLD AUTO: 3.62 X10(6)UL
SODIUM SERPL-SCNC: 140 MMOL/L (ref 136–145)
WBC # BLD AUTO: 8.1 X10(3) UL (ref 4–11)

## 2024-12-27 PROCEDURE — 99223 1ST HOSP IP/OBS HIGH 75: CPT | Performed by: INTERNAL MEDICINE

## 2024-12-27 PROCEDURE — 73630 X-RAY EXAM OF FOOT: CPT | Performed by: EMERGENCY MEDICINE

## 2024-12-27 RX ORDER — VANCOMYCIN 2 GRAM/500 ML IN 0.9 % SODIUM CHLORIDE INTRAVENOUS
25 ONCE
Status: COMPLETED | OUTPATIENT
Start: 2024-12-27 | End: 2024-12-27

## 2024-12-27 RX ORDER — SODIUM CHLORIDE 9 MG/ML
INJECTION, SOLUTION INTRAVENOUS CONTINUOUS
Status: DISCONTINUED | OUTPATIENT
Start: 2024-12-27 | End: 2024-12-28

## 2024-12-27 RX ORDER — ONDANSETRON 2 MG/ML
4 INJECTION INTRAMUSCULAR; INTRAVENOUS EVERY 4 HOURS PRN
Status: DISCONTINUED | OUTPATIENT
Start: 2024-12-27 | End: 2024-12-28

## 2024-12-27 RX ORDER — MORPHINE SULFATE 4 MG/ML
4 INJECTION, SOLUTION INTRAMUSCULAR; INTRAVENOUS EVERY 30 MIN PRN
Status: DISCONTINUED | OUTPATIENT
Start: 2024-12-27 | End: 2024-12-28

## 2024-12-27 RX ORDER — ACETAMINOPHEN 325 MG/1
325 TABLET ORAL EVERY 4 HOURS PRN
COMMUNITY
Start: 2024-11-12

## 2024-12-27 NOTE — TELEPHONE ENCOUNTER
Action Requested: Summary for Provider     []  Critical Lab, Recommendations Needed  [] Need Additional Advice  []   FYI    []   Need Orders  [] Need Medications Sent to Pharmacy  []  Other     SUMMARY: Received call from pt. Per pt, she has had wound on R large toe for past 1 mo. Wound was initially small, but now the size of a dime and is open. Wound draining yellow/blood tinged at times. Patient also stated wound is malodorous. Patient type II diabetic. Informed pt wound likely infected, advised to be seen in UC/ER now given no appts in office. Patient stated understanding and agreeable to plan, pt stated she will go to ER.     Reason for call: Laceration/Abrasion  Onset: 1 month       Reason for Disposition   Looks infected (spreading redness, red streak, pus) and fever    Protocols used: Wound Infection [id number]

## 2024-12-27 NOTE — ED INITIAL ASSESSMENT (HPI)
Pt here due to a wound on her right great toe. Pt stated that about a month ago she noticed that she was getting a wound on her right great toe and was taking care of it. Then over the last two weeks she noticed that it was getting bigger and deeper, and now is having yellowish discharge from it.

## 2024-12-28 LAB
ANION GAP SERPL CALC-SCNC: 7 MMOL/L (ref 0–18)
BASOPHILS # BLD AUTO: 0.04 X10(3) UL (ref 0–0.2)
BASOPHILS NFR BLD AUTO: 0.6 %
BUN BLD-MCNC: 17 MG/DL (ref 9–23)
CALCIUM BLD-MCNC: 9.1 MG/DL (ref 8.7–10.4)
CHLORIDE SERPL-SCNC: 111 MMOL/L (ref 98–112)
CO2 SERPL-SCNC: 25 MMOL/L (ref 21–32)
CREAT BLD-MCNC: 0.88 MG/DL
CREAT BLD-MCNC: 0.88 MG/DL
DEPRECATED HBV CORE AB SER IA-ACNC: 42 NG/ML
EGFRCR SERPLBLD CKD-EPI 2021: 72 ML/MIN/1.73M2 (ref 60–?)
EGFRCR SERPLBLD CKD-EPI 2021: 72 ML/MIN/1.73M2 (ref 60–?)
EOSINOPHIL # BLD AUTO: 0.25 X10(3) UL (ref 0–0.7)
EOSINOPHIL NFR BLD AUTO: 3.5 %
ERYTHROCYTE [DISTWIDTH] IN BLOOD BY AUTOMATED COUNT: 13.2 %
GLUCOSE BLD-MCNC: 101 MG/DL (ref 70–99)
GLUCOSE BLD-MCNC: 107 MG/DL (ref 70–99)
GLUCOSE BLD-MCNC: 116 MG/DL (ref 70–99)
GLUCOSE BLD-MCNC: 136 MG/DL (ref 70–99)
GLUCOSE BLD-MCNC: 170 MG/DL (ref 70–99)
GLUCOSE BLD-MCNC: 177 MG/DL (ref 70–99)
HCT VFR BLD AUTO: 32.9 %
HGB BLD-MCNC: 11.1 G/DL
IMM GRANULOCYTES # BLD AUTO: 0.02 X10(3) UL (ref 0–1)
IMM GRANULOCYTES NFR BLD: 0.3 %
IRON SATN MFR SERPL: 19 %
IRON SERPL-MCNC: 62 UG/DL
LYMPHOCYTES # BLD AUTO: 1.6 X10(3) UL (ref 1–4)
LYMPHOCYTES NFR BLD AUTO: 22.1 %
MCH RBC QN AUTO: 29.2 PG (ref 26–34)
MCHC RBC AUTO-ENTMCNC: 33.7 G/DL (ref 31–37)
MCV RBC AUTO: 86.6 FL
MONOCYTES # BLD AUTO: 0.49 X10(3) UL (ref 0.1–1)
MONOCYTES NFR BLD AUTO: 6.8 %
NEUTROPHILS # BLD AUTO: 4.83 X10 (3) UL (ref 1.5–7.7)
NEUTROPHILS # BLD AUTO: 4.83 X10(3) UL (ref 1.5–7.7)
NEUTROPHILS NFR BLD AUTO: 66.7 %
OSMOLALITY SERPL CALC.SUM OF ELEC: 300 MOSM/KG (ref 275–295)
PLATELET # BLD AUTO: 194 10(3)UL (ref 150–450)
POTASSIUM SERPL-SCNC: 4 MMOL/L (ref 3.5–5.1)
RBC # BLD AUTO: 3.8 X10(6)UL
SODIUM SERPL-SCNC: 143 MMOL/L (ref 136–145)
TOTAL IRON BINDING CAPACITY: 325 UG/DL (ref 250–425)
TRANSFERRIN SERPL-MCNC: 256 MG/DL (ref 250–380)
WBC # BLD AUTO: 7.2 X10(3) UL (ref 4–11)

## 2024-12-28 PROCEDURE — 99222 1ST HOSP IP/OBS MODERATE 55: CPT | Performed by: STUDENT IN AN ORGANIZED HEALTH CARE EDUCATION/TRAINING PROGRAM

## 2024-12-28 PROCEDURE — 99232 SBSQ HOSP IP/OBS MODERATE 35: CPT | Performed by: STUDENT IN AN ORGANIZED HEALTH CARE EDUCATION/TRAINING PROGRAM

## 2024-12-28 RX ORDER — NICOTINE POLACRILEX 4 MG
15 LOZENGE BUCCAL
Status: DISCONTINUED | OUTPATIENT
Start: 2024-12-28 | End: 2024-12-31

## 2024-12-28 RX ORDER — NICOTINE POLACRILEX 4 MG
30 LOZENGE BUCCAL
Status: DISCONTINUED | OUTPATIENT
Start: 2024-12-28 | End: 2024-12-31

## 2024-12-28 RX ORDER — METOCLOPRAMIDE HYDROCHLORIDE 5 MG/ML
5 INJECTION INTRAMUSCULAR; INTRAVENOUS EVERY 8 HOURS PRN
Status: DISCONTINUED | OUTPATIENT
Start: 2024-12-28 | End: 2024-12-31

## 2024-12-28 RX ORDER — INSULIN DEGLUDEC 100 U/ML
30 INJECTION, SOLUTION SUBCUTANEOUS DAILY
Status: DISCONTINUED | OUTPATIENT
Start: 2024-12-28 | End: 2024-12-31

## 2024-12-28 RX ORDER — SODIUM CHLORIDE 9 MG/ML
INJECTION, SOLUTION INTRAVENOUS CONTINUOUS
Status: ACTIVE | OUTPATIENT
Start: 2024-12-28 | End: 2024-12-28

## 2024-12-28 RX ORDER — SODIUM PHOSPHATE, DIBASIC AND SODIUM PHOSPHATE, MONOBASIC 7; 19 G/230ML; G/230ML
1 ENEMA RECTAL ONCE AS NEEDED
Status: DISCONTINUED | OUTPATIENT
Start: 2024-12-28 | End: 2024-12-31

## 2024-12-28 RX ORDER — ECHINACEA PURPUREA EXTRACT 125 MG
1 TABLET ORAL
Status: DISCONTINUED | OUTPATIENT
Start: 2024-12-28 | End: 2024-12-31

## 2024-12-28 RX ORDER — DEXTROSE MONOHYDRATE 25 G/50ML
50 INJECTION, SOLUTION INTRAVENOUS
Status: DISCONTINUED | OUTPATIENT
Start: 2024-12-28 | End: 2024-12-31

## 2024-12-28 RX ORDER — OXYBUTYNIN CHLORIDE 5 MG/1
5 TABLET, EXTENDED RELEASE ORAL 2 TIMES DAILY
Status: DISCONTINUED | OUTPATIENT
Start: 2024-12-28 | End: 2024-12-31

## 2024-12-28 RX ORDER — ONDANSETRON 2 MG/ML
4 INJECTION INTRAMUSCULAR; INTRAVENOUS EVERY 6 HOURS PRN
Status: DISCONTINUED | OUTPATIENT
Start: 2024-12-28 | End: 2024-12-31

## 2024-12-28 RX ORDER — ENOXAPARIN SODIUM 100 MG/ML
40 INJECTION SUBCUTANEOUS DAILY
Status: DISCONTINUED | OUTPATIENT
Start: 2024-12-28 | End: 2024-12-31

## 2024-12-28 RX ORDER — ROSUVASTATIN CALCIUM 10 MG/1
10 TABLET, COATED ORAL NIGHTLY
Status: DISCONTINUED | OUTPATIENT
Start: 2024-12-28 | End: 2024-12-31

## 2024-12-28 RX ORDER — VANCOMYCIN HYDROCHLORIDE
15 EVERY 24 HOURS
Status: DISCONTINUED | OUTPATIENT
Start: 2024-12-28 | End: 2024-12-28

## 2024-12-28 RX ORDER — PANTOPRAZOLE SODIUM 40 MG/1
40 TABLET, DELAYED RELEASE ORAL
Status: DISCONTINUED | OUTPATIENT
Start: 2024-12-28 | End: 2024-12-31

## 2024-12-28 RX ORDER — SENNOSIDES 8.6 MG
17.2 TABLET ORAL NIGHTLY PRN
Status: DISCONTINUED | OUTPATIENT
Start: 2024-12-28 | End: 2024-12-31

## 2024-12-28 RX ORDER — BENZONATATE 100 MG/1
200 CAPSULE ORAL 3 TIMES DAILY PRN
Status: DISCONTINUED | OUTPATIENT
Start: 2024-12-28 | End: 2024-12-31

## 2024-12-28 RX ORDER — HYDROCODONE BITARTRATE AND ACETAMINOPHEN 5; 325 MG/1; MG/1
1 TABLET ORAL EVERY 6 HOURS PRN
Status: DISCONTINUED | OUTPATIENT
Start: 2024-12-28 | End: 2024-12-31

## 2024-12-28 RX ORDER — LISINOPRIL 5 MG/1
5 TABLET ORAL DAILY
Status: DISCONTINUED | OUTPATIENT
Start: 2024-12-28 | End: 2024-12-31

## 2024-12-28 RX ORDER — BISACODYL 10 MG
10 SUPPOSITORY, RECTAL RECTAL
Status: DISCONTINUED | OUTPATIENT
Start: 2024-12-28 | End: 2024-12-31

## 2024-12-28 RX ORDER — POLYETHYLENE GLYCOL 3350 17 G/17G
17 POWDER, FOR SOLUTION ORAL DAILY PRN
Status: DISCONTINUED | OUTPATIENT
Start: 2024-12-28 | End: 2024-12-31

## 2024-12-28 RX ORDER — ACETAMINOPHEN 500 MG
500 TABLET ORAL EVERY 4 HOURS PRN
Status: DISCONTINUED | OUTPATIENT
Start: 2024-12-28 | End: 2024-12-31

## 2024-12-28 RX ORDER — TIMOLOL MALEATE 5 MG/ML
1 SOLUTION/ DROPS OPHTHALMIC 2 TIMES DAILY
Status: DISCONTINUED | OUTPATIENT
Start: 2024-12-28 | End: 2024-12-31

## 2024-12-28 NOTE — CONSULTS
INFECTIOUS DISEASE CONSULT NOTE    Marcela Moreno Patient Status:  Inpatient    10/22/1955 MRN [id number]   [facility] 3NW-A Attending Sarthak Lowry, DO   Hosp Day # 1 PCP Rhiannon Betancourt MD       Reason for Consultation:  Diabetic foot infection    History of Present Illness:  Marcela Moreno is a a(n) 69 year old female with h/o diabetes type 2, hypertension, dyslipidemia, GERD, obesity, CVA with mild left-sided deficits who presented to the ED on  for evaluation of right great toe wound.  She noticed the ulcer a month ago, believes it is worsening with increasing drainage and pain.  no fever, chills, nausea, vomiting, diarrhea, chest pain or shortness of breath, feels well otherwise. has not seen a podiatrist in a very long time.      In the ER, x-ray was concerning for osteomyelitis of the distal phalanx.      History:  Past Medical History:    Abdominal pain    Arthritis    AVM (arteriovenous malformation) brain (formerly [facility])    being followed at [facility]    Back pain    Back problem    Bloating    Calculus of kidney    Constipation    Diabetes mellitus (HCC)    Diarrhea, unspecified    Esophageal reflux    Fatigue    Food intolerance    Frequent urination    Heartburn    High blood pressure    High cholesterol    History of depression    Indigestion    Irregular bowel habits    Leaking of urine    Leg swelling    Muscle weakness    Neuropathy    TOES SOMETIMES    Osteoarthritis    Pain in joints    Pain with bowel movements    Problems with swallowing    Sleep disturbance    Sputum production    Stool incontinence    Stroke (formerly [facility])    LEFT SIDE EFFECTED- USING CANE    Type II or unspecified type diabetes mellitus without mention of complication, not stated as uncontrolled    Uncomfortable fullness after meals    Vitamin D deficiency    Weight gain     Past Surgical History:   Procedure Laterality Date    Brain surgery  2015    avm    [facility]          X3    Cholecystectomy      Colonoscopy N/A 2024    Procedure: COLONOSCOPY;  Surgeon: Guerrero Moser MD;  Location:  ENDOSCOPY    Cystoscopy,insert ureteral stent      Hernia surgery      Other surgical history      LEFT TIBIA FRACTURE- PLATE AND SCREWS       Family History   Problem Relation Age of Onset    Diabetes Mother     Hypertension Mother     Hypertension Father     Heart Disorder Brother         CAD stent in his 40s    Diabetes Brother     Diabetes Sister     Heart Attack Brother       reports that she has never smoked. She has never used smokeless tobacco. She reports that she does not currently use alcohol. She reports that she does not use drugs.    Allergies:  Allergies[1]    Medications:    Current Facility-Administered Medications:     ampicillin-sulbactam (Unasyn) 3 g in sodium chloride 0.9% 100mL IVPB-ADD, 3 g, Intravenous, q6h    vancomycin (Vancocin) 1.25 g in sodium chloride 0.9% 250mL IVPB premix, 15 mg/kg, Intravenous, Q24H    insulin degludec (Tresiba) 100 units/mL flextouch 30 Units, 30 Units, Subcutaneous, Daily    lisinopril (Prinivil; Zestril) tab 5 mg, 5 mg, Oral, Daily    oxybutynin ER (Ditropan-XL) 24 hr tab 5 mg, 5 mg, Oral, BID    pantoprazole (Protonix) DR tab 40 mg, 40 mg, Oral, QAM AC    rosuvastatin (Crestor) tab 10 mg, 10 mg, Oral, Nightly    timolol (Timoptic) 0.5 % ophthalmic solution 1 drop, 1 drop, Both Eyes, BID    glucose (Dex4) 15 GM/59ML oral liquid 15 g, 15 g, Oral, Q15 Min PRN **OR** glucose (Glutose) 40% oral gel 15 g, 15 g, Oral, Q15 Min PRN **OR** glucose-vitamin C (Dex-4) chewable tab 4 tablet, 4 tablet, Oral, Q15 Min PRN **OR** dextrose 50% injection 50 mL, 50 mL, Intravenous, Q15 Min PRN **OR** glucose (Dex4) 15 GM/59ML oral liquid 30 g, 30 g, Oral, Q15 Min PRN **OR** glucose (Glutose) 40% oral gel 30 g, 30 g, Oral, Q15 Min PRN **OR** glucose-vitamin C (Dex-4) chewable tab 8 tablet, 8 tablet, Oral, Q15 Min PRN     insulin aspart (NovoLOG) 100 Units/mL FlexPen 1-68 Units, 1-68 Units, Subcutaneous, TID CC    insulin aspart (NovoLOG) 100 Units/mL FlexPen 1-10 Units, 1-10 Units, Subcutaneous, TID AC and HS    sodium chloride 0.9% infusion, , Intravenous, Continuous    enoxaparin (Lovenox) 40 MG/0.4ML SUBQ injection 40 mg, 40 mg, Subcutaneous, Daily    acetaminophen (Tylenol Extra Strength) tab 500 mg, 500 mg, Oral, Q4H PRN    HYDROcodone-acetaminophen (Norco) 5-325 MG per tab 1 tablet, 1 tablet, Oral, Q6H PRN    melatonin tab 3 mg, 3 mg, Oral, Nightly PRN    polyethylene glycol (PEG 3350) (Miralax) 17 g oral packet 17 g, 17 g, Oral, Daily PRN    sennosides (Senokot) tab 17.2 mg, 17.2 mg, Oral, Nightly PRN    bisacodyl (Dulcolax) 10 MG rectal suppository 10 mg, 10 mg, Rectal, Daily PRN    fleet enema (Fleet) rectal enema 133 mL, 1 enema, Rectal, Once PRN    ondansetron (Zofran) 4 MG/2ML injection 4 mg, 4 mg, Intravenous, Q6H PRN    metoclopramide (Reglan) 5 mg/mL injection 5 mg, 5 mg, Intravenous, Q8H PRN    benzonatate (Tessalon) cap 200 mg, 200 mg, Oral, TID PRN    guaiFENesin (Robitussin) 100 MG/5 ML oral liquid 200 mg, 200 mg, Oral, Q4H PRN    glycerin-hypromellose- (Artificial Tears) 0.2-0.2-1 % ophthalmic solution 1 drop, 1 drop, Both Eyes, QID PRN    sodium chloride (Saline Mist) 0.65 % nasal solution 1 spray, 1 spray, Each Nare, Q3H PRN    influenza virus trivalent high dose PF (Fluzone HD) 0.5 mL injection (ages >/= 65 years) 0.5 mL, 0.5 mL, Intramuscular, Prior to discharge    Review of Systems:    Completed. See pertinent positives and negatives in the the HPI.      Physical Exam:    General: No acute distress. Alert and oriented x 3.  Vital signs: Blood pressure 153/39, pulse 78, temperature 97.9 °F (36.6 °C), temperature source Oral, resp. rate 18, height 5' 1\" (1.549 m), weight 172 lb (78 kg), SpO2 94%.  HEENT: no scleral icterus or conjunctival injection. Moist mucous membranes. No thrush  Neck: No  lymphadenopathy.  Supple.  Respiratory: Clear to auscultation bilaterally.  No wheezes. No rhonchi.  Cardiovascular: S1, S2.  Regular rate and rhythm.  No murmurs.  Abdomen: Soft, nontender, nondistended.  Positive bowel sounds.  Musculoskeletal: Full range of motion of all extremities.  No arthritis or deformity  Integument: No rash. No cellulitis. See pic        Laboratory Data:    Recent Labs   Lab 12/27/24 2023 12/28/24  0657   RBC 3.62* 3.80   HGB 10.5* 11.1*   HCT 31.5* 32.9*   MCV 87.0 86.6   MCH 29.0 29.2   MCHC 33.3 33.7   RDW 13.1 13.2   NEPRELIM 4.78 4.83   WBC 8.1 7.2   .0 194.0     Recent Labs   Lab 12/27/24 2022 12/28/24  0657   * 136*   BUN 21 17   CREATSERUM 1.10* 0.88  0.88   CA 9.4 9.1   ALB 4.2  --     143   K 4.3 4.0    111   CO2 24.0 25.0   ALKPHO 100  --    AST 15  --    ALT <7*  --    BILT 0.5  --    TP 7.1  --      Sed Rate (mm/Hr)   Date Value   12/27/2024 32 (H)      C-Reactive Protein (mg/dL)   Date Value   12/16/2017 1.13 (H)      No results found for: \"VANCT\"  No results for input(s): \"COLORUR\", \"CLARITY\", \"SPECGRAVITY\", \"GLUUR\", \"BILUR\", \"KETUR\", \"BLOODURINE\", \"PHURINE\", \"PROUR\", \"UROBILINOGEN\", \"NITRITE\", \"LEUUR\", \"WBCUR\", \"RBCUR\", \"BACUR\", \"EPIUR\" in the last 168 hours.     Microbiology    Reviewed in EMR,   No results found for the last 90 days.        Radiology: XR FOOT, COMPLETE (MIN 3 VIEWS), RIGHT (CPT=73630)    Result Date: 12/27/2024  PROCEDURE:  XR FOOT, COMPLETE (MIN 3 VIEWS), RIGHT (CPT=73630)  TECHNIQUE:  AP, oblique, and lateral views were obtained.  COMPARISON:  None.  INDICATIONS:  Non-healing wound to right foot -  PATIENT STATED HISTORY: (As transcribed by Technologist)  Patient states open wound along plantar aspect of 1st digit on right foot x1 month.               CONCLUSION:   Focal soft tissue defect/ulceration noted along the plantar aspect of the 1st distal phalanx.  There is associated cortical erosion/destruction involving the tip  of the 1st distal phalanx most in keeping with acute osteomyelitis.  These findings are on a background of generalized osteopenia.  Scattered arthropathy of the mid and forefoot noted.  Large plantar calcaneal spur.   LOCATION:  [facility]   Dictated by (CST): Matheus Gutierrez MD on 12/27/2024 at 9:15 PM     Finalized by (CST): Matheus Gutierrez MD on 12/27/2024 at 9:16 PM            ASSESSMENT:  R hallux diabetic foot ulcer with suspected OM of the distal toe per xray  Type 2 DM     PLAN:  -cover with unasyn for now  -await MRI foot  - if confirmed to have OM with bony destruction would recommend amp of distal phalanx over a prolonged course of IV abx, as it would be more difficult to eradicate infection in that setting  -await art dopplers and optimize circulation if needed  Case and plan d/w pt at length and she expressed understanding    Thank you for allowing me to participate in the care of this patient. Please do not hesitate to call if you have any questions.   I will continue to follow with you and will make further recommendations based on her progress.    Missy Bruno MD  12/28/2024  9:15 AM         [1] No Known Allergies

## 2024-12-28 NOTE — ED QUICK NOTES
Orders for admission, patient is aware of plan and ready to go upstairs. Any questions, please call ED RN Candido at extension [phone].     Patient Covid vaccination status: Fully vaccinated     COVID Test Ordered in ED: None    COVID Suspicion at Admission: N/A    Running Infusions:  None    Mental Status/LOC at time of transport: a/o x 4    Other pertinent information:   CIWA score: N/A   NIH score:  N/A

## 2024-12-28 NOTE — PLAN OF CARE
Upon admission pt alert and oriented x4. VSS on room air. Pt denies chest pain, sob, nausea, emesis.  Abdomen soft, nondistended, denies tenderness. Passing gas. Last BM 12/27. Skin check done w/joseph RN: bottom of right great toe circular open area, pt states weeping for approximately 1month.  Scattered scratches pt states from dry skin.  IV ABX infusing upon admission to floor. Pt updated on poc.  ID called  Podiatry consulted, will see later today

## 2024-12-28 NOTE — PROGRESS NOTES
[facility] Pharmacy Dosing Service      Initial Pharmacokinetic Consult for Vancomycin Dosing     Marcela Moreno is a 69 year old female who is being initiated on vancomycin therapy for cellulitis and diabetic foot.  Pharmacy has been asked to dose vancomycin by Dr. Sarthak Lowry.  The initial treatment and monitoring approach will be steady state AUC strategy.        Weight and Temperature:    Wt Readings from Last 1 Encounters:   24 78 kg (172 lb)        Temp Readings from Last 1 Encounters:   24 98 °F (36.7 °C) (Oral)      Labs:   Recent Labs   Lab 24   CREATSERUM 1.10*      Estimated Creatinine Clearance: 36.9 mL/min (A) (based on SCr of 1.1 mg/dL (H)).     Recent Labs   Lab 24   WBC 8.1          The Pharmacokinetic Target is:       to 600 mg-h/L and trough <=15 mg/L    Renal Dosing Considerations:      None     Assessment/Plan:     Initial/Loading dose: Has received 2000 mg IV (25 mg/kg, capped at 2250 mg) x 1 loading dose.      Maintenance dose: Pharmacy will dose vancomycin at 1250 mg IV every 24 hours.    Monitorin) Plan for vancomycin peak and trough to be obtained at steady state.    2) Pharmacy will order SCr as clinically indicated to assess renal function.    3) Pharmacy will monitor for toxicity and efficacy, adjust vancomycin dose and/or frequency, and order vancomycin levels as appropriate per the Pharmacy and Therapeutics Committee approved protocol until discontinuation of the medication.       We appreciate the opportunity to assist in the care of this patient.     Sweetie Vicente PharmD  2024  12:28 AM  [facility] Extension: [phone]

## 2024-12-28 NOTE — PHYSICAL THERAPY NOTE
PHYSICAL THERAPY EVALUATION - INPATIENT     Room Number: 329/329-A  Evaluation Date: 12/28/2024  Type of Evaluation: Initial  Physician Order: PT Eval and Treat    Presenting Problem: R toe wound- cellulitis/abscess great toe; r/o OM  Co-Morbidities : DM2, HTN, DL, GERD, obesity, CVA c L sided deficits  Reason for Therapy: Mobility Dysfunction and Discharge Planning    Xray R foot 12/27/24-CONCLUSION:       Focal soft tissue defect/ulceration noted along the plantar aspect of the 1st distal phalanx.  There is associated cortical erosion/destruction involving the tip of the 1st distal phalanx most in keeping with acute osteomyelitis. These findings are on a background of generalized osteopenia.  Scattered arthropathy of the mid and forefoot noted.  Large plantar calcaneal spur.    Ordered: US arterial duplex RLE and MRI R foot    PHYSICAL THERAPY ASSESSMENT   Patient is a 69 year old female admitted 12/27/2024 for R toe wound- cellulitis/abscess great toe; r/o OM.   Patient is currently functioning at baseline with bed mobility, transfers, gait, maintaining seated position, standing prolonged periods, and performing household tasks. Prior to admission, patient's baseline is mod indep c all ADLs, most IADLs (spouse assists c remaining), amb with a cane and drives.     Patient will benefit from continued amb with RN staff and t/f up to the chair throughout the day. Pt does not currently have any further therapy needs at this time, however if WB status changes, please reorder for re-eval. At this time no further f/u and rec DC to home when appropriate.     PLAN  Patient has been evaluated and presents with no skilled Physical Therapy needs at this time.  Patient discharged from Physical Therapy services.  Please re-order if a new functional limitation presents during this admission.    PT Device Recommendation: Cane;Gait belt    GOALS  Patient was able to achieve the following goals ...    Patient was able to transfer  At previous, functional level   Patient able to ambulate on level surfaces At previous, functional level     HOME SITUATION  Type of Home: House  Home Layout: One level  Stairs to Enter : 4   Railing: Yes (B)              Lives With: Spouse    Drives: Yes   Patient Regularly Uses: Reading glasses     Prior Level of [place]: Indep c ADls, most IADLs (spouse assists c laundry in the basement), amb with or without a cane, drives; active c family and spouse    SUBJECTIVE  \"I can get by on my own, but my  can help if needed\"    OBJECTIVE  Precautions: None  Fall Risk: Standard fall risk    WEIGHT BEARING RESTRICTION  R Lower Extremity: Full Weight Bearing (per podiatry for PT eval)    PAIN ASSESSMENT  Ratin  Location: denies pn       COGNITION  Overall Cognitive Status:  WFL - within functional limits  Arousal/Alertness:  appropriate responses to stimuli  Orientation Level:  oriented x4  Following Commands:  follows all commands and directions without difficulty  Safety Judgement:  good awareness of safety precautions    RANGE OF MOTION AND STRENGTH ASSESSMENT  Upper extremity ROM and strength are within functional limits     Lower extremity ROM is within functional limits     Lower extremity strength is within functional limits -except R foot NT d/t wound    BALANCE  Static Sitting: Good  Dynamic Sitting: Good  Static Standing: Fair -  Dynamic Standing: Fair -    ADDITIONAL TESTS                                    ACTIVITY TOLERANCE                         O2 WALK       NEUROLOGICAL FINDINGS  Neurological Findings: Sensation           Sensation: BLE symmetrical/intact to light touch         AM-PAC '6-Clicks' INPATIENT SHORT FORM - BASIC MOBILITY  How much difficulty does the patient currently have...  Patient Difficulty: Turning over in bed (including adjusting bedclothes, sheets and blankets)?: None   Patient Difficulty: Sitting down on and standing up from a chair with arms (e.g., wheelchair, bedside  commode, etc.): None   Patient Difficulty: Moving from lying on back to sitting on the side of the bed?: None   How much help from another person does the patient currently need...   Help from Another: Moving to and from a bed to a chair (including a wheelchair)?: None   Help from Another: Need to walk in hospital room?: None   Help from Another: Climbing 3-5 steps with a railing?: A Little       AM-PAC Score:  Raw Score: 23   Approx Degree of Impairment: 11.2%   Standardized Score (AM-PAC Scale): 56.93   CMS Modifier (G-Code): CI    FUNCTIONAL ABILITY STATUS  Gait Assessment   Functional Mobility/Gait Assessment  Gait Assistance: Modified independent  Distance (ft): 200  Assistive Device: Cane  Pattern: R Decreased stance time;R Flexed knee;Shuffle;Comment (mild decr step/stride length on RLE)    Skilled Therapy Provided   Pt presents to PT lying supine in bed and eager to participate.  Supine/sit c mod indep to the EOB/scoot indep.   Assist to don sock- cut side for fit around bandage  Sit/stand from EOB c mod indep and use of cane  Amb x200' c cane and mod indep, however c mild gait deviations, good safety awareness  Following-returned to her room and entered the restroom.   Toilet t/f c mod indep- doffed brief indep, voided and completed pericare  Mod indep sit/stand from toilet and able to walk to sink c mod indep, no device to wash up  Following, t/f to [facility] c mod indep.   Lunch tray positioned in front of her and RN aware of session.    Bed Mobility:  Rolling: NT  Supine to sit: mod indep   Sit to supine: mod indep     Transfer Mobility:  Sit to stand: mod indep   Stand to sit: mod indep  Gait = mod indep x200' c cane    Therapist's comments:d/w pt and RN the possible need to return if imaging leads to need for modified WB status.     Exercise/Education Provided:  Bed mobility  Body mechanics  Functional activity tolerated  Gait training  Posture  Transfer training    Patient End of Session: Up in chair;Needs  met;Call light within reach;RN aware of session/findings;All patient questions and concerns addressed;Hospital anti-slip socks;Family present    Patient Evaluation Complexity Level:  History Moderate - 1 or 2 personal factors and/or co-morbidities   Examination of body systems Low -  addressing 1-2 elements   Clinical Presentation Low- Stable   Clinical Decision Making Low Complexity       PT Session Time: 30 minutes  Gait Training: 10 minutes  Therapeutic Activity: 10 minutes

## 2024-12-28 NOTE — PROGRESS NOTES
[facility]   part of [facility]     Hospitalist Progress Note     Marcela Moreno Patient Status:  Inpatient    10/22/1955 MRN [id number]   Location [facility] 3NW-A Attending Sarthak Lowry,    Hosp Day # 1 PCP Rhiannon Betancourt MD     Chief Complaint: foot ulcer     Subjective:     Patient  denies any pain. Afebrile.     Objective:    Review of Systems:   A comprehensive review of systems was completed; pertinent positive and negatives stated in subjective.    Vital signs:  Temp:  [97.8 °F (36.6 °C)-98 °F (36.7 °C)] 97.9 °F (36.6 °C)  Pulse:  [70-84] 78  Resp:  [14-18] 18  BP: (129-153)/(39-76) 153/39  SpO2:  [94 %-97 %] 94 %    Physical Exam:    General: No acute distress  Respiratory: No wheezes, no rhonchi  Cardiovascular: S1, S2, regular rate and rhythm  Abdomen: Soft, Non-tender, non-distended, positive bowel sounds  Neuro: No new focal deficits.   Extremities: No edema- R great toe ulcer non tender, erythema    Diagnostic Data:    Labs:  Recent Labs   Lab 24  0657   WBC 8.1 7.2   HGB 10.5* 11.1*   MCV 87.0 86.6   .0 194.0       Recent Labs   Lab 24  0657   * 136*   BUN 21 17   CREATSERUM 1.10* 0.88  0.88   CA 9.4 9.1   ALB 4.2  --     143   K 4.3 4.0    111   CO2 24.0 25.0   ALKPHO 100  --    AST 15  --    ALT <7*  --    BILT 0.5  --    TP 7.1  --        Estimated Glomerular Filtration Rate: 71.7 mL/min/1.73m2 (by CKD-EPI based on SCr of 0.88 mg/dL).    No results for input(s): \"TROP\", \"TROPHS\", \"CK\" in the last 168 hours.    No results for input(s): \"PTP\", \"INR\" in the last 168 hours.               Microbiology    No results found for this visit on 24.      Imaging: Reviewed in Epic.    Medications:    ampicillin-sulbactam  3 g Intravenous q6h    vancomycin  15 mg/kg Intravenous Q24H    insulin degludec  30 Units Subcutaneous Daily    lisinopril  5 mg Oral Daily    oxybutynin ER  5 mg Oral BID    pantoprazole  40 mg  Oral QAM AC    rosuvastatin  10 mg Oral Nightly    timolol  1 drop Both Eyes BID    insulin aspart  1-68 Units Subcutaneous TID CC    insulin aspart  1-10 Units Subcutaneous TID AC and HS    enoxaparin  40 mg Subcutaneous Daily       Assessment & Plan:      #R great toe ulcer, DFU- with concerns for osteomyelitis  MRI pending  ID, Podiatry eval  IV Abx  #DM type 2 with PN  #Hypertension   Continue ACE I  #HLD  #Renal insufficiency not FIORDALIZA,   Monitor Cr         Luna Miller MD    Supplementary Documentation:     Quality:  DVT Mechanical Prophylaxis:   SCDs,    DVT Pharmacologic Prophylaxis   Medication    enoxaparin (Lovenox) 40 MG/0.4ML SUBQ injection 40 mg                Code Status: Not on file  Mccallum: No urinary catheter in place  Mccallum Duration (in days):   Central line:    FELA:     Discharge is dependent on: clinical   At this point Ms. Moreno is expected to be discharge to: home    The 21st Century Cures Act makes medical notes like these available to patients in the interest of transparency. Please be advised this is a medical document. Medical documents are intended to carry relevant information, facts as evident, and the clinical opinion of the practitioner. The medical note is intended as peer to peer communication and may appear blunt or direct. It is written in medical language and may contain abbreviations or verbiage that are unfamiliar.

## 2024-12-28 NOTE — ED PROVIDER NOTES
Patient Seen in: [facility] Emergency Department    She is here with her family member.  History     Chief Complaint   Patient presents with    Wound Care     Stated Complaint: Non-healing wound to right foot -    Subjective:   HPI      This is a 69-year-old female who arrives here she has had a history of some mild wound to her right great toe.  It has gotten worse.  She is gotten to the point where its gotten more more red and more swollen.  She denies any fevers or chills she does have history of type 2 diabetes she is got a history of high blood pressure .  The patient has a history of esophageal reflux, diabetes,Previous history of stroke.   the patient has no fevers or chills numbness or weakness is new for her.    Objective:     Past Medical History:    Abdominal pain    Arthritis    AVM (arteriovenous malformation) brain (HCC)    being followed at [facility]    Back pain    Back problem    Bloating    Calculus of kidney    Constipation    Diabetes mellitus (HCC)    Diarrhea, unspecified    Esophageal reflux    Fatigue    Food intolerance    Frequent urination    Heartburn    High blood pressure    High cholesterol    History of depression    Indigestion    Irregular bowel habits    Leaking of urine    Leg swelling    Muscle weakness    Neuropathy    TOES SOMETIMES    Osteoarthritis    Pain in joints    Pain with bowel movements    Problems with swallowing    Sleep disturbance    Sputum production    Stool incontinence    Stroke (HCC)    LEFT SIDE EFFECTED- USING CANE    Type II or unspecified type diabetes mellitus without mention of complication, not stated as uncontrolled    Uncomfortable fullness after meals    Vitamin D deficiency    Weight gain              Past Surgical History:   Procedure Laterality Date    Brain surgery  2015    avm   [facility]          X3    Cholecystectomy      Colonoscopy N/A 2024    Procedure: COLONOSCOPY;  Surgeon: Guerrero Moser MD;   Location:  ENDOSCOPY    Cystoscopy,insert ureteral stent      Hernia surgery      Other surgical history      LEFT TIBIA FRACTURE- PLATE AND SCREWS  2017                Social History     Socioeconomic History    Marital status:    Tobacco Use    Smoking status: Never    Smokeless tobacco: Never   Vaping Use    Vaping status: Never Used   Substance and Sexual Activity    Alcohol use: Not Currently    Drug use: No   Other Topics Concern    Caffeine Concern Yes     Comment: coffee 2 cups daily    Exercise No    Seat Belt Yes     Social Drivers of Health      [facility]    Social Connections                  Physical Exam     ED Triage Vitals [12/27/24 1702]   /52   Pulse 70   Resp 14   Temp 97.8 °F (36.6 °C)   Temp src Oral   SpO2 97 %   O2 Device None (Room air)       Current Vitals:   Vital Signs  BP: 129/52  Pulse: 70  Resp: 14  Temp: 97.8 °F (36.6 °C)  Temp src: Oral  MAP (mmHg): 77    Oxygen Therapy  SpO2: 97 %  O2 Device: None (Room air)        Physical Exam  General: Patient is in no respiratory distress.  The patient is in no respiratory distress    HEENT: There is no signs of trauma.  Oral mucosa is wet.    Lungs: Clear to auscultation without wheezing or retractions    Cardiovascular: Regular without murmurs  Abdomen soft nontender.  The right foot the toe is the only thing red.  There is no streaking up the foot there is no calf swelling there is good pulses the patient is otherwise neurovascular intact.  Extremities: Good pulses bilaterally.  The right great toe is swollen red.  There is some mild drainage from the site.    Neuro: Alert and oriented.  The patient is moving all extremities there is no focal findings.    ED Course     Labs Reviewed   COMP METABOLIC PANEL (14) - Abnormal; Notable for the following components:       Result Value    Glucose 184 (*)     Creatinine 1.10 (*)     Calculated Osmolality 298 (*)      eGFR-Cr 55 (*)     ALT <7 (*)     All other components within normal limits   CBC WITH DIFFERENTIAL WITH PLATELET - Abnormal; Notable for the following components:    RBC 3.62 (*)     HGB 10.5 (*)     HCT 31.5 (*)     All other components within normal limits   SED RATE, WESTERGREN (AUTOMATED) - Abnormal; Notable for the following components:    Sed Rate 32 (*)     All other components within normal limits   BLOOD CULTURE   BLOOD CULTURE   AEROBIC BACTERIAL CULTURE        The patient was placed on monitors, IV was started, blood was drawn.  Workup was done to rule out possible osteomyelitis sepsis was less likely as she is not febrile, and has been going on for 1 month.  Blood cultures were ordered,       MDM   The patient sed rate was slightly elevated.  CBC showed no significant abnormalities..  Except for hemoglobin of 10 the patient was given Unasyn, blood cultures were obtained along with bulk of the anaerobic culture of the area.    Admission disposition: 12/27/2024  9:34 PM       I personally reviewed the radiographs and my individual interpretation shows    No obvious fracture     Also reviewed official report and it shows  XR FOOT, COMPLETE (MIN 3 VIEWS), RIGHT (CPT=73630)    Result Date: 12/27/2024  PROCEDURE:  XR FOOT, COMPLETE (MIN 3 VIEWS), RIGHT (CPT=73630)  TECHNIQUE:  AP, oblique, and lateral views were obtained.  COMPARISON:  None.  INDICATIONS:  Non-healing wound to right foot -  PATIENT STATED HISTORY: (As transcribed by Technologist)  Patient states open wound along plantar aspect of 1st digit on right foot x1 month.               CONCLUSION:   Focal soft tissue defect/ulceration noted along the plantar aspect of the 1st distal phalanx.  There is associated cortical erosion/destruction involving the tip of the 1st distal phalanx most in keeping with acute osteomyelitis.  These findings are on a background of generalized osteopenia.  Scattered arthropathy of the mid and forefoot noted.  Large  plantar calcaneal spur.   LOCATION:  [facility]   Dictated by (CST): Matheus Gutierrez MD on 12/27/2024 at 9:15 PM     Finalized by (CST): Matheus Gutierrez MD on 12/27/2024 at 9:16 PM      The patient said patient has a sed rate has diabetes and findings of possible osteomyelitis she is got a wound on the right big toe with ulcer on the bottom of it comprehensive shows elevated blood sugar.  Discussed this case with Dr. Lowry the patient was given IV antibiotics blood cultures, wound cultures were ordered.  The patient will need to be admitted she was given Unasyn originally we will order vancomycin.  Patient will be admitted for further evaluation treatment  Medical Decision Making      Disposition and Plan     Clinical Impression:  1. Cellulitis and abscess of toe of right foot    2. Osteomyelitis of right foot, unspecified type (HCC)         Disposition:  Admit  12/27/2024  9:34 pm    Follow-up:  No follow-up provider specified.        Medications Prescribed:  Current Discharge Medication List              Supplementary Documentation:         Hospital Problems       Present on Admission  Date Reviewed: 11/13/2024            ICD-10-CM Noted POA    * (Principal) Cellulitis and abscess of toe of right foot L03.031, L02.611 12/27/2024 Unknown

## 2024-12-28 NOTE — CONSULTS
[facility]   part of [facility]    Report of Consultation    Marcela Moreno Patient Status:  Inpatient    10/22/1955 MRN [id number]   Location [facility] 3NW-A Attending Sarthak Lowry, DO   Hosp Day # 1 PCP Rhiannon Betancourt MD     Date of Admission:  2024  Date of Consult:  2024    Reason for Consultation:  Right hallux wound    History of Present Illness:  Marcela Moreno is a a(n) 69 year old female with past medical history of diabetes who was seen at bedside this morning for evaluation of right great toe wound.  Patient relates that she has had ulcer for a little over a month.  She has noticed worsening drainage in appearance and subsequently presented to the emergency room.  She did complete x-rays which showed concerns for osteomyelitis.  She has been admitted for further evaluation.  No other complaints are mentioned.    History:  Past Medical History:    Abdominal pain    Arthritis    AVM (arteriovenous malformation) brain (HCC)    being followed at [facility]    Back pain    Back problem    Bloating    Calculus of kidney    Constipation    Diabetes mellitus (HCC)    Diarrhea, unspecified    Esophageal reflux    Fatigue    Food intolerance    Frequent urination    Heartburn    High blood pressure    High cholesterol    History of depression    Indigestion    Irregular bowel habits    Leaking of urine    Leg swelling    Muscle weakness    Neuropathy    TOES SOMETIMES    Osteoarthritis    Pain in joints    Pain with bowel movements    Problems with swallowing    Sleep disturbance    Sputum production    Stool incontinence    Stroke (HCC)    LEFT SIDE EFFECTED- USING CANE    Type II or unspecified type diabetes mellitus without mention of complication, not stated as uncontrolled    Uncomfortable fullness after meals    Vitamin D deficiency    Weight gain     Past Surgical History:   Procedure Laterality Date    Brain surgery  2015    avm   [facility]          X3     Cholecystectomy      Colonoscopy N/A 07/30/2024    Procedure: COLONOSCOPY;  Surgeon: Guerrero Moser MD;  Location:  ENDOSCOPY    Cystoscopy,insert ureteral stent      Hernia surgery      Other surgical history      LEFT TIBIA FRACTURE- PLATE AND SCREWS  2017     Family History   Problem Relation Age of Onset    Diabetes Mother     Hypertension Mother     Hypertension Father     Heart Disorder Brother         CAD stent in his 40s    Diabetes Brother     Diabetes Sister     Heart Attack Brother       reports that she has never smoked. She has never used smokeless tobacco. She reports that she does not currently use alcohol. She reports that she does not use drugs.    Allergies:  Allergies[1]    Medications:    Current Facility-Administered Medications:     ampicillin-sulbactam (Unasyn) 3 g in sodium chloride 0.9% 100mL IVPB-ADD, 3 g, Intravenous, q6h    insulin degludec (Tresiba) 100 units/mL flextouch 30 Units, 30 Units, Subcutaneous, Daily    lisinopril (Prinivil; Zestril) tab 5 mg, 5 mg, Oral, Daily    oxybutynin ER (Ditropan-XL) 24 hr tab 5 mg, 5 mg, Oral, BID    pantoprazole (Protonix) DR tab 40 mg, 40 mg, Oral, QAM AC    rosuvastatin (Crestor) tab 10 mg, 10 mg, Oral, Nightly    timolol (Timoptic) 0.5 % ophthalmic solution 1 drop, 1 drop, Both Eyes, BID    glucose (Dex4) 15 GM/59ML oral liquid 15 g, 15 g, Oral, Q15 Min PRN **OR** glucose (Glutose) 40% oral gel 15 g, 15 g, Oral, Q15 Min PRN **OR** glucose-vitamin C (Dex-4) chewable tab 4 tablet, 4 tablet, Oral, Q15 Min PRN **OR** dextrose 50% injection 50 mL, 50 mL, Intravenous, Q15 Min PRN **OR** glucose (Dex4) 15 GM/59ML oral liquid 30 g, 30 g, Oral, Q15 Min PRN **OR** glucose (Glutose) 40% oral gel 30 g, 30 g, Oral, Q15 Min PRN **OR** glucose-vitamin C (Dex-4) chewable tab 8 tablet, 8 tablet, Oral, Q15 Min PRN    insulin aspart (NovoLOG) 100 Units/mL FlexPen 1-68 Units, 1-68 Units, Subcutaneous, TID CC    insulin aspart (NovoLOG) 100 Units/mL FlexPen  1-10 Units, 1-10 Units, Subcutaneous, TID AC and HS    sodium chloride 0.9% infusion, , Intravenous, Continuous    enoxaparin (Lovenox) 40 MG/0.4ML SUBQ injection 40 mg, 40 mg, Subcutaneous, Daily    acetaminophen (Tylenol Extra Strength) tab 500 mg, 500 mg, Oral, Q4H PRN    HYDROcodone-acetaminophen (Norco) 5-325 MG per tab 1 tablet, 1 tablet, Oral, Q6H PRN    melatonin tab 3 mg, 3 mg, Oral, Nightly PRN    polyethylene glycol (PEG 3350) (Miralax) 17 g oral packet 17 g, 17 g, Oral, Daily PRN    sennosides (Senokot) tab 17.2 mg, 17.2 mg, Oral, Nightly PRN    bisacodyl (Dulcolax) 10 MG rectal suppository 10 mg, 10 mg, Rectal, Daily PRN    fleet enema (Fleet) rectal enema 133 mL, 1 enema, Rectal, Once PRN    ondansetron (Zofran) 4 MG/2ML injection 4 mg, 4 mg, Intravenous, Q6H PRN    metoclopramide (Reglan) 5 mg/mL injection 5 mg, 5 mg, Intravenous, Q8H PRN    benzonatate (Tessalon) cap 200 mg, 200 mg, Oral, TID PRN    guaiFENesin (Robitussin) 100 MG/5 ML oral liquid 200 mg, 200 mg, Oral, Q4H PRN    glycerin-hypromellose- (Artificial Tears) 0.2-0.2-1 % ophthalmic solution 1 drop, 1 drop, Both Eyes, QID PRN    sodium chloride (Saline Mist) 0.65 % nasal solution 1 spray, 1 spray, Each Nare, Q3H PRN    influenza virus trivalent high dose PF (Fluzone HD) 0.5 mL injection (ages >/= 65 years) 0.5 mL, 0.5 mL, Intramuscular, Prior to discharge    Review of Systems: No nausea, vomiting, fever, chills.      Physical Exam:    Derm: Full-thickness ulcer to the plantar aspect of right hallux that measures 0.8 x 0.8 x 0.2 cm.  Base is fibrogranular.  Site does appear to probe to bone.  No purulence or acute signs infection currently noted.    Vascular: Difficulty palpating pedal pulses.    Neuro: Decreased protective sensation of the lower extremities.    MSK: No pain.  Compartments are soft compressible.  Strength testing deferred.      Imaging:  XR FOOT, COMPLETE (MIN 3 VIEWS), RIGHT (CPT=73630)    Result Date:  12/27/2024  CONCLUSION:   Focal soft tissue defect/ulceration noted along the plantar aspect of the 1st distal phalanx.  There is associated cortical erosion/destruction involving the tip of the 1st distal phalanx most in keeping with acute osteomyelitis.  These findings are on a background of generalized osteopenia.  Scattered arthropathy of the mid and forefoot noted.  Large plantar calcaneal spur.   LOCATION:  [facility]   Dictated by (CST): Matheus Gutierrez MD on 12/27/2024 at 9:15 PM     Finalized by (CST): Matheus Gutierrez MD on 12/27/2024 at 9:16 PM         Laboratory Data:  Recent Labs   Lab 12/28/24  0657   RBC 3.80   HGB 11.1*   HCT 32.9*   MCV 86.6   MCH 29.2   MCHC 33.7   RDW 13.2   NEPRELIM 4.83   WBC 7.2   .0       Impression and Plan:  Patient Active Problem List   Diagnosis    Mixed hyperlipidemia    Type 2 diabetes mellitus with hyperglycemia, with long-term current use of insulin (HCC)    Status post stroke    Essential hypertension    H/O arteriovenous malformation (AVM)    Hypothyroidism    Class 1 obesity due to excess calories with serious comorbidity and body mass index (BMI) of 30.0 to 30.9 in adult    Overactive bladder    RLS (restless legs syndrome)    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 associated with type 2 diabetes mellitus (HCC)    Age-related osteoporosis without current pathological fracture    Ventral hernia without obstruction or gangrene    Cellulitis and abscess of toe of right foot    Osteomyelitis of right foot, unspecified type (HCC)       -Patient examined, chart history reviewed.  -Patient is afebrile without leukocytosis.  -ESR 32.  -X-rays do show erosions concerning for osteomyelitis.  -MRI pending.  Arterial duplex pending.  -Wound cultures pending.  ID following.  -Today site dressed with Betadine and dry dressing.  -Discussed prognosis with patient.  Pending further workup, may require partial hallux amputation.  Will await MRI  findings as well as vascular findings.  May require vascular consultation.  -Will continue to follow and further recommendations pending further workup.  Appreciate the opportunity participate in patient's care.    Renny Jiménez DPM   12/28/2024         [1] No Known Allergies

## 2024-12-28 NOTE — PROGRESS NOTES
NURSING ADMISSION NOTE      Patient admitted via Cart  Oriented to room.  Safety precautions initiated.  Bed in low position.  Call light in reach.    Admission skin assessment completed with Gretel SHANKS. Mild scratches and dry skin noted to lower extremities. Right big toe diabetic ulcer noted, photograph and assessment completed in flowsheet.

## 2024-12-28 NOTE — H&P
[facility]     Marcela Moreno Patient Status:  Inpatient    10/22/1955 MRN [id number]   Location [facility] 3NW-A Attending Sarthak Lowry,    Hosp Day # 1 PCP Rhiannon Betancourt MD     Chief Complaint: Right toe wound    Subjective:    History of Present Illness:   Marcela Moreno is a 69 year old female with PMHx diabetes mellitus type 2, hypertension, dyslipidemia, GERD, obesity, CVA with mild left-sided deficits who presents to the hospital for evaluation of right toe wound.  The wound is on the plantar aspect of the right toe and there is some mild pain, erythema, warmth and drainage.  She noticed the ulcer a month ago but believes it is worsening with increasing drainage.  She denies any fever, chills, nausea, vomiting, diarrhea, chest pain or shortness of breath.  She reports she has not seen a podiatrist in a very long time.  In the ER, x-ray was concerning for first distal phalanx acute osteomyelitis.  Blood cultures were drawn.  Patient was given ampicillin and vancomycin and subsequently admitted.    History/Other:    Past Medical History:  Past Medical History:    Abdominal pain    Arthritis    AVM (arteriovenous malformation) brain ([facility])    being followed at [facility]    Back pain    Back problem    Bloating    Calculus of kidney    Constipation    Diabetes mellitus (HCC)    Diarrhea, unspecified    Esophageal reflux    Fatigue    Food intolerance    Frequent urination    Heartburn    High blood pressure    High cholesterol    History of depression    Indigestion    Irregular bowel habits    Leaking of urine    Leg swelling    Muscle weakness    Neuropathy    TOES SOMETIMES    Osteoarthritis    Pain in joints    Pain with bowel movements    Problems with swallowing    Sleep disturbance    Sputum production    Stool incontinence    Stroke (HCC)    LEFT SIDE EFFECTED- USING CANE    Type II or unspecified type diabetes mellitus without mention of complication, not  stated as uncontrolled    Uncomfortable fullness after meals    Vitamin D deficiency    Weight gain     Past Surgical History:   Past Surgical History:   Procedure Laterality Date    Brain surgery  2015    avm   [facility]          X3    Cholecystectomy      Colonoscopy N/A 2024    Procedure: COLONOSCOPY;  Surgeon: Guerrero Moser MD;  Location:  ENDOSCOPY    Cystoscopy,insert ureteral stent      Hernia surgery      Other surgical history      LEFT TIBIA FRACTURE- PLATE AND SCREWS        Family History:   Family History   Problem Relation Age of Onset    Diabetes Mother     Hypertension Mother     Hypertension Father     Heart Disorder Brother         CAD stent in his 40s    Diabetes Brother     Diabetes Sister     Heart Attack Brother      Social History:    reports that she has never smoked. She has never used smokeless tobacco. She reports that she does not currently use alcohol. She reports that she does not use drugs.     Allergies: Allergies[1]    Medications:  Medications Ordered Prior to Encounter[2]    Review of Systems:   A comprehensive review of systems was completed.    Pertinent positives and negatives noted in the HPI.    Objective:   Physical Exam:    /61 (BP Location: Left arm)   Pulse 73   Temp 98 °F (36.7 °C) (Oral)   Resp 16   Ht 5' 1\" (1.549 m)   Wt 172 lb (78 kg)   LMP  (LMP Unknown)   SpO2 96%   BMI 32.50 kg/m²   General: No acute distress, awake and alert  Respiratory: No rhonchi, no wheezes  Cardiovascular: S1, S2. Regular rate and rhythm  Abdomen: Soft, Non-tender, non-distended, positive bowel sounds  Neuro: Strength 5/5 everywhere but patient walks with a cane due to issues with fine motor on her left side  Extremities: Right big toe swollen, warm and red.  There is an ulcer on the plantar aspect with mild drainage from the site    Results:    Labs:      Labs Last 24 Hours:  Recent Labs   Lab 24   RBC 3.62*   HGB 10.5*   HCT 31.5*    MCV 87.0   MCH 29.0   MCHC 33.3   RDW 13.1   NEPRELIM 4.78   WBC 8.1   .0     Recent Labs   Lab 12/27/24 2022   *   BUN 21   CREATSERUM 1.10*   EGFRCR 55*   CA 9.4   ALB 4.2      K 4.3      CO2 24.0   ALKPHO 100   AST 15   ALT <7*   BILT 0.5   TP 7.1     No results found for: \"PT\", \"INR\"    No results for input(s): \"TROP\", \"TROPHS\", \"CK\" in the last 168 hours.    No results for input(s): \"TROP\", \"PBNP\" in the last 168 hours.    No results for input(s): \"PCT\" in the last 168 hours.    Imaging: Imaging data reviewed in Epic.    Assessment & Plan:      #Right great toe diabetic foot ulcer with concern for underlying osteomyelitis  -X-ray reviewed, check MRI right foot  -Check arterial duplex to assess for significant PAD  -Follow blood and wound culture  -Empiric vancomycin and Unasyn  -Consult podiatry and ID  -Pain control    #DM type 2 with A1c 7.5  -Tresiba, ISS, carb coverage    #Hypertension  -Resume lisinopril    #Dyslipidemia  -Statin    #Mild renal insufficiency, not FIORDALIZA  -Gentle IVF overnight    #Normocytic anemia  -Check iron studies    #GERD  -PPI    #Obesity  -Body mass index is 32.5 kg/m².    #CVA  -Ambulates with a cane due to issues with fine motor on her left side      Plan of care discussed with patient and .    Sarthak Lowry DO    Supplementary Documentation:     The 21st Century Cures Act makes medical notes like these available to patients in the interest of transparency. Please be advised this is a medical document. Medical documents are intended to carry relevant information, facts as evident, and the clinical opinion of the practitioner. The medical note is intended as peer to peer communication and may appear blunt or direct. It is written in medical language and may contain abbreviations or verbiage that are unfamiliar.           **Certification    PHYSICIAN Certification of Need for Inpatient Hospitalization - Initial Certification    Patient will require  inpatient services that will reasonably be expected to span two midnight's based on the clinical documentation in H+P.   Based on patients current state of illness, I anticipate that, after discharge, patient will require TBD.              [1] No Known Allergies  [2]   No current facility-administered medications on file prior to encounter.     Current Outpatient Medications on File Prior to Encounter   Medication Sig Dispense Refill    acetaminophen 325 MG Oral Tab Take 1 tablet (325 mg total) by mouth every 4 (four) hours as needed for Pain.      insulin degludec (TRESIBA FLEXTOUCH) 200 UNIT/ML Subcutaneous Solution Pen-injector Inject daily as directed up to TDD = 30 units (Patient taking differently: Inject 30 Units into the skin daily. Inject daily as directed up to TDD = 30 units) 18 mL 0    METFORMIN  MG Oral Tablet 24 Hr TAKE 1 TABLET(500 MG) BY MOUTH TWICE DAILY WITH MEALS 180 tablet 1    oxybutynin ER 5 MG Oral Tablet 24 Hr Take 1 tablet (5 mg total) by mouth daily. (Patient taking differently: Take 1 tablet (5 mg total) by mouth 2 (two) times daily.) 30 tablet 1    Polyethylene Glycol 3350 (MIRALAX) 17 g Oral Powd Pack Take 17 g by mouth daily as needed (Take to avoid constipation, especially if taking narcotic pain medications.). 20 packet 1    Insulin Pen Needle (BD PEN NEEDLE TITO 2ND GEN) 32G X 4 MM Does not apply Misc Inject 1 each into the skin daily. 100 each 3    PANTOPRAZOLE 40 MG Oral Tab EC TAKE 1 TABLET(40 MG) BY MOUTH DAILY 30 tablet 6    lisinopril 5 MG Oral Tab Take 1 tablet (5 mg total) by mouth daily. 90 tablet 1    Glucose Blood (ONETOUCH VERIO) In Vitro Strip USE TO TEST ONCE TO TWICE DAILY 100 strip 1    OneTouch Delica Lancets 33G Does not apply Misc 1 Lancet by Finger stick route daily. 100 each 0    rosuvastatin 10 MG Oral Tab TAKE 1 TABLET(10 MG) BY MOUTH EVERY NIGHT 90 tablet 3    timolol 0.5 % Ophthalmic Solution Place 1 drop into both eyes 2 (two) times daily.

## 2024-12-28 NOTE — PROGRESS NOTES
Assumed care of patient at 0700. Continues on iv abx and iv fluids. Await MRI foot and ultrasound duplex. Podiatry and ID following. Up and walking with PT on shift. Use of cane with long distances. Denies pain. Updated on plan of care and condition update. All needs met on shift.

## 2024-12-29 LAB
GLUCOSE BLD-MCNC: 114 MG/DL (ref 70–99)
GLUCOSE BLD-MCNC: 134 MG/DL (ref 70–99)
GLUCOSE BLD-MCNC: 148 MG/DL (ref 70–99)
GLUCOSE BLD-MCNC: 160 MG/DL (ref 70–99)

## 2024-12-29 PROCEDURE — 99232 SBSQ HOSP IP/OBS MODERATE 35: CPT | Performed by: STUDENT IN AN ORGANIZED HEALTH CARE EDUCATION/TRAINING PROGRAM

## 2024-12-29 RX ORDER — LACTOBACILLUS ACIDOPHILUS 500MM CELL
1 CAPSULE ORAL DAILY
Status: DISCONTINUED | OUTPATIENT
Start: 2024-12-29 | End: 2024-12-31

## 2024-12-29 RX ORDER — VANCOMYCIN HYDROCHLORIDE
1250
Status: DISCONTINUED | OUTPATIENT
Start: 2024-12-29 | End: 2024-12-29 | Stop reason: SDUPTHER

## 2024-12-29 NOTE — OCCUPATIONAL THERAPY NOTE
OCCUPATIONAL THERAPY EVALUATION - INPATIENT    Room Number: 329/329-A  Evaluation Date: 12/29/2024     Type of Evaluation: Initial  Presenting Problem: Cellulitis and abcess of toe of R foot    Physician Order: IP Consult to Occupational Therapy  Reason for Therapy:  ADL/IADL Dysfunction and Discharge Planning    OCCUPATIONAL THERAPY ASSESSMENT   Patient is a 69 year old female admitted on 12/27/2024 with Presenting Problem: Cellulitis and abcess of toe of R foot. Co-Morbidities : DM2, HTN, DL, GERD, obesity, CVA c L sided deficits  Patient is currently functioning near baseline with toileting, lower body dressing, transfers, static sitting balance, dynamic sitting balance, static standing balance, dynamic standing balance, maintaining seated position, functional standing tolerance, energy conservation strategies, and aerobic capacity.  Prior to admission, patient's baseline is IND with ADLs/IADLs.  Patient met all OT goals at supervision level. Anticipate pt to reach baseline at discharge with continued OOB mobility and function with nursing staff.  Patient reports no further questions/concerns at this time.     Pt with pending MRI to R foot to r/o osteomyelitis    Patient will be discharged from acute inpatient OT services at this time.    Recommendations for nursing staff:   Transfers: up x 1 assist, supervision, RW  Toileting location: Toilet    EVALUATION SESSION:  Patient at start of session: seated upright in chair    FUNCTIONAL TRANSFER ASSESSMENT  Sit to Stand: Chair  Chair: Supervision  Toilet Transfer: Supervision    BED MOBILITY  Supine to Sit : Not tested  Sit to Supine (OT): Not Tested    BALANCE ASSESSMENT  Static Sitting: Supervision  Static Standing: Supervision  Dynamic Sitting: Supervision  Dynamic Standing: Supervision    FUNCTIONAL ADL ASSESSMENT  LB Dressing Standing: Supervision (doffed/donned brief standing at toilet)  Toileting Seated: Supervision    ACTIVITY TOLERANCE: WFL                          O2 SATURATIONS       COGNITION  Overall Cognitive Status:  WFL - within functional limits    COGNITION ASSESSMENTS     Upper Extremity:   ROM: within functional limits  Strength: is within functional limits    EDUCATION PROVIDED  Patient Education : Role of Occupational Therapy; Plan of Care; Discharge Recommendations; Functional Transfer Techniques; Fall Prevention; Weight Bear Status; Posture/Positioning; Energy Conservation; Proper Body Mechanics  Patient's Response to Education: Verbalized Understanding; Returned Demonstration    Equipment used: RW  Demonstrates functional use    Therapist comments: RN cleared pt for session. Pt educated on full weight bearing precautions per podiatry MD, pt verbalized understanding. Pt completed toileting task with supervision, no toileting concerns at this time. Pt demonstrated sufficient standing balance > 5 min with use of RW and supervision. Pt with no other ADL concerns at end of session, pt reports spouse is able to assist with ADLs if needed. Pt educated on continued OOB mobility and function with nursing staff and use of walker PRN to prevent further deconditioning while in house; pt verbalized understanding.     Patient End of Session: Up in chair;Needs met;Call light within reach;RN aware of session/findings;All patient questions and concerns addressed;Hospital anti-slip socks    OCCUPATIONAL PROFILE    HOME SITUATION  Type of Home: House  Home Layout: One level  Lives With: Spouse    Toilet and Equipment: Comfort height toilet;Grab bar  Shower/Tub and Equipment: Tub-shower combo;Tub transfer bench;Grab bar  Other Equipment:  (RW, walks with cane when out in community)    Occupation/Status: Retired  Hand Dominance: Right  Drives: Yes  Patient Regularly Uses: Reading glasses    Prior Level of Function: Pt lives at home with supportive spouse, typically IND with ADLs/IADLs, uses a cane when out in the community.    SUBJECTIVE  \"I do a lot of Netflix\"    PAIN  ASSESSMENT  Ratin  Location: chronic back pain  Management Techniques: Repositioning;Relaxation;Breathing techniques    OBJECTIVE  Precautions: None  Fall Risk: Standard fall risk    WEIGHT BEARING RESTRICTION  R Lower Extremity: Full Weight Bearing (per podiatry per PT kusum)      AM-PAC ‘6-Clicks’ Inpatient Daily Activity Short Form  -   Putting on and taking off regular lower body clothing?: None  -   Bathing (including washing, rinsing, drying)?: None  -   Toileting, which includes using toilet, bedpan or urinal? : None  -   Putting on and taking off regular upper body clothing?: None  -   Taking care of personal grooming such as brushing teeth?: None  -   Eating meals?: None    AM-PAC Score:  Score: 24  Approx Degree of Impairment: 0%  Standardized Score (AM-PAC Scale): 57.54    ADDITIONAL TESTS     NEUROLOGICAL FINDINGS      PLAN   Patient has been evaluated and presents with no skilled Occupational Therapy needs at this time.  Patient discharged from Occupational Therapy services.  Please re-order if a new functional limitation presents during this admission.    OT Device Recommendations: None    Patient Evaluation Complexity Level:   Occupational Profile/Medical History LOW - Brief history including review of medical or therapy records    Specific performance deficits impacting engagement in ADL/IADL LOW  1 - 3 performance deficits    Client Assessment/Performance Deficits LOW - No comorbidities nor modifications of tasks    Clinical Decision Making LOW - Analysis of occupational profile, problem-focused assessments, limited treatment options    Overall Complexity LOW     OT Session Time: 20 minutes  Self-Care Home Management: 12 minutes

## 2024-12-29 NOTE — PROGRESS NOTES
Upon assessment, A&Ox4. RA. SCDs while in bed. Lovenox for VTE proph. Educated pt to perform ankle pumps. Tolerating diet. Bgs and carb intake managed per MAR. Active bowel sounds. Abdomen soft. Voids. Denies SOB, CP, lightheadedness, N/V, and pain at this time. POC discussed, all questions answered and concerns addressed. Asked pt and family for records of clip, plate, and screw to ensure MRI is safe for pt. Safety precautions in place. Frequent rounds performed.

## 2024-12-29 NOTE — PLAN OF CARE
Pt alert and oriented x4. VSS on room air. Pt denies chest pain, sob, nausea, emesis.  Abdomen soft, nondistended, denies tenderness. Passing gas. Voids freely in adequate amounts. Bottom of right great toe circular open area- gauze and ace wrap intact.  Scattered scratches pt states from dry skin.  IV ABX infusing. Pt updated on poc. Awaiting MRI and duplex scans.

## 2024-12-29 NOTE — PROGRESS NOTES
INFECTIOUS DISEASE PROGRESS NOTE    Marcela Moreno Patient Status:  Inpatient    10/22/1955 MRN [id number]   Formerly [facility] 3NW-A Attending Sarthak Lowry, DO   Hosp Day # 2 PCP Rhiannon Betancourt MD     Abx: unasyn--> vanco and zosyn d#1    Subjective: pt seen and examined. No new issues. No fevers. Awaiting MRI    Medications:    Current Facility-Administered Medications:     piperacillin-tazobactam (Zosyn) 4.5 g in dextrose 5% 100 mL IVPB-ADDV, 4.5 g, Intravenous, Q8H    acidophilus (Probiotic) cap/tab 1 each, 1 each, Oral, Daily    vancomycin (Vancocin) 1,250 mg in sodium chloride 0.9% 500 mL IVPB, 1,250 mg, Intravenous, Q18H    insulin degludec (Tresiba) 100 units/mL flextouch 30 Units, 30 Units, Subcutaneous, Daily    lisinopril (Prinivil; Zestril) tab 5 mg, 5 mg, Oral, Daily    oxybutynin ER (Ditropan-XL) 24 hr tab 5 mg, 5 mg, Oral, BID    pantoprazole (Protonix) DR tab 40 mg, 40 mg, Oral, QAM AC    rosuvastatin (Crestor) tab 10 mg, 10 mg, Oral, Nightly    timolol (Timoptic) 0.5 % ophthalmic solution 1 drop, 1 drop, Both Eyes, BID    glucose (Dex4) 15 GM/59ML oral liquid 15 g, 15 g, Oral, Q15 Min PRN **OR** glucose (Glutose) 40% oral gel 15 g, 15 g, Oral, Q15 Min PRN **OR** glucose-vitamin C (Dex-4) chewable tab 4 tablet, 4 tablet, Oral, Q15 Min PRN **OR** dextrose 50% injection 50 mL, 50 mL, Intravenous, Q15 Min PRN **OR** glucose (Dex4) 15 GM/59ML oral liquid 30 g, 30 g, Oral, Q15 Min PRN **OR** glucose (Glutose) 40% oral gel 30 g, 30 g, Oral, Q15 Min PRN **OR** glucose-vitamin C (Dex-4) chewable tab 8 tablet, 8 tablet, Oral, Q15 Min PRN    insulin aspart (NovoLOG) 100 Units/mL FlexPen 1-68 Units, 1-68 Units, Subcutaneous, TID CC    insulin aspart (NovoLOG) 100 Units/mL FlexPen 1-10 Units, 1-10 Units, Subcutaneous, TID AC and HS    enoxaparin (Lovenox) 40 MG/0.4ML SUBQ injection 40 mg, 40 mg, Subcutaneous, Daily    acetaminophen (Tylenol  Extra Strength) tab 500 mg, 500 mg, Oral, Q4H PRN    HYDROcodone-acetaminophen (Norco) 5-325 MG per tab 1 tablet, 1 tablet, Oral, Q6H PRN    melatonin tab 3 mg, 3 mg, Oral, Nightly PRN    polyethylene glycol (PEG 3350) (Miralax) 17 g oral packet 17 g, 17 g, Oral, Daily PRN    sennosides (Senokot) tab 17.2 mg, 17.2 mg, Oral, Nightly PRN    bisacodyl (Dulcolax) 10 MG rectal suppository 10 mg, 10 mg, Rectal, Daily PRN    fleet enema (Fleet) rectal enema 133 mL, 1 enema, Rectal, Once PRN    ondansetron (Zofran) 4 MG/2ML injection 4 mg, 4 mg, Intravenous, Q6H PRN    metoclopramide (Reglan) 5 mg/mL injection 5 mg, 5 mg, Intravenous, Q8H PRN    benzonatate (Tessalon) cap 200 mg, 200 mg, Oral, TID PRN    guaiFENesin (Robitussin) 100 MG/5 ML oral liquid 200 mg, 200 mg, Oral, Q4H PRN    glycerin-hypromellose- (Artificial Tears) 0.2-0.2-1 % ophthalmic solution 1 drop, 1 drop, Both Eyes, QID PRN    sodium chloride (Saline Mist) 0.65 % nasal solution 1 spray, 1 spray, Each Nare, Q3H PRN    influenza virus trivalent high dose PF (Fluzone HD) 0.5 mL injection (ages >/= 65 years) 0.5 mL, 0.5 mL, Intramuscular, Prior to discharge    Review of Systems:    Completed. See pertinent positives and negatives in the the HPI.      Physical Exam:    General: No acute distress. Alert and oriented x 3.  Vital signs: Blood pressure 147/57, pulse 70, temperature 97.9 °F (36.6 °C), temperature source Oral, resp. rate 16, height 5' 1\" (1.549 m), weight 172 lb (78 kg), SpO2 96%.  HEENT: no scleral icterus or conjunctival injection. Moist mucous membranes. No thrush  Neck: No lymphadenopathy.  Supple.  Respiratory: Clear to auscultation bilaterally.  No wheezes. No rhonchi.  Cardiovascular: S1, S2.  Regular rate and rhythm.  No murmurs.  Abdomen: Soft, nontender, nondistended.  Positive bowel sounds.  Musculoskeletal: Full range of motion of all extremities.  No arthritis or deformity  Integument: No rash. Foot with c/d/d. No cellulitis of  exposed skin    Laboratory Data:    Recent Labs   Lab 12/27/24 2023 12/28/24  0657   RBC 3.62* 3.80   HGB 10.5* 11.1*   HCT 31.5* 32.9*   MCV 87.0 86.6   MCH 29.0 29.2   MCHC 33.3 33.7   RDW 13.1 13.2   NEPRELIM 4.78 4.83   WBC 8.1 7.2   .0 194.0     Recent Labs   Lab 12/27/24 2022 12/28/24  0657   * 136*   BUN 21 17   CREATSERUM 1.10* 0.88  0.88   CA 9.4 9.1   ALB 4.2  --     143   K 4.3 4.0    111   CO2 24.0 25.0   ALKPHO 100  --    AST 15  --    ALT <7*  --    BILT 0.5  --    TP 7.1  --      Sed Rate (mm/Hr)   Date Value   12/27/2024 32 (H)      C-Reactive Protein (mg/dL)   Date Value   12/16/2017 1.13 (H)      No results found for: \"VANCT\"  No results for input(s): \"COLORUR\", \"CLARITY\", \"SPECGRAVITY\", \"GLUUR\", \"BILUR\", \"KETUR\", \"BLOODURINE\", \"PHURINE\", \"PROUR\", \"UROBILINOGEN\", \"NITRITE\", \"LEUUR\", \"WBCUR\", \"RBCUR\", \"BACUR\", \"EPIUR\" in the last 168 hours.     Microbiology    Reviewed in EMR,   Susceptibility data from last 90 days.  Collected Specimen Info Organism   12/27/24 Other from Toe Staphylococcus aureus     Pseudomonas aeruginosa         Radiology: XR FOOT, COMPLETE (MIN 3 VIEWS), RIGHT (CPT=73630)    Result Date: 12/27/2024  PROCEDURE:  XR FOOT, COMPLETE (MIN 3 VIEWS), RIGHT (CPT=73630)  TECHNIQUE:  AP, oblique, and lateral views were obtained.  COMPARISON:  None.  INDICATIONS:  Non-healing wound to right foot -  PATIENT STATED HISTORY: (As transcribed by Technologist)  Patient states open wound along plantar aspect of 1st digit on right foot x1 month.               CONCLUSION:   Focal soft tissue defect/ulceration noted along the plantar aspect of the 1st distal phalanx.  There is associated cortical erosion/destruction involving the tip of the 1st distal phalanx most in keeping with acute osteomyelitis.  These findings are on a background of generalized osteopenia.  Scattered arthropathy of the mid and forefoot noted.  Large plantar calcaneal spur.   LOCATION:  [facility]    Dictated by (CST): Matheus Gutierrez MD on 12/27/2024 at 9:15 PM     Finalized by (CST): Matheus Gutierrez MD on 12/27/2024 at 9:16 PM            ASSESSMENT:  R hallux diabetic foot ulcer with suspected OM of the distal toe per xray  Superf cx with S aureus and PSAR  Type 2 DM     PLAN:  -transition unasyn to vanco and zosyn for now  -await MRI foot  - if confirmed to have OM with bony destruction would recommend amp of distal phalanx over a prolonged course of IV abx, as it would be more difficult to eradicate infection in that setting  -await art dopplers and optimize circulation if needed before surgery  Case and plan d/w pt at length again today. she expressed understanding  Will follow    Missy Bruno MD

## 2024-12-29 NOTE — PROGRESS NOTES
Seen at bedside this AM. Awaiting MRI and arterial duplex. Will continue to follow. Dr. Hugo will take over care for patient tomorrow.

## 2024-12-29 NOTE — PROGRESS NOTES
Merged with [facility] Pharmacy Dosing Service      Initial Pharmacokinetic Consult for Vancomycin Dosing     Marcela Moreno is a 69 year old female who is being initiated on vancomycin therapy for osteomyelitis.  Pharmacy has been asked to dose vancomycin by Dr Bruno.  The initial treatment and monitoring approach will be steady state AUC strategy.        Weight and Temperature:    Wt Readings from Last 1 Encounters:   24 78 kg (172 lb)        Temp Readings from Last 1 Encounters:   24 98.3 °F (36.8 °C) (Oral)      Labs:   Recent Labs   Lab 24  0657   CREATSERUM 1.10* 0.88  0.88      Estimated Creatinine Clearance: 46.2 mL/min (based on SCr of 0.88 mg/dL).     Recent Labs   Lab 24  0657   WBC 8.1 7.2          The Pharmacokinetic Target is:     to 600 mg-h/L and trough <=15 mg/L    Renal Dosing Considerations:    None     Assessment/Plan:   Initial/Loading dose: Will receive 1250 mg IV (16 mg/kg, capped at 2250 mg) x 1 initial dose.      Maintenance dose: Pharmacy will dose vancomycin at 1250 mg IV every 18 hours    Monitorin) Plan for vancomycin peak and trough to be obtained at steady state    2) Pharmacy will order SCr as clinically indicated to assess renal function.    3) Pharmacy will monitor for toxicity and efficacy, adjust vancomycin dose and/or frequency, and order vancomycin levels as appropriate per the Pharmacy and Therapeutics Committee approved protocol until discontinuation of the medication.       We appreciate the opportunity to assist in the care of this patient.     Cipriano Gomez, [place]  2024  9:37 AM  [facility] Extension: [phone]

## 2024-12-29 NOTE — PROGRESS NOTES
[facility]   part of [facility]     Hospitalist Progress Note     Macrela Moreno Patient Status:  Inpatient    10/22/1955 MRN [id number]   Location [facility] 3NW-A Attending Sarthak Lowry, DO   Hosp Day # 2 PCP Rhiannon Betancourt MD     Chief Complaint: foot ulcer     Subjective:     Patient  w some loose stools yesterday, resolved.    Objective:    Review of Systems:   A comprehensive review of systems was completed; pertinent positive and negatives stated in subjective.    Vital signs:  Temp:  [97.7 °F (36.5 °C)-98.3 °F (36.8 °C)] 97.9 °F (36.6 °C)  Pulse:  [70-79] 70  Resp:  [16-18] 16  BP: (124-155)/(44-60) 147/57  SpO2:  [93 %-100 %] 96 %    Physical Exam:    General: No acute distress  Respiratory: No wheezes, no rhonchi  Cardiovascular: S1, S2, regular rate and rhythm  Abdomen: Soft, Non-tender, non-distended, positive bowel sounds  Neuro: No new focal deficits.   Extremities: No edema- R great toe ulcer non tender, erythema    Diagnostic Data:    Labs:  Recent Labs   Lab 24  0657   WBC 8.1 7.2   HGB 10.5* 11.1*   MCV 87.0 86.6   .0 194.0       Recent Labs   Lab 24  0657   * 136*   BUN 21 17   CREATSERUM 1.10* 0.88  0.88   CA 9.4 9.1   ALB 4.2  --     143   K 4.3 4.0    111   CO2 24.0 25.0   ALKPHO 100  --    AST 15  --    ALT <7*  --    BILT 0.5  --    TP 7.1  --        Estimated Glomerular Filtration Rate: 71.7 mL/min/1.73m2 (by CKD-EPI based on SCr of 0.88 mg/dL).    No results for input(s): \"TROP\", \"TROPHS\", \"CK\" in the last 168 hours.    No results for input(s): \"PTP\", \"INR\" in the last 168 hours.               Microbiology    Hospital Encounter on 24   1. Blood Culture     Status: None (Preliminary result)    Collection Time: 24  8:23 PM    Specimen: Blood,peripheral   Result Value Ref Range    Blood Culture Result No Growth 1 Day N/A   2. Aerobic Bacterial Culture     Status: Abnormal (Preliminary result)     Collection Time: 12/27/24  8:22 PM    Specimen: Toe; Other   Result Value Ref Range    Aerobic Culture Result 2+ growth Staphylococcus aureus (A) N/A    Aerobic Culture Result 1+ growth Pseudomonas aeruginosa (A) N/A    Aerobic Smear No WBCs seen N/A    Aerobic Smear No organisms seen N/A         Imaging: Reviewed in Epic.    Medications:    piperacillin-tazobactam  4.5 g Intravenous Q8H    acidophilus  1 each Oral Daily    vancomycin  1,250 mg Intravenous Q18H    insulin degludec  30 Units Subcutaneous Daily    lisinopril  5 mg Oral Daily    oxybutynin ER  5 mg Oral BID    pantoprazole  40 mg Oral QAM AC    rosuvastatin  10 mg Oral Nightly    timolol  1 drop Both Eyes BID    insulin aspart  1-68 Units Subcutaneous TID CC    insulin aspart  1-10 Units Subcutaneous TID AC and HS    enoxaparin  40 mg Subcutaneous Daily       Assessment & Plan:      #R great toe ulcer, DFU- with concerns for osteomyelitis  MRI pending  ID, Podiatry eval  IV Abx  #DM type 2 with PN  #Hypertension   Continue ACE I  #HLD  #Renal insufficiency not FIORDALIZA,   Monitor Cr         Luna Miller MD    Supplementary Documentation:     Quality:  DVT Mechanical Prophylaxis:   SCDs,    DVT Pharmacologic Prophylaxis   Medication    enoxaparin (Lovenox) 40 MG/0.4ML SUBQ injection 40 mg                Code Status: Not on file  Mccallum: No urinary catheter in place  Mccallum Duration (in days):   Central line:    FELA:     Discharge is dependent on: clinical   At this point Ms. Moreno is expected to be discharge to: home    The 21st Century Cures Act makes medical notes like these available to patients in the interest of transparency. Please be advised this is a medical document. Medical documents are intended to carry relevant information, facts as evident, and the clinical opinion of the practitioner. The medical note is intended as peer to peer communication and may appear blunt or direct. It is written in medical language and may contain abbreviations or  verbiage that are unfamiliar.

## 2024-12-30 ENCOUNTER — APPOINTMENT (OUTPATIENT)
Dept: MRI IMAGING | Facility: HOSPITAL | Age: 69
End: 2024-12-30
Attending: INTERNAL MEDICINE
Payer: MEDICARE

## 2024-12-30 ENCOUNTER — APPOINTMENT (OUTPATIENT)
Dept: ULTRASOUND IMAGING | Facility: HOSPITAL | Age: 69
End: 2024-12-30
Attending: INTERNAL MEDICINE
Payer: MEDICARE

## 2024-12-30 PROBLEM — E11.621 DIABETIC ULCER OF RIGHT GREAT TOE (HCC): Status: ACTIVE | Noted: 2024-12-30

## 2024-12-30 PROBLEM — L97.519 DIABETIC ULCER OF RIGHT GREAT TOE (HCC): Status: ACTIVE | Noted: 2024-12-30

## 2024-12-30 LAB
CREAT BLD-MCNC: 0.96 MG/DL
EGFRCR SERPLBLD CKD-EPI 2021: 64 ML/MIN/1.73M2 (ref 60–?)
GLUCOSE BLD-MCNC: 122 MG/DL (ref 70–99)
GLUCOSE BLD-MCNC: 145 MG/DL (ref 70–99)
GLUCOSE BLD-MCNC: 165 MG/DL (ref 70–99)
GLUCOSE BLD-MCNC: 188 MG/DL (ref 70–99)

## 2024-12-30 PROCEDURE — 93925 LOWER EXTREMITY STUDY: CPT | Performed by: INTERNAL MEDICINE

## 2024-12-30 PROCEDURE — 73718 MRI LOWER EXTREMITY W/O DYE: CPT | Performed by: INTERNAL MEDICINE

## 2024-12-30 PROCEDURE — 99232 SBSQ HOSP IP/OBS MODERATE 35: CPT | Performed by: STUDENT IN AN ORGANIZED HEALTH CARE EDUCATION/TRAINING PROGRAM

## 2024-12-30 PROCEDURE — 99232 SBSQ HOSP IP/OBS MODERATE 35: CPT | Performed by: PODIATRIST

## 2024-12-30 RX ORDER — FUROSEMIDE 10 MG/ML
20 INJECTION INTRAMUSCULAR; INTRAVENOUS ONCE
Status: COMPLETED | OUTPATIENT
Start: 2024-12-30 | End: 2024-12-30

## 2024-12-30 NOTE — PROGRESS NOTES
[facility]   part of [facility]     Hospitalist Progress Note     Marcela Moreno Patient Status:  Inpatient    10/22/1955 MRN [id number]   Location [facility] 3NW-A Attending Sarthak Lowry,    Hosp Day # 3 PCP Rhiannon Betancourt MD     Chief Complaint: foot ulcer     Subjective:     Patient  feels swollen    Objective:    Review of Systems:   A comprehensive review of systems was completed; pertinent positive and negatives stated in subjective.    Vital signs:  Temp:  [97.7 °F (36.5 °C)-97.9 °F (36.6 °C)] 97.9 °F (36.6 °C)  Pulse:  [70-79] 79  Resp:  [16] 16  BP: (137-157)/(45-82) 137/45  SpO2:  [95 %-99 %] 95 %    Physical Exam:    General: No acute distress  Respiratory: No wheezes, no rhonchi  Cardiovascular: S1, S2, regular rate and rhythm  Abdomen: Soft, Non-tender, non-distended, positive bowel sounds  Neuro: No new focal deficits.   Extremities: No edema- R great toe ulcer non tender, erythema    Diagnostic Data:    Labs:  Recent Labs   Lab 24  0657   WBC 8.1 7.2   HGB 10.5* 11.1*   MCV 87.0 86.6   .0 194.0       Recent Labs   Lab 24  0657 24  0540   * 136*  --    BUN 21 17  --    CREATSERUM 1.10* 0.88  0.88 0.96   CA 9.4 9.1  --    ALB 4.2  --   --     143  --    K 4.3 4.0  --     111  --    CO2 24.0 25.0  --    ALKPHO 100  --   --    AST 15  --   --    ALT <7*  --   --    BILT 0.5  --   --    TP 7.1  --   --        Estimated Glomerular Filtration Rate: 64.6 mL/min/1.73m2 (by CKD-EPI based on SCr of 0.96 mg/dL).    No results for input(s): \"TROP\", \"TROPHS\", \"CK\" in the last 168 hours.    No results for input(s): \"PTP\", \"INR\" in the last 168 hours.               Microbiology    Hospital Encounter on 24   1. Blood Culture     Status: None (Preliminary result)    Collection Time: 24  8:23 PM    Specimen: Blood,peripheral   Result Value Ref Range    Blood Culture Result No Growth 2 Days N/A   2. Aerobic  Bacterial Culture     Status: Abnormal    Collection Time: 12/27/24  8:22 PM    Specimen: Toe; Other   Result Value Ref Range    Aerobic Culture Result 2+ growth Staphylococcus aureus (A) N/A    Aerobic Culture Result 1+ growth Pseudomonas aeruginosa (A) N/A    Aerobic Smear No WBCs seen N/A    Aerobic Smear No organisms seen N/A       Susceptibility    Staphylococcus aureus -  (no method available)     Cefazolin  Sensitive      Clindamycin <=0.25 Sensitive      Erythromycin <=0.25 Sensitive      Gentamicin <=0.5 Sensitive      Levofloxacin 0.25 Sensitive      Oxacillin <=0.25 Sensitive      Trimethoprim/Sulfa <=10 Sensitive      Vancomycin 1 Sensitive     Pseudomonas aeruginosa -  (no method available)     Cefepime <=2 Sensitive      Ceftazidime <=1 Sensitive      Ciprofloxacin <=1 Sensitive      Meropenem <=1 Sensitive      Levofloxacin <=0.25 Sensitive      Piperacillin + Tazobactam <=4 Sensitive      Tobramycin <=1 Sensitive          Imaging: Reviewed in Epic.    Medications:    furosemide  20 mg Intravenous Once    piperacillin-tazobactam  4.5 g Intravenous Q8H    acidophilus  1 each Oral Daily    insulin degludec  30 Units Subcutaneous Daily    lisinopril  5 mg Oral Daily    oxybutynin ER  5 mg Oral BID    pantoprazole  40 mg Oral QAM AC    rosuvastatin  10 mg Oral Nightly    timolol  1 drop Both Eyes BID    insulin aspart  1-68 Units Subcutaneous TID CC    insulin aspart  1-10 Units Subcutaneous TID AC and HS    enoxaparin  40 mg Subcutaneous Daily       Assessment & Plan:      #R great toe ulcer, DFU- with concerns for osteomyelitis  MRI pending- delay due to clips in head   ID, Podiatry eval  IV Abx  #DM type 2 with PN  #Hypertension   Continue ACE I  #HLD  #Renal insufficiency not FIORDALIZA,   Monitor Cr     Lasix x1  MRI pending  abx    Luna Miller MD    Supplementary Documentation:     Quality:  DVT Mechanical Prophylaxis:   SCDs,    DVT Pharmacologic Prophylaxis   Medication    enoxaparin (Lovenox) 40  MG/0.4ML SUBQ injection 40 mg                Code Status: Not on file  Mccallum: No urinary catheter in place  Mccallum Duration (in days):   Central line:    FELA:     Discharge is dependent on: clinical   At this point Ms. Moreno is expected to be discharge to: home    The 21st Century Cures Act makes medical notes like these available to patients in the interest of transparency. Please be advised this is a medical document. Medical documents are intended to carry relevant information, facts as evident, and the clinical opinion of the practitioner. The medical note is intended as peer to peer communication and may appear blunt or direct. It is written in medical language and may contain abbreviations or verbiage that are unfamiliar.

## 2024-12-30 NOTE — PAYOR COMM NOTE
--------------  ADMISSION REVIEW     Payor: [employer]  Subscriber #:  [id number]  Authorization Number: [id number]    Admit date: 12/27/24  Admit time: 11:29 PM       REVIEW DOCUMENTATION:      Patient Seen in: [facility] Emergency Department    She is here with her family member.  History     Chief Complaint   Patient presents with    Wound Care     Stated Complaint: Non-healing wound to right foot -    Subjective:   HPI      This is a 69-year-old female who arrives here she has had a history of some mild wound to her right great toe.  It has gotten worse.  She is gotten to the point where its gotten more more red and more swollen.  She denies any fevers or chills she does have history of type 2 diabetes she is got a history of high blood pressure .  The patient has a history of esophageal reflux, diabetes,Previous history of stroke.   the patient has no fevers or chills numbness or weakness is new for her.    Objective:     Past Medical History:    Abdominal pain    Arthritis    AVM (arteriovenous malformation) brain (Formerly [facility])    being followed at [facility]    Back pain    Back problem    Bloating    Calculus of kidney    Constipation    Diabetes mellitus (HCC)    Diarrhea, unspecified    Esophageal reflux    Fatigue    Food intolerance    Frequent urination    Heartburn    High blood pressure    High cholesterol    History of depression    Indigestion    Irregular bowel habits    Leaking of urine    Leg swelling    Muscle weakness    Neuropathy    TOES SOMETIMES    Osteoarthritis    Pain in joints    Pain with bowel movements    Problems with swallowing    Sleep disturbance    Sputum production    Stool incontinence    Stroke (Formerly [facility])    LEFT SIDE EFFECTED- USING CANE    Type II or unspecified type diabetes mellitus without mention of complication, not stated as uncontrolled    Uncomfortable fullness after meals    Vitamin D deficiency    Weight gain       Past Surgical History:   Procedure  Laterality Date    Brain surgery  2015    avm   [facility]          X3    Cholecystectomy      Colonoscopy N/A 2024    Procedure: COLONOSCOPY;  Surgeon: Guerrero Moser MD;  Location:  ENDOSCOPY    Cystoscopy,insert ureteral stent      Hernia surgery      Other surgical history      LEFT TIBIA FRACTURE- PLATE AND SCREWS         Physical Exam     ED Triage Vitals [24 1702]   /52   Pulse 70   Resp 14   Temp 97.8 °F (36.6 °C)   Temp src Oral   SpO2 97 %   O2 Device None (Room air)       Current Vitals:   Vital Signs  BP: 129/52  Pulse: 70  Resp: 14  Temp: 97.8 °F (36.6 °C)  Temp src: Oral  MAP (mmHg): 77    Oxygen Therapy  SpO2: 97 %  O2 Device: None (Room air)    Physical Exam  General: Patient is in no respiratory distress.  The patient is in no respiratory distress    HEENT: There is no signs of trauma.  Oral mucosa is wet.    Lungs: Clear to auscultation without wheezing or retractions    Cardiovascular: Regular without murmurs  Abdomen soft nontender.  The right foot the toe is the only thing red.  There is no streaking up the foot there is no calf swelling there is good pulses the patient is otherwise neurovascular intact.  Extremities: Good pulses bilaterally.  The right great toe is swollen red.  There is some mild drainage from the site.    Neuro: Alert and oriented.  The patient is moving all extremities there is no focal findings.    ED Course     Labs Reviewed   COMP METABOLIC PANEL (14) - Abnormal; Notable for the following components:       Result Value    Glucose 184 (*)     Creatinine 1.10 (*)     Calculated Osmolality 298 (*)     eGFR-Cr 55 (*)     ALT <7 (*)     All other components within normal limits   CBC WITH DIFFERENTIAL WITH PLATELET - Abnormal; Notable for the following components:    RBC 3.62 (*)     HGB 10.5 (*)     HCT 31.5 (*)     All other components within normal limits   SED RATE, WESTERGREN (AUTOMATED) - Abnormal; Notable for the following  components:    Sed Rate 32 (*)     All other components within normal limits   BLOOD CULTURE   BLOOD CULTURE   AEROBIC BACTERIAL CULTURE     Also reviewed official report and it shows  XR FOOT, COMPLETE (MIN 3 VIEWS), RIGHT (CPT=73630)    Result Date: 12/27/2024  PROCEDURE:  XR FOOT, COMPLETE  CONCLUSION:   Focal soft tissue defect/ulceration noted along the plantar aspect of the 1st distal phalanx.  There is associated cortical erosion/destruction involving the tip of the 1st distal phalanx most in keeping with acute osteomyelitis.  These findings are on a background of generalized osteopenia.  Scattered arthropathy of the mid and forefoot noted.  Large plantar calcaneal spur.   LOCATION:  [facility]   Dictated by (CST): Matheus Gutierrez MD on 12/27/2024 at 9:15 PM     Finalized by (CST): Matheus Gutierrez MD on 12/27/2024 at 9:16 PM        Disposition and Plan     Clinical Impression:  1. Cellulitis and abscess of toe of right foot    2. Osteomyelitis of right foot, unspecified type (HCC)         Disposition:  Admit  12/27/2024  9:34 pm      Signed by Mart Lyons MD on 12/27/2024  9:35 PM         HISTORY AND PHYSICAL   12-28-24 12/28/24 2055 98.1 °F (36.7 °C) 70 18 142/53 97 % -- None (Room air) -- [facility]   12/28/24 1635 98 °F (36.7 °C) 70 18 128/44 94 % -- None (Room air) -- IPA   12/28/24 1327 97.7 °F (36.5 °C) 73 18 124/45 100 % -- None (Room air) -- IPA       Assessment & Plan:  #Right great toe diabetic foot ulcer with concern for underlying osteomyelitis  -X-ray reviewed, check MRI right foot  -Check arterial duplex to assess for significant PAD  -Follow blood and wound culture  -Empiric vancomycin and Unasyn  -Consult podiatry and ID  -Pain control     #DM type 2 with A1c 7.5  -Tresiba, ISS, carb coverage     #Hypertension  -Resume lisinopril     #Dyslipidemia  -Statin    #Mild renal insufficiency, not FIORDALIZA  -Gentle IVF overnight     #Normocytic anemia  -Check iron studies     #GERD  -PPI     #Obesity  -Body mass  index is 32.5 kg/m².     #CVA  -Ambulates with a cane due to issues with fine motor on her left side    ID CONSULT      In the ER, x-ray was concerning for osteomyelitis of the distal phalanx.     General: No acute distress. Alert and oriented x 3.  Vital signs: Blood pressure 153/39, pulse 78, temperature 97.9 °F (36.6 °C), temperature source Oral, resp. rate 18, height 5' 1\" (1.549 m), weight 172 lb (78 kg), SpO2 94%.  HEENT: no scleral icterus or conjunctival injection. Moist mucous membranes. No thrush  Neck: No lymphadenopathy.  Supple.  Respiratory: Clear to auscultation bilaterally.  No wheezes. No rhonchi.  Cardiovascular: S1, S2.  Regular rate and rhythm.  No murmurs.  Abdomen: Soft, nontender, nondistended.  Positive bowel sounds.  Musculoskeletal: Full range of motion of all extremities.  No arthritis or deformity  Integument: No rash. No cellulitis.     Lab 12/28/24  0657   RBC 3.80   HGB 11.1*   HCT 32.9*   MCV 86.6   MCH 29.2   MCHC 33.7   RDW 13.2   NEPRELIM 4.83   WBC 7.2   .0          Recent Labs   Lab 12/28/24  0657   *   BUN 17   CREATSERUM 0.88  0.88   CA 9.1   ALB  --       K 4.0      CO2 25.0       ASSESSMENT:  R hallux diabetic foot ulcer with suspected OM of the distal toe per xray  Type 2 DM      PLAN:  -cover with unasyn for now  -await MRI foot  - if confirmed to have OM with bony destruction would recommend amp of distal phalanx over a prolonged course of IV abx, as it would be more difficult to eradicate infection in that setting  -await art dopplers and optimize circulation if needed    PODIATRY CONSULT      Physical Exam:     Derm: Full-thickness ulcer to the plantar aspect of right hallux that measures 0.8 x 0.8 x 0.2 cm.  Base is fibrogranular.  Site does appear to probe to bone.  No purulence or acute signs infection currently noted.     Vascular: Difficulty palpating pedal pulses.     Neuro: Decreased protective sensation of the lower extremities.      MSK: No pain.  Compartments are soft compressible.  Strength testing deferred.     -Patient examined, chart history reviewed.  -Patient is afebrile without leukocytosis.  -ESR 32.  -X-rays do show erosions concerning for osteomyelitis.  -MRI pending.  Arterial duplex pending.  -Wound cultures pending.  ID following.  -Today site dressed with Betadine and dry dressing.  -Discussed prognosis with patient.  Pending further workup, may require partial hallux amputation.  Will await MRI findings as well as vascular findings.  May require vascular consultation.      REVIEW DOCUMENTATION  12-29-24 12/29/24 2122 97.7 °F (36.5 °C) 76 16 157/82 99 % -- None (Room air) -- IP   12/29/24 1237 97.9 °F (36.6 °C) 70 16 147/57 96 % -- None (Room air) 0 L/min SB   12/29/24 0521 98.3 °F (36.8 °C) 79 16 155/60 93 % -- None (Room air) -- EW     Physical Exam:    General: No acute distress  Respiratory: No wheezes, no rhonchi  Cardiovascular: S1, S2, regular rate and rhythm  Abdomen: Soft, Non-tender, non-distended, positive bowel sounds  Neuro: No new focal deficits.   Extremities: No edema- R great toe ulcer non tender, erythema    Assessment & Plan:  #R great toe ulcer, DFU- with concerns for osteomyelitis  MRI pending  ID, Podiatry eval  IV Abx  #DM type 2 with PN  #Hypertension   Continue ACE I  #HLD  #Renal insufficiency not FIORDALIZA,   Monitor Cr                     Medications 12/26 12/27 12/28 12/29 12/30   acidophilus (Probiotic) cap/tab 1 each  Dose: 1 each  Freq: Daily Route: OR  Start: 12/29/24 1030   Admin Instructions:   Administer with water or milk during a meal.       1150 RC-Given      0915 RC-Given        ampicillin-sulbactam (Unasyn) 3 g in sodium chloride 0.9% 100mL IVPB-ADD  Dose: 3 g  Freq: Every 6 hours Route: IV  Last Dose: 3 g (12/29/24 6457)  Start: 12/28/24 0300 End: 12/29/24 0920   Order specific questions:         0224 AG-New Bag     0849 MH-New Bag     1627 MH-New Bag     2011 AG-New Bag      0314 AG-New  Bag     0857 RC-New Bag     0920-D/C'd       ampicillin-sulbactam (Unasyn) 3 g in sodium chloride 0.9% 100mL IVPB-ADD  Dose: 3 g  Freq: Once Route: IV  Last Dose: Stopped (12/27/24 2108)  Start: 12/27/24 2000 End: 12/27/24 2108   Order specific questions:        2038 BR-New Bag     2108 AL-Stopped           enoxaparin (Lovenox) 40 MG/0.4ML SUBQ injection 40 mg  Dose: 40 mg  Freq: Daily Route: SC  Start: 12/28/24 0930   Admin Instructions:   Only administer Enoxaparin subcutaneously into the abdomen unless directed otherwise by a physician. Alternate injection sites between the left and right anterolateral and left and right posterolateral abdominal wall.      0848 MH-Given      0857 RC-Given      0914 RC-Given        insulin aspart (NovoLOG) 100 Units/mL FlexPen 1-68 Units  Dose: 1-68 Units  Freq: 3 times daily with meals Route: SC  Start: 12/28/24 0800   Admin Instructions:   1 unit of Novolog (aspart) insulin for every 10 grams of carbohydrates eaten  Give within 10 minutes before or after a meal  Do not give if patient is NPO (carbohydrate is on hold)      0848 MH-Given     1256 MH-Given     1746 MH-Given      (0800 RC)-Not Given     1356 RC-Given     1854 RC-Given      0914 RC-Given     1200     1700        insulin degludec (Tresiba) 100 units/mL flextouch 30 Units  Dose: 30 Units  Freq: Daily Route: SC  Start: 12/28/24 0930   Admin Instructions:   This is LONG ACTING insulin.  Continue to give basal insulin (insulin degludec - Tresiba) even if NPO.  DO NOT hold or alter insulin dose without a physician order.      0853 MH-Given      0858 RC-Given      0914 RC-Given        lisinopril (Prinivil; Zestril) tab 5 mg  Dose: 5 mg  Freq: Daily Route: OR  Start: 12/28/24 0930      0847 MH-Given      0856 RC-Given      0915 RC-Given        oxybutynin ER (Ditropan-XL) 24 hr tab 5 mg  Dose: 5 mg  Freq: 2 times daily Route: OR  Start: 12/28/24 0030   Admin Instructions:   DO NOT CRUSH, SPLIT, OR CHEW TABLET      0053  AG-Given     0847 MH-Given     2011 AG-Given      0856 RC-Given     2019 AG-Given      0927 RC-Given     2100        pantoprazole (Protonix) DR tab 40 mg  Dose: 40 mg  Freq: Every morning before breakfast Route: OR  Start: 12/28/24 0700   Admin Instructions:   Do not crush      0622 AS-Given      0519 AG-Given      0537 AG-Given        piperacillin-tazobactam (Zosyn) 4.5 g in dextrose 5% 100 mL IVPB-ADDV  Dose: 4.5 g  Freq: Every 8 hours Route: IV  Last Dose: 4.5 g (12/30/24 0930)  Start: 12/29/24 0930   Admin Instructions:   Incompatible with Lactated Ringers (LR)   Order specific questions:          1152 RC-New Bag     1730 RC-New Bag      0130 AG-New Bag     0930 RC-New Bag     1730        rosuvastatin (Crestor) tab 10 mg  Dose: 10 mg  Freq: Nightly Route: OR  Start: 12/28/24 0030      0053 AG-Given     2011 AG-Given      2019 AG-Given      2100        timolol (Timoptic) 0.5 % ophthalmic solution 1 drop  Dose: 1 drop  Freq: 2 times daily Route: Both Eyes  Start: 12/28/24 0900      0851 MH-Given     2011 AG-Given      0856 RC-Given     2019 AG-Given      0914 RC-Given     2100        vancomycin (Vancocin) 1,250 mg in sodium chloride 0.9% 500 mL IVPB  Dose: 1,250 mg  Freq: Every 18 hours Route: IV  Last Dose: 1,250 mg (12/30/24 0537)  Start: 12/29/24 1100 End: 12/30/24 0959   Admin Instructions:   Vancomycin concentrations >/= 5 mg/mL are incompatible with Zosyn and must be run separately   Order specific questions:          1152 RC-New Bag      0537 AG-New Bag     0959-D/C'd      vancomycin (Vancocin) 2 g in sodium chloride 0.9% 500mL IVPB premix  Dose: 25 mg/kg  Weight Dosing Info: 78 kg  Freq: Once Route: IV  Last Dose: 2,000 mg (12/27/24 2134)  Start: 12/27/24 2134 End: 12/27/24 2334   Admin Instructions:   Vancomycin concentrations >/= 5 mg/mL are incompatible with Zosyn and must be run separately   Order specific questions:        2134 AL-New Bag     2210 AL-Handoff           Legend:               Medications  12/27 12/28 12/29 12/30   sodium chloride 0.9% infusion  Rate: 83 mL/hr  Freq: Continuous Route: IV  Start: 12/28/24 0045 End: 12/28/24 1244     0045 AG-New Bag     1244-D/C'd

## 2024-12-30 NOTE — PLAN OF CARE
Pt alert and oriented x4. VSS on room air. Pt denies chest pain, sob, nausea, emesis.  Abdomen soft, nondistended, denies tenderness. Passing gas. Voids freely in adequate amounts. Bottom of right great toe circular open area- gauze and ace wrap intact.  Scattered scratches pt states from dry skin.  IV ABX infusing. Pt updated on poc.

## 2024-12-30 NOTE — PROGRESS NOTES
Upon assessment, A&Ox4. RA.  WDL. SCDs. Lovenox for VTE proph. Edema managed per MAR. Tolerating diet. Bgs and carb coverage managed per MAR. Active bowel sounds. Voids. Up ad shoshana. IVF infusing. R great toe w/ dressing, C/D/I, podiatry to come and assess. Denies SOB, CP, lightheadedness, N/V, and pain. Faxed request form to [facility] for clip information. POC discussed w/ pt and family, all questions answered and concerns addressed. Safety precautions in place. Frequent rounds performed.     1130 - pt off unit to US.     1300 - back to unit. Elevated BP, MD paged, no new orders at this time. Called [facility] to follow up on request of pt's records.     1730 - Great toe redressed per podiatry. Vascular surgery consulted, will see in AM.

## 2024-12-30 NOTE — PROGRESS NOTES
INFECTIOUS DISEASE PROGRESS NOTE    Marcela Moreno Patient Status:  Inpatient    10/22/1955 MRN [id number]   Formerly [facility] 3NW-A Attending Sarthak Lowry, DO   Hosp Day # 3 PCP Rhiannon Betancourt MD     Abx: unasyn--> vanco and zosyn d#2    Subjective: pt seen and examined today. Denies any foot or toe pain. Afebrile.     Medications:    Current Facility-Administered Medications:     piperacillin-tazobactam (Zosyn) 4.5 g in dextrose 5% 100 mL IVPB-ADDV, 4.5 g, Intravenous, Q8H    acidophilus (Probiotic) cap/tab 1 each, 1 each, Oral, Daily    vancomycin (Vancocin) 1,250 mg in sodium chloride 0.9% 500 mL IVPB, 1,250 mg, Intravenous, Q18H    insulin degludec (Tresiba) 100 units/mL flextouch 30 Units, 30 Units, Subcutaneous, Daily    lisinopril (Prinivil; Zestril) tab 5 mg, 5 mg, Oral, Daily    oxybutynin ER (Ditropan-XL) 24 hr tab 5 mg, 5 mg, Oral, BID    pantoprazole (Protonix) DR tab 40 mg, 40 mg, Oral, QAM AC    rosuvastatin (Crestor) tab 10 mg, 10 mg, Oral, Nightly    timolol (Timoptic) 0.5 % ophthalmic solution 1 drop, 1 drop, Both Eyes, BID    glucose (Dex4) 15 GM/59ML oral liquid 15 g, 15 g, Oral, Q15 Min PRN **OR** glucose (Glutose) 40% oral gel 15 g, 15 g, Oral, Q15 Min PRN **OR** glucose-vitamin C (Dex-4) chewable tab 4 tablet, 4 tablet, Oral, Q15 Min PRN **OR** dextrose 50% injection 50 mL, 50 mL, Intravenous, Q15 Min PRN **OR** glucose (Dex4) 15 GM/59ML oral liquid 30 g, 30 g, Oral, Q15 Min PRN **OR** glucose (Glutose) 40% oral gel 30 g, 30 g, Oral, Q15 Min PRN **OR** glucose-vitamin C (Dex-4) chewable tab 8 tablet, 8 tablet, Oral, Q15 Min PRN    insulin aspart (NovoLOG) 100 Units/mL FlexPen 1-68 Units, 1-68 Units, Subcutaneous, TID CC    insulin aspart (NovoLOG) 100 Units/mL FlexPen 1-10 Units, 1-10 Units, Subcutaneous, TID AC and HS    enoxaparin (Lovenox) 40 MG/0.4ML SUBQ injection 40 mg, 40 mg, Subcutaneous, Daily    acetaminophen  (Tylenol Extra Strength) tab 500 mg, 500 mg, Oral, Q4H PRN    HYDROcodone-acetaminophen (Norco) 5-325 MG per tab 1 tablet, 1 tablet, Oral, Q6H PRN    melatonin tab 3 mg, 3 mg, Oral, Nightly PRN    polyethylene glycol (PEG 3350) (Miralax) 17 g oral packet 17 g, 17 g, Oral, Daily PRN    sennosides (Senokot) tab 17.2 mg, 17.2 mg, Oral, Nightly PRN    bisacodyl (Dulcolax) 10 MG rectal suppository 10 mg, 10 mg, Rectal, Daily PRN    fleet enema (Fleet) rectal enema 133 mL, 1 enema, Rectal, Once PRN    ondansetron (Zofran) 4 MG/2ML injection 4 mg, 4 mg, Intravenous, Q6H PRN    metoclopramide (Reglan) 5 mg/mL injection 5 mg, 5 mg, Intravenous, Q8H PRN    benzonatate (Tessalon) cap 200 mg, 200 mg, Oral, TID PRN    guaiFENesin (Robitussin) 100 MG/5 ML oral liquid 200 mg, 200 mg, Oral, Q4H PRN    glycerin-hypromellose- (Artificial Tears) 0.2-0.2-1 % ophthalmic solution 1 drop, 1 drop, Both Eyes, QID PRN    sodium chloride (Saline Mist) 0.65 % nasal solution 1 spray, 1 spray, Each Nare, Q3H PRN    influenza virus trivalent high dose PF (Fluzone HD) 0.5 mL injection (ages >/= 65 years) 0.5 mL, 0.5 mL, Intramuscular, Prior to discharge    Review of Systems:  Completed. See pertinent positives and negatives in the the HPI.      Physical Exam:  General: No acute distress. Alert and oriented x 3. On room air.   Vital signs: Blood pressure 137/45, pulse 79, temperature 97.9 °F (36.6 °C), temperature source Oral, resp. rate 16, height 154.9 cm (5' 1\"), weight 172 lb (78 kg), SpO2 95%.  HEENT: no scleral icterus or conjunctival injection. Moist mucous membranes.   Neck: Supple.  Respiratory: Clear to auscultation bilaterally.  No wheezes. No rhonchi.  Cardiovascular: S1, S2.  Regular rate and rhythm.  No murmurs.  Abdomen: Nondistended.  Musculoskeletal: Full range of motion of all extremities.  No arthritis or deformity. Bilateral LE edema.   Integument: No rash. Foot with c/d/d. No cellulitis of exposed skin    Laboratory  Data:    Recent Labs   Lab 12/27/24 2023 12/28/24  0657   RBC 3.62* 3.80   HGB 10.5* 11.1*   HCT 31.5* 32.9*   MCV 87.0 86.6   MCH 29.0 29.2   MCHC 33.3 33.7   RDW 13.1 13.2   NEPRELIM 4.78 4.83   WBC 8.1 7.2   .0 194.0     Recent Labs   Lab 12/27/24 2022 12/28/24  0657 12/30/24  0540   * 136*  --    BUN 21 17  --    CREATSERUM 1.10* 0.88  0.88 0.96   CA 9.4 9.1  --    ALB 4.2  --   --     143  --    K 4.3 4.0  --     111  --    CO2 24.0 25.0  --    ALKPHO 100  --   --    AST 15  --   --    ALT <7*  --   --    BILT 0.5  --   --    TP 7.1  --   --      Sed Rate (mm/Hr)   Date Value   12/27/2024 32 (H)      C-Reactive Protein (mg/dL)   Date Value   12/16/2017 1.13 (H)      No results found for: \"VANCT\"  No results for input(s): \"COLORUR\", \"CLARITY\", \"SPECGRAVITY\", \"GLUUR\", \"BILUR\", \"KETUR\", \"BLOODURINE\", \"PHURINE\", \"PROUR\", \"UROBILINOGEN\", \"NITRITE\", \"LEUUR\", \"WBCUR\", \"RBCUR\", \"BACUR\", \"EPIUR\" in the last 168 hours.     Microbiology:    Reviewed in EMR,   Susceptibility data from last 90 days.  Collected Specimen Info Organism Cefazolin Cefepime Ceftazidime Ciprofloxacin Clindamycin Erythromycin Gentamicin Levofloxacin Meropenem Oxacillin Piperacillin/Tazobactam Tobramycin Trimethoprim/Sulfamethoxazole Vancomycin   12/27/24 Other from Toe Staphylococcus aureus S     S  S  S  S   S    S  S     Pseudomonas aeruginosa   S  S  S     S  S   S  S           Radiology:     XR FOOT, COMPLETE (MIN 3 VIEWS), RIGHT (CPT=73630)    Result Date: 12/27/2024  PROCEDURE:  XR FOOT, COMPLETE (MIN 3 VIEWS), RIGHT (CPT=73630)  TECHNIQUE:  AP, oblique, and lateral views were obtained.  COMPARISON:  None.  INDICATIONS:  Non-healing wound to right foot -  PATIENT STATED HISTORY: (As transcribed by Technologist)  Patient states open wound along plantar aspect of 1st digit on right foot x1 month.               CONCLUSION:   Focal soft tissue defect/ulceration noted along the plantar aspect of the 1st distal phalanx.   There is associated cortical erosion/destruction involving the tip of the 1st distal phalanx most in keeping with acute osteomyelitis.  These findings are on a background of generalized osteopenia.  Scattered arthropathy of the mid and forefoot noted.  Large plantar calcaneal spur.   LOCATION:  [facility]   Dictated by (CST): Matheus Gutierrez MD on 12/27/2024 at 9:15 PM     Finalized by (CST): Matheus Gutierrez MD on 12/27/2024 at 9:16 PM            ASSESSMENT:  R hallux diabetic foot ulcer with suspected OM of the distal toe per xray  Superf cx with S aureus (MSSA) and PSAR  Type 2 DM, hgb A1C 7.5 (11/2024)    PLAN:  -continue IV zosyn, dc IV vancomycin  -await MRI foot  -if confirmed to have OM with bony destruction would recommend amp of distal phalanx over a prolonged course of IV abx, as it would be more difficult to eradicate infection in that setting  -await art dopplers and optimize circulation if needed before surgery    Case and plan d/w pt and RN.  Will follow    Denae Long PA-C    ID ATTENDING ADDENDUM     Pt seen an examined independently. Chart reviewed. Agree with above. Note has been reviewed by me and modified as needed.  Exam and Impression/ Recs as noted above.  Will follow  D/w staff and with pt  More than 50% of clinical time and 100% of the clinical decision making performed by me.    Missy Bruno MD

## 2024-12-31 VITALS
TEMPERATURE: 98 F | OXYGEN SATURATION: 97 % | SYSTOLIC BLOOD PRESSURE: 111 MMHG | DIASTOLIC BLOOD PRESSURE: 59 MMHG | RESPIRATION RATE: 18 BRPM | HEIGHT: 61 IN | HEART RATE: 70 BPM | BODY MASS INDEX: 32.47 KG/M2 | WEIGHT: 172 LBS

## 2024-12-31 DIAGNOSIS — I73.9 PAD (PERIPHERAL ARTERY DISEASE) (HCC): Primary | ICD-10-CM

## 2024-12-31 LAB
CREAT BLD-MCNC: 0.97 MG/DL
EGFRCR SERPLBLD CKD-EPI 2021: 64 ML/MIN/1.73M2 (ref 60–?)
GLUCOSE BLD-MCNC: 149 MG/DL (ref 70–99)
GLUCOSE BLD-MCNC: 189 MG/DL (ref 70–99)

## 2024-12-31 PROCEDURE — 0JBQ3ZZ EXCISION OF RIGHT FOOT SUBCUTANEOUS TISSUE AND FASCIA, PERCUTANEOUS APPROACH: ICD-10-PCS | Performed by: PODIATRIST

## 2024-12-31 PROCEDURE — 99233 SBSQ HOSP IP/OBS HIGH 50: CPT | Performed by: PODIATRIST

## 2024-12-31 PROCEDURE — 99239 HOSP IP/OBS DSCHRG MGMT >30: CPT | Performed by: STUDENT IN AN ORGANIZED HEALTH CARE EDUCATION/TRAINING PROGRAM

## 2024-12-31 RX ORDER — CEFADROXIL 500 MG/1
500 CAPSULE ORAL 2 TIMES DAILY
Qty: 28 CAPSULE | Refills: 0 | Status: SHIPPED | OUTPATIENT
Start: 2024-12-31 | End: 2025-01-14

## 2024-12-31 RX ORDER — LEVOFLOXACIN 750 MG/1
750 TABLET, FILM COATED ORAL EVERY OTHER DAY
Qty: 7 TABLET | Refills: 0 | Status: SHIPPED | OUTPATIENT
Start: 2024-12-31 | End: 2025-01-14

## 2024-12-31 NOTE — PLAN OF CARE
A&Ox4.  RA/ Denies chest pain, shortness of breath GI: Abdomen soft, nondistended. Passing flatus : Voiding adequately. Pain controlled with PRN pain medications. Up with standby assist and walker. Tolerating 1800 ADA diet IVF per order.  Dressing to R toe CDI. All appropriate safety measures in place. MRI completed.

## 2024-12-31 NOTE — PROGRESS NOTES
[facility]   part of [facility]    PODIATRY PROGRESS NOTE    Marcela Moreno Patient Status:  Inpatient    10/22/1955 MRN [id number]   Location [facility] 3NW-A Attending Luna Miller MD   Hosp Day # 3 PCP Rhiannon Betancourt MD     Date of Admission:  2024    History of Present Illness:  Patient seen this evening resting comfortably in her hospital chair.  She did have 2 family members present.  Patient is anxiously awaiting her MRI.  She had her vascular studies done today.  Denies any pain to her right great toe.  She does have dressings intact.  Denies any other concerns in regards to her ulceration site.    History:  Past Medical History:    Abdominal pain    Arthritis    AVM (arteriovenous malformation) brain (HCC)    being followed at [facility]    Back pain    Back problem    Bloating    Calculus of kidney    Constipation    Diabetes mellitus (HCC)    Diarrhea, unspecified    Esophageal reflux    Fatigue    Food intolerance    Frequent urination    Heartburn    High blood pressure    High cholesterol    History of depression    Indigestion    Irregular bowel habits    Leaking of urine    Leg swelling    Muscle weakness    Neuropathy    TOES SOMETIMES    Osteoarthritis    Pain in joints    Pain with bowel movements    Problems with swallowing    Sleep disturbance    Sputum production    Stool incontinence    Stroke (HCC)    LEFT SIDE EFFECTED- USING CANE    Type II or unspecified type diabetes mellitus without mention of complication, not stated as uncontrolled    Uncomfortable fullness after meals    Vitamin D deficiency    Weight gain     Past Surgical History:   Procedure Laterality Date    Brain surgery  2015    avm   [facility]          X3    Cholecystectomy      Colonoscopy N/A 2024    Procedure: COLONOSCOPY;  Surgeon: Guerrero Moser MD;  Location:  ENDOSCOPY    Cystoscopy,insert ureteral stent      Hernia surgery      Other surgical history      LEFT  TIBIA FRACTURE- PLATE AND SCREWS  2017     Family History   Problem Relation Age of Onset    Diabetes Mother     Hypertension Mother     Hypertension Father     Heart Disorder Brother         CAD stent in his 40s    Diabetes Brother     Diabetes Sister     Heart Attack Brother       reports that she has never smoked. She has never used smokeless tobacco. She reports that she does not currently use alcohol. She reports that she does not use drugs.    Allergies:  Allergies[1]    Medications:    Current Facility-Administered Medications:     piperacillin-tazobactam (Zosyn) 4.5 g in dextrose 5% 100 mL IVPB-ADDV, 4.5 g, Intravenous, Q8H    acidophilus (Probiotic) cap/tab 1 each, 1 each, Oral, Daily    insulin degludec (Tresiba) 100 units/mL flextouch 30 Units, 30 Units, Subcutaneous, Daily    lisinopril (Prinivil; Zestril) tab 5 mg, 5 mg, Oral, Daily    oxybutynin ER (Ditropan-XL) 24 hr tab 5 mg, 5 mg, Oral, BID    pantoprazole (Protonix) DR tab 40 mg, 40 mg, Oral, QAM AC    rosuvastatin (Crestor) tab 10 mg, 10 mg, Oral, Nightly    timolol (Timoptic) 0.5 % ophthalmic solution 1 drop, 1 drop, Both Eyes, BID    glucose (Dex4) 15 GM/59ML oral liquid 15 g, 15 g, Oral, Q15 Min PRN **OR** glucose (Glutose) 40% oral gel 15 g, 15 g, Oral, Q15 Min PRN **OR** glucose-vitamin C (Dex-4) chewable tab 4 tablet, 4 tablet, Oral, Q15 Min PRN **OR** dextrose 50% injection 50 mL, 50 mL, Intravenous, Q15 Min PRN **OR** glucose (Dex4) 15 GM/59ML oral liquid 30 g, 30 g, Oral, Q15 Min PRN **OR** glucose (Glutose) 40% oral gel 30 g, 30 g, Oral, Q15 Min PRN **OR** glucose-vitamin C (Dex-4) chewable tab 8 tablet, 8 tablet, Oral, Q15 Min PRN    insulin aspart (NovoLOG) 100 Units/mL FlexPen 1-68 Units, 1-68 Units, Subcutaneous, TID CC    insulin aspart (NovoLOG) 100 Units/mL FlexPen 1-10 Units, 1-10 Units, Subcutaneous, TID AC and HS    enoxaparin (Lovenox) 40 MG/0.4ML SUBQ injection 40 mg, 40 mg, Subcutaneous, Daily    acetaminophen (Tylenol Extra  Strength) tab 500 mg, 500 mg, Oral, Q4H PRN    HYDROcodone-acetaminophen (Norco) 5-325 MG per tab 1 tablet, 1 tablet, Oral, Q6H PRN    melatonin tab 3 mg, 3 mg, Oral, Nightly PRN    polyethylene glycol (PEG 3350) (Miralax) 17 g oral packet 17 g, 17 g, Oral, Daily PRN    sennosides (Senokot) tab 17.2 mg, 17.2 mg, Oral, Nightly PRN    bisacodyl (Dulcolax) 10 MG rectal suppository 10 mg, 10 mg, Rectal, Daily PRN    fleet enema (Fleet) rectal enema 133 mL, 1 enema, Rectal, Once PRN    ondansetron (Zofran) 4 MG/2ML injection 4 mg, 4 mg, Intravenous, Q6H PRN    metoclopramide (Reglan) 5 mg/mL injection 5 mg, 5 mg, Intravenous, Q8H PRN    benzonatate (Tessalon) cap 200 mg, 200 mg, Oral, TID PRN    guaiFENesin (Robitussin) 100 MG/5 ML oral liquid 200 mg, 200 mg, Oral, Q4H PRN    glycerin-hypromellose- (Artificial Tears) 0.2-0.2-1 % ophthalmic solution 1 drop, 1 drop, Both Eyes, QID PRN    sodium chloride (Saline Mist) 0.65 % nasal solution 1 spray, 1 spray, Each Nare, Q3H PRN    influenza virus trivalent high dose PF (Fluzone HD) 0.5 mL injection (ages >/= 65 years) 0.5 mL, 0.5 mL, Intramuscular, Prior to discharge    Review of Systems:  10 point ROS completed and was negative, except for pertinent positive and negatives stated in subjective.    Physical Exam:        GENERAL: well developed, well nourished, in no apparent distress  EXTREMITIES:   1. Integument: Full-thickness ulceration to plantar aspect of right hallux measuring approximately 0.8 cm x 0.8 cm x 0.1 cm.  No surrounding erythema, purulent drainage, or other signs of infection currently  2. Vascular: Difficulty palpating pulses to right lower extremity   3. Musculoskeletal: No pain to right hallux ulceration site.  Compartments are soft and compressible.  Muscle strength testing deferred.   4. Neurological: Decreased protective sensation noted    Imaging:  US ARTERIAL DUPLEX LOWER EXTREMITY BILATERAL (CPT=93925)    Result Date: 12/30/2024  CONCLUSION:   1. Diffuse atherosclerotic plaquing bilaterally with diffuse biphasic waveform patterns involving both lower extremities. 2. There is a focus of increased arterial velocity involving the left mid SFA suggesting mild to moderate grade stenosis approximating 50%.  3. Increased arterial velocity within the right mid MATT and left proximal PTA suspicious for stenosis approximating 75%.   LOCATION:  [facility]   Dictated by (CST): Maame Rodrigues DO on 12/30/2024 at 1:23 PM     Finalized by (CST): Maame Rodrigues DO on 12/30/2024 at 1:27 PM       XR FOOT, COMPLETE (MIN 3 VIEWS), RIGHT (CPT=73630)    Result Date: 12/27/2024  CONCLUSION:   Focal soft tissue defect/ulceration noted along the plantar aspect of the 1st distal phalanx.  There is associated cortical erosion/destruction involving the tip of the 1st distal phalanx most in keeping with acute osteomyelitis.  These findings are on a background of generalized osteopenia.  Scattered arthropathy of the mid and forefoot noted.  Large plantar calcaneal spur.   LOCATION:  [facility]   Dictated by (CST): Matheus Gutierrez MD on 12/27/2024 at 9:15 PM     Finalized by (CST): Matheus Gutierrez MD on 12/27/2024 at 9:16 PM         Laboratory Data:  Recent Labs   Lab 12/28/24  0657   RBC 3.80   HGB 11.1*   HCT 32.9*   MCV 86.6   MCH 29.2   MCHC 33.7   RDW 13.2   NEPRELIM 4.83   WBC 7.2   .0       Impression:  Patient Active Problem List   Diagnosis    Mixed hyperlipidemia    Type 2 diabetes mellitus with hyperglycemia, with long-term current use of insulin (HCC)    Status post stroke    Essential hypertension    H/O arteriovenous malformation (AVM)    Hypothyroidism    Class 1 obesity due to excess calories with serious comorbidity and body mass index (BMI) of 30.0 to 30.9 in adult    Overactive bladder    RLS (restless legs syndrome)    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 associated with type 2 diabetes mellitus (HCC)    Age-related osteoporosis without  current pathological fracture    Ventral hernia without obstruction or gangrene    Cellulitis and abscess of toe of right foot    Osteomyelitis of right foot, unspecified type (HCC)         Plan:  #Diabetic ulcer of right great toe with fat layer exposed  #Osteomyelitis right foot    -Patient examined this evening.  Improvements in cellulitic changes to right great toe with no current signs of infection.      -Discussed arterial ultrasound results.  Will consult vascular surgery.  Appreciate recommendations    -MRI pending.  There is concern of erosive changes on x-ray.  If no concerns of osteomyelitis on MRI, patient can follow-up in wound care center for outpatient management.  If osteomyelitis is noted, we will discuss partial versus full right hallux amputation versus long-term antibiotic management.  Infectious disease recommending amputation over prolonged IV antibiotics if OM is confirmed due to difficulty eradicating infection.  Will plan further following results of MRI.     -Recommend keeping ulceration covered with Xeroform, Marina, and Ace bandage.  Patient can weight-bear as tolerated in a postop shoe    -Patient to continue with antibiotics per infectious disease.  Appreciate recommendations    -Podiatry will continue to follow    Héctor Hugo DPM   12/30/2024  8:50 PM         [1] No Known Allergies

## 2024-12-31 NOTE — PROGRESS NOTES
NURSING DISCHARGE NOTE    Discharged Home via Wheelchair.  Accompanied by Family member  Belongings Taken by patient/family.    Patient given discharge instructions. Instructed to  prescription medications from pharmacy. Given surgical walking shoe. IV removed and intact. All questions and concerns addressed at this time.

## 2024-12-31 NOTE — CM/SW NOTE
12/31/24 1400   CM/SW Referral Data   Referral Source    Reason for Referral Discharge planning   Informant EMR;Clinical Staff Member     CM self referred and following for potential discharge needs.  Pt is a 69 year old female admitted with cellulitis of R great toe. MRI was concerning for OM.  Podiatry, ID, Vascular following. No vascular intervention is planned.  Await decision on amputation vs IV abx at discharge.    OT evaluated pt and Anticipated therapy need: Home    From OT eval:    HOME SITUATION  Type of Home: House  Home Layout: One level  Lives With: Spouse     Toilet and Equipment: Comfort height toilet;Grab bar  Shower/Tub and Equipment: Tub-shower combo;Tub transfer bench;Grab bar  Other Equipment:  (RW, walks with cane when out in community)     Occupation/Status: Retired  Hand Dominance: Right  Drives: Yes  Patient Regularly Uses: Reading glasses     Prior Level of Function: Pt lives at home with supportive spouse, typically IND with ADLs/IADLs, uses a cane when out in the community.    Addendum 6210  Noted discharge order entered by Podiatry pending ID clearance.  Confirmed w/ID NIK Philippe that pt discharging on PO Abx      / to remain available for support and/or discharge planning.     Miesha BRAYA MSN, RN CTL/  [id number]

## 2024-12-31 NOTE — CONSULTS
[facility]   part of [facility]    Vascular Surgery Consultation    Marcela Moreno Patient Status:  Inpatient    10/22/1955 MRN [id number]   Location [facility] 3NW-A Attending Luna Miller MD   Hosp Day # 4 PCP Rhiannon Betancourt MD     Date of Admission:  2024  Date of Consult: 2024    “I was requested by Dr. Miller to provide a consultation for Marcela Moreno  Reason for Consultation:   Concerns for nonhealing wound of the right great toe and peripheral arterial disease    History of Present Illness:   Patient is a 69 year old female with past medical history of DM, GERD, HTN, HLP, and osteoarthritis who was admitted to the hospital for Cellulitis and abscess of toe of right foot:  Patient explained that she has been having the right greater toe wound for about a month.  She has similar problem before that was managed nonoperatively.  MRI of the right foot was notable for osteomyelitis of the right first distal phalanx bone.  She also has an arterial duplex ultrasound of the right lower extremity which is notable for moderate atherosclerotic disease of mainly right tibial vessels.  Vascular surgery was consulted for further evaluation and recommendations.  She denies having any fevers or significant drainage from the wound.    Past Medical History  Past Medical History:    Abdominal pain    Arthritis    AVM (arteriovenous malformation) brain (HCC)    being followed at [facility]    Back pain    Back problem    Bloating    Calculus of kidney    Constipation    Diabetes mellitus (HCC)    Diarrhea, unspecified    Esophageal reflux    Fatigue    Food intolerance    Frequent urination    Heartburn    High blood pressure    High cholesterol    History of depression    Indigestion    Irregular bowel habits    Leaking of urine    Leg swelling    Muscle weakness    Neuropathy    TOES SOMETIMES    Osteoarthritis    Pain in joints    Pain with bowel movements    Problems with swallowing     Sleep disturbance    Sputum production    Stool incontinence    Stroke (HCC)    LEFT SIDE EFFECTED- USING CANE    Type II or unspecified type diabetes mellitus without mention of complication, not stated as uncontrolled    Uncomfortable fullness after meals    Vitamin D deficiency    Weight gain       Past Surgical History  Past Surgical History:   Procedure Laterality Date    Brain surgery  2015    avm   [facility]          X3    Cholecystectomy      Colonoscopy N/A 2024    Procedure: COLONOSCOPY;  Surgeon: Guerrero Moser MD;  Location:  ENDOSCOPY    Cystoscopy,insert ureteral stent      Hernia surgery      Other surgical history      LEFT TIBIA FRACTURE- PLATE AND SCREWS         Family History  Family History   Problem Relation Age of Onset    Diabetes Mother     Hypertension Mother     Hypertension Father     Heart Disorder Brother         CAD stent in his 40s    Diabetes Brother     Diabetes Sister     Heart Attack Brother        Social History  Social History     Socioeconomic History    Marital status:    Tobacco Use    Smoking status: Never    Smokeless tobacco: Never   Vaping Use    Vaping status: Never Used   Substance and Sexual Activity    Alcohol use: Not Currently    Drug use: No   Other Topics Concern    Caffeine Concern Yes     Comment: coffee 2 cups daily    Exercise No    Seat Belt Yes     Social Drivers of Health     Food Insecurity: No Food Insecurity (2024)    Food Insecurity     Food Insecurity: Never true   Transportation Needs: No Transportation Needs (2024)    Transportation Needs     Lack of Transportation: No    [facility]    Social Connections   Housing Stability: Low Risk  (2024)    Housing Stability     Housing Instability: No        Current Medications:  Current Facility-Administered Medications   Medication Dose Route Frequency    piperacillin-tazobactam (Zosyn)  4.5 g in dextrose 5% 100 mL IVPB-ADDV  4.5 g Intravenous Q8H    acidophilus (Probiotic) cap/tab 1 each  1 each Oral Daily    insulin degludec (Tresiba) 100 units/mL flextouch 30 Units  30 Units Subcutaneous Daily    lisinopril (Prinivil; Zestril) tab 5 mg  5 mg Oral Daily    oxybutynin ER (Ditropan-XL) 24 hr tab 5 mg  5 mg Oral BID    pantoprazole (Protonix) DR tab 40 mg  40 mg Oral QAM AC    rosuvastatin (Crestor) tab 10 mg  10 mg Oral Nightly    timolol (Timoptic) 0.5 % ophthalmic solution 1 drop  1 drop Both Eyes BID    glucose (Dex4) 15 GM/59ML oral liquid 15 g  15 g Oral Q15 Min PRN    Or    glucose (Glutose) 40% oral gel 15 g  15 g Oral Q15 Min PRN    Or    glucose-vitamin C (Dex-4) chewable tab 4 tablet  4 tablet Oral Q15 Min PRN    Or    dextrose 50% injection 50 mL  50 mL Intravenous Q15 Min PRN    Or    glucose (Dex4) 15 GM/59ML oral liquid 30 g  30 g Oral Q15 Min PRN    Or    glucose (Glutose) 40% oral gel 30 g  30 g Oral Q15 Min PRN    Or    glucose-vitamin C (Dex-4) chewable tab 8 tablet  8 tablet Oral Q15 Min PRN    insulin aspart (NovoLOG) 100 Units/mL FlexPen 1-68 Units  1-68 Units Subcutaneous TID CC    insulin aspart (NovoLOG) 100 Units/mL FlexPen 1-10 Units  1-10 Units Subcutaneous TID AC and HS    enoxaparin (Lovenox) 40 MG/0.4ML SUBQ injection 40 mg  40 mg Subcutaneous Daily    acetaminophen (Tylenol Extra Strength) tab 500 mg  500 mg Oral Q4H PRN    HYDROcodone-acetaminophen (Norco) 5-325 MG per tab 1 tablet  1 tablet Oral Q6H PRN    melatonin tab 3 mg  3 mg Oral Nightly PRN    polyethylene glycol (PEG 3350) (Miralax) 17 g oral packet 17 g  17 g Oral Daily PRN    sennosides (Senokot) tab 17.2 mg  17.2 mg Oral Nightly PRN    bisacodyl (Dulcolax) 10 MG rectal suppository 10 mg  10 mg Rectal Daily PRN    fleet enema (Fleet) rectal enema 133 mL  1 enema Rectal Once PRN    ondansetron (Zofran) 4 MG/2ML injection 4 mg  4 mg Intravenous Q6H PRN    metoclopramide (Reglan) 5 mg/mL injection 5 mg  5 mg  Intravenous Q8H PRN    benzonatate (Tessalon) cap 200 mg  200 mg Oral TID PRN    guaiFENesin (Robitussin) 100 MG/5 ML oral liquid 200 mg  200 mg Oral Q4H PRN    glycerin-hypromellose- (Artificial Tears) 0.2-0.2-1 % ophthalmic solution 1 drop  1 drop Both Eyes QID PRN    sodium chloride (Saline Mist) 0.65 % nasal solution 1 spray  1 spray Each Nare Q3H PRN    influenza virus trivalent high dose PF (Fluzone HD) 0.5 mL injection (ages >/= 65 years) 0.5 mL  0.5 mL Intramuscular Prior to discharge     Medications Prior to Admission   Medication Sig    acetaminophen 325 MG Oral Tab Take 1 tablet (325 mg total) by mouth every 4 (four) hours as needed for Pain.    insulin degludec (TRESIBA FLEXTOUCH) 200 UNIT/ML Subcutaneous Solution Pen-injector Inject daily as directed up to TDD = 30 units (Patient taking differently: Inject 30 Units into the skin daily. Inject daily as directed up to TDD = 30 units)    METFORMIN  MG Oral Tablet 24 Hr TAKE 1 TABLET(500 MG) BY MOUTH TWICE DAILY WITH MEALS    oxybutynin ER 5 MG Oral Tablet 24 Hr Take 1 tablet (5 mg total) by mouth daily. (Patient taking differently: Take 1 tablet (5 mg total) by mouth 2 (two) times daily.)    Polyethylene Glycol 3350 (MIRALAX) 17 g Oral Powd Pack Take 17 g by mouth daily as needed (Take to avoid constipation, especially if taking narcotic pain medications.).    Insulin Pen Needle (BD PEN NEEDLE TITO 2ND GEN) 32G X 4 MM Does not apply Misc Inject 1 each into the skin daily.    PANTOPRAZOLE 40 MG Oral Tab EC TAKE 1 TABLET(40 MG) BY MOUTH DAILY    lisinopril 5 MG Oral Tab Take 1 tablet (5 mg total) by mouth daily.    Glucose Blood (ONETOUCH VERIO) In Vitro Strip USE TO TEST ONCE TO TWICE DAILY    OneTouch Delica Lancets 33G Does not apply Misc 1 Lancet by Finger stick route daily.    rosuvastatin 10 MG Oral Tab TAKE 1 TABLET(10 MG) BY MOUTH EVERY NIGHT    timolol 0.5 % Ophthalmic Solution Place 1 drop into both eyes 2 (two) times daily.        Allergies  Allergies[1]    Review of Systems:   Constitutional: No fevers, chills, fatigue or night sweats.  ENT: No neck pain, running nose, headaches.  Skin: No rashes, pruritus or skin changes,  Respiratory: No coughing, phlegm or wheezing.  CV: Denies chest pain, palpitations, orthopnea, or dizziness.  Musculoskeletal: No joint pain, stiffness or swelling.  GI: No nausea, vomiting.  No diarrhea.  Denies any abdominal pain or unintentional weight loss.  No food fear.  No postprandial abdominal pain.   Neurologic: No seizures, tremors, weakness or numbness.    Physical Exam:   Blood pressure 156/56, pulse 70, temperature 98.7 °F (37.1 °C), temperature source Oral, resp. rate 18, height 5' 1\" (1.549 m), weight 172 lb (78 kg), SpO2 96%.  Intake/Output:   Last 3 shifts: I/O last 3 completed shifts:  In: 1260 [P.O.:810; I.V.:450]  Out: 2250 [Urine:2250]   This shift: I/O this shift:  In: -   Out: 100 [Urine:100]     Vent Settings:      General: NAD  Neck: No JVD  Lungs: CTA bilat  Heart: RRR  Abdomen: Soft, no tenderness or sign of peritonitis.  No rigidity or self guarding.    Extremities: Warm, dry, no LE edema bilat  Vascular: Palpable bilateral femoral pulses.  Palpable right DP pulse.  Nonpalpable right PT pulse  Skin: no rashes or legions noted  Neurological:  AAOx3, MAEW    Hemodynamic parameters (last 24 hours):      Medications:     Scheduled Meds:    piperacillin-tazobactam  4.5 g Intravenous Q8H    acidophilus  1 each Oral Daily    insulin degludec  30 Units Subcutaneous Daily    lisinopril  5 mg Oral Daily    oxybutynin ER  5 mg Oral BID    pantoprazole  40 mg Oral QAM AC    rosuvastatin  10 mg Oral Nightly    timolol  1 drop Both Eyes BID    insulin aspart  1-68 Units Subcutaneous TID CC    insulin aspart  1-10 Units Subcutaneous TID AC and HS    enoxaparin  40 mg Subcutaneous Daily       Results:     Laboratory Data:  Lab Results   Component Value Date    WBC 7.2 12/28/2024    HGB 11.1 (L)  12/28/2024    HCT 32.9 (L) 12/28/2024    .0 12/28/2024    CREATSERUM 0.97 12/31/2024    BUN 17 12/28/2024     12/28/2024    K 4.0 12/28/2024     12/28/2024    CO2 25.0 12/28/2024     (H) 12/28/2024    CA 9.1 12/28/2024    ALB 4.2 12/27/2024    ALKPHO 100 12/27/2024    TP 7.1 12/27/2024    AST 15 12/27/2024    ALT <7 (L) 12/27/2024    T4F 1.6 12/07/2015    TSH 1.340 06/04/2024     08/08/2022    ESRML 32 (H) 12/27/2024    CRP 1.13 (H) 12/16/2017    MG 1.9 07/15/2024    PHOS 3.6 07/15/2024    B12 259 06/04/2024         Imaging:    MRI FOOT, RIGHT (CPT=73718)  Narrative: PROCEDURE:  MRI FOOT, RIGHT (CPT=73718)     COMPARISON:  [facility] , XR, XR FOOT, COMPLETE (MIN 3 VIEWS), RIGHT (CPT=73630), 12/27/2024, 8:49 PM.     INDICATIONS:  Concern for osteomyelitis, foot pain, abnormal x-ray     TECHNIQUE:  A variety of imaging planes and parameters were utilized for visualization of suspected pathology.  Images were performed without contrast.     PATIENT STATED HISTORY: (As transcribed by Technologist)  Patient states right foot infection.       FINDINGS:    Soft tissue ulceration along the distal aspect of the great toe overlies the distal phalanx and there is moderate generalized edema signal associated with the great toe which is concerning for cellulitis in this region.  There is marrow signal   abnormality in the distal phalanx including areas of low T1 signal, STIR hyperintensity, and cortical irregularity that is highly concerning for osteomyelitis as was suggested on recent plain film radiographs.  Clinical correlation recommended.  There is   no discrete soft tissue abscess identified.     Microangiopathic changes noted in the plantar soft tissues.     Impression: CONCLUSION:  Soft tissue ulceration along the distal aspect of the great toe overlies the distal phalanx and there is moderate generalized edema signal associated with the great toe which is concerning for cellulitis in this  region.  There is marrow   signal abnormality in the distal phalanx including areas of low T1 signal, STIR hyperintensity, and cortical irregularity that is highly concerning for osteomyelitis as was suggested on recent plain film radiographs.  Clinical correlation recommended.    There is no discrete soft tissue abscess identified.      LOCATION:  [facility]       US ARTERIAL DUPLEX LOWER EXTREMITY BILATERAL (CPT=93925)  Narrative: PROCEDURE:  US ARTERIAL DUPLEX LOWER EXTREMITY BILATERAL (CPT=93925)     COMPARISON:  None.     INDICATIONS:  Right toe diabetic foot ulcer, eval for peripheral arterial disease.     TECHNIQUE:  A comprehensive color duplex Doppler ultrasound examination of the bilateral lower extremities was performed.  Color imaging, Doppler wave form analysis, and peak systolic flow measurements of the common femoral, profunda femoral, superficial   femoral, and popliteal arteries were performed.       PATIENT STATED HISTORY: (As transcribed by Technologist)  Patient states that she has had a right toe ulcer that has been getting worse.          FINDINGS:    RIGHT EXTREMITY:  There are diffusely biphasic waveform patterns from the CFA to the proximal PTA and monophasic waveform patterns of the mid to distal PTA.  Diffuse atherosclerotic plaque.  LEFT EXTREMITY:  Diffusely biphasic waveform patterns from the CFA to the distal PTA.  Diffuse atherosclerotic plaquing.  OTHER:  None.     SYSTOLIC VELOCITIES (CM/S):  RIGHT COMMON FEMORAL:      109    RIGHT PROFUNDA FEMORAL:      99      RIGHT SUPERFICIAL FEMORAL PROX:    104  RIGHT SUPERFICIAL FEMORAL MID:    193   RIGHT SUPERFICIAL FEMORAL DISTAL:    197   RIGHT POPLITEAL PROX:      105   RIGHT POPLITEAL DISTAL:      57   TIBIOPER TRUNK:      73  RIGHT PTA PROX:      46  RIGHT PTA MID:      11   RIGHT PTA DISTAL:      18   RIGHT MATT PROX:      65  RIGHT MATT MID:      157    RIGHT DORSALIS PEDIS:      70   RIGHT GREAT TOE PRESSURE:      Could not be obtained.      SYSTOLIC VELOCITIES (CM./S):  LEFT COMMON FEMORAL:      138    LEFT PROFUNDA FEMORAL:      163      LEFT SUPERFICIAL FEMORAL PROX:    108  LEFT SUPERFICIAL FEMORAL MID:      206   LEFT SUPERFICIAL FEMORAL DISTAL:    175   LEFT POPLITEAL PROX:      53   LEFT POPLITEAL DISTAL:      69   TIBIOPER TRUNK:      63   LEFT PTA PROX:      165   LEFT PTA MID:      85   LEFT PTA DISTAL:      72   LEFT MATT PROX:      174  LEFT MATT MID:      59    LEFT DORSALIS PEDIS::      88   LEFT GREAT TOE PRESSURE:      254 mmHg                      Impression: CONCLUSION:      1. Diffuse atherosclerotic plaquing bilaterally with diffuse biphasic waveform patterns involving both lower extremities.  2. There is a focus of increased arterial velocity involving the left mid SFA suggesting mild to moderate grade stenosis approximating 50%.    3. Increased arterial velocity within the right mid MATT and left proximal PTA suspicious for stenosis approximating 75%.        Impression:   69 year old female with past medical history of DM, GERD, HTN, HLP, and osteoarthritis who presented with right greater toe wound with delayed wound wound healing.  MRI of the right foot was notable for osteomyelitis of the right first distal phalanx bone.  She also has an arterial duplex ultrasound of the right lower extremity which is notable for moderate atherosclerotic disease of mainly right tibial vessels.  Vascular surgery was consulted for further evaluation and recommendations.  She denies having any fevers or significant drainage from the wound.  I reviewed podiatry consult note.  I reviewed patient's duplex US images.    Recommendations:  -No vascular surgery intervention is indicated at this time.  Patient has palpable right DP pulse.  With DP peak systolic velocity of 70 she should be able to heal a toe wound.  The reason for delayed wound healing is most likely the underlying osteomyelitis of the distal phalanx bone.   -Discussing the patient with   Hugo of podiatry, the plan is to put patient on prolonged antibiotics and follow her as an outpatient.  - I will ensure to follow the patient outpatiently as well with 6-month ABIs and a clinic visit.  I will gladly see the patient in the clinic if wound is not healing despite of resolution of osteomyelitis.  If that is the case she may need diagnostic angiogram to ensure the angiosome of the right greater toe is off of DP, and if that is not the case she may need intervention on her PT or peroneal artery.  -Please call with any questions or concerns.    Thank you for allowing me to participate in the care of your patient.    Marta Foley MD  12/31/2024  [facility], Division of Vascular Surgery    Please note: Dragon speech recognition software was used to prepare this note. If a word or phrase is confusing, it is likely do to a failure of recognition.   Please contact me with any questions or clarifications.       [1] No Known Allergies

## 2024-12-31 NOTE — PROGRESS NOTES
INFECTIOUS DISEASE PROGRESS NOTE    Marcela Moreno Patient Status:  Inpatient    10/22/1955 MRN [id number]   [facility] 3NW-A Attending Sarthak Lowry, DO   Hosp Day # 4 PCP Rhiannon Betancourt MD     Abx: unasyn--> zosyn d#3, s/p IV vanco    Subjective: pt seen and examined today. Feeling well, denies any foot or toe pain. Afebrile.     Medications:    Current Facility-Administered Medications:     piperacillin-tazobactam (Zosyn) 4.5 g in dextrose 5% 100 mL IVPB-ADDV, 4.5 g, Intravenous, Q8H    acidophilus (Probiotic) cap/tab 1 each, 1 each, Oral, Daily    insulin degludec (Tresiba) 100 units/mL flextouch 30 Units, 30 Units, Subcutaneous, Daily    lisinopril (Prinivil; Zestril) tab 5 mg, 5 mg, Oral, Daily    oxybutynin ER (Ditropan-XL) 24 hr tab 5 mg, 5 mg, Oral, BID    pantoprazole (Protonix) DR tab 40 mg, 40 mg, Oral, QAM AC    rosuvastatin (Crestor) tab 10 mg, 10 mg, Oral, Nightly    timolol (Timoptic) 0.5 % ophthalmic solution 1 drop, 1 drop, Both Eyes, BID    glucose (Dex4) 15 GM/59ML oral liquid 15 g, 15 g, Oral, Q15 Min PRN **OR** glucose (Glutose) 40% oral gel 15 g, 15 g, Oral, Q15 Min PRN **OR** glucose-vitamin C (Dex-4) chewable tab 4 tablet, 4 tablet, Oral, Q15 Min PRN **OR** dextrose 50% injection 50 mL, 50 mL, Intravenous, Q15 Min PRN **OR** glucose (Dex4) 15 GM/59ML oral liquid 30 g, 30 g, Oral, Q15 Min PRN **OR** glucose (Glutose) 40% oral gel 30 g, 30 g, Oral, Q15 Min PRN **OR** glucose-vitamin C (Dex-4) chewable tab 8 tablet, 8 tablet, Oral, Q15 Min PRN    insulin aspart (NovoLOG) 100 Units/mL FlexPen 1-68 Units, 1-68 Units, Subcutaneous, TID CC    insulin aspart (NovoLOG) 100 Units/mL FlexPen 1-10 Units, 1-10 Units, Subcutaneous, TID AC and HS    enoxaparin (Lovenox) 40 MG/0.4ML SUBQ injection 40 mg, 40 mg, Subcutaneous, Daily    acetaminophen (Tylenol Extra Strength) tab 500 mg, 500 mg, Oral, Q4H PRN     HYDROcodone-acetaminophen (Norco) 5-325 MG per tab 1 tablet, 1 tablet, Oral, Q6H PRN    melatonin tab 3 mg, 3 mg, Oral, Nightly PRN    polyethylene glycol (PEG 3350) (Miralax) 17 g oral packet 17 g, 17 g, Oral, Daily PRN    sennosides (Senokot) tab 17.2 mg, 17.2 mg, Oral, Nightly PRN    bisacodyl (Dulcolax) 10 MG rectal suppository 10 mg, 10 mg, Rectal, Daily PRN    fleet enema (Fleet) rectal enema 133 mL, 1 enema, Rectal, Once PRN    ondansetron (Zofran) 4 MG/2ML injection 4 mg, 4 mg, Intravenous, Q6H PRN    metoclopramide (Reglan) 5 mg/mL injection 5 mg, 5 mg, Intravenous, Q8H PRN    benzonatate (Tessalon) cap 200 mg, 200 mg, Oral, TID PRN    guaiFENesin (Robitussin) 100 MG/5 ML oral liquid 200 mg, 200 mg, Oral, Q4H PRN    glycerin-hypromellose- (Artificial Tears) 0.2-0.2-1 % ophthalmic solution 1 drop, 1 drop, Both Eyes, QID PRN    sodium chloride (Saline Mist) 0.65 % nasal solution 1 spray, 1 spray, Each Nare, Q3H PRN    influenza virus trivalent high dose PF (Fluzone HD) 0.5 mL injection (ages >/= 65 years) 0.5 mL, 0.5 mL, Intramuscular, Prior to discharge    Review of Systems:  Completed. See pertinent positives and negatives in the the HPI.      Physical Exam:  General: No acute distress. Alert and oriented x 3. On room air.   Vital signs: Blood pressure 156/56, pulse 70, temperature 98.7 °F (37.1 °C), temperature source Oral, resp. rate 18, height 154.9 cm (5' 1\"), weight 172 lb (78 kg), SpO2 96%.  HEENT: no scleral icterus or conjunctival injection. Moist mucous membranes.   Neck: Supple.  Respiratory: Non-labored breathing.   Abdomen: Nondistended.  Musculoskeletal: Full range of motion of all extremities.  No arthritis or deformity. Bilateral LE edema.   Integument: No rash. Foot with c/d/d. No cellulitis of exposed skin.    Laboratory Data:    Recent Labs   Lab 12/27/24 2023 12/28/24  0657   RBC 3.62* 3.80   HGB 10.5* 11.1*   HCT 31.5* 32.9*   MCV 87.0 86.6   MCH 29.0 29.2   MCHC 33.3 33.7   RDW  13.1 13.2   NEPRELIM 4.78 4.83   WBC 8.1 7.2   .0 194.0     Recent Labs   Lab 12/27/24 2022 12/28/24  0657 12/30/24  0540 12/31/24  0501   * 136*  --   --    BUN 21 17  --   --    CREATSERUM 1.10* 0.88  0.88 0.96 0.97   CA 9.4 9.1  --   --    ALB 4.2  --   --   --     143  --   --    K 4.3 4.0  --   --     111  --   --    CO2 24.0 25.0  --   --    ALKPHO 100  --   --   --    AST 15  --   --   --    ALT <7*  --   --   --    BILT 0.5  --   --   --    TP 7.1  --   --   --      Sed Rate (mm/Hr)   Date Value   12/27/2024 32 (H)      C-Reactive Protein (mg/dL)   Date Value   12/16/2017 1.13 (H)      No results found for: \"VANCT\"  No results for input(s): \"COLORUR\", \"CLARITY\", \"SPECGRAVITY\", \"GLUUR\", \"BILUR\", \"KETUR\", \"BLOODURINE\", \"PHURINE\", \"PROUR\", \"UROBILINOGEN\", \"NITRITE\", \"LEUUR\", \"WBCUR\", \"RBCUR\", \"BACUR\", \"EPIUR\" in the last 168 hours.     Microbiology:    Reviewed in EMR,   Susceptibility data from last 90 days.  Collected Specimen Info Organism Cefazolin Cefepime Ceftazidime Ciprofloxacin Clindamycin Erythromycin Gentamicin Levofloxacin Meropenem Oxacillin Piperacillin/Tazobactam Tobramycin Trimethoprim/Sulfamethoxazole Vancomycin   12/27/24 Other from Toe Staphylococcus aureus S     S  S  S  S   S    S  S     Pseudomonas aeruginosa   S  S  S     S  S   S  S           Radiology: Reviewed.    PROCEDURE:  MRI FOOT, RIGHT (CPT=73718)     COMPARISON:  [facility] , XR, XR FOOT, COMPLETE (MIN 3 VIEWS), RIGHT (CPT=73630), 12/27/2024, 8:49 PM.     INDICATIONS:  Concern for osteomyelitis, foot pain, abnormal x-ray     TECHNIQUE:  A variety of imaging planes and parameters were utilized for visualization of suspected pathology.  Images were performed without contrast.     PATIENT STATED HISTORY: (As transcribed by Technologist)  Patient states right foot infection.      FINDINGS:    Soft tissue ulceration along the distal aspect of the great toe overlies the distal phalanx and there is moderate  generalized edema signal associated with the great toe which is concerning for cellulitis in this region.  There is marrow signal  abnormality in the distal phalanx including areas of low T1 signal, STIR hyperintensity, and cortical irregularity that is highly concerning for osteomyelitis as was suggested on recent plain film radiographs.  Clinical correlation recommended.  There is   no discrete soft tissue abscess identified.     Microangiopathic changes noted in the plantar soft tissues.     Impression:    CONCLUSION:  Soft tissue ulceration along the distal aspect of the great toe overlies the distal phalanx and there is moderate generalized edema signal associated with the great toe which is concerning for cellulitis in this region.  There is marrow  signal abnormality in the distal phalanx including areas of low T1 signal, STIR hyperintensity, and cortical irregularity that is highly concerning for osteomyelitis as was suggested on recent plain film radiographs.  Clinical correlation recommended.    There is no discrete soft tissue abscess identified.     LOCATION:  [facility]     Dictated by (CST): Bayron Gardner MD on 12/30/2024 at 10:25 PM      Finalized by (CST): Bayron Gardner MD on 12/30/2024 at 10:28 PM         PROCEDURE:  US ARTERIAL DUPLEX LOWER EXTREMITY BILATERAL (CPT=93925)     COMPARISON:  None.     INDICATIONS:  Right toe diabetic foot ulcer, eval for peripheral arterial disease.     TECHNIQUE:  A comprehensive color duplex Doppler ultrasound examination of the bilateral lower extremities was performed.  Color imaging, Doppler wave form analysis, and peak systolic flow measurements of the common femoral, profunda femoral, superficial   femoral, and popliteal arteries were performed.       PATIENT STATED HISTORY: (As transcribed by Technologist)  Patient states that she has had a right toe ulcer that has been getting worse.         FINDINGS:    RIGHT EXTREMITY:  There are diffusely biphasic waveform  patterns from the CFA to the proximal PTA and monophasic waveform patterns of the mid to distal PTA.  Diffuse atherosclerotic plaque.  LEFT EXTREMITY:  Diffusely biphasic waveform patterns from the CFA to the distal PTA.  Diffuse atherosclerotic plaquing.  OTHER:  None.     SYSTOLIC VELOCITIES (CM/S):  RIGHT COMMON FEMORAL:      109    RIGHT PROFUNDA FEMORAL:      99      RIGHT SUPERFICIAL FEMORAL PROX:    104  RIGHT SUPERFICIAL FEMORAL MID:    193  RIGHT SUPERFICIAL FEMORAL DISTAL:    197  RIGHT POPLITEAL PROX:      105  RIGHT POPLITEAL DISTAL:      57  TIBIOPER TRUNK:      73  RIGHT PTA PROX:      46  RIGHT PTA MID:      11  RIGHT PTA DISTAL:      18  RIGHT MATT PROX:      65  RIGHT MATT MID:      157    RIGHT DORSALIS PEDIS:      70  RIGHT GREAT TOE PRESSURE:      Could not be obtained.     SYSTOLIC VELOCITIES (CM./S):  LEFT COMMON FEMORAL:      138    LEFT PROFUNDA FEMORAL:      163      LEFT SUPERFICIAL FEMORAL PROX:    108  LEFT SUPERFICIAL FEMORAL MID:      206  LEFT SUPERFICIAL FEMORAL DISTAL:    175  LEFT POPLITEAL PROX:      53  LEFT POPLITEAL DISTAL:      69  TIBIOPER TRUNK:      63  LEFT PTA PROX:      165  LEFT PTA MID:      85  LEFT PTA DISTAL:      72  LEFT MATT PROX:      174  LEFT MATT MID:      59    LEFT DORSALIS PEDIS::      88  LEFT GREAT TOE PRESSURE:      254 mmHg    Impression:    CONCLUSION:    1. Diffuse atherosclerotic plaquing bilaterally with diffuse biphasic waveform patterns involving both lower extremities.  2. There is a focus of increased arterial velocity involving the left mid SFA suggesting mild to moderate grade stenosis approximating 50%.    3. Increased arterial velocity within the right mid MATT and left proximal PTA suspicious for stenosis approximating 75%.    LOCATION:  [facility]     Dictated by (CST): Maame Rodrigues DO on 12/30/2024 at 1:23 PM      Finalized by (CST): Maame Rodrigues DO on 12/30/2024 at 1:27 PM        XR FOOT, COMPLETE (MIN 3 VIEWS), RIGHT (CPT=73630)    Result  Date: 12/27/2024  PROCEDURE:  XR FOOT, COMPLETE (MIN 3 VIEWS), RIGHT (CPT=73630)  TECHNIQUE:  AP, oblique, and lateral views were obtained.  COMPARISON:  None.  INDICATIONS:  Non-healing wound to right foot -  PATIENT STATED HISTORY: (As transcribed by Technologist)  Patient states open wound along plantar aspect of 1st digit on right foot x1 month.               CONCLUSION:   Focal soft tissue defect/ulceration noted along the plantar aspect of the 1st distal phalanx.  There is associated cortical erosion/destruction involving the tip of the 1st distal phalanx most in keeping with acute osteomyelitis.  These findings are on a background of generalized osteopenia.  Scattered arthropathy of the mid and forefoot noted.  Large plantar calcaneal spur.   LOCATION:  [facility]   Dictated by (CST): Matheus Gutierrez MD on 12/27/2024 at 9:15 PM     Finalized by (CST): Matheus Gutierrez MD on 12/27/2024 at 9:16 PM            ASSESSMENT:  R hallux diabetic foot ulcer with suspected OM of the distal toe per xray and MRI  Superf cx with S aureus (MSSA) and PSAR  MRI highly concerning for OM  Type 2 DM, hgb A1C 7.5 (11/2024)    PLAN:  -continue IV zosyn  -arterial dopplers noted, vascular on consult  -MRI noted, await podiatry eval.     Addendum: Dr. Bruno discussed with Dr. Hugo, given wound appears superficial and clean currently- could try a course of po abx levaquin/cefadroxil x 2 weeks based on barbara/MICs and monitor closely. However, if worsening appearance surgical intervention may need to be considered.     Case and plan d/w pt, Dr. Bruno and RN.  Will follow    Denae Long PA-C    ID ATTENDING ADDENDUM     Pt seen an examined independently. Chart reviewed. Agree with above. Note has been reviewed by me and modified as needed.  Exam and Impression/ Recs as noted above.  D/w pt and with Dr Hugo at length. Plan as above  More than 50% of clinical time and 100% of the clinical decision making performed by me.    Missy Bruno,  MD

## 2024-12-31 NOTE — PROGRESS NOTES
[facility]   part of [facility]    PODIATRY PROGRESS NOTE    Marcela Moreno Patient Status:  Inpatient    10/22/1955 MRN [id number]   Location [facility] 3NW-A Attending Luna Miller MD   Hosp Day # 4 PCP Rhiannon Betancourt MD     Date of Admission:  2024    History of Present Illness:  Patient seen this afternoon.  Denying any overnight events or current pain.  Dressings are intact to right foot.    History:  Past Medical History:    Abdominal pain    Arthritis    AVM (arteriovenous malformation) brain (HCC)    being followed at [facility]    Back pain    Back problem    Bloating    Calculus of kidney    Constipation    Diabetes mellitus ([facility])    Diarrhea, unspecified    Esophageal reflux    Fatigue    Food intolerance    Frequent urination    Heartburn    High blood pressure    High cholesterol    History of depression    Indigestion    Irregular bowel habits    Leaking of urine    Leg swelling    Muscle weakness    Neuropathy    TOES SOMETIMES    Osteoarthritis    Pain in joints    Pain with bowel movements    Problems with swallowing    Sleep disturbance    Sputum production    Stool incontinence    Stroke ([facility])    LEFT SIDE EFFECTED- USING CANE    Type II or unspecified type diabetes mellitus without mention of complication, not stated as uncontrolled    Uncomfortable fullness after meals    Vitamin D deficiency    Weight gain     Past Surgical History:   Procedure Laterality Date    Brain surgery  2015    avm   [facility]          X3    Cholecystectomy      Colonoscopy N/A 2024    Procedure: COLONOSCOPY;  Surgeon: Guerrero Moser MD;  Location:  ENDOSCOPY    Cystoscopy,insert ureteral stent      Hernia surgery      Other surgical history      LEFT TIBIA FRACTURE- PLATE AND SCREWS       Family History   Problem Relation Age of Onset    Diabetes Mother     Hypertension Mother     Hypertension Father     Heart Disorder Brother         CAD stent in his 40s     Diabetes Brother     Diabetes Sister     Heart Attack Brother       reports that she has never smoked. She has never used smokeless tobacco. She reports that she does not currently use alcohol. She reports that she does not use drugs.    Allergies:  Allergies[1]    Medications:    Current Facility-Administered Medications:     piperacillin-tazobactam (Zosyn) 4.5 g in dextrose 5% 100 mL IVPB-ADDV, 4.5 g, Intravenous, Q8H    acidophilus (Probiotic) cap/tab 1 each, 1 each, Oral, Daily    insulin degludec (Tresiba) 100 units/mL flextouch 30 Units, 30 Units, Subcutaneous, Daily    lisinopril (Prinivil; Zestril) tab 5 mg, 5 mg, Oral, Daily    oxybutynin ER (Ditropan-XL) 24 hr tab 5 mg, 5 mg, Oral, BID    pantoprazole (Protonix) DR tab 40 mg, 40 mg, Oral, QAM AC    rosuvastatin (Crestor) tab 10 mg, 10 mg, Oral, Nightly    timolol (Timoptic) 0.5 % ophthalmic solution 1 drop, 1 drop, Both Eyes, BID    glucose (Dex4) 15 GM/59ML oral liquid 15 g, 15 g, Oral, Q15 Min PRN **OR** glucose (Glutose) 40% oral gel 15 g, 15 g, Oral, Q15 Min PRN **OR** glucose-vitamin C (Dex-4) chewable tab 4 tablet, 4 tablet, Oral, Q15 Min PRN **OR** dextrose 50% injection 50 mL, 50 mL, Intravenous, Q15 Min PRN **OR** glucose (Dex4) 15 GM/59ML oral liquid 30 g, 30 g, Oral, Q15 Min PRN **OR** glucose (Glutose) 40% oral gel 30 g, 30 g, Oral, Q15 Min PRN **OR** glucose-vitamin C (Dex-4) chewable tab 8 tablet, 8 tablet, Oral, Q15 Min PRN    insulin aspart (NovoLOG) 100 Units/mL FlexPen 1-68 Units, 1-68 Units, Subcutaneous, TID CC    insulin aspart (NovoLOG) 100 Units/mL FlexPen 1-10 Units, 1-10 Units, Subcutaneous, TID AC and HS    enoxaparin (Lovenox) 40 MG/0.4ML SUBQ injection 40 mg, 40 mg, Subcutaneous, Daily    acetaminophen (Tylenol Extra Strength) tab 500 mg, 500 mg, Oral, Q4H PRN    HYDROcodone-acetaminophen (Norco) 5-325 MG per tab 1 tablet, 1 tablet, Oral, Q6H PRN    melatonin tab 3 mg, 3 mg, Oral, Nightly PRN    polyethylene glycol (PEG 3350)  (Miralax) 17 g oral packet 17 g, 17 g, Oral, Daily PRN    sennosides (Senokot) tab 17.2 mg, 17.2 mg, Oral, Nightly PRN    bisacodyl (Dulcolax) 10 MG rectal suppository 10 mg, 10 mg, Rectal, Daily PRN    fleet enema (Fleet) rectal enema 133 mL, 1 enema, Rectal, Once PRN    ondansetron (Zofran) 4 MG/2ML injection 4 mg, 4 mg, Intravenous, Q6H PRN    metoclopramide (Reglan) 5 mg/mL injection 5 mg, 5 mg, Intravenous, Q8H PRN    benzonatate (Tessalon) cap 200 mg, 200 mg, Oral, TID PRN    guaiFENesin (Robitussin) 100 MG/5 ML oral liquid 200 mg, 200 mg, Oral, Q4H PRN    glycerin-hypromellose- (Artificial Tears) 0.2-0.2-1 % ophthalmic solution 1 drop, 1 drop, Both Eyes, QID PRN    sodium chloride (Saline Mist) 0.65 % nasal solution 1 spray, 1 spray, Each Nare, Q3H PRN    influenza virus trivalent high dose PF (Fluzone HD) 0.5 mL injection (ages >/= 65 years) 0.5 mL, 0.5 mL, Intramuscular, Prior to discharge    Review of Systems:  10 point ROS completed and was negative, except for pertinent positive and negatives stated in subjective.    Physical Exam:        GENERAL: well developed, well nourished, in no apparent distress  EXTREMITIES:              1. Integument: Following debridement of all nonviable tissue today, full-thickness ulceration to plantar aspect of right hallux measuring approximately 1.2 cm x 0.9 cm x 0.1 cm.  No surrounding erythema, purulent drainage, or other signs of infection   2. Vascular: Was able to palpate DP pulse of RLE today.  Unable to palpate PT pulse              3. Musculoskeletal: No pain to right hallux ulceration site.  Compartments are soft and compressible.  Muscle strength testing deferred.              4. Neurological: Decreased protective sensation noted       Imaging:  MRI FOOT, RIGHT (CPT=73718)    Result Date: 12/30/2024  CONCLUSION:  Soft tissue ulceration along the distal aspect of the great toe overlies the distal phalanx and there is moderate generalized edema signal  associated with the great toe which is concerning for cellulitis in this region.  There is marrow signal abnormality in the distal phalanx including areas of low T1 signal, STIR hyperintensity, and cortical irregularity that is highly concerning for osteomyelitis as was suggested on recent plain film radiographs.  Clinical correlation recommended.  There is no discrete soft tissue abscess identified.  LOCATION:  [facility]   Dictated by (CST): Bayron Gardner MD on 12/30/2024 at 10:25 PM     Finalized by (CST): Bayron Gardner MD on 12/30/2024 at 10:28 PM       US ARTERIAL DUPLEX LOWER EXTREMITY BILATERAL (CPT=93925)    Result Date: 12/30/2024  CONCLUSION:  1. Diffuse atherosclerotic plaquing bilaterally with diffuse biphasic waveform patterns involving both lower extremities. 2. There is a focus of increased arterial velocity involving the left mid SFA suggesting mild to moderate grade stenosis approximating 50%.  3. Increased arterial velocity within the right mid MATT and left proximal PTA suspicious for stenosis approximating 75%.   LOCATION:  [facility]   Dictated by (CST): Maame Rodrigues DO on 12/30/2024 at 1:23 PM     Finalized by (CST): Maame Rodrigues DO on 12/30/2024 at 1:27 PM         Laboratory Data:  Recent Labs   Lab 12/28/24  0657   RBC 3.80   HGB 11.1*   HCT 32.9*   MCV 86.6   MCH 29.2   MCHC 33.7   RDW 13.2   NEPRELIM 4.83   WBC 7.2   .0       Impression:  Patient Active Problem List   Diagnosis    Mixed hyperlipidemia    Type 2 diabetes mellitus with hyperglycemia, with long-term current use of insulin (HCC)    Status post stroke    Essential hypertension    H/O arteriovenous malformation (AVM)    Hypothyroidism    Class 1 obesity due to excess calories with serious comorbidity and body mass index (BMI) of 30.0 to 30.9 in adult    Overactive bladder    RLS (restless legs syndrome)    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 associated with type 2 diabetes mellitus  (HCC)    Age-related osteoporosis without current pathological fracture    Ventral hernia without obstruction or gangrene    Cellulitis and abscess of toe of right foot    Osteomyelitis of right foot, unspecified type (HCC)    Diabetic ulcer of right great toe (HCC)         Plan:  #Diabetic ulcer of right great toe with fat layer exposed  #Osteomyelitis of right distal phalanx of the hallux    -Patient seen this afternoon.  No current signs of infection to right great toe ulcer.  No leukocytosis, afebrile, previous ESR from 12/27/2024 slightly elevated to 32.    -Excisional debridement was performed today at the bedside utilizing a #11 blade, with care to remove all nonviable, hyperkeratotic tissue.  There was a granular, bleeding wound bed noted upon debridement.  No deep probing to the bone, no tunneling, no undermining, no current purulent drainage or other signs of infection.    -Patient's ulceration is superficial in nature with no probing to bone.  MRI did reveal concerns of osteomyelitis to the distal phalanx of the right hallux.  Patient's options were discussed in length today.    -Discussed case with Dr. Foley, vascular surgeon, and Dr. Bruno, infectious disease.  Patient does have adequate flow for healing per vascular surgery.  I am recommending close outpatient follow-up/debridements in the wound care center with outpatient antibiotics to cover concerns of osteomyelitis.    -Patient does know that she is at risk of needing a partial versus full right hallux amputation if her symptoms do not continue to improve or acutely worsen.    -Patient's ulceration covered with Xeroform, 4 x 4 gauze, Kerlix, and Ace bandage.  She can change this 1-2 times weekly.  I will have patient follow-up with me in wound care center next Monday.    -Patient is okay to weight-bear as tolerated in a postop shoe.  Will have nursing staff provide patient with this prior to discharge.    -Patient will continue antibiotics per  infectious disease.  Appreciate recommendations    -From podiatry standpoint, patient is stable for discharge when all services signed off    Héctor Hugo DPM   12/31/2024  3:36 PM         [1] No Known Allergies

## 2024-12-31 NOTE — PROGRESS NOTES
[facility]   part of [facility]     Hospitalist Progress Note     Marcela Moreno Patient Status:  Inpatient    10/22/1955 MRN [id number]   Location [facility] 3NW-A Attending Sarthak Lowry, DO   Hosp Day # 4 PCP Rhiannon Betancourt MD     Chief Complaint: foot ulcer     Subjective:     Patient doing well. No pain    Objective:    Review of Systems:   A comprehensive review of systems was completed; pertinent positive and negatives stated in subjective.    Vital signs:  Temp:  [97.6 °F (36.4 °C)-98.7 °F (37.1 °C)] 98.7 °F (37.1 °C)  Pulse:  [70-81] 70  Resp:  [18] 18  BP: (124-170)/(56-74) 156/56  SpO2:  [94 %-96 %] 96 %    Physical Exam:    General: No acute distress  Respiratory: No wheezes, no rhonchi  Cardiovascular: S1, S2, regular rate and rhythm  Abdomen: Soft, Non-tender, non-distended, positive bowel sounds  Neuro: No new focal deficits.   Extremities: No edema- R great toe ulcer non tender, erythema    Diagnostic Data:    Labs:  Recent Labs   Lab 24  0657   WBC 8.1 7.2   HGB 10.5* 11.1*   MCV 87.0 86.6   .0 194.0       Recent Labs   Lab 24  0657 24  0540 24  0501   * 136*  --   --    BUN 21 17  --   --    CREATSERUM 1.10* 0.88  0.88 0.96 0.97   CA 9.4 9.1  --   --    ALB 4.2  --   --   --     143  --   --    K 4.3 4.0  --   --     111  --   --    CO2 24.0 25.0  --   --    ALKPHO 100  --   --   --    AST 15  --   --   --    ALT <7*  --   --   --    BILT 0.5  --   --   --    TP 7.1  --   --   --        Estimated Glomerular Filtration Rate: 63.8 mL/min/1.73m2 (by CKD-EPI based on SCr of 0.97 mg/dL).    No results for input(s): \"TROP\", \"TROPHS\", \"CK\" in the last 168 hours.    No results for input(s): \"PTP\", \"INR\" in the last 168 hours.               Microbiology    Hospital Encounter on 24   1. Blood Culture     Status: None (Preliminary result)    Collection Time: 24  8:23 PM    Specimen:  Blood,peripheral   Result Value Ref Range    Blood Culture Result No Growth 3 Days N/A   2. Aerobic Bacterial Culture     Status: Abnormal    Collection Time: 12/27/24  8:22 PM    Specimen: Toe; Other   Result Value Ref Range    Aerobic Culture Result 2+ growth Staphylococcus aureus (A) N/A    Aerobic Culture Result 1+ growth Pseudomonas aeruginosa (A) N/A    Aerobic Smear No WBCs seen N/A    Aerobic Smear No organisms seen N/A       Susceptibility    Staphylococcus aureus -  (no method available)     Cefazolin  Sensitive      Clindamycin <=0.25 Sensitive      Erythromycin <=0.25 Sensitive      Gentamicin <=0.5 Sensitive      Levofloxacin 0.25 Sensitive      Oxacillin <=0.25 Sensitive      Trimethoprim/Sulfa <=10 Sensitive      Vancomycin 1 Sensitive     Pseudomonas aeruginosa -  (no method available)     Cefepime <=2 Sensitive      Ceftazidime <=1 Sensitive      Ciprofloxacin <=1 Sensitive      Meropenem <=1 Sensitive      Levofloxacin <=0.25 Sensitive      Piperacillin + Tazobactam <=4 Sensitive      Tobramycin <=1 Sensitive          Imaging: Reviewed in Epic.    Medications:    piperacillin-tazobactam  4.5 g Intravenous Q8H    acidophilus  1 each Oral Daily    insulin degludec  30 Units Subcutaneous Daily    lisinopril  5 mg Oral Daily    oxybutynin ER  5 mg Oral BID    pantoprazole  40 mg Oral QAM AC    rosuvastatin  10 mg Oral Nightly    timolol  1 drop Both Eyes BID    insulin aspart  1-68 Units Subcutaneous TID CC    insulin aspart  1-10 Units Subcutaneous TID AC and HS    enoxaparin  40 mg Subcutaneous Daily       Assessment & Plan:      #R great toe ulcer, DFU- with concerns for osteomyelitis  MRI pending- with ? Distal phalanx OM, await podiatry eval   ID, Podiatry eval  IV Abx  #DM type 2 with PN  #Hypertension   Continue ACE I  #HLD  #Renal insufficiency not FIORDALIZA,   Monitor Cr , resolved         Luna Miller MD    Supplementary Documentation:     Quality:  DVT Mechanical Prophylaxis:   SCDs,    DVT  Pharmacologic Prophylaxis   Medication    enoxaparin (Lovenox) 40 MG/0.4ML SUBQ injection 40 mg                Code Status: Not on file  Mccallum: No urinary catheter in place  Mccallum Duration (in days):   Central line:    FELA:     Discharge is dependent on: clinical   At this point Ms. Moreno is expected to be discharge to: home    The 21st Century Cures Act makes medical notes like these available to patients in the interest of transparency. Please be advised this is a medical document. Medical documents are intended to carry relevant information, facts as evident, and the clinical opinion of the practitioner. The medical note is intended as peer to peer communication and may appear blunt or direct. It is written in medical language and may contain abbreviations or verbiage that are unfamiliar.

## 2025-01-03 NOTE — DISCHARGE SUMMARY
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  The wound is on the plantar aspect of the right toe and there is some mild pain, erythema, warmth and drainage.  She noticed the ulcer a month ago but believes it is worsening with increasing drainage.  She denies any fever, chills, nausea, vomiting, diarrhea, chest pain or shortness of breath.  She reports she has not seen a podiatrist in a very long time.  In the ER, x-ray was concerning for first distal phalanx acute osteomyelitis.  Blood cultures were drawn.  Patient was given ampicillin and vancomycin and subsequently admitted.     Brief Synopsis:   Pt admitted, ID and Podiatry consulted. She was treated w/ IV Abx and MRI was completed. Plan is for OP abx and follow up with podiatry for serial debridement.     Lace+ Score: 50  59-90 High Risk  29-58 Medium Risk  0-28   Low Risk       TCM Follow-Up Recommendation:  LACE > 58: High Risk of readmission after discharge from the hospital.      Procedures during hospitalization:   MRI    Incidental or significant findings and recommendations (brief descriptions):  MRI FOOT, RIGHT (CPT=73718)     Result Date: 2024  CONCLUSION:  Soft tissue  ulceration along the distal aspect of the great toe overlies the distal phalanx and there is moderate generalized edema signal associated with the great toe which is concerning for cellulitis in this region.  There is marrow signal abnormality in the distal phalanx including areas of low T1 signal, STIR hyperintensity, and cortical irregularity that is highly concerning for osteomyelitis as was suggested on recent plain film radiographs.  Clinical correlation recommended.  There is no discrete soft tissue abscess identified.  LOCATION:  [facility]   Dictated by (CST): Bayron Gardner MD on 12/30/2024 at 10:25 PM     Finalized by (CST): Bayron Gardner MD on 12/30/2024 at 10:28 PM       Lab/Test results pending at Discharge:   no    Consultants:  ID, Vascular, Podiatry     Discharge Medication List:     Discharge Medications        START taking these medications        Instructions Prescription details   cefadroxil 500 MG Caps  Commonly known as: DURICEF      Take 1 capsule (500 mg total) by mouth 2 (two) times daily for 14 days.   Stop taking on: January 14, 2025  Quantity: 28 capsule  Refills: 0     levoFLOXacin 750 MG Tabs  Commonly known as: Levaquin      Take 1 tablet (750 mg total) by mouth every other day for 14 days.   Stop taking on: January 14, 2025  Quantity: 7 tablet  Refills: 0            CHANGE how you take these medications        Instructions Prescription details   Tresiba FlexTouch 200 UNIT/ML Sopn  Generic drug: insulin degludec  What changed:   how much to take  how to take this  when to take this      Inject daily as directed up to TDD = 30 units   Quantity: 18 mL  Refills: 0            CONTINUE taking these medications        Instructions Prescription details   acetaminophen 325 MG Tabs  Commonly known as: Tylenol      Take 1 tablet (325 mg total) by mouth every 4 (four) hours as needed for Pain.   Refills: 0     BD Pen Needle Elvie 2nd Gen 32G X 4 MM Misc  Generic drug: Insulin Pen Needle      Inject  1 each into the skin daily.   Quantity: 100 each  Refills: 3     lisinopril 5 MG Tabs  Commonly known as: Prinivil; Zestril      Take 1 tablet (5 mg total) by mouth daily.   Quantity: 90 tablet  Refills: 1     metFORMIN  MG Tb24  Commonly known as: Glucophage XR      TAKE 1 TABLET(500 MG) BY MOUTH TWICE DAILY WITH MEALS   Quantity: 180 tablet  Refills: 1     OneTouch Delica Lancets 33G Misc      1 Lancet by Finger stick route daily.   Quantity: 100 each  Refills: 0     OneTouch Verio Strp      USE TO TEST ONCE TO TWICE DAILY   Quantity: 100 strip  Refills: 1     oxybutynin ER 5 MG Tb24  Commonly known as: Ditropan-XL      Take 1 tablet (5 mg total) by mouth daily.   Quantity: 30 tablet  Refills: 1     pantoprazole 40 MG Tbec  Commonly known as: Protonix      TAKE 1 TABLET(40 MG) BY MOUTH DAILY   Quantity: 30 tablet  Refills: 6     Polyethylene Glycol 3350 17 g Pack  Commonly known as: MiraLax      Take 17 g by mouth daily as needed (Take to avoid constipation, especially if taking narcotic pain medications.).   Quantity: 20 packet  Refills: 1     rosuvastatin 10 MG Tabs  Commonly known as: Crestor      TAKE 1 TABLET(10 MG) BY MOUTH EVERY NIGHT   Quantity: 90 tablet  Refills: 3     timolol 0.5 % Soln  Commonly known as: Timoptic      Place 1 drop into both eyes 2 (two) times daily.   Refills: 0               Where to Get Your Medications        These medications were sent to "[id number] - [place], [place] - [address] AT [facility], [phone], [phone]  [address] [place] [address]      Hours: 24-hours Phone: [phone]   cefadroxil 500 MG Caps  levoFLOXacin 750 MG Tabs         [place] reviewed: n[place]    Follow-up appointment:   No follow-up provider specified.  Appointments for Next 30 Days 1/3/2025 - 2/2/2025      None            Vital signs:       Physical Exam:    General: No acute distress   Lungs: clear to auscultation  Cardiovascular: S1, S2  Abdomen:  Soft      -----------------------------------------------------------------------------------------------  PATIENT DISCHARGE INSTRUCTIONS: See electronic chart    Luna Miller MD    Total time spent on discharge plannin minutes     The  Century Cures Act makes medical notes like these available to patients in the interest of transparency. Please be advised this is a medical document. Medical documents are intended to carry relevant information, facts as evident, and the clinical opinion of the practitioner. The medical note is intended as peer to peer communication and may appear blunt or direct. It is written in medical language and may contain abbreviations or verbiage that are unfamiliar.

## 2025-01-06 ENCOUNTER — TELEPHONE (OUTPATIENT)
Facility: LOCATION | Age: 70
End: 2025-01-06

## 2025-01-06 ENCOUNTER — PATIENT OUTREACH (OUTPATIENT)
Dept: CASE MANAGEMENT | Age: 70
End: 2025-01-06

## 2025-01-06 ENCOUNTER — TELEPHONE (OUTPATIENT)
Dept: PODIATRY CLINIC | Facility: CLINIC | Age: 70
End: 2025-01-06

## 2025-01-06 DIAGNOSIS — Z12.31 VISIT FOR SCREENING MAMMOGRAM: Primary | ICD-10-CM

## 2025-01-06 DIAGNOSIS — E55.9 VITAMIN D DEFICIENCY: ICD-10-CM

## 2025-01-06 DIAGNOSIS — Z79.4 TYPE 2 DIABETES MELLITUS WITH HYPERGLYCEMIA, WITH LONG-TERM CURRENT USE OF INSULIN (HCC): ICD-10-CM

## 2025-01-06 DIAGNOSIS — E11.65 TYPE 2 DIABETES MELLITUS WITH HYPERGLYCEMIA, WITH LONG-TERM CURRENT USE OF INSULIN (HCC): ICD-10-CM

## 2025-01-06 NOTE — TELEPHONE ENCOUNTER
Per patient was seen at [facility] by Dr. Hugo, was told office would be reaching out for hospital follow up appointment for today. Please call thank you.

## 2025-01-06 NOTE — TELEPHONE ENCOUNTER
Patient is calling because she was discharged from [facility] by Dr. Hugo and was told to be seen today in the office but he is not in the office today. Please contact patient. Thanks

## 2025-01-06 NOTE — TELEPHONE ENCOUNTER
Patient admitted 12/27 through 12/31. Seen by Dr Hugo in hospital on 12/31 and per notes to Follow up in wound clinic.

## 2025-01-06 NOTE — PROGRESS NOTES
TCM request (discharged 12/31/24)    Dr Rhiannon Betancourt  [facility]  [address]  [place] [address]  [phone]    Attempt #1:  Left message on voicemail for patient to call transitions specialist back to schedule follow up appointments. Provided Transitions specialist scheduling phone number [phone].

## 2025-01-07 NOTE — PROGRESS NOTES
TCM request (discharged 12/31/24)     Dr Rhiannon Betancourt  [facility]  [address]  [place] [address]  [phone]  Apt made:  Mon 02/03 @10:40am & on waitlist for sooner appointment  Patient only wants to see Dr Betancourt  Confirmed w/pt  Closing encounter

## 2025-01-08 NOTE — PAYOR COMM NOTE
--------------  DISCHARGE REVIEW    Payor: [employer] MEDICARE ADV PPO  Subscriber #:  [id number]  Authorization Number: [id number]    Admit date: 24  Admit time:  11:29 PM  Discharge Date: 2024  6:24 PM     Admitting Physician: Raj Hamilton MD  Attending Physician:  Shadi Lim MD  Primary Care Physician: Rhiannon Betancourt MD          Discharge Summary Notes        Discharge Summary signed by Luna Miller MD at 1/3/2025  3:11 AM       Author: Luna Miller MD Specialty: HOSPITALIST, Internal Medicine Author Type: Physician    Filed: 1/3/2025  3:11 AM Date of Service: 1/3/2025  3:09 AM Status: Signed    : Luna Miller MD (Physician)           [facility]  DISCHARGE SUMMARY     Marcela Moreno Patient Status:  Inpatient    10/22/1955 MRN [id number]   [facility] 3NW-A Attending Shadi Lim MD   Hosp Day # 4 PCP Rhiannon Betancourt MD     Date of Admission: 2024  Date of Discharge:  2024     Discharge Disposition: Home or Self Care    Discharge Diagnosis:  #R great toe ulcer, DFU- with concerns for osteomyelitis  MRI pending- delay due to clips in head   ID, Podiatry eval  IV Abx  #DM type 2 with PN  #Hypertension   Continue ACE I  #HLD  #Renal insufficiency not FIORDALIZA,   Monitor Cr     History of Present Illness:   Marcela Moreno is a 69 year old female with PMHx diabetes mellitus type 2, hypertension, dyslipidemia, GERD, obesity, CVA with mild left-sided deficits who presents to the hospital for evaluation of right toe wound.  The wound is on the plantar aspect of the right toe and there is some mild pain, erythema, warmth and drainage.  She noticed the ulcer a month ago but believes it is worsening with increasing drainage.  She denies any fever, chills, nausea, vomiting, diarrhea, chest pain or shortness of breath.  She reports she has not seen a podiatrist in a very long time.  In the ER, x-ray was concerning for first distal phalanx acute osteomyelitis.   Blood cultures were drawn.  Patient was given ampicillin and vancomycin and subsequently admitted.     Brief Synopsis:   Pt admitted, ID and Podiatry consulted. She was treated w/ IV Abx and MRI was completed. Plan is for OP abx and follow up with podiatry for serial debridement.     Lace+ Score: 50  59-90 High Risk  29-58 Medium Risk  0-28   Low Risk       TCM Follow-Up Recommendation:  LACE > 58: High Risk of readmission after discharge from the hospital.      Procedures during hospitalization:   MRI    Incidental or significant findings and recommendations (brief descriptions):  MRI FOOT, RIGHT (CPT=73718)     Result Date: 12/30/2024  CONCLUSION:  Soft tissue ulceration along the distal aspect of the great toe overlies the distal phalanx and there is moderate generalized edema signal associated with the great toe which is concerning for cellulitis in this region.  There is marrow signal abnormality in the distal phalanx including areas of low T1 signal, STIR hyperintensity, and cortical irregularity that is highly concerning for osteomyelitis as was suggested on recent plain film radiographs.  Clinical correlation recommended.  There is no discrete soft tissue abscess identified.  LOCATION:  [facility]   Dictated by (CST): Bayron Gardner MD on 12/30/2024 at 10:25 PM     Finalized by (CST): Bayron Gardner MD on 12/30/2024 at 10:28 PM       Lab/Test results pending at Discharge:   no    Consultants:  ID, Vascular, Podiatry     Discharge Medication List:     Discharge Medications        START taking these medications        Instructions Prescription details   cefadroxil 500 MG Caps  Commonly known as: DURICEF      Take 1 capsule (500 mg total) by mouth 2 (two) times daily for 14 days.   Stop taking on: January 14, 2025  Quantity: 28 capsule  Refills: 0     levoFLOXacin 750 MG Tabs  Commonly known as: Levaquin      Take 1 tablet (750 mg total) by mouth every other day for 14 days.   Stop taking on: January 14,  2025  Quantity: 7 tablet  Refills: 0            CHANGE how you take these medications        Instructions Prescription details   Tresiba FlexTouch 200 UNIT/ML Sopn  Generic drug: insulin degludec  What changed:   how much to take  how to take this  when to take this      Inject daily as directed up to TDD = 30 units   Quantity: 18 mL  Refills: 0            CONTINUE taking these medications        Instructions Prescription details   acetaminophen 325 MG Tabs  Commonly known as: Tylenol      Take 1 tablet (325 mg total) by mouth every 4 (four) hours as needed for Pain.   Refills: 0     BD Pen Needle Elvie 2nd Gen 32G X 4 MM Misc  Generic drug: Insulin Pen Needle      Inject 1 each into the skin daily.   Quantity: 100 each  Refills: 3     lisinopril 5 MG Tabs  Commonly known as: Prinivil; Zestril      Take 1 tablet (5 mg total) by mouth daily.   Quantity: 90 tablet  Refills: 1     metFORMIN  MG Tb24  Commonly known as: Glucophage XR      TAKE 1 TABLET(500 MG) BY MOUTH TWICE DAILY WITH MEALS   Quantity: 180 tablet  Refills: 1     OneTouch Delica Lancets 33G Misc      1 Lancet by Finger stick route daily.   Quantity: 100 each  Refills: 0     OneTouch Verio Strp      USE TO TEST ONCE TO TWICE DAILY   Quantity: 100 strip  Refills: 1     oxybutynin ER 5 MG Tb24  Commonly known as: Ditropan-XL      Take 1 tablet (5 mg total) by mouth daily.   Quantity: 30 tablet  Refills: 1     pantoprazole 40 MG Tbec  Commonly known as: Protonix      TAKE 1 TABLET(40 MG) BY MOUTH DAILY   Quantity: 30 tablet  Refills: 6     Polyethylene Glycol 3350 17 g Pack  Commonly known as: MiraLax      Take 17 g by mouth daily as needed (Take to avoid constipation, especially if taking narcotic pain medications.).   Quantity: 20 packet  Refills: 1     rosuvastatin 10 MG Tabs  Commonly known as: Crestor      TAKE 1 TABLET(10 MG) BY MOUTH EVERY NIGHT   Quantity: 90 tablet  Refills: 3     timolol 0.5 % Soln  Commonly known as: Timoptic      Place 1  drop into both eyes 2 (two) times daily.   Refills: 0               Where to Get Your Medications        These medications were sent to [id number] - [place], [place] - [address] AT [facility] [address], [phone], [phone]  [address] [place] [address]      Hours: 24-hours Phone: [phone]   cefadroxil 500 MG Caps  levoFLOXacin 750 MG Tabs         [place] reviewed: n[place]    Follow-up appointment:   No follow-up provider specified.  Appointments for Next 30 Days 1/3/2025 - 2025      None            Vital signs:       Physical Exam:    General: No acute distress   Lungs: clear to auscultation  Cardiovascular: S1, S2  Abdomen: Soft      -----------------------------------------------------------------------------------------------  PATIENT DISCHARGE INSTRUCTIONS: See electronic chart    Luna Miller MD    Total time spent on discharge plannin minutes     The  Century Cures Act makes medical notes like these available to patients in the interest of transparency. Please be advised this is a medical document. Medical documents are intended to carry relevant information, facts as evident, and the clinical opinion of the practitioner. The medical note is intended as peer to peer communication and may appear blunt or direct. It is written in medical language and may contain abbreviations or verbiage that are unfamiliar.       Electronically signed by Luna Miller MD on 1/3/2025  3:11 AM         REVIEWER COMMENTS

## 2025-01-13 ENCOUNTER — OFFICE VISIT (OUTPATIENT)
Dept: WOUND CARE | Facility: HOSPITAL | Age: 70
End: 2025-01-13
Attending: PODIATRIST
Payer: MEDICARE

## 2025-01-13 VITALS
DIASTOLIC BLOOD PRESSURE: 66 MMHG | HEART RATE: 85 BPM | TEMPERATURE: 98 F | SYSTOLIC BLOOD PRESSURE: 137 MMHG | BODY MASS INDEX: 33.77 KG/M2 | HEIGHT: 60 IN | WEIGHT: 172 LBS | RESPIRATION RATE: 16 BRPM

## 2025-01-13 DIAGNOSIS — E11.65 TYPE 2 DIABETES MELLITUS WITH HYPERGLYCEMIA, WITH LONG-TERM CURRENT USE OF INSULIN (HCC): ICD-10-CM

## 2025-01-13 DIAGNOSIS — L97.519 DIABETIC ULCER OF RIGHT GREAT TOE (HCC): Primary | ICD-10-CM

## 2025-01-13 DIAGNOSIS — Z79.4 TYPE 2 DIABETES MELLITUS WITH HYPERGLYCEMIA, WITH LONG-TERM CURRENT USE OF INSULIN (HCC): ICD-10-CM

## 2025-01-13 DIAGNOSIS — M20.5X1 HALLUX LIMITUS OF RIGHT FOOT: ICD-10-CM

## 2025-01-13 DIAGNOSIS — M86.9 OSTEOMYELITIS OF GREAT TOE OF RIGHT FOOT (HCC): ICD-10-CM

## 2025-01-13 DIAGNOSIS — E11.621 DIABETIC ULCER OF RIGHT GREAT TOE (HCC): Primary | ICD-10-CM

## 2025-01-13 DIAGNOSIS — L92.9 HYPERGRANULATION: ICD-10-CM

## 2025-01-13 LAB — GLUCOSE BLD-MCNC: 112 MG/DL (ref 70–99)

## 2025-01-13 PROCEDURE — 99213 OFFICE O/P EST LOW 20 MIN: CPT

## 2025-01-13 PROCEDURE — 11042 DBRDMT SUBQ TIS 1ST 20SQCM/<: CPT | Performed by: PODIATRIST

## 2025-01-13 PROCEDURE — 82962 GLUCOSE BLOOD TEST: CPT | Performed by: PODIATRIST

## 2025-01-13 NOTE — PROGRESS NOTES
Subjective   Marcela Moreno is a 69 year old female.    Chief Complaint   Patient presents with    Wound Care     Patient states she has wound to right big toe. Pt states she completed oral ABT course- cefadroxil, still taking levofloxacin. Denies any pain in wound at this time. Pt reports wound has been open more than 1 month. Arrived with xeroform, gauze, tape to wound.        HPI  Marcela Moreno is a 69 year old female with DM2 with peripheral polyneuropathy who is presenting to wound care center for initial visit due to a right great toe ulceration.  Patient was seen in the inpatient setting for this wound, which she was admitted for cellulitis.  She states that it started as a blister a little over a month ago.  It did have an appearance of infection, which caused her admission.  An MRI did reveal concerns of osteomyelitis to the distal phalanx of the right hallux.  Her wound drastically improved while inpatient with no deep probing and was discharged on antibiotic therapy.  She continues to take levofloxacin, and has finished her cefadroxil.  She denies noticing any recent signs of infection.  She is ambulating in cro with diabetic inserts within them.  She states that she has noticed a yellowish drainage recently, but denies any other signs of infection.  She is denying pain or other concerns.  Patient does share that her blood sugars have been under control recently, averaging below 120 mg/dL.  Her blood sugar today in office is 112 mg/dL.    Review of Systems  Denies nausea, vomiting, fever, chills, shortness of breath, chest pain, and calf pain.    Objective    Physical Exam:    Derm:  Wound Assessment  Wound 08/28/24 Perineum (Active)   Date First Assessed/Time First Assessed: 08/28/24 0931   Present on Original Admission: No  Location: (c) Perineum      Assessments 8/28/2024 10:15 AM 8/28/2024 12:27 PM   Drainage Amount None None   Dressing Open to air Open to air       No associated orders.        Wound 01/13/25 #1 rt great Toe (Comment which one) Plantar;Right (Active)   Date First Assessed/Time First Assessed: 01/13/25 0911    Wound Number (Wound Clinic Only): #1 rt great  Primary Wound Type: Diabetic Ulcer  Location: Toe (Comment which one)  Wound Location Orientation: Plantar;Right      Assessments 1/13/2025  9:12 AM 1/13/2025  9:13 AM   Wound Image       Drainage Amount Scant --   Drainage Description Serosanguineous --   Wound Length (cm) 0.8 cm 0.9 cm   Wound Width (cm) 0.7 cm 0.8 cm   Wound Surface Area (cm^2) 0.56 cm^2 0.72 cm^2   Wound Depth (cm) 0.1 cm 0.1 cm   Wound Volume (cm^3) 0.056 cm^3 0.072 cm^3   Wound Healing % -- -29   Margins Well-defined edges --   Non-staged Wound Description Full thickness --   Kayla-wound Assessment Callous --   Wound Granulation Tissue Pink;Spongy --   Wound Bed Granulation (%) 100 % --   Wound Odor None --   Colmenares Scale Grade 1 --       Active Orders   Date Order Priority Status Authorizing Provider   01/13/25 0942 Debridement Diabetic Ulcer Right;Plantar Toe (Comment which one) Routine Active Jomar Hugo DPM       Vascular: pedal pulses are palpable. CFT <3 sec to digits  Musculoskeletal: no acute deformities noted.  Decreased active and passive dorsiflexion of right first MPJ  Neurological: Gross sensation intact via light touch.  Protective sensation diminished via Ipswitch test.      MRI FOOT, RIGHT (CPT=73718)    Result Date: 12/30/2024  CONCLUSION:  Soft tissue ulceration along the distal aspect of the great toe overlies the distal phalanx and there is moderate generalized edema signal associated with the great toe which is concerning for cellulitis in this region.  There is marrow signal abnormality in the distal phalanx including areas of low T1 signal, STIR hyperintensity, and cortical irregularity that is highly concerning for osteomyelitis as was suggested on recent plain film radiographs.  Clinical correlation recommended.  There is no discrete  soft tissue abscess identified.  LOCATION:  [facility]   Dictated by (CST): Bayron Gardner MD on 12/30/2024 at 10:25 PM     Finalized by (CST): Bayron Gardner MD on 12/30/2024 at 10:28 PM       US ARTERIAL DUPLEX LOWER EXTREMITY BILATERAL (CPT=93925)    Result Date: 12/30/2024  CONCLUSION:  1. Diffuse atherosclerotic plaquing bilaterally with diffuse biphasic waveform patterns involving both lower extremities. 2. There is a focus of increased arterial velocity involving the left mid SFA suggesting mild to moderate grade stenosis approximating 50%.  3. Increased arterial velocity within the right mid MATT and left proximal PTA suspicious for stenosis approximating 75%.   LOCATION:  [facility]   Dictated by (CST): Maame Rodrigues DO on 12/30/2024 at 1:23 PM     Finalized by (CST): Maame Rodrigues DO on 12/30/2024 at 1:27 PM       XR FOOT, COMPLETE (MIN 3 VIEWS), RIGHT (CPT=73630)    Result Date: 12/27/2024  CONCLUSION:   Focal soft tissue defect/ulceration noted along the plantar aspect of the 1st distal phalanx.  There is associated cortical erosion/destruction involving the tip of the 1st distal phalanx most in keeping with acute osteomyelitis.  These findings are on a background of generalized osteopenia.  Scattered arthropathy of the mid and forefoot noted.  Large plantar calcaneal spur.   LOCATION:  [facility]   Dictated by (CST): Matheus Gutierrez MD on 12/27/2024 at 9:15 PM     Finalized by (CST): Matheus Gutierrez MD on 12/27/2024 at 9:16 PM          Vital Signs  Vital Signs    01/13/25 0915   BP: 137/66   Pulse: 85   Resp: 16   Temp: 97.7 °F (36.5 °C)   PainSc: 0 - (None)         Allergies  Allergies[1]    Assessment    Encounter Diagnosis  1. Type 2 diabetes mellitus with hyperglycemia, with long-term current use of insulin (HCC)    2. Diabetic ulcer of right great toe (HCC)    3. Osteomyelitis of great toe of right foot (HCC)    4. Hallux limitus of right foot    5. Hypergranulation        Problem List  Patient Active Problem List    Diagnosis    Mixed hyperlipidemia    Type 2 diabetes mellitus with hyperglycemia, with long-term current use of insulin (HCC)    Status post stroke    Essential hypertension    H/O arteriovenous malformation (AVM)    Hypothyroidism    Class 1 obesity due to excess calories with serious comorbidity and body mass index (BMI) of 30.0 to 30.9 in adult    Overactive bladder    RLS (restless legs syndrome)    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 associated with type 2 diabetes mellitus (HCC)    Age-related osteoporosis without current pathological fracture    Ventral hernia without obstruction or gangrene    Cellulitis and abscess of toe of right foot    Osteomyelitis of right foot, unspecified type (HCC)    Diabetic ulcer of right great toe (HCC)       Plan  Orders:  Orders Placed This Encounter   Procedures    Debridement Diabetic Ulcer Right;Plantar Toe (Comment which one)         -Patient examined, chart history reviewed.    -Inspected right foot: Full-thickness ulceration to plantar distal right hallux with surrounding hyperkeratotic tissue.  There is some hypergranulation noted within the wound bed itself.  No deep probing, no surrounding erythema, no purulent drainage, no other signs of infection.    -Recommending debridement of ulceration and hyperkeratotic tissue today.  Utilizing #15 blade, excisional debridement was performed to right hallux ulceration down to including subcutaneous tissues.  Care was taken to remove all periwound hyperkeratotic tissue as well.  The hypergranulated wound bed was sharply debrided.  Silver nitrate was utilized for hemostasis    -Patient's ulceration covered with Paulina, Hydrofera Blue ready, paper tape, rolled up gauze underneath the hallux for offloading, and E-spanda    -Will have patient change these dressings in a similar fashion every 2-4 days dependent on amount of drainage.    -Patient was provided with a [employer] offloading shoe with  offloading peg liner.  Recommend she continue to weight-bear as tolerated in this Darco shoe.  She should avoid excessive ambulation as offloading will be essential for wound healing    -Recommend continued tight glycemic control.  Discussed blood sugars and their role in healing potential.    -Patient does understand that she is at risk of needing a partial hallux amputation    -Educated on signs of infection and encouraged patient to seek immediate medical attention if noticing any of these signs.    Follow-Up  1 week    Héctor Hugo DPM    1/13/2025    Dragon speech recognition software was used to prepare this note.  Errors in word recognition may occur.  Please contact me with any questions/concerns with this note.          [1] No Known Allergies

## 2025-01-13 NOTE — PROGRESS NOTES
Patient ID: Marcela Moreno is a 69 year old female.    Debridement Diabetic Ulcer Right;Plantar Toe (Comment which one)   Wound 01/13/25 #1 rt great Toe (Comment which one) Plantar;Right    Performed by: Jomar Hugo DPM  Authorized by: Jomar Hugo DPM      Consent   Consent obtained? verbal  Consent given by: patient  Risks discussed? procedural risks discussed  Time out called at 1/13/2025 9:42 AM  Immediately prior to the procedure a time out was called and the performing provider verified the correct patient, procedure, equipment, support staff, and site/side marked as required.    Debridement Details  Performed by: physician  Debridement type: surgical  Level of debridement: subcutaneous tissue  Pain control: lidocaine 4%  Pain control administration type: topical    Pre-debridement measurements  Length (cm): 0.8  Width (cm): 0.7  Depth (cm): 0.1  Surface Area (cm^2): 0.56    Post-debridement measurements  Length (cm): 0.9  Width (cm): 0.8  Depth (cm): 0.1  Percent debrided: 100%  Surface Area (cm^2): 0.72  Area Debrided (cm^2): 0.72  Volume (cm^3): 0.07    Tissue and other material debrided: subcutaneous tissue  Devitalized tissue debrided: biofilm, callus and necrotic debris  Instrument(s) utilized: blade  Bleeding: small  Hemostasis obtained with: pressure  Procedural pain (0-10): 0  Post-procedural pain: 0   Response to treatment: procedure was tolerated well

## 2025-01-13 NOTE — PROGRESS NOTES
Weekly Wound Education Note    Teaching Provided To: Patient  Training Topics: Discharge instructions;Off-loading;Cleasing and general instructions;Dressing  Training Method: Demonstration;Explain/Verbal;Written  Training Response: Patient responds and understands;Reinforcement needed        Notes: Right great toe: cleanse with saline or wound cleanser, apply sheila, hydrofera ready (writing side away from wound) Change as directed. Darco shoe with liner provided, wear whenever your walk or stand. Stay off your feet as much as possible.  E spanda from base of toes to just below knee, may remove at bedtime and reapply first thing in the morning.

## 2025-01-18 DIAGNOSIS — Z79.4 TYPE 2 DIABETES MELLITUS WITH HYPERGLYCEMIA, WITH LONG-TERM CURRENT USE OF INSULIN (HCC): ICD-10-CM

## 2025-01-18 DIAGNOSIS — R80.9 POSITIVE FOR MACROALBUMINURIA: ICD-10-CM

## 2025-01-18 DIAGNOSIS — E11.65 TYPE 2 DIABETES MELLITUS WITH HYPERGLYCEMIA, WITH LONG-TERM CURRENT USE OF INSULIN (HCC): ICD-10-CM

## 2025-01-18 RX ORDER — PANTOPRAZOLE SODIUM 40 MG/1
40 TABLET, DELAYED RELEASE ORAL DAILY
Qty: 30 TABLET | Refills: 6 | OUTPATIENT
Start: 2025-01-18

## 2025-01-18 RX ORDER — PANTOPRAZOLE SODIUM 40 MG/1
40 TABLET, DELAYED RELEASE ORAL DAILY
Qty: 30 TABLET | Refills: 0 | Status: SHIPPED | OUTPATIENT
Start: 2025-01-18

## 2025-01-18 RX ORDER — PANTOPRAZOLE SODIUM 40 MG/1
40 TABLET, DELAYED RELEASE ORAL DAILY
Qty: 90 TABLET | Refills: 0 | OUTPATIENT
Start: 2025-01-18

## 2025-01-20 ENCOUNTER — OFFICE VISIT (OUTPATIENT)
Dept: WOUND CARE | Facility: HOSPITAL | Age: 70
End: 2025-01-20
Attending: PODIATRIST
Payer: MEDICARE

## 2025-01-20 VITALS
TEMPERATURE: 98 F | HEART RATE: 77 BPM | SYSTOLIC BLOOD PRESSURE: 154 MMHG | DIASTOLIC BLOOD PRESSURE: 74 MMHG | RESPIRATION RATE: 16 BRPM

## 2025-01-20 DIAGNOSIS — Z79.4 TYPE 2 DIABETES MELLITUS WITH HYPERGLYCEMIA, WITH LONG-TERM CURRENT USE OF INSULIN (HCC): ICD-10-CM

## 2025-01-20 DIAGNOSIS — M20.5X1 HALLUX LIMITUS OF RIGHT FOOT: ICD-10-CM

## 2025-01-20 DIAGNOSIS — E11.65 TYPE 2 DIABETES MELLITUS WITH HYPERGLYCEMIA, WITH LONG-TERM CURRENT USE OF INSULIN (HCC): ICD-10-CM

## 2025-01-20 DIAGNOSIS — M86.9 OSTEOMYELITIS OF GREAT TOE OF RIGHT FOOT (HCC): ICD-10-CM

## 2025-01-20 DIAGNOSIS — E11.621 DIABETIC ULCER OF RIGHT GREAT TOE (HCC): Primary | ICD-10-CM

## 2025-01-20 DIAGNOSIS — E78.2 MIXED HYPERLIPIDEMIA: ICD-10-CM

## 2025-01-20 DIAGNOSIS — L97.519 DIABETIC ULCER OF RIGHT GREAT TOE (HCC): Primary | ICD-10-CM

## 2025-01-20 PROCEDURE — 11042 DBRDMT SUBQ TIS 1ST 20SQCM/<: CPT | Performed by: PODIATRIST

## 2025-01-20 RX ORDER — LISINOPRIL 5 MG/1
5 TABLET ORAL DAILY
Qty: 90 TABLET | Refills: 1 | Status: SHIPPED | OUTPATIENT
Start: 2025-01-20

## 2025-01-20 RX ORDER — ROSUVASTATIN CALCIUM 10 MG/1
10 TABLET, COATED ORAL NIGHTLY
Qty: 90 TABLET | Refills: 3 | Status: SHIPPED | OUTPATIENT
Start: 2025-01-20

## 2025-01-20 NOTE — PROGRESS NOTES
Weekly Wound Education Note    Teaching Provided To: Patient  Training Topics: Discharge instructions;Cleasing and general instructions;Edema control;Dressing;Off-loading  Training Method: Demonstration;Explain/Verbal;Written  Training Response: Patient responds and understands        Notes: Cleanse right great toe wound with saline or wound cleanser, apply sheila, hydrofera ready (writing side away from wound) secure with tape, change every 3 days, E spanda to right leg from base of toe to just below knee, may remove at bedtime and reapply first thing in the morning

## 2025-01-20 NOTE — PROGRESS NOTES
Subjective   Marcela Moreno is a 69 year old female.    Chief Complaint   Patient presents with    Wound Recheck     Pt arrives for wound care follow-up on foot wearing darco offloading shoe with peg liner. Denies pain to wound. Denies new concerns.       HPI  Marcela Moreno is a 69 year old female with DM2 with peripheral polyneuropathy who is returning to clinic today for recheck on right great toe ulceration.  Patient states she is doing well overall.  Denying any current pain.  She is ambulating today in her Darco offloading shoe to the right foot.  She has been utilizing her Tubigrip's.  Denies noticing any recent signs of infection.  Per patient, her blood sugar this morning is 127 mg/dL.  No other concerns.    Review of Systems  Denies nausea, vomiting, fever, chills, shortness of breath, chest pain, and calf pain.    Objective    Physical Exam:    Derm:  Wound Assessment  Wound 08/28/24 Perineum (Active)   Date First Assessed/Time First Assessed: 08/28/24 0931   Present on Original Admission: No  Location: (c) Perineum      Assessments 8/28/2024 10:15 AM 8/28/2024 12:27 PM   Drainage Amount None None   Dressing Open to air Open to air       No associated orders.       Wound 01/13/25 #1 rt great Toe (Comment which one) Plantar;Right (Active)   Date First Assessed/Time First Assessed: 01/13/25 0911    Wound Number (Wound Clinic Only): #1 rt great  Primary Wound Type: Diabetic Ulcer  Location: Toe (Comment which one)  Wound Location Orientation: Plantar;Right      Assessments 1/13/2025  9:12 AM 1/20/2025  8:55 AM   Wound Image       Drainage Amount Scant --   Drainage Description Serosanguineous --   Wound Length (cm) 0.8 cm 0.6 cm   Wound Width (cm) 0.7 cm 0.3 cm   Wound Surface Area (cm^2) 0.56 cm^2 0.18 cm^2   Wound Depth (cm) 0.1 cm 0.1 cm   Wound Volume (cm^3) 0.056 cm^3 0.018 cm^3   Wound Healing % -- 68   Margins Well-defined edges --   Non-staged Wound Description Full thickness --   Kayla-wound  Assessment Callous --   Wound Granulation Tissue Pink;Spongy --   Wound Bed Granulation (%) 100 % --   Wound Odor None --   Colmenares Scale Grade 1 --       Active Orders   Date Order Priority Status Authorizing Provider   01/20/25 0859 Debridement Diabetic Ulcer Plantar;Right Toe (Comment which one) Routine Active Jomar Hugo DPM       Inactive Orders   Date Order Priority Status Authorizing Provider   01/13/25 0942 Debridement Diabetic Ulcer Right;Plantar Toe (Comment which one) Routine Completed Jomar Hugo DPM       Vascular: pedal pulses are palpable. CFT <3 sec to digits  Musculoskeletal: no acute deformities noted.  Decreased active and passive dorsiflexion of right first MPJ  Neurological: Gross sensation intact via light touch.  Protective sensation diminished via Ipswitch test.        MRI FOOT, RIGHT (CPT=73718)     Result Date: 12/30/2024  CONCLUSION:  Soft tissue ulceration along the distal aspect of the great toe overlies the distal phalanx and there is moderate generalized edema signal associated with the great toe which is concerning for cellulitis in this region.  There is marrow signal abnormality in the distal phalanx including areas of low T1 signal, STIR hyperintensity, and cortical irregularity that is highly concerning for osteomyelitis as was suggested on recent plain film radiographs.  Clinical correlation recommended.  There is no discrete soft tissue abscess identified.  LOCATION:  [facility]   Dictated by (CST): Bayron Gardner MD on 12/30/2024 at 10:25 PM     Finalized by (CST): Bayron Gardner MD on 12/30/2024 at 10:28 PM        US ARTERIAL DUPLEX LOWER EXTREMITY BILATERAL (CPT=93925)     Result Date: 12/30/2024  CONCLUSION:  1. Diffuse atherosclerotic plaquing bilaterally with diffuse biphasic waveform patterns involving both lower extremities. 2. There is a focus of increased arterial velocity involving the left mid SFA suggesting mild to moderate grade stenosis approximating 50%.  3.  Increased arterial velocity within the right mid MATT and left proximal PTA suspicious for stenosis approximating 75%.   LOCATION:  [facility]   Dictated by (CST): Maame Rodrigues DO on 12/30/2024 at 1:23 PM     Finalized by (CST): Maame Rodrigues DO on 12/30/2024 at 1:27 PM        XR FOOT, COMPLETE (MIN 3 VIEWS), RIGHT (CPT=73630)     Result Date: 12/27/2024  CONCLUSION:   Focal soft tissue defect/ulceration noted along the plantar aspect of the 1st distal phalanx.  There is associated cortical erosion/destruction involving the tip of the 1st distal phalanx most in keeping with acute osteomyelitis.  These findings are on a background of generalized osteopenia.  Scattered arthropathy of the mid and forefoot noted.  Large plantar calcaneal spur.   LOCATION:  [facility]   Dictated by (CST): Matheus Gutierrez MD on 12/27/2024 at 9:15 PM     Finalized by (CST): Matheus Gutierrez MD on 12/27/2024 at 9:16 PM        Vital Signs  Vital Signs    01/20/25 0851   BP: 154/74   Pulse: 77   Resp: 16   Temp: 97.9 °F (36.6 °C)   PainSc: 0 - (None)         Allergies  Allergies[1]    Assessment    Encounter Diagnosis  1. Diabetic ulcer of right great toe (HCC)    2. Osteomyelitis of great toe of right foot (HCC)    3. Hallux limitus of right foot    4. Type 2 diabetes mellitus with hyperglycemia, with long-term current use of insulin ([facility])        Problem List  Patient Active Problem List   Diagnosis    Mixed hyperlipidemia    Type 2 diabetes mellitus with hyperglycemia, with long-term current use of insulin ([facility])    Status post stroke    Essential hypertension    H/O arteriovenous malformation (AVM)    Hypothyroidism    Class 1 obesity due to excess calories with serious comorbidity and body mass index (BMI) of 30.0 to 30.9 in adult    Overactive bladder    RLS (restless legs syndrome)    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 associated with type 2 diabetes mellitus (HCC)    Age-related osteoporosis without current  pathological fracture    Ventral hernia without obstruction or gangrene    Cellulitis and abscess of toe of right foot    Osteomyelitis of right foot, unspecified type (HCC)    Diabetic ulcer of right great toe (HCC)       Plan  Orders:  Orders Placed This Encounter   Procedures    Debridement Diabetic Ulcer Plantar;Right Toe (Comment which one)         -Patient examined, chart history reviewed.    -Inspected right foot: Full-thickness ulceration to plantar distal right hallux with mild surrounding hyperkeratotic tissue.  There is no hypergranulation noted to the wound bed today.  No deep probing, no surrounding erythema, no purulent drainage, no other signs of infection.  Overall improvements in dimensions.     -Utilizing #15 blade, excisional debridement was performed to right hallux ulceration down to including subcutaneous tissues.  Care was taken to remove all periwound hyperkeratotic tissue as well.       -Patient's ulceration covered with Paulina, Hydrofera Blue ready, paper tape, rolled up gauze underneath the hallux for offloading, and E-spanda     -Will have patient change these dressings in a similar fashion every 2-4 days dependent on amount of drainage.     -Recommend patient continue to weight-bear as tolerated in her Darco shoe.  She should avoid excessive ambulation as offloading will be essential for wound healing     -Recommend continued tight glycemic control.  Discussed blood sugars and their role in healing potential.     -Patient does understand that she is at risk of needing a partial hallux amputation.  Recent MRI reveals concerns of osteomyelitis to the distal phalanx.  No acute signs of infection currently.  Will continue to monitor    -Educated on signs of infection and encouraged patient to seek immediate medical attention if noticing any of these signs.    Follow-Up  2 weeks    Héctor Hugo DPM    1/20/2025    Dragon speech recognition software was used to prepare this note.  Errors in  word recognition may occur.  Please contact me with any questions/concerns with this note.          [1] No Known Allergies

## 2025-01-20 NOTE — PROGRESS NOTES
Patient ID: Marcela Moreno is a 69 year old female.    Debridement Diabetic Ulcer Plantar;Right Toe (Comment which one)   Wound 01/13/25 #1 rt great Toe (Comment which one) Plantar;Right    Performed by: Jomar Hugo DPM  Authorized by: Jomar Hugo DPM      Consent   Consent obtained? verbal  Consent given by: patient  Risks discussed? procedural risks discussed  Immediately prior to the procedure a time out was called and the performing provider verified the correct patient, procedure, equipment, support staff, and site/side marked as required.    Debridement Details  Performed by: physician  Debridement type: surgical  Level of debridement: subcutaneous tissue  Pain control: none    Pre-debridement measurements  Length (cm): 0.5  Width (cm): 0.3  Depth (cm): 0.1  Surface Area (cm^2): 0.15    Post-debridement measurements  Length (cm): 0.6  Width (cm): 0.3  Depth (cm): 0.1  Percent debrided: 100%  Surface Area (cm^2): 0.18  Area Debrided (cm^2): 0.18  Volume (cm^3): 0.02    Tissue and other material debrided: subcutaneous tissue  Devitalized tissue debrided: biofilm, callus and necrotic debris  Instrument(s) utilized: blade  Bleeding: small  Hemostasis obtained with: not applicable  Procedural pain (0-10): 0  Post-procedural pain: 0   Response to treatment: procedure was tolerated well

## 2025-01-20 NOTE — TELEPHONE ENCOUNTER
Received fax from [facility] requesting refill of rosuvastatin, pended  Requested Prescriptions     Pending Prescriptions Disp Refills    rosuvastatin 10 MG Oral Tab 90 tablet 3     Sig: Take 1 tablet (10 mg total) by mouth nightly.     Your appointments       Date & Time Appointment Department ([place])    Feb 03, 2025 10:40 AM [facility] Follow Up with Rhiannon Betancourt MD [address] ([facility])        Feb 13, 2025 9:00 AM [facility] Diabetes Pump follow up with Jessica Hicks APRN [address] ([facility])              [address]  [address]  [facility] [id number] [phone] [address]  [facility]  [address]  [facility] [phone]          Last A1c value was 7.5% done 11/12/2024.    Refill 11/28/23  LOV 11/12/24   Passed

## 2025-01-20 NOTE — TELEPHONE ENCOUNTER
Requested Prescriptions     Pending Prescriptions Disp Refills    LISINOPRIL 5 MG Oral Tab [Pharmacy Med Name: LISINOPRIL 5MG TABLETS] 90 tablet 1     Sig: TAKE 1 TABLET(5 MG) BY MOUTH DAILY     Your appointments       Date & Time Appointment Department ([place])    Jan 20, 2025 8:30 AM CST Wound Care Follow up with Jomar Hugo DPM [facility] ([facility])        Feb 03, 2025 10:40 AM CST Hospital Follow Up with Rhiannon Betancourt MD [address] ([facility])        Feb 13, 2025 9:00 AM CST Diabetes Pump follow up with Jessica Hicks APRN [address] ([facility] [place])              [facility]  [facility]  [address] [facility] [address]  [phone] [address]  [address]  [facility] [id number]  [phone] [address]  [facility] [place]  [address]  [facility] [id number] [phone]          Last A1c value was 7.5% done 11/12/2024.    Refill 7/19/24  LOV 11/12/24

## 2025-02-03 ENCOUNTER — OFFICE VISIT (OUTPATIENT)
Dept: WOUND CARE | Facility: HOSPITAL | Age: 70
End: 2025-02-03
Attending: PODIATRIST
Payer: MEDICARE

## 2025-02-03 ENCOUNTER — OFFICE VISIT (OUTPATIENT)
Dept: FAMILY MEDICINE CLINIC | Facility: CLINIC | Age: 70
End: 2025-02-03
Payer: MEDICARE

## 2025-02-03 VITALS
TEMPERATURE: 98 F | HEART RATE: 90 BPM | SYSTOLIC BLOOD PRESSURE: 166 MMHG | RESPIRATION RATE: 16 BRPM | DIASTOLIC BLOOD PRESSURE: 82 MMHG

## 2025-02-03 VITALS
BODY MASS INDEX: 33.96 KG/M2 | DIASTOLIC BLOOD PRESSURE: 70 MMHG | HEIGHT: 60 IN | WEIGHT: 173 LBS | OXYGEN SATURATION: 99 % | HEART RATE: 95 BPM | SYSTOLIC BLOOD PRESSURE: 128 MMHG

## 2025-02-03 DIAGNOSIS — S16.1XXD STRAIN OF NECK MUSCLE, SUBSEQUENT ENCOUNTER: ICD-10-CM

## 2025-02-03 DIAGNOSIS — E11.621 DIABETIC ULCER OF RIGHT GREAT TOE (HCC): Primary | ICD-10-CM

## 2025-02-03 DIAGNOSIS — E11.65 TYPE 2 DIABETES MELLITUS WITH HYPERGLYCEMIA, WITH LONG-TERM CURRENT USE OF INSULIN (HCC): ICD-10-CM

## 2025-02-03 DIAGNOSIS — L97.519 DIABETIC ULCER OF RIGHT GREAT TOE (HCC): Primary | ICD-10-CM

## 2025-02-03 DIAGNOSIS — Z79.4 TYPE 2 DIABETES MELLITUS WITH HYPERGLYCEMIA, WITH LONG-TERM CURRENT USE OF INSULIN (HCC): ICD-10-CM

## 2025-02-03 DIAGNOSIS — M86.9 OSTEOMYELITIS OF GREAT TOE OF RIGHT FOOT (HCC): ICD-10-CM
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
## 2025-02-03 DIAGNOSIS — M20.5X1 HALLUX LIMITUS OF RIGHT FOOT: ICD-10-CM

## 2025-02-03 LAB — GLUCOSE BLD-MCNC: 139 MG/DL (ref 70–99)

## 2025-02-03 PROCEDURE — 1111F DSCHRG MED/CURRENT MED MERGE: CPT | Performed by: FAMILY MEDICINE

## 2025-02-03 PROCEDURE — 1159F MED LIST DOCD IN RCRD: CPT | Performed by: FAMILY MEDICINE

## 2025-02-03 PROCEDURE — 3078F DIAST BP <80 MM HG: CPT | Performed by: FAMILY MEDICINE

## 2025-02-03 PROCEDURE — 99214 OFFICE O/P EST MOD 30 MIN: CPT | Performed by: FAMILY MEDICINE

## 2025-02-03 PROCEDURE — 11042 DBRDMT SUBQ TIS 1ST 20SQCM/<: CPT | Performed by: PODIATRIST

## 2025-02-03 PROCEDURE — 3074F SYST BP LT 130 MM HG: CPT | Performed by: FAMILY MEDICINE

## 2025-02-03 PROCEDURE — 3008F BODY MASS INDEX DOCD: CPT | Performed by: FAMILY MEDICINE

## 2025-02-03 PROCEDURE — 1160F RVW MEDS BY RX/DR IN RCRD: CPT | Performed by: FAMILY MEDICINE

## 2025-02-03 RX ORDER — TRAMADOL HYDROCHLORIDE 50 MG/1
50 TABLET ORAL EVERY 8 HOURS PRN
Qty: 30 TABLET | Refills: 1 | Status: SHIPPED | OUTPATIENT
Start: 2025-02-03

## 2025-02-03 RX ORDER — CYCLOBENZAPRINE HCL 5 MG
5 TABLET ORAL 3 TIMES DAILY PRN
Qty: 30 TABLET | Refills: 1 | Status: SHIPPED | OUTPATIENT
Start: 2025-02-03
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
## 2025-02-03 NOTE — PROGRESS NOTES
Subjective   Marcela Moreno is a 69 year old female.    Chief Complaint   Patient presents with    Wound Care     Patient is here for a wound care follow up. She denies any pain or new wound concerns.       HPI  Marcela Moreno is a 69 year old female with DM2 with peripheral polyneuropathy who is returning to clinic today for recheck on right great toe ulceration.  Patient states she is doing well overall.  She is denying any pain.  She is ambulating at our Darco offloading shoe to the right foot.  Continues to use Tubigrip's.  Denies noticing any signs of infection to her foot.  No other concerns at this time.  Blood sugar this morning is 139 mg/dL.    Review of Systems  Denies nausea, vomiting, fever, chills, shortness of breath, chest pain, and calf pain.    Objective    Physical Exam:    Derm:  Wound Assessment  Wound 08/28/24 Perineum (Active)   Date First Assessed/Time First Assessed: 08/28/24 0931   Present on Original Admission: No  Location: (c) Perineum      Assessments 8/28/2024 10:15 AM 8/28/2024 12:27 PM   Drainage Amount None None   Dressing Open to air Open to air       No associated orders.       Wound 01/13/25 #1 rt great Toe (Comment which one) Plantar;Right (Active)   Date First Assessed/Time First Assessed: 01/13/25 0911    Wound Number (Wound Clinic Only): #1 rt great  Primary Wound Type: Diabetic Ulcer  Location: Toe (Comment which one)  Wound Location Orientation: Plantar;Right      Assessments 1/13/2025  9:12 AM 2/3/2025  8:35 AM   Wound Image       Drainage Amount Scant --   Drainage Description Serosanguineous --   Wound Length (cm) 0.8 cm --   Wound Width (cm) 0.7 cm --   Wound Surface Area (cm^2) 0.56 cm^2 --   Wound Depth (cm) 0.1 cm --   Wound Volume (cm^3) 0.056 cm^3 --   Margins Well-defined edges --   Non-staged Wound Description Full thickness --   Kayla-wound Assessment Callous --   Wound Granulation Tissue Pink;Spongy --   Wound Bed Granulation (%) 100 % --   Wound Odor None --    Colmenares Scale Grade 1 --       Active Orders   Date Order Priority Status Authorizing Provider   02/03/25 0833 Debridement Diabetic Ulcer Plantar;Right Toe (Comment which one) Routine Active Jomar Hugo DPM       Inactive Orders   Date Order Priority Status Authorizing Provider   01/20/25 0859 Debridement Diabetic Ulcer Plantar;Right Toe (Comment which one) Routine Completed Jomar Hugo DPM   01/13/25 0942 Debridement Diabetic Ulcer Right;Plantar Toe (Comment which one) Routine Completed Jomar Hugo DPM       Vascular: pedal pulses are palpable. CFT <3 sec to digits  Musculoskeletal: no acute deformities noted.  Decreased active and passive dorsiflexion of right first MPJ  Neurological: Gross sensation intact via light touch.  Protective sensation diminished via Ipswitch test.        MRI FOOT, RIGHT (CPT=73718)     Result Date: 12/30/2024  CONCLUSION:  Soft tissue ulceration along the distal aspect of the great toe overlies the distal phalanx and there is moderate generalized edema signal associated with the great toe which is concerning for cellulitis in this region.  There is marrow signal abnormality in the distal phalanx including areas of low T1 signal, STIR hyperintensity, and cortical irregularity that is highly concerning for osteomyelitis as was suggested on recent plain film radiographs.  Clinical correlation recommended.  There is no discrete soft tissue abscess identified.  LOCATION:  [facility]   Dictated by (CST): Bayron Gardner MD on 12/30/2024 at 10:25 PM     Finalized by (CST): Bayron Gardner MD on 12/30/2024 at 10:28 PM        US ARTERIAL DUPLEX LOWER EXTREMITY BILATERAL (CPT=93925)     Result Date: 12/30/2024  CONCLUSION:  1. Diffuse atherosclerotic plaquing bilaterally with diffuse biphasic waveform patterns involving both lower extremities. 2. There is a focus of increased arterial velocity involving the left mid SFA suggesting mild to moderate grade stenosis approximating 50%.  3.  Increased arterial velocity within the right mid MATT and left proximal PTA suspicious for stenosis approximating 75%.   LOCATION:  [facility]   Dictated by (CST): Maame Rodrigues DO on 12/30/2024 at 1:23 PM     Finalized by (CST): Maame Rodrigues DO on 12/30/2024 at 1:27 PM        XR FOOT, COMPLETE (MIN 3 VIEWS), RIGHT (CPT=73630)     Result Date: 12/27/2024  CONCLUSION:   Focal soft tissue defect/ulceration noted along the plantar aspect of the 1st distal phalanx.  There is associated cortical erosion/destruction involving the tip of the 1st distal phalanx most in keeping with acute osteomyelitis.  These findings are on a background of generalized osteopenia.  Scattered arthropathy of the mid and forefoot noted.  Large plantar calcaneal spur.   LOCATION:  [facility]   Dictated by (CST): Matheus Gutierrez MD on 12/27/2024 at 9:15 PM     Finalized by (CST): Matheus Gutierrez MD on 12/27/2024 at 9:16 PM          Vital Signs  Vital Signs    02/03/25 0813   BP: (!) 166/82   Pulse: 90   Resp: 16   Temp: 97.6 °F (36.4 °C)   PainSc: 0 - (None)         Allergies  Allergies[1]    Assessment    Encounter Diagnosis  1. Diabetic ulcer of right great toe (HCC)    2. Osteomyelitis of great toe of right foot (HCC)    3. Hallux limitus of right foot    4. Type 2 diabetes mellitus with hyperglycemia, with long-term current use of insulin ([facility])        Problem List  Patient Active Problem List   Diagnosis    Mixed hyperlipidemia    Type 2 diabetes mellitus with hyperglycemia, with long-term current use of insulin ([facility])    Status post stroke    Essential hypertension    H/O arteriovenous malformation (AVM)    Hypothyroidism    Class 1 obesity due to excess calories with serious comorbidity and body mass index (BMI) of 30.0 to 30.9 in adult    Overactive bladder    RLS (restless legs syndrome)    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 associated with type 2 diabetes mellitus (HCC)    Age-related osteoporosis without current  pathological fracture    Ventral hernia without obstruction or gangrene    Cellulitis and abscess of toe of right foot    Osteomyelitis of right foot, unspecified type (HCC)    Diabetic ulcer of right great toe (HCC)       Plan  Orders:  Orders Placed This Encounter   Procedures    Debridement Diabetic Ulcer Plantar;Right Toe (Comment which one)         -Patient examined, chart history reviewed.    -Inspected right foot: Full-thickness ulceration to plantar distal right hallux with surrounding hyperkeratotic tissue.  There is no hypergranulation noted to the wound bed today.  No deep probing, no surrounding erythema, no purulent drainage, no other signs of infection.  Overall improvements in wound.     -Utilizing #15 blade, excisional debridement was performed to right hallux ulceration down to including subcutaneous tissues.  Care was taken to remove all periwound hyperkeratotic tissue as well.       -Patient's ulceration covered with Paulina, Hydrofera Blue ready, paper tape, and E-spanda     -Will have patient change these dressings in a similar fashion every 2-4 days dependent on amount of drainage.     -Recommend patient continue to weight-bear as tolerated in her Darco shoe.  She should avoid excessive ambulation as offloading will be essential for wound healing     -Recommend continued tight glycemic control.  Discussed blood sugars and their role in healing potential.     -Patient does understand that she is at risk of needing a partial hallux amputation.  Recent MRI reveals concerns of osteomyelitis to the distal phalanx.  No acute signs of infection currently.  Will continue to monitor    -Educated on signs of infection and encouraged patient to seek immediate medical attention if noticing any of these signs.    Follow-Up  2 weeks    Héctor Hugo DPM    2/3/2025    Dragon speech recognition software was used to prepare this note.  Errors in word recognition may occur.  Please contact me with any  questions/concerns with this note.          [1] No Known Allergies

## 2025-02-03 NOTE — PROGRESS NOTES
Patient ID: Marcela Moreno is a 69 year old female.    Debridement Diabetic Ulcer Plantar;Right Toe (Comment which one)   Wound 01/13/25 #1 rt great Toe (Comment which one) Plantar;Right    Performed by: Jomar Hugo DPM  Authorized by: Jomar Hugo DPM      Consent   Consent obtained? verbal  Consent given by: patient  Risks discussed? procedural risks discussed  Time out called at 2/3/2025 8:33 AM  Immediately prior to the procedure a time out was called and the performing provider verified the correct patient, procedure, equipment, support staff, and site/side marked as required.    Debridement Details  Performed by: physician  Debridement type: surgical  Level of debridement: subcutaneous tissue    Pre-debridement measurements  Length (cm): 0.4  Width (cm): 0.2  Depth (cm): 0.2  Surface Area (cm^2): 0.08    Post-debridement measurements  Length (cm): 0.4  Width (cm): 0.2  Depth (cm): 0.1  Percent debrided: 100%  Surface Area (cm^2): 0.08  Area Debrided (cm^2): 0.08  Volume (cm^3): 0.01    Tissue and other material debrided: subcutaneous tissue  Devitalized tissue debrided: biofilm, callus and fibrin  Instrument(s) utilized: blade  Bleeding: small  Hemostasis obtained with: pressure  Procedural pain (0-10): 0  Post-procedural pain: 0   Response to treatment: procedure was tolerated well

## 2025-02-03 NOTE — PROGRESS NOTES
Weekly Wound Education Note    Teaching Provided To: Patient  Training Topics: Discharge instructions;Cleasing and general instructions;Dressing  Training Method: Explain/Verbal;Demonstration;Written  Training Response: Patient responds and understands        Notes: Cleanse Right great toe wound with saline or wound cleanser apply sheila, hydrofera ready (writing side away from wound), secure with tape. Change 2-3 times weekly depending on how much drainage is noted

## 2025-02-09 DIAGNOSIS — Z79.4 TYPE 2 DIABETES MELLITUS WITH HYPERGLYCEMIA, WITH LONG-TERM CURRENT USE OF INSULIN (HCC): ICD-10-CM

## 2025-02-09 DIAGNOSIS — E11.65 TYPE 2 DIABETES MELLITUS WITH HYPERGLYCEMIA, WITH LONG-TERM CURRENT USE OF INSULIN (HCC): ICD-10-CM

## 2025-02-10 ENCOUNTER — TELEPHONE (OUTPATIENT)
Dept: ENDOCRINOLOGY CLINIC | Facility: CLINIC | Age: 70
End: 2025-02-10

## 2025-02-10 RX ORDER — INSULIN DEGLUDEC 200 U/ML
INJECTION, SOLUTION SUBCUTANEOUS
Qty: 18 ML | Refills: 0 | Status: SHIPPED | OUTPATIENT
Start: 2025-02-10

## 2025-02-10 NOTE — TELEPHONE ENCOUNTER
Requested Prescriptions     Pending Prescriptions Disp Refills    insulin degludec (TRESIBA FLEXTOUCH) 200 UNIT/ML Subcutaneous Solution Pen-injector [Pharmacy Med Name: TRESIBA FLEXTOUCH PEN(U-200)INJ 3ML] 18 mL 0     Sig: INJECT UNDER THE SKIN AS DIRECTED. MAX 30 UNITS PER DAY     HGBA1C:    Lab Results   Component Value Date    A1C 7.5 (A) 11/12/2024    A1C 7.0 (A) 08/06/2024    A1C 8.0 (H) 06/04/2024     (H) 06/04/2024     Your Appointments      Thursday February 13, 2025 9:00 AM  Diabetes Pump follow up with BIBIANA Grigsby  [address] ([facility]) [address] [address] [facility] [address] [facility] [address] [phone]        Monday February 17, 2025 8:45 AM  Wound Care Follow up with Jomar Hugo DPM  [facility] ([facility]) [address] [facility] [address] [phone]        Monday March 03, 2025  Robotically-Assisted Procedure with Hemanth Murillo MD  [facility] (--) [address] [facility] [address] [phone]   Masks are optional for all patients and visitors, unless otherwise indicated.          Last OFFICE VISIT: 11--    Last refill:  11--

## 2025-02-13 ENCOUNTER — TELEPHONE (OUTPATIENT)
Facility: LOCATION | Age: 70
End: 2025-02-13

## 2025-02-13 NOTE — TELEPHONE ENCOUNTER
Dr Murillo,    Patient daughter is requesting for continuous and intermittent leave to care for patient post op.     Continuous leave:   Start date of leave: 03/03/2025  End date of leave: 03/16/2025  RTW: 03/17/25   Intermittent leave:   Start date of leave: 03/17/25  End date of leave: 04/17/25  1-2 flare ups per month each episode lasting 1-2 days   1-4 appts per month each appt lasting 1-4 hours    Do you support?    Thanks,    Pebbles

## 2025-02-13 NOTE — TELEPHONE ENCOUNTER
Type of Leave: Continuous    Reason for Leave: care for patient post op   Start date of leave: 03/03/2025  End date of leave: 03/16/2025  RTW: 03/17/25      Type of Leave: Intermittent   Reason for Leave: care for patient post op  Start date of leave: 03/17/25  End date of leave: 04/17/25  How many flare ups per month/length?: 1-2 flare ups per month each episode lasting 1-2 days   How many appts per month/length?: 1-4 appts per month each appt lasting 1-4 hours    Patient and patient daughter called to advise daughter will be submitting FMLA forms to care for patient post op- no forms received at this time

## 2025-02-17 ENCOUNTER — OFFICE VISIT (OUTPATIENT)
Dept: WOUND CARE | Facility: HOSPITAL | Age: 70
End: 2025-02-17
Attending: PODIATRIST
Payer: MEDICARE

## 2025-02-17 ENCOUNTER — TELEPHONE (OUTPATIENT)
Facility: LOCATION | Age: 70
End: 2025-02-17

## 2025-02-17 VITALS
HEART RATE: 85 BPM | TEMPERATURE: 98 F | DIASTOLIC BLOOD PRESSURE: 76 MMHG | SYSTOLIC BLOOD PRESSURE: 157 MMHG | RESPIRATION RATE: 16 BRPM

## 2025-02-17 DIAGNOSIS — E11.65 TYPE 2 DIABETES MELLITUS WITH HYPERGLYCEMIA, WITH LONG-TERM CURRENT USE OF INSULIN (HCC): ICD-10-CM

## 2025-02-17 DIAGNOSIS — M20.5X1 HALLUX LIMITUS OF RIGHT FOOT: ICD-10-CM

## 2025-02-17 DIAGNOSIS — Z79.4 TYPE 2 DIABETES MELLITUS WITH HYPERGLYCEMIA, WITH LONG-TERM CURRENT USE OF INSULIN (HCC): ICD-10-CM

## 2025-02-17 DIAGNOSIS — E11.621 DIABETIC ULCER OF RIGHT GREAT TOE (HCC): Primary | ICD-10-CM

## 2025-02-17 DIAGNOSIS — L97.519 DIABETIC ULCER OF RIGHT GREAT TOE (HCC): Primary | ICD-10-CM

## 2025-02-17 DIAGNOSIS — M86.9 OSTEOMYELITIS OF GREAT TOE OF RIGHT FOOT (HCC): ICD-10-CM

## 2025-02-17 LAB — GLUCOSE BLD-MCNC: 122 MG/DL (ref 70–99)

## 2025-02-17 PROCEDURE — 11042 DBRDMT SUBQ TIS 1ST 20SQCM/<: CPT | Performed by: PODIATRIST

## 2025-02-17 RX ORDER — PANTOPRAZOLE SODIUM 40 MG/1
40 TABLET, DELAYED RELEASE ORAL DAILY
Qty: 30 TABLET | Refills: 2 | Status: SHIPPED | OUTPATIENT
Start: 2025-02-17

## 2025-02-17 NOTE — TELEPHONE ENCOUNTER
SONIA MEYERS Patient  Member ID  [id number]    Date of Birth  1955-10-22    Gender  Female    Transaction Type  Outpatient Authorization    Organization  [facility]    Payer  [facility] logo     Certificate Information  Reference Number  [id number]    Status  NO ACTION REQUIRED    Message  Requested Service does not require preauthorization. We would strongly encourage you to check benefits for this service.    Member Information  Patient Name  SONIA MEYERS    Patient Date of Birth  1955-10-22    Patient Gender  Female    Member ID  [id number]    Relationship to Subscriber  Self    Subscriber Name  SONIA MEYERS    Requesting Provider     Name  ELTON SOTO    NPI  [id number]    Tax Id  [id number]    Specialty  [id number]    Provider Role  Provider    Address  [address] [place] [address]    Phone  [phone]    Fax  [phone]    Contact Name  JACE SINGH    Service Information  Service Type  2 - Surgical    Place of Service  22 - [facility]    Service From - To Date  2025-03-03 - 2025-05-30    Level of Service  Elective    Diagnosis Code 1  K439 - Ventral hernia without obstruction or gangrene    Procedure Code 1 (CPT/HCPCS)  34945 - MUSCLE-SKIN GRAFT TRUNK    Quantity  1 Units    Status  NO ACTION REQUIRED    Procedure Code 2 (CPT/HCPCS)  32645 - RPR AA HRN 1ST < 3 CM RDC    Quantity  1 Units    Status  NO ACTION REQUIRED

## 2025-02-17 NOTE — PROGRESS NOTES
Patient ID: Marcela Moreno is a 69 year old female.    Debridement Diabetic Ulcer Plantar;Right Toe (Comment which one)   Wound 01/13/25 #1 rt great Toe (Comment which one) Plantar;Right    Performed by: Jomar Hugo DPM  Authorized by: Jomar Hugo DPM      Consent   Consent obtained? verbal  Consent given by: patient  Risks discussed? procedural risks discussed  Time out called at 2/17/2025 9:17 AM  Immediately prior to the procedure a time out was called and the performing provider verified the correct patient, procedure, equipment, support staff, and site/side marked as required.    Debridement Details  Performed by: physician  Debridement type: surgical  Level of debridement: subcutaneous tissue  Pain control: none    Pre-debridement measurements  Length (cm): 0.4  Width (cm): 0.3  Depth (cm): 0.1  Surface Area (cm^2): 0.12    Post-debridement measurements  Length (cm): 0.4  Width (cm): 0.3  Depth (cm): 0.1  Percent debrided: 100%  Surface Area (cm^2): 0.12  Area Debrided (cm^2): 0.12  Volume (cm^3): 0.01    Tissue and other material debrided: subcutaneous tissue  Devitalized tissue debrided: biofilm, callus and necrotic debris  Instrument(s) utilized: blade  Bleeding: small  Hemostasis obtained with: not applicable  Procedural pain (0-10): 0  Post-procedural pain: 0   Response to treatment: procedure was tolerated well

## 2025-02-17 NOTE — PROGRESS NOTES
Subjective   Marcela Moreno is a 69 year old female.    Chief Complaint   Patient presents with    Wound Care     Patient arrives for a wound care follow up appointment. Patient is using collagen and transfer to the wound and covering. Patient reports no pain. Patient arrives with a Spandagrip.        HPI  Marcela Moreno is a 69 year old female with DM2 with peripheral polyneuropathy who is returning to clinic today for recheck on right great toe ulceration.  Patient states that she is doing well overall and is denying any pain.  She continues to utilize collagen and transfer to the wound itself.  Denies noticing any recent signs of infection.  She is ambulating in a Darco offloading shoe and does have a cane today for gait assistance.  She has been utilizing Tubigrip's for edema control.  She has no other concerns at this time.    Review of Systems  Denies nausea, vomiting, fever, chills, shortness of breath, chest pain, and calf pain.    Objective    Physical Exam:    Derm:  Wound Assessment  Wound 08/28/24 Perineum (Active)   Date First Assessed/Time First Assessed: 08/28/24 0931   Present on Original Admission: No  Location: (c) Perineum      Assessments 8/28/2024 10:15 AM 8/28/2024 12:27 PM   Drainage Amount None None   Dressing Open to air Open to air       No associated orders.       Wound 01/13/25 #1 rt great Toe (Comment which one) Plantar;Right (Active)   Date First Assessed/Time First Assessed: 01/13/25 0911    Wound Number (Wound Clinic Only): #1 rt great  Primary Wound Type: Diabetic Ulcer  Location: Toe (Comment which one)  Wound Location Orientation: Plantar;Right      Assessments 1/13/2025  9:12 AM 2/17/2025  9:09 AM   Wound Image       Drainage Amount Scant --   Drainage Description Serosanguineous --   Wound Length (cm) 0.8 cm 0.4 cm   Wound Width (cm) 0.7 cm 0.3 cm   Wound Surface Area (cm^2) 0.56 cm^2 0.12 cm^2   Wound Depth (cm) 0.1 cm 0.1 cm   Wound Volume (cm^3) 0.056 cm^3 0.012 cm^3    Wound Healing % -- 79   Margins Well-defined edges --   Non-staged Wound Description Full thickness --   Kayla-wound Assessment Callous --   Wound Granulation Tissue Pink;Spongy --   Wound Bed Granulation (%) 100 % --   Wound Odor None --   Colmenares Scale Grade 1 --       Active Orders   Date Order Priority Status Authorizing Provider   02/17/25 0917 Debridement Diabetic Ulcer Plantar;Right Toe (Comment which one) Routine Active Jomar Hugo DPM       Inactive Orders   Date Order Priority Status Authorizing Provider   02/03/25 0833 Debridement Diabetic Ulcer Plantar;Right Toe (Comment which one) Routine Completed Jomar Hugo DPM   01/20/25 0859 Debridement Diabetic Ulcer Plantar;Right Toe (Comment which one) Routine Completed Jomar Hugo DPM   01/13/25 0942 Debridement Diabetic Ulcer Right;Plantar Toe (Comment which one) Routine Completed Jomar Hugo DPM       Vascular: pedal pulses are palpable. CFT <3 sec to digits  Musculoskeletal: no acute deformities noted.  Decreased active and passive dorsiflexion of right first MPJ  Neurological: Gross sensation intact via light touch.  Protective sensation diminished via Ipswitch test.        MRI FOOT, RIGHT (CPT=73718)     Result Date: 12/30/2024  CONCLUSION:  Soft tissue ulceration along the distal aspect of the great toe overlies the distal phalanx and there is moderate generalized edema signal associated with the great toe which is concerning for cellulitis in this region.  There is marrow signal abnormality in the distal phalanx including areas of low T1 signal, STIR hyperintensity, and cortical irregularity that is highly concerning for osteomyelitis as was suggested on recent plain film radiographs.  Clinical correlation recommended.  There is no discrete soft tissue abscess identified.  LOCATION:  [facility]   Dictated by (CST): Bayron Gardner MD on 12/30/2024 at 10:25 PM     Finalized by (CST): Bayron Gardner MD on 12/30/2024 at 10:28 PM          ARTERIAL DUPLEX LOWER EXTREMITY BILATERAL (CPT=93925)     Result Date: 12/30/2024  CONCLUSION:  1. Diffuse atherosclerotic plaquing bilaterally with diffuse biphasic waveform patterns involving both lower extremities. 2. There is a focus of increased arterial velocity involving the left mid SFA suggesting mild to moderate grade stenosis approximating 50%.  3. Increased arterial velocity within the right mid MATT and left proximal PTA suspicious for stenosis approximating 75%.   LOCATION:  [facility]   Dictated by (CST): Maame Rodrigues DO on 12/30/2024 at 1:23 PM     Finalized by (CST): Maame Rodrigues DO on 12/30/2024 at 1:27 PM        XR FOOT, COMPLETE (MIN 3 VIEWS), RIGHT (CPT=73630)     Result Date: 12/27/2024  CONCLUSION:   Focal soft tissue defect/ulceration noted along the plantar aspect of the 1st distal phalanx.  There is associated cortical erosion/destruction involving the tip of the 1st distal phalanx most in keeping with acute osteomyelitis.  These findings are on a background of generalized osteopenia.  Scattered arthropathy of the mid and forefoot noted.  Large plantar calcaneal spur.   LOCATION:  [facility]   Dictated by (CST): Matheus Gutierrez MD on 12/27/2024 at 9:15 PM     Finalized by (CST): Matheus Gutierrez MD on 12/27/2024 at 9:16 PM        Vital Signs  Vital Signs    02/17/25 0700   BP: 157/76   Pulse: 85   Resp: 16   Temp: 97.6 °F (36.4 °C)   PainSc: 0 - (None)         Allergies  Allergies[1]    Assessment    Encounter Diagnosis  1. Diabetic ulcer of right great toe (HCC)    2. Osteomyelitis of great toe of right foot (HCC)    3. Hallux limitus of right foot    4. Type 2 diabetes mellitus with hyperglycemia, with long-term current use of insulin (HCC)        Problem List  Patient Active Problem List   Diagnosis    Mixed hyperlipidemia    Type 2 diabetes mellitus with hyperglycemia, with long-term current use of insulin ([facility])    Status post stroke    Essential hypertension    H/O arteriovenous malformation (AVM)     Hypothyroidism    Class 1 obesity due to excess calories with serious comorbidity and body mass index (BMI) of 30.0 to 30.9 in adult    Overactive bladder    RLS (restless legs syndrome)    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 associated with type 2 diabetes mellitus (HCC)    Age-related osteoporosis without current pathological fracture    Ventral hernia without obstruction or gangrene    Cellulitis and abscess of toe of right foot    Osteomyelitis of right foot, unspecified type (HCC)    Diabetic ulcer of right great toe (HCC)       Plan  Orders:  Orders Placed This Encounter   Procedures    Debridement Diabetic Ulcer Plantar;Right Toe (Comment which one)         -Patient examined, chart history reviewed.    -Inspected right foot: Full-thickness ulceration to plantar distal right hallux with surrounding hyperkeratotic tissue.  There is no hypergranulation noted to the wound bed today.  No deep probing, no surrounding erythema, no purulent drainage, no other signs of infection.  Overall improvements in wound depth.     -Utilizing #15 blade, excisional debridement was performed to right hallux ulceration down to including subcutaneous tissues.  Care was taken to remove all periwound hyperkeratotic tissue as well.       -Patient's ulceration covered with Paulina, Hydrofera Blue ready, paper tape, and E-spanda.  Rolled up gauze was also placed under the hallux to assist in offloading     -Will have patient change these dressings in a similar fashion every 2-4 days dependent on amount of drainage.     -Recommend patient continue to weight-bear as tolerated in her Darco shoe.  She should avoid excessive ambulation as offloading will be essential for wound healing.  Also recommend continued use of cane as needed.     -Recommend continued tight glycemic control.  Discussed blood sugars and their role in healing potential.     -Patient does understand that she is at risk of needing a  partial hallux amputation.  Recent MRI reveals concerns of osteomyelitis to the distal phalanx.  No acute signs of infection currently.  Will continue to monitor    -Educated on signs of infection and encouraged patient to seek immediate medical attention if noticing any of these signs.    Follow-Up  1 week    Héctor Hugo DPM    2/17/2025    Dragon speech recognition software was used to prepare this note.  Errors in word recognition may occur.  Please contact me with any questions/concerns with this note.          [1] No Known Allergies

## 2025-02-17 NOTE — PROGRESS NOTES
Weekly Wound Education Note    Teaching Provided To: Patient  Training Topics: Discharge instructions;Cleasing and general instructions;Dressing  Training Method: Demonstration;Explain/Verbal;Written  Training Response: Patient responds and understands        Notes: Cleanse right great toe wound with saline or wound cleanser, apply sheila, hydrofera ready (writing side away from wound), secure with tape, change 2-3 times weekly. E spanda from base of toes to just below knee, may remove at bedtime and reapply first thing in the morning.

## 2025-02-18 NOTE — TELEPHONE ENCOUNTER
Received Family Medical Leave Act via email. Logged for processing. Sent patient Pre[employer] message to obtain Release of Information.

## 2025-02-20 NOTE — TELEPHONE ENCOUNTER
Hi Dr. Murillo,     Please sign off on form if you agree to:   Family Medical Leave Act for daughter to care for patient. Continuous leave start date 03/03/25-03/16/25  Intermittent leave start date 03/17/25-04/17/25  -Signature page will be the first page scanned  -From your Inbasket, Highlight the patient and click Chart   -Double click the 02/13/25 Forms Completion telephone encounter  -Scroll down to the Media section   -Click the blue Hyperlink: Chidi Yen, 02/20/25  -Click Acknowledge located in the top right ribbon/menu   -Drag the mouse into the blank space of the document and a + sign will appear. Left click to   electronically sign the document.  -Once signed, simply exit out of the screen and you signature will be saved.     Thank you kindly for your time,  Ronak   Forms Dept.

## 2025-02-20 NOTE — TELEPHONE ENCOUNTER
Hi Dr. Murillo,     Patient daughter is requesting for continuous and intermittent leave to care for patient post op.      Continuous leave:   Start date of leave: 03/03/2025  End date of leave: 03/16/2025  RTW: 03/17/25   Intermittent leave:   Start date of leave: 03/17/25  End date of leave: 04/17/25  1-2 flare ups per month each episode lasting 1-2 days   1-4 appts per month each appt lasting 1-4 hours     Do you support the two leaves?    Thank you so much for your time,  Senada   Forms Dept

## 2025-02-21 NOTE — TELEPHONE ENCOUNTER
Form completed and sent to patient via [employer] message. Valid Release of Information not on file.

## 2025-02-24 ENCOUNTER — TELEPHONE (OUTPATIENT)
Facility: LOCATION | Age: 70
End: 2025-02-24

## 2025-02-24 ENCOUNTER — OFFICE VISIT (OUTPATIENT)
Dept: WOUND CARE | Facility: HOSPITAL | Age: 70
End: 2025-02-24
Attending: PODIATRIST
Payer: MEDICARE

## 2025-02-24 VITALS
TEMPERATURE: 97 F | RESPIRATION RATE: 16 BRPM | HEART RATE: 78 BPM | SYSTOLIC BLOOD PRESSURE: 150 MMHG | DIASTOLIC BLOOD PRESSURE: 71 MMHG

## 2025-02-24 DIAGNOSIS — E11.621 DIABETIC ULCER OF RIGHT GREAT TOE (HCC): Primary | ICD-10-CM

## 2025-02-24 DIAGNOSIS — M86.9 OSTEOMYELITIS OF GREAT TOE OF RIGHT FOOT (HCC): ICD-10-CM

## 2025-02-24 DIAGNOSIS — Z79.4 TYPE 2 DIABETES MELLITUS WITH HYPERGLYCEMIA, WITH LONG-TERM CURRENT USE OF INSULIN (HCC): ICD-10-CM

## 2025-02-24 DIAGNOSIS — E11.65 TYPE 2 DIABETES MELLITUS WITH HYPERGLYCEMIA, WITH LONG-TERM CURRENT USE OF INSULIN (HCC): ICD-10-CM

## 2025-02-24 DIAGNOSIS — L97.519 DIABETIC ULCER OF RIGHT GREAT TOE (HCC): Primary | ICD-10-CM

## 2025-02-24 DIAGNOSIS — K43.9 VENTRAL HERNIA WITHOUT OBSTRUCTION OR GANGRENE: Primary | ICD-10-CM

## 2025-02-24 DIAGNOSIS — M20.5X1 HALLUX LIMITUS OF RIGHT FOOT: ICD-10-CM

## 2025-02-24 PROCEDURE — 11042 DBRDMT SUBQ TIS 1ST 20SQCM/<: CPT | Performed by: PODIATRIST

## 2025-02-24 NOTE — PROGRESS NOTES
Patient ID: Marcela Moreno is a 69 year old female.    Debridement Diabetic Ulcer Plantar;Right Toe (Comment which one)   Wound 01/13/25 #1 rt great Toe (Comment which one) Plantar;Right    Performed by: Jomar Hugo DPM  Authorized by: Jomar Hugo DPM      Consent   Consent obtained? verbal  Consent given by: patient  Risks discussed? procedural risks discussed  Time out called at 2/24/2025 8:14 AM  Immediately prior to the procedure a time out was called and the performing provider verified the correct patient, procedure, equipment, support staff, and site/side marked as required.    Debridement Details  Performed by: physician  Debridement type: surgical  Level of debridement: subcutaneous tissue    Pre-debridement measurements  Length (cm): 0.4  Width (cm): 0.3  Depth (cm): 0.1  Surface Area (cm^2): 0.12    Post-debridement measurements  Length (cm): 0.5  Width (cm): 0.3  Depth (cm): 0.1  Percent debrided: 100%  Surface Area (cm^2): 0.15  Area Debrided (cm^2): 0.15  Volume (cm^3): 0.02    Tissue and other material debrided: subcutaneous tissue  Devitalized tissue debrided: biofilm, callus and necrotic debris  Instrument(s) utilized: blade  Bleeding: small  Hemostasis obtained with: not applicable  Procedural pain (0-10): 0  Post-procedural pain: 0   Response to treatment: procedure was tolerated well

## 2025-02-24 NOTE — PROGRESS NOTES
Weekly Wound Education Note    Teaching Provided To: Patient  Training Topics: Discharge instructions;Dressing;Off-loading  Training Method: Demonstration;Explain/Verbal;Written  Training Response: Patient responds and understands        Notes: RT great toe Wound Stable, continue sheila, hydrofera ready, tape, and gauze bolster under great toe change dressing every 2-3 days, E spanda may remove at bedtime and reapply first thing in the morning

## 2025-02-24 NOTE — PROGRESS NOTES
Subjective   Marcela Moreno is a 69 year old female.    Chief Complaint   Patient presents with    Wound Recheck     Patient arrives for wound care follow-up appointment on foot w/ cane, wearing darco offloading shoe to R foot. Denies pain.       HPI  Marcela Moreno is a 69 year old female with DM2 with peripheral polyneuropathy who is returning to clinic today for recheck on right great toe ulceration.  Patient states that she is continuing to do well overall.  Denying any recent signs of infection or any pain to the great toe.  She has been changing dressings as directed.  She is ambulating as tolerated in her Darco offloading shoe.  She has had a rolled up piece of gauze underneath her big toe that has been assisting in offloading as well.  She states that that this did stay on.  She also utilizes a cane for gait assistance.  She is denying any other concerns at this time.    Review of Systems  Denies nausea, vomiting, fever, chills, shortness of breath, chest pain, and calf pain.    Objective    Physical Exam:    Derm:  Wound Assessment  Wound 08/28/24 Perineum (Active)   Date First Assessed/Time First Assessed: 08/28/24 0931   Present on Original Admission: No  Location: (c) Perineum      Assessments 8/28/2024 10:15 AM 8/28/2024 12:27 PM   Drainage Amount None None   Dressing Open to air Open to air       No associated orders.       Wound 01/13/25 #1 rt great Toe (Comment which one) Plantar;Right (Active)   Date First Assessed/Time First Assessed: 01/13/25 0911    Wound Number (Wound Clinic Only): #1 rt great  Primary Wound Type: Diabetic Ulcer  Location: Toe (Comment which one)  Wound Location Orientation: Plantar;Right      Assessments 1/13/2025  9:12 AM 2/24/2025  8:15 AM   Wound Image       Drainage Amount Scant --   Drainage Description Serosanguineous --   Wound Length (cm) 0.8 cm --   Wound Width (cm) 0.7 cm --   Wound Surface Area (cm^2) 0.56 cm^2 --   Wound Depth (cm) 0.1 cm --   Wound Volume  (cm^3) 0.056 cm^3 --   Margins Well-defined edges --   Non-staged Wound Description Full thickness --   Kayla-wound Assessment Callous --   Wound Granulation Tissue Pink;Spongy --   Wound Bed Granulation (%) 100 % --   Wound Odor None --   Colmenares Scale Grade 1 --       Active Orders   Date Order Priority Status Authorizing Provider   02/24/25 0813 Debridement Routine Active Jomar Hugo DPM       Inactive Orders   Date Order Priority Status Authorizing Provider   02/17/25 0917 Debridement Diabetic Ulcer Plantar;Right Toe (Comment which one) Routine Completed Jomar Hugo DPM   02/03/25 0833 Debridement Diabetic Ulcer Plantar;Right Toe (Comment which one) Routine Completed Jomar Hugo DPM   01/20/25 0859 Debridement Diabetic Ulcer Plantar;Right Toe (Comment which one) Routine Completed Jomar Hugo DPM   01/13/25 0942 Debridement Diabetic Ulcer Right;Plantar Toe (Comment which one) Routine Completed Jomar Hugo DPM       Vascular: pedal pulses are palpable. CFT <3 sec to digits  Musculoskeletal: no acute deformities noted.  Decreased active and passive dorsiflexion of right first MPJ  Neurological: Gross sensation intact via light touch.  Protective sensation diminished via Ipswitch test.        MRI FOOT, RIGHT (CPT=73718)     Result Date: 12/30/2024  CONCLUSION:  Soft tissue ulceration along the distal aspect of the great toe overlies the distal phalanx and there is moderate generalized edema signal associated with the great toe which is concerning for cellulitis in this region.  There is marrow signal abnormality in the distal phalanx including areas of low T1 signal, STIR hyperintensity, and cortical irregularity that is highly concerning for osteomyelitis as was suggested on recent plain film radiographs.  Clinical correlation recommended.  There is no discrete soft tissue abscess identified.  LOCATION:  [facility]   Dictated by (CST): Bayron Gardner MD on 12/30/2024 at 10:25 PM     Finalized by  (CST): Bayron Gardner MD on 12/30/2024 at 10:28 PM        US ARTERIAL DUPLEX LOWER EXTREMITY BILATERAL (CPT=93925)     Result Date: 12/30/2024  CONCLUSION:  1. Diffuse atherosclerotic plaquing bilaterally with diffuse biphasic waveform patterns involving both lower extremities. 2. There is a focus of increased arterial velocity involving the left mid SFA suggesting mild to moderate grade stenosis approximating 50%.  3. Increased arterial velocity within the right mid MATT and left proximal PTA suspicious for stenosis approximating 75%.   LOCATION:  [facility]   Dictated by (CST): Maame Rodrigues DO on 12/30/2024 at 1:23 PM     Finalized by (CST): Maame Rodrigues DO on 12/30/2024 at 1:27 PM        XR FOOT, COMPLETE (MIN 3 VIEWS), RIGHT (CPT=73630)     Result Date: 12/27/2024  CONCLUSION:   Focal soft tissue defect/ulceration noted along the plantar aspect of the 1st distal phalanx.  There is associated cortical erosion/destruction involving the tip of the 1st distal phalanx most in keeping with acute osteomyelitis.  These findings are on a background of generalized osteopenia.  Scattered arthropathy of the mid and forefoot noted.  Large plantar calcaneal spur.   LOCATION:  [facility]   Dictated by (CST): Matheus Gutierrez MD on 12/27/2024 at 9:15 PM     Finalized by (CST): Matheus Gutierrez MD on 12/27/2024 at 9:16 PM        Vital Signs  Vital Signs    02/24/25 0800   BP: 150/71   Pulse: 78   Resp: 16   Temp: 97.2 °F (36.2 °C)   PainSc: 0 - (None)         Allergies  Allergies[1]    Assessment    Encounter Diagnosis  1. Diabetic ulcer of right great toe (HCC)    2. Osteomyelitis of great toe of right foot (HCC)    3. Hallux limitus of right foot    4. Type 2 diabetes mellitus with hyperglycemia, with long-term current use of insulin (HCC)        Problem List  Patient Active Problem List   Diagnosis    Mixed hyperlipidemia    Type 2 diabetes mellitus with hyperglycemia, with long-term current use of insulin (HCC)    Status post stroke     Essential hypertension    H/O arteriovenous malformation (AVM)    Hypothyroidism    Class 1 obesity due to excess calories with serious comorbidity and body mass index (BMI) of 30.0 to 30.9 in adult    Overactive bladder    RLS (restless legs syndrome)    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 associated with type 2 diabetes mellitus (HCC)    Age-related osteoporosis without current pathological fracture    Ventral hernia without obstruction or gangrene    Cellulitis and abscess of toe of right foot    Osteomyelitis of right foot, unspecified type (HCC)    Diabetic ulcer of right great toe (HCC)       Plan  Orders:  Orders Placed This Encounter   Procedures    Debridement         -Patient examined, chart history reviewed.    -Inspected right foot: Full-thickness ulceration to plantar distal right hallux with surrounding hyperkeratotic tissue.  There is no hypergranulation noted to the wound bed today.  No deep probing, no surrounding erythema, no purulent drainage, no other signs of infection.  Overall stable wound.  It is superficial in nature.     -Utilizing #15 blade, excisional debridement was performed to right hallux ulceration down to including subcutaneous tissues.  Care was taken to remove all periwound hyperkeratotic tissue as well.       -Patient's ulceration covered with Paulina, Hydrofera Blue ready, paper tape, and E-spanda.  Rolled up gauze was also placed under the hallux to assist in offloading.  Encouraged patient to keep this on.     -Will have patient change these dressings in a similar fashion every 2-4 days dependent on amount of drainage.     -Recommend patient continue to weight-bear as tolerated in her Darco shoe.  She should avoid excessive ambulation as offloading will be essential for wound healing.  Also recommend continued use of cane as needed.     -Recommend continued tight glycemic control.  Discussed blood sugars and their role in healing  potential.     -Patient does understand that she is at risk of needing a partial hallux amputation.  Recent MRI reveals concerns of osteomyelitis to the distal phalanx.  No acute signs of infection currently.  Will continue to monitor    -Educated on signs of infection and encouraged patient to seek immediate medical attention if noticing any of these signs.    Follow-Up  2 weeks    Héctor Hugo DPM    2/24/2025    Dragon speech recognition software was used to prepare this note.  Errors in word recognition may occur.  Please contact me with any questions/concerns with this note.        [1] No Known Allergies

## 2025-02-25 ENCOUNTER — TELEPHONE (OUTPATIENT)
Facility: LOCATION | Age: 70
End: 2025-02-25

## 2025-02-25 NOTE — TELEPHONE ENCOUNTER
Patient would like the doctor to know for the last week she has been having heartburn while taking her GERD medication pantoprazole 40 MG Oral Tab EC   She wants to know if there is another medication she can take along with this to help with the heartburn.    Please advise   CB# [phone]

## 2025-02-25 NOTE — TELEPHONE ENCOUNTER
Spoke with patient.  Patient advised should follow up with PCP physician for additional recommendation regarding Heart burn treatment.  Patient verbalized understanding.

## 2025-02-26 ENCOUNTER — LAB ENCOUNTER (OUTPATIENT)
Dept: LAB | Age: 70
End: 2025-02-26
Payer: MEDICARE

## 2025-02-26 ENCOUNTER — EKG ENCOUNTER (OUTPATIENT)
Dept: LAB | Age: 70
End: 2025-02-26
Payer: MEDICARE

## 2025-02-26 DIAGNOSIS — I10 ESSENTIAL HYPERTENSION: ICD-10-CM

## 2025-02-26 DIAGNOSIS — E55.9 VITAMIN D DEFICIENCY: ICD-10-CM

## 2025-02-26 DIAGNOSIS — Z79.4 TYPE 2 DIABETES MELLITUS WITH HYPERGLYCEMIA, WITH LONG-TERM CURRENT USE OF INSULIN (HCC): ICD-10-CM

## 2025-02-26 DIAGNOSIS — E11.65 TYPE 2 DIABETES MELLITUS WITH HYPERGLYCEMIA, WITH LONG-TERM CURRENT USE OF INSULIN (HCC): ICD-10-CM

## 2025-02-26 DIAGNOSIS — K43.9 VENTRAL HERNIA WITHOUT OBSTRUCTION OR GANGRENE: ICD-10-CM

## 2025-02-26 LAB
ALBUMIN SERPL-MCNC: 4.6 G/DL (ref 3.2–4.8)
ALBUMIN/GLOB SERPL: 1.7 {RATIO} (ref 1–2)
ALP LIVER SERPL-CCNC: 73 U/L
ALT SERPL-CCNC: <7 U/L
ANION GAP SERPL CALC-SCNC: 5 MMOL/L (ref 0–18)
AST SERPL-CCNC: 15 U/L (ref ?–34)
ATRIAL RATE: 80 BPM
BASOPHILS # BLD AUTO: 0.05 X10(3) UL (ref 0–0.2)
BASOPHILS NFR BLD AUTO: 0.7 %
BILIRUB SERPL-MCNC: 0.6 MG/DL (ref 0.2–1.1)
BUN BLD-MCNC: 16 MG/DL (ref 9–23)
CALCIUM BLD-MCNC: 9.5 MG/DL (ref 8.7–10.6)
CHLORIDE SERPL-SCNC: 104 MMOL/L (ref 98–112)
CHOLEST SERPL-MCNC: 155 MG/DL (ref ?–200)
CO2 SERPL-SCNC: 32 MMOL/L (ref 21–32)
CREAT BLD-MCNC: 0.95 MG/DL
CREAT UR-SCNC: 120.5 MG/DL
EGFRCR SERPLBLD CKD-EPI 2021: 65 ML/MIN/1.73M2 (ref 60–?)
EOSINOPHIL # BLD AUTO: 0.22 X10(3) UL (ref 0–0.7)
EOSINOPHIL NFR BLD AUTO: 2.9 %
ERYTHROCYTE [DISTWIDTH] IN BLOOD BY AUTOMATED COUNT: 12.6 %
EST. AVERAGE GLUCOSE BLD GHB EST-MCNC: 180 MG/DL (ref 68–126)
FASTING PATIENT LIPID ANSWER: YES
FASTING STATUS PATIENT QL REPORTED: YES
GLOBULIN PLAS-MCNC: 2.7 G/DL (ref 2–3.5)
GLUCOSE BLD-MCNC: 79 MG/DL (ref 70–99)
HBA1C MFR BLD: 7.9 % (ref ?–5.7)
HCT VFR BLD AUTO: 36.5 %
HDLC SERPL-MCNC: 52 MG/DL (ref 40–59)
HGB BLD-MCNC: 12.2 G/DL
IMM GRANULOCYTES # BLD AUTO: 0.02 X10(3) UL (ref 0–1)
IMM GRANULOCYTES NFR BLD: 0.3 %
LDLC SERPL CALC-MCNC: 82 MG/DL (ref ?–100)
LYMPHOCYTES # BLD AUTO: 2.05 X10(3) UL (ref 1–4)
LYMPHOCYTES NFR BLD AUTO: 27.1 %
MCH RBC QN AUTO: 29.1 PG (ref 26–34)
MCHC RBC AUTO-ENTMCNC: 33.4 G/DL (ref 31–37)
MCV RBC AUTO: 87.1 FL
MICROALBUMIN UR-MCNC: 15.7 MG/DL
MICROALBUMIN/CREAT 24H UR-RTO: 130.3 UG/MG (ref ?–30)
MONOCYTES # BLD AUTO: 0.45 X10(3) UL (ref 0.1–1)
MONOCYTES NFR BLD AUTO: 5.9 %
NEUTROPHILS # BLD AUTO: 4.78 X10 (3) UL (ref 1.5–7.7)
NEUTROPHILS # BLD AUTO: 4.78 X10(3) UL (ref 1.5–7.7)
NEUTROPHILS NFR BLD AUTO: 63.1 %
NONHDLC SERPL-MCNC: 103 MG/DL (ref ?–130)
OSMOLALITY SERPL CALC.SUM OF ELEC: 292 MOSM/KG (ref 275–295)
P AXIS: 46 DEGREES
P-R INTERVAL: 136 MS
PLATELET # BLD AUTO: 207 10(3)UL (ref 150–450)
POTASSIUM SERPL-SCNC: 3.9 MMOL/L (ref 3.5–5.1)
PROT SERPL-MCNC: 7.3 G/DL (ref 5.7–8.2)
Q-T INTERVAL: 386 MS
QRS DURATION: 82 MS
QTC CALCULATION (BEZET): 445 MS
R AXIS: -16 DEGREES
RBC # BLD AUTO: 4.19 X10(6)UL
SODIUM SERPL-SCNC: 141 MMOL/L (ref 136–145)
T AXIS: 65 DEGREES
TRIGL SERPL-MCNC: 118 MG/DL (ref 30–149)
TSI SER-ACNC: 0.94 UIU/ML (ref 0.55–4.78)
VENTRICULAR RATE: 80 BPM
VIT B12 SERPL-MCNC: 273 PG/ML (ref 211–911)
VIT D+METAB SERPL-MCNC: 31.9 NG/ML (ref 30–100)
VLDLC SERPL CALC-MCNC: 19 MG/DL (ref 0–30)
WBC # BLD AUTO: 7.6 X10(3) UL (ref 4–11)

## 2025-02-26 PROCEDURE — 82607 VITAMIN B-12: CPT

## 2025-02-26 PROCEDURE — 82043 UR ALBUMIN QUANTITATIVE: CPT

## 2025-02-26 PROCEDURE — 93005 ELECTROCARDIOGRAM TRACING: CPT

## 2025-02-26 PROCEDURE — 80061 LIPID PANEL: CPT

## 2025-02-26 PROCEDURE — 84443 ASSAY THYROID STIM HORMONE: CPT

## 2025-02-26 PROCEDURE — 36415 COLL VENOUS BLD VENIPUNCTURE: CPT

## 2025-02-26 PROCEDURE — 82306 VITAMIN D 25 HYDROXY: CPT

## 2025-02-26 PROCEDURE — 85025 COMPLETE CBC W/AUTO DIFF WBC: CPT

## 2025-02-26 PROCEDURE — 83036 HEMOGLOBIN GLYCOSYLATED A1C: CPT

## 2025-02-26 PROCEDURE — 82570 ASSAY OF URINE CREATININE: CPT

## 2025-02-26 PROCEDURE — 93010 ELECTROCARDIOGRAM REPORT: CPT | Performed by: INTERNAL MEDICINE

## 2025-02-26 PROCEDURE — 80053 COMPREHEN METABOLIC PANEL: CPT

## 2025-02-28 ENCOUNTER — TELEPHONE (OUTPATIENT)
Facility: CLINIC | Age: 70
End: 2025-02-28

## 2025-02-28 NOTE — TELEPHONE ENCOUNTER
Patient left message - advised she is having ventral hernia procedure this Monday 3/3/25 at [place] - was wondering on insulin adjustments. Call placed back to patient to confirm-     Confirmed patient's medications as below-     Metformin ER 500mg po twice daily  Tresiba  30 units injection daily  Will be taking Tylenol and 2 - 20 ounces Gatorades pre-operatively  (Patient not taking right now: Farxiga 10mg po daily)    Recommendations to patient dosing - please confirm:  Hold Metformin 3/3/25 - resume with normal dosing once tolerating regular diet  Decrease Lantus to 24 units on 3/1/25 (2 days prior to procedure) - resume with normal dosing once tolerating regular diet  Further recommendations related to Gatorade?    Pre surgery protocol for DM meds   Pre op/anesthesia requests for patient to contact providers for insulin prescription   SGLT: hold 3 days before surgery  HOLD all oral diabetes medication the day of surgery   48 hour prior to surgery, decrease basal insulin dose by 20% if using Toujeo or Tresiba   24 hour prior to surgery, decrease basal insulin dose by 20% if using any other basal (detemir, glargine U 100, NPH)   No changes in prandial dosing. Hold AM dose of prandial on day of surgery since NPO   Resume DM meds post operatively    If General Anesthesia: Hold GLP-1 agonists on the day of the procedure/surgery for patients who take the medication daily.  If General Anesthesia: Hold GLP-1 agonists a week prior to the procedure/surgery for patients who take the medication weekly.    General anesthesia at [facility], recommends 2 -20 ounce Gatorade pre operatively, please have APN determine pre op prandial insulin needs for Gatorade.

## 2025-02-28 NOTE — TELEPHONE ENCOUNTER
Call placed to patient to discuss provider recommendations - patient confirmed adjustments - No further questions/concerns at this time.     Noticed that patient is not scheduled with provider at this time- discussed follow up appointment with provider - schedule in person and put on wait-list - advised patient would need to schedule follow up - wants to see how things are looking after her appointment - will not be able to drive for at least a couple weeks - wanted to call office back to re-schedule.    Patient called in, she forgot to call [facility] pharmacy to get a refill on the Cilostazol, and she will be out this weekend. She did call [facility] but they have a 7-14 day mail out. Wants to know if you can send in a refill for #30 to [facility].     Thanks  Mariam

## 2025-02-28 NOTE — H&P
Expand All Collapse All    Follow Up Visit Note     Active Problems      1. Ventral hernia without obstruction or gangrene          Chief Complaint        Chief Complaint   Patient presents with    \[facility] [place] [facility]\"" Care       EP- 3 months f/u Ventral Hernia- discuss surgery             History of Present Illness     The patient presents for continued follow-up of sizable incarcerated ventral hernia.  Since our last encounter the patient has experienced an approximately 8-10 pound weight loss.  The patient does experience intermittent pain and discomfort due to her hernia.  There is pain at the site of the hernia with some radiation outward and laterally.  No other new complaints offered.  The patient wishes to consider definitive surgical repair for symptom relief.     The patient denies fever, chills, chest pain, shortness of breath, dyspnea. The patient also denies hematemesis, melena, or hematochezia. The patient denies change in bowel or bladder habits. There is no complaint of hematuria or dysuria.     I discussed the risk, benefits, alternatives of surgery.  I discussed the anticipated postoperative recovery including dietary, activity, exercise, and lifestyle recommendations.  The patient's questions regarding surgical procedure were answered and the patient voiced understanding of the care plan.     Allergies  Marcela has No Known Allergies.     Past Medical / Surgical / Social / Family History    The past medical and past surgical history have been reviewed by me today.     Past Medical History       Past Medical History:    Abdominal pain    Arthritis    AVM (arteriovenous malformation) brain (HCC)     being followed at [facility]    Back pain    Back problem    Bloating    Calculus of kidney    Constipation    Diabetes mellitus (HCC)    Diarrhea, unspecified    Esophageal reflux    Fatigue    Food intolerance    Frequent urination    Heartburn    High blood pressure    High cholesterol    History of  depression    Indigestion    Irregular bowel habits    Leaking of urine    Leg swelling    Muscle weakness    Neuropathy     TOES SOMETIMES    Osteoarthritis    Pain in joints    Pain with bowel movements    Problems with swallowing    Sleep disturbance    Sputum production    Stool incontinence    Stroke (HCC)     LEFT SIDE EFFECTED- USING CANE    Type II or unspecified type diabetes mellitus without mention of complication, not stated as uncontrolled    Uncomfortable fullness after meals    Vitamin D deficiency    Weight gain         Past Surgical History         Past Surgical History:   Procedure Laterality Date    Brain surgery   2015     avm   [facility]             X3    Cholecystectomy        Colonoscopy N/A 2024     Procedure: COLONOSCOPY;  Surgeon: Guerrero Moser MD;  Location:  ENDOSCOPY    Cystoscopy,insert ureteral stent        Hernia surgery        Other surgical history         LEFT TIBIA FRACTURE- PLATE AND SCREWS              The family history and social history have been reviewed by me today.     Family History         Family History   Problem Relation Age of Onset    Diabetes Mother      Hypertension Mother      Hypertension Father      Heart Disorder Brother           CAD stent in his 40s    Diabetes Brother      Diabetes Sister      Heart Attack Brother           Social Hx on file   Social History            Socioeconomic History    Marital status:    Tobacco Use    Smoking status: Never    Smokeless tobacco: Never   Vaping Use    Vaping status: Never Used   Substance and Sexual Activity    Alcohol use: Not Currently    Drug use: No   Other Topics Concern    Caffeine Concern Yes       Comment: coffee 2 cups daily    Exercise No    Seat Belt Yes           Medications - Current      Current Outpatient Medications:     dapagliflozin (FARXIGA) 10 MG Oral Tab, Take 1 tablet (10 mg total) by mouth daily., Disp: 90 tablet, Rfl: 1    insulin degludec (TRESIBA  FLEXTOUCH) 200 UNIT/ML Subcutaneous Solution Pen-injector, Inject daily as directed up to TDD = 30 units, Disp: 18 mL, Rfl: 0    Ketorolac Tromethamine 10 MG Oral Tab, Take 1 tablet (10 mg total) by mouth every 8 (eight) hours as needed for Pain (Use sparingly and with plenty of water)., Disp: 10 tablet, Rfl: 0    METFORMIN  MG Oral Tablet 24 Hr, TAKE 1 TABLET(500 MG) BY MOUTH TWICE DAILY WITH MEALS, Disp: 180 tablet, Rfl: 1    oxybutynin ER 5 MG Oral Tablet 24 Hr, Take 1 tablet (5 mg total) by mouth daily., Disp: 30 tablet, Rfl: 1    Polyethylene Glycol 3350 (MIRALAX) 17 g Oral Powd Pack, Take 17 g by mouth daily as needed (Take to avoid constipation, especially if taking narcotic pain medications.)., Disp: 20 packet, Rfl: 1    tamsulosin 0.4 MG Oral Cap, Take 1 capsule (0.4 mg total) by mouth every evening., Disp: 30 capsule, Rfl: 1    Insulin Pen Needle (BD PEN NEEDLE TITO 2ND GEN) 32G X 4 MM Does not apply Misc, Inject 1 each into the skin daily., Disp: 100 each, Rfl: 3    PANTOPRAZOLE 40 MG Oral Tab EC, TAKE 1 TABLET(40 MG) BY MOUTH DAILY, Disp: 30 tablet, Rfl: 6    lisinopril 5 MG Oral Tab, Take 1 tablet (5 mg total) by mouth daily., Disp: 90 tablet, Rfl: 1    Glucose Blood (ONETOUCH VERIO) In Vitro Strip, USE TO TEST ONCE TO TWICE DAILY, Disp: 100 strip, Rfl: 1    OneTouch Delica Lancets 33G Does not apply Misc, 1 Lancet by Finger stick route daily., Disp: 100 each, Rfl: 0    rosuvastatin 10 MG Oral Tab, TAKE 1 TABLET(10 MG) BY MOUTH EVERY NIGHT, Disp: 90 tablet, Rfl: 3    timolol 0.5 % Ophthalmic Solution, Place 1 drop into both eyes 2 (two) times daily., Disp: , Rfl:         Review of Systems  The Review of Systems has been reviewed by me during today.  Review of Systems   Constitutional:  Negative for chills, diaphoresis, fatigue, fever and unexpected weight change.   HENT:  Negative for hearing loss, nosebleeds, sore throat and trouble swallowing.    Respiratory:  Negative for apnea, cough,  shortness of breath and wheezing.    Cardiovascular:  Negative for chest pain, palpitations and leg swelling.   Gastrointestinal:  Negative for abdominal distention, abdominal pain, anal bleeding, blood in stool, constipation, diarrhea, nausea and vomiting.   Genitourinary:  Negative for difficulty urinating, dysuria, frequency and urgency.   Musculoskeletal:  Negative for arthralgias and myalgias.   Skin:  Negative for color change and rash.   Neurological:  Negative for tremors, syncope and weakness.   Hematological:  Negative for adenopathy. Does not bruise/bleed easily.   Psychiatric/Behavioral:  Negative for behavioral problems and sleep disturbance.          Physical Findings   Pulse 75   Temp 98 °F (36.7 °C) (Temporal)   Resp 20   LMP  (LMP Unknown)   SpO2 98%   Physical Exam  Vitals and nursing note reviewed.   Constitutional:       General: She is not in acute distress.     Appearance: Normal appearance. She is well-developed. She is obese.   HENT:      Head: Normocephalic and atraumatic.   Eyes:      General: No scleral icterus.     Conjunctiva/sclera: Conjunctivae normal.   Neck:      Trachea: No tracheal deviation.   Cardiovascular:      Rate and Rhythm: Normal rate and regular rhythm.      Heart sounds: S1 normal and S2 normal. No murmur heard.  Pulmonary:      Effort: No accessory muscle usage or respiratory distress.      Breath sounds: No decreased breath sounds, wheezing, rhonchi or rales.   Abdominal:      General: Abdomen is protuberant. There is no distension or abdominal bruit.      Palpations: Abdomen is soft. Abdomen is not rigid. There is no shifting dullness, fluid wave, hepatomegaly, splenomegaly, mass or pulsatile mass.      Tenderness: There is no abdominal tenderness. There is no guarding or rebound. Negative signs include Silver's sign and McBurney's sign.      Hernia: A hernia is present. Hernia is present in the ventral area. There is no hernia in the umbilical area.         Skin:     General: Skin is warm and dry.   Neurological:      Mental Status: She is alert and oriented to person, place, and time.   Psychiatric:         Speech: Speech normal.         Behavior: Behavior normal.         Thought Content: Thought content normal.         Judgment: Judgment normal.             Assessment   1. Ventral hernia without obstruction or gangrene          Plan      The patient will be scheduled for robotic repair of complex ventral hernia with mesh and transversus abdominis component separation.     Perioperative medical risk assessment and medication management will be requested from the patient's primary care physician, Dr. Betancourt.     The bryant-operative care plan was discussed with the patient, who voices understanding.  Activity and lifting recommendations were discussed in length.      The risks, benefits, and alternatives to the procedure were explained to the patient.  The risks explained include, but are not limited to, bleeding, infection, pain wound complications, recurrence, incorrect diagnosis, injury to adjacent organs and structures. We also discussed the possibile need for further therapeutic, diagnostic, or surgical intervention.  The patient voiced understanding, and after all questions were answered to the patient's satisfaction, the patient provided willing and informed consent to proceed.  No orders of the defined types were placed in this encounter.        Imaging & Referrals   None     Follow Up  No follow-ups on file.     Hemanth Murillo MD     Date of Surgery:                                DX:       ICD-10-CM   1. Ventral hernia without obstruction or gangrene  K43.9         Surgery Site :     [] Open                            [] Laparoscopic                             [x] Robotic     [] Pilonidal Cystectomy               [] Excision of Lipomas        [] Sigmoidectomies                      [] Hemorrhoidectomy                  [] Transanal Hemorrhoidal  Dearterialization             [] Rectal Exam Under Anesthesia  [] Ureteral stent, Urologist  [] Hernia   [x] Ventral  [] Umbilical  [] Inguinal  [] Incisional  [] Paraesophageal [x] Component Separation (TAR)   [] Cholecystectomy  [] Appendectomy    [] Port placement       Anesthesia: ANESTHESIA TYPE: Gen     Location:             [] [facility]                              [] [facility]                  [] [place]  [x] [facility]   [] [facility]     Admission Status: ED[facility] OR ADMIT LOCATION: ADMIT     SA Needed: Yes/No: Yes: General Surgery PA        Clearance Needed:  [x]Medical Clearance Dr. Betancourt  []Cardiac Clearance:      []Anticoagulation Management:    []Renal:                           []Neuro:      Hx sleep apnea:                     Pacemaker:         Latex allergies:       Pre-Admission Testing:  Surgeon's Personalized order Set.   Prophylactic Antibiotics Order: Prophylactic antibiotic protocol     Equipment:   [] C-Arm  Other:       First Case.  Allow 5 hours.     Hemanth Murillo MD FACS  Trauma Medical Director, [facility] General Surgery     The 21st Century Cures Act makes medical notes like these available to patients in the interest of transparency. Please be advised this is a medical document. Medical documents are intended to carry relevant information, facts as evident, and the clinical opinion of the practitioner. The medical note is intended as peer to peer communication and may appear blunt or direct. It is written in medical language and may contain abbreviations or verbiage that are unfamiliar.     This note was prepared using Dragon Medical voice recognition dictation software. As a result, errors may occur. When identified, these errors have been corrected. While every attempt is made to correct errors during dictation, discrepancies may still exist.

## 2025-03-02 ENCOUNTER — ANESTHESIA EVENT (OUTPATIENT)
Dept: SURGERY | Facility: HOSPITAL | Age: 70
End: 2025-03-02
Payer: MEDICARE

## 2025-03-03 ENCOUNTER — ANESTHESIA (OUTPATIENT)
Dept: SURGERY | Facility: HOSPITAL | Age: 70
End: 2025-03-03
Payer: MEDICARE

## 2025-03-03 ENCOUNTER — APPOINTMENT (OUTPATIENT)
Dept: GENERAL RADIOLOGY | Facility: HOSPITAL | Age: 70
End: 2025-03-03
Attending: SURGERY
Payer: MEDICARE

## 2025-03-03 ENCOUNTER — HOSPITAL ENCOUNTER (OUTPATIENT)
Facility: HOSPITAL | Age: 70
Discharge: HOME | End: 2025-03-04
Attending: SURGERY | Admitting: SURGERY
Payer: MEDICARE

## 2025-03-03 DIAGNOSIS — K43.9 VENTRAL HERNIA WITHOUT OBSTRUCTION OR GANGRENE: Primary | ICD-10-CM

## 2025-03-03 DIAGNOSIS — G89.18 POSTOPERATIVE PAIN: ICD-10-CM

## 2025-03-03 DIAGNOSIS — I10 ESSENTIAL HYPERTENSION: ICD-10-CM

## 2025-03-03 PROBLEM — K43.6 INCARCERATED VENTRAL HERNIA: Status: ACTIVE | Noted: 2025-03-03

## 2025-03-03 LAB
GLUCOSE BLD-MCNC: 173 MG/DL (ref 70–99)
GLUCOSE BLD-MCNC: 237 MG/DL (ref 70–99)
GLUCOSE BLD-MCNC: 245 MG/DL (ref 70–99)
GLUCOSE BLD-MCNC: 292 MG/DL (ref 70–99)

## 2025-03-03 PROCEDURE — 8E0W4CZ ROBOTIC ASSISTED PROCEDURE OF TRUNK REGION, PERCUTANEOUS ENDOSCOPIC APPROACH: ICD-10-PCS | Performed by: SURGERY

## 2025-03-03 PROCEDURE — 76000 FLUOROSCOPY <1 HR PHYS/QHP: CPT | Performed by: SURGERY

## 2025-03-03 PROCEDURE — 15734 MUSCLE-SKIN GRAFT TRUNK: CPT | Performed by: SURGERY

## 2025-03-03 PROCEDURE — 49596 RPR AA HRN 1ST > 10 NCR/STRN: CPT | Performed by: SURGERY

## 2025-03-03 PROCEDURE — 99213 OFFICE O/P EST LOW 20 MIN: CPT | Performed by: STUDENT IN AN ORGANIZED HEALTH CARE EDUCATION/TRAINING PROGRAM

## 2025-03-03 PROCEDURE — 49596 RPR AA HRN 1ST > 10 NCR/STRN: CPT

## 2025-03-03 PROCEDURE — 76942 ECHO GUIDE FOR BIOPSY: CPT | Performed by: STUDENT IN AN ORGANIZED HEALTH CARE EDUCATION/TRAINING PROGRAM

## 2025-03-03 PROCEDURE — 0WUF4JZ SUPPLEMENT ABDOMINAL WALL WITH SYNTHETIC SUBSTITUTE, PERCUTANEOUS ENDOSCOPIC APPROACH: ICD-10-PCS | Performed by: SURGERY

## 2025-03-03 PROCEDURE — 15734 MUSCLE-SKIN GRAFT TRUNK: CPT

## 2025-03-03 DEVICE — BARD SOFT MESH, 12" X 12" (30. 5 CM X 30.5 CM)
Type: IMPLANTABLE DEVICE | Site: ABDOMEN | Status: FUNCTIONAL
Brand: BARD

## 2025-03-03 DEVICE — VISTASEAL FIBRIN SEAL 4 ML: Type: IMPLANTABLE DEVICE | Site: ABDOMEN | Status: FUNCTIONAL

## 2025-03-03 RX ORDER — PHENYLEPHRINE HCL 10 MG/ML
VIAL (ML) INJECTION AS NEEDED
Status: DISCONTINUED | OUTPATIENT
Start: 2025-03-03 | End: 2025-03-03 | Stop reason: SURG

## 2025-03-03 RX ORDER — OXYCODONE HYDROCHLORIDE 5 MG/1
2.5 TABLET ORAL EVERY 4 HOURS PRN
Status: DISCONTINUED | OUTPATIENT
Start: 2025-03-03 | End: 2025-03-03 | Stop reason: HOSPADM

## 2025-03-03 RX ORDER — SODIUM PHOSPHATE, DIBASIC AND SODIUM PHOSPHATE, MONOBASIC 7; 19 G/230ML; G/230ML
1 ENEMA RECTAL ONCE AS NEEDED
Status: DISCONTINUED | OUTPATIENT
Start: 2025-03-03 | End: 2025-03-04

## 2025-03-03 RX ORDER — PANTOPRAZOLE SODIUM 40 MG/1
40 TABLET, DELAYED RELEASE ORAL DAILY
Status: DISCONTINUED | OUTPATIENT
Start: 2025-03-04 | End: 2025-03-04

## 2025-03-03 RX ORDER — ACETAMINOPHEN 500 MG
1000 TABLET ORAL EVERY 8 HOURS SCHEDULED
Status: DISCONTINUED | OUTPATIENT
Start: 2025-03-03 | End: 2025-03-04

## 2025-03-03 RX ORDER — NICOTINE POLACRILEX 4 MG
15 LOZENGE BUCCAL
Status: DISCONTINUED | OUTPATIENT
Start: 2025-03-03 | End: 2025-03-03 | Stop reason: HOSPADM

## 2025-03-03 RX ORDER — HYDRALAZINE HYDROCHLORIDE 20 MG/ML
5 INJECTION INTRAMUSCULAR; INTRAVENOUS EVERY 6 HOURS PRN
Status: DISCONTINUED | OUTPATIENT
Start: 2025-03-03 | End: 2025-03-04

## 2025-03-03 RX ORDER — ONDANSETRON 2 MG/ML
4 INJECTION INTRAMUSCULAR; INTRAVENOUS EVERY 6 HOURS PRN
Status: DISCONTINUED | OUTPATIENT
Start: 2025-03-03 | End: 2025-03-03 | Stop reason: HOSPADM

## 2025-03-03 RX ORDER — METOCLOPRAMIDE HYDROCHLORIDE 5 MG/ML
5 INJECTION INTRAMUSCULAR; INTRAVENOUS EVERY 8 HOURS PRN
Status: DISCONTINUED | OUTPATIENT
Start: 2025-03-03 | End: 2025-03-03 | Stop reason: HOSPADM

## 2025-03-03 RX ORDER — DEXTROSE MONOHYDRATE 25 G/50ML
50 INJECTION, SOLUTION INTRAVENOUS
Status: DISCONTINUED | OUTPATIENT
Start: 2025-03-03 | End: 2025-03-04

## 2025-03-03 RX ORDER — HEPARIN SODIUM 5000 [USP'U]/ML
INJECTION, SOLUTION INTRAVENOUS; SUBCUTANEOUS
Status: COMPLETED
Start: 2025-03-03 | End: 2025-03-03

## 2025-03-03 RX ORDER — INSULIN ASPART 100 [IU]/ML
INJECTION, SOLUTION INTRAVENOUS; SUBCUTANEOUS ONCE
Status: COMPLETED | OUTPATIENT
Start: 2025-03-03 | End: 2025-03-03

## 2025-03-03 RX ORDER — SODIUM CHLORIDE, SODIUM LACTATE, POTASSIUM CHLORIDE, CALCIUM CHLORIDE 600; 310; 30; 20 MG/100ML; MG/100ML; MG/100ML; MG/100ML
INJECTION, SOLUTION INTRAVENOUS CONTINUOUS
Status: DISCONTINUED | OUTPATIENT
Start: 2025-03-03 | End: 2025-03-03 | Stop reason: HOSPADM

## 2025-03-03 RX ORDER — OXYCODONE HYDROCHLORIDE 5 MG/1
5 TABLET ORAL EVERY 4 HOURS PRN
Status: DISCONTINUED | OUTPATIENT
Start: 2025-03-03 | End: 2025-03-04

## 2025-03-03 RX ORDER — BISACODYL 10 MG
10 SUPPOSITORY, RECTAL RECTAL
Status: DISCONTINUED | OUTPATIENT
Start: 2025-03-03 | End: 2025-03-04

## 2025-03-03 RX ORDER — OXYCODONE HYDROCHLORIDE 5 MG/1
10 TABLET ORAL EVERY 4 HOURS PRN
Status: DISCONTINUED | OUTPATIENT
Start: 2025-03-03 | End: 2025-03-03 | Stop reason: HOSPADM

## 2025-03-03 RX ORDER — BUPIVACAINE HYDROCHLORIDE 2.5 MG/ML
INJECTION, SOLUTION EPIDURAL; INFILTRATION; INTRACAUDAL AS NEEDED
Status: DISCONTINUED | OUTPATIENT
Start: 2025-03-03 | End: 2025-03-03 | Stop reason: HOSPADM

## 2025-03-03 RX ORDER — OXYCODONE HYDROCHLORIDE 5 MG/1
5 TABLET ORAL EVERY 4 HOURS PRN
Status: DISCONTINUED | OUTPATIENT
Start: 2025-03-03 | End: 2025-03-03 | Stop reason: HOSPADM

## 2025-03-03 RX ORDER — OXYBUTYNIN CHLORIDE 5 MG/1
5 TABLET, EXTENDED RELEASE ORAL DAILY
Status: DISCONTINUED | OUTPATIENT
Start: 2025-03-03 | End: 2025-03-04

## 2025-03-03 RX ORDER — LISINOPRIL 5 MG/1
5 TABLET ORAL DAILY
Status: DISCONTINUED | OUTPATIENT
Start: 2025-03-03 | End: 2025-03-04

## 2025-03-03 RX ORDER — SENNOSIDES 8.6 MG
17.2 TABLET ORAL NIGHTLY PRN
Status: DISCONTINUED | OUTPATIENT
Start: 2025-03-03 | End: 2025-03-04

## 2025-03-03 RX ORDER — TIMOLOL MALEATE 5 MG/ML
1 SOLUTION/ DROPS OPHTHALMIC 2 TIMES DAILY
Status: DISCONTINUED | OUTPATIENT
Start: 2025-03-03 | End: 2025-03-04

## 2025-03-03 RX ORDER — NICOTINE POLACRILEX 4 MG
15 LOZENGE BUCCAL
Status: DISCONTINUED | OUTPATIENT
Start: 2025-03-03 | End: 2025-03-04

## 2025-03-03 RX ORDER — ROSUVASTATIN CALCIUM 10 MG/1
10 TABLET, COATED ORAL NIGHTLY
Status: DISCONTINUED | OUTPATIENT
Start: 2025-03-03 | End: 2025-03-04

## 2025-03-03 RX ORDER — HYDROMORPHONE HYDROCHLORIDE 1 MG/ML
0.6 INJECTION, SOLUTION INTRAMUSCULAR; INTRAVENOUS; SUBCUTANEOUS EVERY 5 MIN PRN
Status: DISCONTINUED | OUTPATIENT
Start: 2025-03-03 | End: 2025-03-03 | Stop reason: HOSPADM

## 2025-03-03 RX ORDER — ROPIVACAINE HYDROCHLORIDE 5 MG/ML
INJECTION, SOLUTION EPIDURAL; INFILTRATION; PERINEURAL AS NEEDED
Status: DISCONTINUED | OUTPATIENT
Start: 2025-03-03 | End: 2025-03-03 | Stop reason: SURG

## 2025-03-03 RX ORDER — NICOTINE POLACRILEX 4 MG
30 LOZENGE BUCCAL
Status: DISCONTINUED | OUTPATIENT
Start: 2025-03-03 | End: 2025-03-04

## 2025-03-03 RX ORDER — INSULIN ASPART 100 [IU]/ML
INJECTION, SOLUTION INTRAVENOUS; SUBCUTANEOUS ONCE
Status: DISCONTINUED | OUTPATIENT
Start: 2025-03-03 | End: 2025-03-04

## 2025-03-03 RX ORDER — ONDANSETRON 2 MG/ML
INJECTION INTRAMUSCULAR; INTRAVENOUS AS NEEDED
Status: DISCONTINUED | OUTPATIENT
Start: 2025-03-03 | End: 2025-03-03 | Stop reason: SURG

## 2025-03-03 RX ORDER — NALOXONE HYDROCHLORIDE 0.4 MG/ML
0.08 INJECTION, SOLUTION INTRAMUSCULAR; INTRAVENOUS; SUBCUTANEOUS AS NEEDED
Status: DISCONTINUED | OUTPATIENT
Start: 2025-03-03 | End: 2025-03-03 | Stop reason: HOSPADM

## 2025-03-03 RX ORDER — HYDROMORPHONE HYDROCHLORIDE 1 MG/ML
0.2 INJECTION, SOLUTION INTRAMUSCULAR; INTRAVENOUS; SUBCUTANEOUS EVERY 2 HOUR PRN
Status: DISCONTINUED | OUTPATIENT
Start: 2025-03-03 | End: 2025-03-04

## 2025-03-03 RX ORDER — MIDAZOLAM HYDROCHLORIDE 1 MG/ML
INJECTION INTRAMUSCULAR; INTRAVENOUS AS NEEDED
Status: DISCONTINUED | OUTPATIENT
Start: 2025-03-03 | End: 2025-03-03 | Stop reason: SURG

## 2025-03-03 RX ORDER — METHOCARBAMOL 500 MG/1
500 TABLET, FILM COATED ORAL 3 TIMES DAILY PRN
Status: DISCONTINUED | OUTPATIENT
Start: 2025-03-03 | End: 2025-03-04

## 2025-03-03 RX ORDER — HYDROMORPHONE HYDROCHLORIDE 1 MG/ML
0.4 INJECTION, SOLUTION INTRAMUSCULAR; INTRAVENOUS; SUBCUTANEOUS EVERY 2 HOUR PRN
Status: DISCONTINUED | OUTPATIENT
Start: 2025-03-03 | End: 2025-03-04

## 2025-03-03 RX ORDER — INSULIN ASPART 100 [IU]/ML
INJECTION, SOLUTION INTRAVENOUS; SUBCUTANEOUS
Status: COMPLETED
Start: 2025-03-03 | End: 2025-03-03

## 2025-03-03 RX ORDER — HYDROMORPHONE HYDROCHLORIDE 1 MG/ML
0.2 INJECTION, SOLUTION INTRAMUSCULAR; INTRAVENOUS; SUBCUTANEOUS EVERY 5 MIN PRN
Status: DISCONTINUED | OUTPATIENT
Start: 2025-03-03 | End: 2025-03-03 | Stop reason: HOSPADM

## 2025-03-03 RX ORDER — POLYETHYLENE GLYCOL 3350 17 G/17G
17 POWDER, FOR SOLUTION ORAL DAILY PRN
Status: DISCONTINUED | OUTPATIENT
Start: 2025-03-03 | End: 2025-03-04

## 2025-03-03 RX ORDER — HYDROMORPHONE HYDROCHLORIDE 1 MG/ML
0.4 INJECTION, SOLUTION INTRAMUSCULAR; INTRAVENOUS; SUBCUTANEOUS EVERY 5 MIN PRN
Status: DISCONTINUED | OUTPATIENT
Start: 2025-03-03 | End: 2025-03-03 | Stop reason: HOSPADM

## 2025-03-03 RX ORDER — HEPARIN SODIUM 5000 [USP'U]/ML
5000 INJECTION, SOLUTION INTRAVENOUS; SUBCUTANEOUS ONCE
Status: COMPLETED | OUTPATIENT
Start: 2025-03-03 | End: 2025-03-03

## 2025-03-03 RX ORDER — ONDANSETRON 2 MG/ML
4 INJECTION INTRAMUSCULAR; INTRAVENOUS EVERY 6 HOURS PRN
Status: DISCONTINUED | OUTPATIENT
Start: 2025-03-03 | End: 2025-03-04

## 2025-03-03 RX ORDER — SODIUM CHLORIDE, SODIUM LACTATE, POTASSIUM CHLORIDE, CALCIUM CHLORIDE 600; 310; 30; 20 MG/100ML; MG/100ML; MG/100ML; MG/100ML
INJECTION, SOLUTION INTRAVENOUS CONTINUOUS
Status: DISCONTINUED | OUTPATIENT
Start: 2025-03-03 | End: 2025-03-03

## 2025-03-03 RX ORDER — INSULIN DEGLUDEC 100 U/ML
10 INJECTION, SOLUTION SUBCUTANEOUS DAILY
Status: DISCONTINUED | OUTPATIENT
Start: 2025-03-03 | End: 2025-03-04

## 2025-03-03 RX ORDER — METOCLOPRAMIDE HYDROCHLORIDE 5 MG/ML
5 INJECTION INTRAMUSCULAR; INTRAVENOUS EVERY 8 HOURS PRN
Status: DISCONTINUED | OUTPATIENT
Start: 2025-03-03 | End: 2025-03-04

## 2025-03-03 RX ORDER — DEXAMETHASONE SODIUM PHOSPHATE 4 MG/ML
VIAL (ML) INJECTION AS NEEDED
Status: DISCONTINUED | OUTPATIENT
Start: 2025-03-03 | End: 2025-03-03 | Stop reason: SURG

## 2025-03-03 RX ORDER — METOCLOPRAMIDE HYDROCHLORIDE 5 MG/ML
INJECTION INTRAMUSCULAR; INTRAVENOUS AS NEEDED
Status: DISCONTINUED | OUTPATIENT
Start: 2025-03-03 | End: 2025-03-03 | Stop reason: SURG

## 2025-03-03 RX ORDER — LIDOCAINE HYDROCHLORIDE 10 MG/ML
INJECTION, SOLUTION EPIDURAL; INFILTRATION; INTRACAUDAL; PERINEURAL AS NEEDED
Status: DISCONTINUED | OUTPATIENT
Start: 2025-03-03 | End: 2025-03-03 | Stop reason: SURG

## 2025-03-03 RX ORDER — ACETAMINOPHEN 500 MG
1000 TABLET ORAL ONCE AS NEEDED
Status: DISCONTINUED | OUTPATIENT
Start: 2025-03-03 | End: 2025-03-03 | Stop reason: HOSPADM

## 2025-03-03 RX ORDER — KETOROLAC TROMETHAMINE 15 MG/ML
15 INJECTION, SOLUTION INTRAMUSCULAR; INTRAVENOUS EVERY 6 HOURS
Status: DISCONTINUED | OUTPATIENT
Start: 2025-03-03 | End: 2025-03-04

## 2025-03-03 RX ORDER — ROCURONIUM BROMIDE 10 MG/ML
INJECTION, SOLUTION INTRAVENOUS AS NEEDED
Status: DISCONTINUED | OUTPATIENT
Start: 2025-03-03 | End: 2025-03-03 | Stop reason: SURG

## 2025-03-03 RX ORDER — SODIUM CHLORIDE, SODIUM LACTATE, POTASSIUM CHLORIDE, CALCIUM CHLORIDE 600; 310; 30; 20 MG/100ML; MG/100ML; MG/100ML; MG/100ML
INJECTION, SOLUTION INTRAVENOUS CONTINUOUS
Status: DISCONTINUED | OUTPATIENT
Start: 2025-03-03 | End: 2025-03-04

## 2025-03-03 RX ORDER — CYCLOBENZAPRINE HCL 5 MG
5 TABLET ORAL 3 TIMES DAILY PRN
Status: DISCONTINUED | OUTPATIENT
Start: 2025-03-03 | End: 2025-03-03

## 2025-03-03 RX ORDER — DEXTROSE MONOHYDRATE 25 G/50ML
50 INJECTION, SOLUTION INTRAVENOUS
Status: DISCONTINUED | OUTPATIENT
Start: 2025-03-03 | End: 2025-03-03 | Stop reason: HOSPADM

## 2025-03-03 RX ORDER — NICOTINE POLACRILEX 4 MG
30 LOZENGE BUCCAL
Status: DISCONTINUED | OUTPATIENT
Start: 2025-03-03 | End: 2025-03-03 | Stop reason: HOSPADM

## 2025-03-03 RX ORDER — KETAMINE HYDROCHLORIDE 50 MG/ML
INJECTION, SOLUTION INTRAMUSCULAR; INTRAVENOUS AS NEEDED
Status: DISCONTINUED | OUTPATIENT
Start: 2025-03-03 | End: 2025-03-03 | Stop reason: SURG

## 2025-03-03 RX ORDER — ACETAMINOPHEN 500 MG
1000 TABLET ORAL ONCE
Status: DISCONTINUED | OUTPATIENT
Start: 2025-03-03 | End: 2025-03-03 | Stop reason: HOSPADM

## 2025-03-03 RX ADMIN — PHENYLEPHRINE HCL 100 MCG: 10 MG/ML VIAL (ML) INJECTION at 08:10:00

## 2025-03-03 RX ADMIN — ONDANSETRON 4 MG: 2 INJECTION INTRAMUSCULAR; INTRAVENOUS at 12:37:00

## 2025-03-03 RX ADMIN — MIDAZOLAM HYDROCHLORIDE 2 MG: 1 INJECTION INTRAMUSCULAR; INTRAVENOUS at 07:35:00

## 2025-03-03 RX ADMIN — SODIUM CHLORIDE, SODIUM LACTATE, POTASSIUM CHLORIDE, CALCIUM CHLORIDE: 600; 310; 30; 20 INJECTION, SOLUTION INTRAVENOUS at 13:11:00

## 2025-03-03 RX ADMIN — PHENYLEPHRINE HCL 100 MCG: 10 MG/ML VIAL (ML) INJECTION at 08:52:00

## 2025-03-03 RX ADMIN — ROCURONIUM BROMIDE 20 MG: 10 INJECTION, SOLUTION INTRAVENOUS at 10:49:00

## 2025-03-03 RX ADMIN — SODIUM CHLORIDE, SODIUM LACTATE, POTASSIUM CHLORIDE, CALCIUM CHLORIDE: 600; 310; 30; 20 INJECTION, SOLUTION INTRAVENOUS at 07:32:00

## 2025-03-03 RX ADMIN — PHENYLEPHRINE HCL 100 MCG: 10 MG/ML VIAL (ML) INJECTION at 12:28:00

## 2025-03-03 RX ADMIN — ROCURONIUM BROMIDE 70 MG: 10 INJECTION, SOLUTION INTRAVENOUS at 07:40:00

## 2025-03-03 RX ADMIN — METOCLOPRAMIDE HYDROCHLORIDE 10 MG: 5 INJECTION INTRAMUSCULAR; INTRAVENOUS at 08:00:00

## 2025-03-03 RX ADMIN — ROCURONIUM BROMIDE 30 MG: 10 INJECTION, SOLUTION INTRAVENOUS at 09:19:00

## 2025-03-03 RX ADMIN — DEXAMETHASONE SODIUM PHOSPHATE 4 MG: 4 MG/ML VIAL (ML) INJECTION at 08:00:00

## 2025-03-03 RX ADMIN — KETAMINE HYDROCHLORIDE 25 MG: 50 INJECTION, SOLUTION INTRAMUSCULAR; INTRAVENOUS at 08:10:00

## 2025-03-03 RX ADMIN — ROCURONIUM BROMIDE 20 MG: 10 INJECTION, SOLUTION INTRAVENOUS at 12:19:00

## 2025-03-03 RX ADMIN — ROPIVACAINE HYDROCHLORIDE 30 ML: 5 INJECTION, SOLUTION EPIDURAL; INFILTRATION; PERINEURAL at 07:51:00

## 2025-03-03 RX ADMIN — LIDOCAINE HYDROCHLORIDE 50 MG: 10 INJECTION, SOLUTION EPIDURAL; INFILTRATION; INTRACAUDAL; PERINEURAL at 07:37:00

## 2025-03-03 RX ADMIN — PHENYLEPHRINE HCL 100 MCG: 10 MG/ML VIAL (ML) INJECTION at 08:02:00

## 2025-03-03 NOTE — ANESTHESIA PROCEDURE NOTES
Airway  Date/Time: 3/3/2025 7:43 AM  Urgency: elective    Airway not difficult    General Information and Staff    Patient location during procedure: OR  Anesthesiologist: Rosette Zepeda MD  Performed: anesthesiologist   Performed by: Rosette Zepeda MD  Authorized by: Rosette Zepeda MD      Indications and Patient Condition  Indications for airway management: anesthesia  Spontaneous Ventilation: absent  Sedation level: deep  Preoxygenated: yes  Patient position: sniffing  Mask difficulty assessment: 2 - vent by mask + OA or adjuvant +/- NMBA    Final Airway Details  Final airway type: endotracheal airway      Successful airway: ETT  Cuffed: yes   Successful intubation technique: direct laryngoscopy  Facilitating devices/methods: intubating stylet  Endotracheal tube insertion site: oral  Blade: Priyanka  Blade size: #3  ETT size (mm): 7.0    Cormack-Lehane Classification: grade I - full view of glottis  Placement verified by: capnometry   Cuff volume (mL): 6  Measured from: lips  ETT to lips (cm): 20  Number of attempts at approach: 1  Number of other approaches attempted: 0

## 2025-03-03 NOTE — OPERATIVE REPORT
OPERATIVE REPORT      PREOPERATIVE DIAGNOSIS:  Incarcerated ventral hernia .  POSTOPERATIVE DIAGNOSIS:  Incarcerated ventral Hernia.    PROCEDURE:    1.   Robotic Retromuscular repair of complex ventral/incisional hernia by transversus abdominis release.  2.   Rectus myofascial release (right, 25846).  3.   Rectus myofascial release (left, 57007-75).  4.   Transversus abdominis release (right, 12767-68).  5.   Transversus abdominis release (left 99642-73).   6.   Bard Soft Mest mesh implant after component separation (mesh measuring 28 cm x 24 cm implanted).    ASSISTANT:  PA: Perla Avalos PA-C; Gladys Guthrie PA-C PA-C.    ANESTHESIA:  General endotracheal anesthesia.    Anesthesiologist.: Rosette Zepeda MD    BLOOD LOSS:  Approximately 10 mL.    COMPLICATIONS:  None apparent. Suture needle lodged in the subcutaneous tissue during exchange; retrieved with fluoroscopic assistance.     SPECIMENS:  None.     DISPOSITION:  The patient is awakened from anesthesia and brought to the recovery room in stable condition.  She tolerated the procedure without apparent complication.  Needle, sponge, and instrument counts were correct at the end the procedure.  Please note, preprocedure antibiotic and DVT prophylaxis was administered per protocol and time-out was performed prior to initiating the procedure identifying the correct patient, procedure, surgeon.    INDICATIONS:  Please review the preprocedure history and physical.  Briefly, the patient is a 69 year old female with a complex incarcerated incisional hernia .  The risks, benefits, and alternatives of surgical repair were explained to the patient and family in detail on multiple occasions.  The risks of surgery explained included, but were not limited to, bleeding, infection, injury to adjacent organs and structures, bowel injury, wound complications including hematoma, seroma, infection, wound dehiscence, suboptimal cosmesis, recurrent hernia, nontherapeutic  yield, and the possible need for further therapeutic, diagnostic, or surgical intervention.  The patient voiced understanding.  All pertinent questions answered to their satisfaction after which the patient provided willing and informed consent to proceed.    FINDINGS:  There is a large incarcerated ventral/incisional hernia  20 cm diameter.    OPERATIVE TECHNIQUE:  The patient was brought to the operating room, and after induction of general endotracheal anesthesia, the anesthesiologist completed a bilateral TAP block.  Next, the abdomen was prepped and draped in usual sterile fashion.  Three 8 mm incisions are placed in the patient's left lateral abdomen after Veress needle was used to establish pneumoperitoneum; optical entry trocar used to gain access to the abdominal cavity.  Visual inspection reveals incarcerated ventral hernia with small bowel and abdominal adhesions.  At this point, incarcerated small bowel was noted in the largest hernia sac.  There were moderate  intraabdominal adhesions.    At this point, attention was turned to adhesiolysis.  This was accomplished using careful meticulous technique incorporating both sharp and blunt dissection as well as judicious use of electrocautery when necessary.  Adhesiolysis required approximately 20 minutes to accomplish.  Once the adhesions were fully lysed, the small bowel was inspected from the terminal ileum to the ligament of Treitz several times to assure that there was no iatrogenic injury, serosal injury, or compromise of the bowel.  The remaining intraabdominal contents were briefly inspected.  No other abnormalities were noted and attention was now turned to initiating the component separation procedure.     The left and right rectus myofascial release as well as the right and left transversus abdominis release was accomplished in similar manner bilaterally.  Of note once the ipsilateral side was completed contralateral trocars were placed under direct  vision to complete the contralateral dissection as below.  The rectus muscle was identified, and in the posterior aspect, incision is created using electrocautery to enter the rectus sheath.  The rectus sheath was then incised and opened in its entire length.  The rectus muscle was then bluntly dissected free of the posterior rectus sheath with care to identify and preserve the neurovascular bundles entering the rectus muscle.  Once the neurovascular bundles were identified, the posterior rectus sheath was incised just medial to these neurovascular bundles and entry is made into the transversus plane.  The transversus muscle and aponeurosis was divided in its entire length cephalad and caudad using blunt and sharp dissection and judicious use of electrocautery.  The transversus muscle was then reflected away from the peritoneum, and the avascular plane was entered and developed as far laterally as the abdominal wall would allow.  This procedure was accomplished bilaterally with excellent result. The peritoneum was now closed using running 3-O V-lock suture.  Next, the midline fascia was reapproximated to close the abdominal wall.  The linea alba was then secured using 2 separate running #1 Stratafix sutures.  The abdominal wall closure was now noted to be tension free and very secure.Next, measurements were taken and a 28 cm x 24 cm Bard soft mesh was selected as the appropriate size.  The mesh was placed in the retromuscular position and after this, the mesh was noted to sit very nicely.  Next, a #19 Javier drain was placed above the mesh through the lower trocar site.  The drain was secured to the skin using 2-0 nylon suture.  12 mm port site was closed using Endo Close device under vision with #1 PDS suture.  All skin incisions were reapproximated using subcuticular 4-0 Monocryl.  Skin glue used to seal all incisions.  At the completion of the operation, the drain was placed to self-suction bulb.   The patient was  awakened from anesthesia.  She was brought to the recovery room in stable condition.  She tolerated the procedure without apparent complication.  Needle, sponge, and instrument counts were correct at the end of the procedure and procedural findings were discussed with the patient's family immediately upon conclusion of surgery.        Hemanth Murillo MD FACS  Trauma Medical Director, [facility] General Surgery    The 21st Century Cures Act makes medical notes like these available to patients in the interest of transparency. Please be advised this is a medical document. Medical documents are intended to carry relevant information, facts as evident, and the clinical opinion of the practitioner. The medical note is intended as peer to peer communication and may appear blunt or direct. It is written in medical language and may contain abbreviations or verbiage that are unfamiliar.    This note was prepared using Dragon Medical voice recognition dictation software. As a result, errors may occur. When identified, these errors have been corrected. While every attempt is made to correct errors during dictation, discrepancies may still exist.

## 2025-03-03 NOTE — ANESTHESIA POSTPROCEDURE EVALUATION
[facility]    Marcela Moreno Patient Status:  Outpatient in a Bed   Age/Gender 69 year old female MRN [id number]   Location [facility] POST ANESTHESIA CARE UNIT Attending Hemanth Murillo MD   Hosp Day # 0 PCP Rhiannon Betancourt MD       Anesthesia Post-op Note    Robotic repair complex ventral hernia with mesh and transversus abdominus component separation \"    Procedure Summary       Date: 03/03/25 Room / Location:  MAIN OR 09 / [facility] MAIN OR    Anesthesia Start: 0732 Anesthesia Stop: 1311    Procedure: Robotic repair complex ventral hernia with mesh and transversus abdominus component separation \" (Abdomen) Diagnosis:       Ventral hernia without obstruction or gangrene      (Ventral hernia without obstruction or gangrene [K43.9])    Surgeons: Hemanth Murillo MD Anesthesiologist: Rosette Zepeda MD    Anesthesia Type: general ASA Status: 2            Anesthesia Type: general    Vitals Value Taken Time   /67 03/03/25 1324   Temp 99 °F (37.2 °C) 03/03/25 1309   Pulse 87 03/03/25 1332   Resp 16 03/03/25 1332   SpO2 93 % 03/03/25 1332   Vitals shown include unfiled device data.        Patient Location: PACU    Anesthesia Type: general    Airway Patency: patent and extubated    Postop Pain Control: adequate    Mental Status: preanesthetic baseline    Nausea/Vomiting: none    Cardiopulmonary/Hydration status: stable euvolemic    Complications: no apparent anesthesia related complications    Postop vital signs: stable    Dental Exam: Unchanged from Preop    Patient to be discharged from PACU when criteria met.

## 2025-03-03 NOTE — PLAN OF CARE
Pt presented onto unit from PACU with family at bedside     A&Ox4. VSS. RA. IS. . Denies chest pain and SOB.   Telemetry to be placed by tech   GI: Abdomen soft, nondistended. Due to pass gas, no belching present.   Denies nausea.   : Voids.   Pain controlled with PRN pain medications.   Up with standby assist/walker: baseline: up with cane   Drains: BRAULIO drain - bloody output    Incisions: x5 abd lap sites with derma-bond. Abd binders in place   Diet: Clear liquid diet - ADAT   Saline locked - tolerating PO intake. All appropriate safety measures in place. All questions and concerns addressed.    Two person skin check completed with ROMAN Valverde - skin is clean, dry, and intact. Surgical incisions in place.

## 2025-03-03 NOTE — ANESTHESIA PROCEDURE NOTES
Regional Block    Date/Time: 3/3/2025 7:45 AM    Performed by: Rosette Zepeda MD  Authorized by: Rosette Zepeda MD      General Information and Staff    Start Time:  3/3/2025 7:45 AM  End Time:  3/3/2025 7:51 AM  Anesthesiologist:  Rosette Zepeda MD  Performed by:  Anesthesiologist  Patient Location:  OR    Block Placement: Post Induction  Site Identification: real time ultrasound guided and image stored and retrievable    Block site/laterality marked before start: site marked  Reason for Block: at surgeon's request and post-op pain management    Preanesthetic Checklist: 2 patient identifers, IV checked, risks and benefits discussed, monitors and equipment checked, pre-op evaluation, timeout performed, anesthesia consent, sterile technique used, no prohibitive neurological deficits and no local skin infection at insertion site      Procedure Details    Patient Position:  Supine  Prep: ChloraPrep    Monitoring:  Cardiac monitor, continuous pulse ox, blood pressure cuff and heart rate  Block Type:  TAP  Laterality:  Bilateral  Injection Technique:  Single-shot    Needle    Needle Type:  Short-bevel and echogenic  Needle Gauge:  21 G  Needle Length:  100 mm  Needle Localization:  Ultrasound guidance  Reason for Ultrasound Use: appropriate spread of the medication was noted in real time and no ultrasound evidence of intravascular and/or intraneural injection            Assessment    Injection Assessment:  Good spread noted, negative resistance, negative aspiration for heme, incremental injection and low pressure  Heart Rate Change: No    - Patient tolerated block procedure well without evidence of immediate block related complications.     Medications  3/3/2025 7:45 AM      Additional Comments    Medication:  Ropivacaine 0.25% 30mL x2

## 2025-03-03 NOTE — DISCHARGE INSTRUCTIONS
Home Care Instructions  Robotic component separation  Dr Hemanth Murillo    MEDICATIONS  For post-operative pain control the medications are usually Vicodin or Norco. These are narcotics and are best taken by starting with one tablet and repeating every four to six hours as needed. If the patient does not feel they need the narcotics they shouldn’t take them. If the pain is severe the patient may take up to two pills every four hours. If a minor supplement is necessary in addition to the narcotics do not overlap with Tylenol (any product with acetaminophen) as both Vicodin and Norco contain Tylenol. Please supplement with Advil (ibuprofen) or Motrin. Please ask your surgeon before resuming blood thinners such as aspirin, plavix or coumadin. All other home medications may be resumed as scheduled.    Recommend wearing your abdominal binder for at least 2 weeks.     DIET  The patient may resume a general diet immediately. This is not a good time to eat excessively. The patient should eat in moderation and stick with foods the patient feels are easy to digest. There should be no alcohol consumption in the immediate recover time period or within six hours of taking narcotics.    WOUND CARE  The top dressing may be removed the day after surgery. This includes the gauze, tape and band-aids if they are present. Do not remove the steri-strips or butterfly tapes that are white and adherent to the skin. The steri-strips will eventually peel up at the ends and at this point they may be removed. This is usually seven to ten days after surgery. The patient may shower the day after surgery. There is no need to cover the incisions and all top gauze type dressing should be removed prior to showering. Soap can get on the wounds but do not scrub over the wounds. No hair dye or chemicals of any kind should get in the wounds. Avoid tub baths for two weeks. If visible sutures or staples are present they will be removed in the office by  the surgeon or nurse. Most wounds will be closed with dissolving suture underneath the skin. These sutures will dissolve on their own.    You will go home with your drain. Please track the daily drain output. Drain can be removed when output is less than 10 mL for at least 2 consecutive days or it has been 3 weeks since surgery, whichever occurs first.     ACTIVITY  Every day the patient should be up, showered and dressed. Each day the patient should be up and around the house. The patient should not lie in bed and should not stay in pajamas. We count on the patient being up, coughing, walking and deep breathing to avoid pneumonia and blood clots in the legs. Once a day the patient should get out of the house and go shopping, go to the mall, the [employer] store, the movies or a restaurant. The patient may ride in a car but should not drive the car for at least one week. Patients should be off narcotics for at least 8 hours prior to being the . The average time off work is 10 to 14 days; most adults will be seeing the surgeon prior to returning to work. Patients may go up and down stairs and lift up to five pounds but no bending, pushing or pulling. Nothing called work or exercise until the follow up visit. No ‘stair-master’ poser walking, jogging or workout until the follow up visit. Patients should seek further activity limits at the time of their appointment. If the patient is unable to urinate and feels a painful and full bladder call us immediately.    APPOINTMENT  Please call our office today for an appointment within five to ten days of discharge Any fever greater than 100.5, chills, nausea, vomiting, or severe diarrhea please call our office. If the wound turns red, hot, swollen, becomes increasingly painful, or drains pus call us immediately at [phone]. For life threatening emergencies call [phone]. For non-emergent care please call our office after 9:30 a.m. Monday through Saturday. The number  listed above is our office number, our phone automatically switches to out answering service if we are not there. Please do not use the emergency room for nonurgent care.      Thank you for entrusting me with your care.

## 2025-03-03 NOTE — ANESTHESIA PREPROCEDURE EVALUATION
PRE-OP EVALUATION    Patient Name: Marcela Moreno    Admit Diagnosis: Ventral hernia without obstruction or gangrene [K43.9]    Pre-op Diagnosis: Ventral hernia without obstruction or gangrene [K43.9]    Robotic repair complex ventral hernia with mesh and transversus abdominus component separation \"    Anesthesia Procedure: Robotic repair complex ventral hernia with mesh and transversus abdominus component separation \"    Surgeons and Role:     * Hemanth Murillo MD - Primary    Pre-op vitals reviewed.  Temp: 97.9 °F (36.6 °C)  Pulse: 75  Resp: 16  BP: 169/75  SpO2: 99 %  Body mass index is 33.79 kg/m².    Current medications reviewed.  Hospital Medications:   acetaminophen (Tylenol Extra Strength) tab 1,000 mg  1,000 mg Oral Once    glucose (Dex4) 15 GM/59ML oral liquid 15 g  15 g Oral Q15 Min PRN    Or    glucose (Glutose) 40% oral gel 15 g  15 g Oral Q15 Min PRN    Or    glucose-vitamin C (Dex-4) chewable tab 4 tablet  4 tablet Oral Q15 Min PRN    Or    dextrose 50% injection 50 mL  50 mL Intravenous Q15 Min PRN    Or    glucose (Dex4) 15 GM/59ML oral liquid 30 g  30 g Oral Q15 Min PRN    Or    glucose (Glutose) 40% oral gel 30 g  30 g Oral Q15 Min PRN    Or    glucose-vitamin C (Dex-4) chewable tab 8 tablet  8 tablet Oral Q15 Min PRN    lactated ringers infusion   Intravenous Continuous    [COMPLETED] heparin (Porcine) 5000 UNIT/ML injection 5,000 Units  5,000 Units Subcutaneous Once    ceFAZolin (Ancef) 2g in 10mL IV syringe premix  2 g Intravenous Once    ceFAZolin (Ancef) 2 g/10mL IV syringe premix           Outpatient Medications:   Prescriptions Prior to Admission[1]    Allergies: Patient has no known allergies.      Anesthesia Evaluation    Patient summary reviewed.    Anesthetic Complications  (-) history of anesthetic complications         GI/Hepatic/Renal      (+) GERD                           Cardiovascular      ECG reviewed.  Exercise tolerance: good     MET: >4    (+) obesity  (+) hypertension    (+) hyperlipidemia                    (-) angina     (-) PACHECO         Endo/Other      (+) diabetes  type 2, using insulin                         Pulmonary    Negative pulmonary ROS.                       Neuro/Psych          (+) CVA and residual deficits                   Left sides weakness, both upper and lower extemity. Pt uses a cane          Past Surgical History:   Procedure Laterality Date    Brain surgery  2015    avm   [facility]          X3    Cholecystectomy      Colonoscopy N/A 2024    Procedure: COLONOSCOPY;  Surgeon: Guerrero Moser MD;  Location:  ENDOSCOPY    Cystoscopy,insert ureteral stent      Hernia surgery      Other surgical history      LEFT TIBIA FRACTURE- PLATE AND SCREWS       Social History     Socioeconomic History    Marital status:    Tobacco Use    Smoking status: Never    Smokeless tobacco: Never   Vaping Use    Vaping status: Never Used   Substance and Sexual Activity    Alcohol use: Not Currently    Drug use: No   Other Topics Concern    Caffeine Concern Yes     Comment: coffee 2 cups daily    Exercise No    Seat Belt Yes     History   Drug Use No     Available pre-op labs reviewed.  Lab Results   Component Value Date    WBC 7.6 2025    RBC 4.19 2025    HGB 12.2 2025    HCT 36.5 2025    MCV 87.1 2025    MCH 29.1 2025    MCHC 33.4 2025    RDW 12.6 2025    .0 2025     Lab Results   Component Value Date     2025    K 3.9 2025     2025    CO2 32.0 2025    BUN 16 2025    CREATSERUM 0.95 2025    GLU 79 2025    CA 9.5 2025            Airway      Mallampati: II  Mouth opening: >3 FB  TM distance: > 6 cm  Neck ROM: full Cardiovascular    Cardiovascular exam normal.         Dental    Dentition appears grossly intact         Pulmonary    Pulmonary exam normal.                 Other findings              ASA: 2   Plan: general  NPO  status verified and patient meets guidelines.    Post-procedure pain management plan discussed with surgeon and patient.    Comment: I spoke with the patient and discussed the risks of general anesthesia, which include allergic reaction, nausea, vomiting, dental injury, sore throat, awareness, hypotension, as well as more serious cardiac and pulmonary complications. The patient understands and consents to receiving general anesthesia for this procedure.     Per surgeon request, I discussed option of TAP nerve blocks with patient. I discussed improved pain control following the procedure however there will likely be need for additional pain medicines. We discussed risks as well, including failed block,, prolonged abdominal numbness. Other very rare complications such as local anesthetic systemic toxicity (LAST), infection, hematoma formation, and permanent nerve damage was also discussed. All questions were answered and patient agreed to proceed.             Plan/risks discussed with: patient                Present on Admission:  **None**             [1]   Medications Prior to Admission   Medication Sig Dispense Refill Last Dose/Taking    pantoprazole 40 MG Oral Tab EC Take 1 tablet (40 mg total) by mouth daily. 30 tablet 2 3/3/2025 at  3:00 AM    insulin degludec (TRESIBA FLEXTOUCH) 200 UNIT/ML Subcutaneous Solution Pen-injector INJECT UNDER THE SKIN AS DIRECTED. MAX 30 UNITS PER DAY 18 mL 0 3/2/2025 at  9:00 AM    cyclobenzaprine 5 MG Oral Tab Take 1 tablet (5 mg total) by mouth 3 (three) times daily as needed for Muscle spasms. 30 tablet 1 Past Week    LISINOPRIL 5 MG Oral Tab TAKE 1 TABLET(5 MG) BY MOUTH DAILY 90 tablet 1 3/2/2025 at  8:00 AM    rosuvastatin 10 MG Oral Tab Take 1 tablet (10 mg total) by mouth nightly. 90 tablet 3 3/2/2025 at  7:00 PM    acetaminophen 325 MG Oral Tab Take 1 tablet (325 mg total) by mouth every 4 (four) hours as needed for Pain.   Past Month    METFORMIN  MG Oral Tablet 24 Hr  TAKE 1 TABLET(500 MG) BY MOUTH TWICE DAILY WITH MEALS 180 tablet 1 3/2/2025 at  7:00 PM    oxybutynin ER 5 MG Oral Tablet 24 Hr Take 1 tablet (5 mg total) by mouth daily. (Patient taking differently: Take 1 tablet (5 mg total) by mouth 2 (two) times daily.) 30 tablet 1 3/2/2025 at  7:00 PM    timolol 0.5 % Ophthalmic Solution Place 1 drop into both eyes 2 (two) times daily.   3/2/2025 at  7:00 PM    Insulin Pen Needle (BD PEN NEEDLE TITO 2ND GEN) 32G X 4 MM Does not apply Misc Inject 1 each into the skin daily. 100 each 3     Glucose Blood (ONETOUCH VERIO) In Vitro Strip USE TO TEST ONCE TO TWICE DAILY 100 strip 1     OneTouch Delica Lancets 33G Does not apply Misc 1 Lancet by Finger stick route daily. 100 each 0

## 2025-03-03 NOTE — CONSULTS
[facility]  CONSULT     Marcela Moreno Patient Status:  Outpatient in a Bed    10/22/1955 MRN [id number]   Location [facility] 3NW-A Attending Hemanth Murillo MD   Hosp Day # 0 PCP Rhiannon Betancourt MD     Reason for consult: Medical Mgt     Requested by:  Hernia repair     Subjective:   History of Present Illness:     Marcela Moreno is a 69 year old female with h/o DM type 2, HTN, HLD. Pt is POD D0 s/p hernia repair and repair of abdominal separation. She is having pain.     History/Other:    Past Medical History:  Past Medical History:    Abdominal pain    Arthritis    AVM (arteriovenous malformation) brain (HCC)    being followed at [facility]    Back pain    Back problem    Bloating    Calculus of kidney    Constipation    Diabetes mellitus (HCC)    Diarrhea, unspecified    Esophageal reflux    Fatigue    Food intolerance    Frequent urination    Heartburn    High blood pressure    High cholesterol    History of depression    Indigestion    Irregular bowel habits    Leaking of urine    Leg swelling    Muscle weakness    Neuropathy    TOES SOMETIMES    Osteoarthritis    Pain in joints    Pain with bowel movements    Problems with swallowing    Sleep disturbance    Sputum production    Stool incontinence    Stroke (HCC)    LEFT SIDE EFFECTED- USING CANE    Type II or unspecified type diabetes mellitus without mention of complication, not stated as uncontrolled    Uncomfortable fullness after meals    Vitamin D deficiency    Weight gain     Past Surgical History:   Past Surgical History:   Procedure Laterality Date    Brain surgery  2015    avm   [facility]          X3    Cholecystectomy      Colonoscopy N/A 2024    Procedure: COLONOSCOPY;  Surgeon: Guerrero Moser MD;  Location:  ENDOSCOPY    Cystoscopy,insert ureteral stent      Hernia surgery      Other surgical history      LEFT TIBIA FRACTURE- PLATE AND SCREWS        Family History:   Family History    Problem Relation Age of Onset    Hypertension Father         2023    Diabetes Mother         2023    Hypertension Mother     Diabetes Sister     Heart Disorder Brother         CAD stent in his 40s    Diabetes Brother     Heart Attack Brother      Social History:    reports that she has never smoked. She has never used smokeless tobacco. She reports that she does not currently use alcohol. She reports that she does not use drugs.     Allergies: Allergies[1]    Medications:  Medications Ordered Prior to Encounter[2]    Review of Systems:   A comprehensive review of systems was completed.    Pertinent positives and negatives noted in the HPI.    Objective:   Physical Exam:    BP (!) 167/67   Pulse 89   Temp 96.8 °F (36 °C) (Temporal)   Resp 13   Ht 5' (1.524 m)   Wt 173 lb (78.5 kg)   LMP  (LMP Unknown)   SpO2 95%   BMI 33.79 kg/m²   General: No acute distress, Alert  Respiratory: No rhonchi, no wheezes  Cardiovascular: S1, S2. Regular rate and rhythm  Abdomen: Soft, NT/ND, +BS, drain   Neuro: No new focal deficits  Extremities: No edema      Results:    Labs:      Labs Last 24 Hours:  Recent Labs   Lab 02/26/25  0948   WBC 7.6   HGB 12.2   MCV 87.1   .0       Recent Labs   Lab 02/26/25  0948   GLU 79   BUN 16   CREATSERUM 0.95   CA 9.5   ALB 4.6      K 3.9      CO2 32.0   ALKPHO 73   AST 15   ALT <7*   BILT 0.6   TP 7.3       No results for input(s): \"PTP\", \"INR\" in the last 168 hours.    No results for input(s): \"TROP\", \"CK\" in the last 168 hours.      Imaging: Imaging data reviewed in Epic.    Assessment & Plan:      #Ventral hernia and abdominal wall seperation s/p repair  Drain care per Sx  Pain control  Anti emetics    #DM type 2  Long acting insulin today 10 u, adjust dose tomorrow in AM pending PO intake  ISS  ICF 1 :10    #Hypertension  Resume ACE I           Plan of care discussed with pt, pt family, RN    Luna Miller MD  3/3/2025    The 21st Century Cures Act makes medical  notes like these available to patients in the interest of transparency. Please be advised this is a medical document. Medical documents are intended to carry relevant information, facts as evident, and the clinical opinion of the practitioner. The medical note is intended as peer to peer communication and may appear blunt or direct. It is written in medical language and may contain abbreviations or verbiage that are unfamiliar.            [1] No Known Allergies  [2]   No current facility-administered medications on file prior to encounter.     Current Outpatient Medications on File Prior to Encounter   Medication Sig Dispense Refill    METFORMIN  MG Oral Tablet 24 Hr TAKE 1 TABLET(500 MG) BY MOUTH TWICE DAILY WITH MEALS 180 tablet 1    oxybutynin ER 5 MG Oral Tablet 24 Hr Take 1 tablet (5 mg total) by mouth daily. 30 tablet 1    timolol 0.5 % Ophthalmic Solution Place 1 drop into both eyes 2 (two) times daily.      Insulin Pen Needle (BD PEN NEEDLE TITO 2ND GEN) 32G X 4 MM Does not apply Misc Inject 1 each into the skin daily. 100 each 3    Glucose Blood (ONETOUCH VERIO) In Vitro Strip USE TO TEST ONCE TO TWICE DAILY 100 strip 1    OneTouch Delica Lancets 33G Does not apply Misc 1 Lancet by Finger stick route daily. 100 each 0

## 2025-03-03 NOTE — BRIEF OP NOTE
Pre-Operative Diagnosis: Ventral hernia without obstruction or gangrene [K43.9]     Post-Operative Diagnosis: Ventral hernia without obstruction or gangrene [K43.9]      Procedure Performed:   Robotic repair complex ventral hernia with mesh and transversus abdominus component separation \"    Surgeons and Role:     * Hemanth Murillo MD - Primary    Assistant(s):  PA: Perla Avalos PA-C; Gladys Guthrie PA-C     Surgical Findings: 20 cm incarcerated ventral hernia     Specimen: none     Estimated Blood Loss: Blood Output: 20 mL (3/3/2025  1:03 PM)      Dictation Number:      Hemanth Murillo MD  3/3/2025  1:08 PM

## 2025-03-04 ENCOUNTER — HOSPITAL ENCOUNTER (EMERGENCY)
Facility: HOSPITAL | Age: 70
Discharge: HOME | End: 2025-03-04
Attending: EMERGENCY MEDICINE
Payer: MEDICARE

## 2025-03-04 VITALS
OXYGEN SATURATION: 96 % | RESPIRATION RATE: 20 BRPM | WEIGHT: 172 LBS | HEIGHT: 60 IN | TEMPERATURE: 99 F | DIASTOLIC BLOOD PRESSURE: 78 MMHG | HEART RATE: 79 BPM | BODY MASS INDEX: 33.77 KG/M2 | SYSTOLIC BLOOD PRESSURE: 142 MMHG

## 2025-03-04 VITALS
DIASTOLIC BLOOD PRESSURE: 68 MMHG | HEIGHT: 60 IN | TEMPERATURE: 98 F | WEIGHT: 173 LBS | RESPIRATION RATE: 18 BRPM | SYSTOLIC BLOOD PRESSURE: 142 MMHG | HEART RATE: 79 BPM | OXYGEN SATURATION: 95 % | BODY MASS INDEX: 33.96 KG/M2

## 2025-03-04 DIAGNOSIS — T14.8XXA WOUND DISCHARGE: Primary | ICD-10-CM

## 2025-03-04 LAB
ALBUMIN SERPL-MCNC: 4.2 G/DL (ref 3.2–4.8)
ALBUMIN/GLOB SERPL: 1.8 {RATIO} (ref 1–2)
ALP LIVER SERPL-CCNC: 68 U/L
ALT SERPL-CCNC: <7 U/L
ANION GAP SERPL CALC-SCNC: 10 MMOL/L (ref 0–18)
AST SERPL-CCNC: 30 U/L (ref ?–34)
BASOPHILS # BLD AUTO: 0.02 X10(3) UL (ref 0–0.2)
BASOPHILS NFR BLD AUTO: 0.3 %
BILIRUB SERPL-MCNC: 0.5 MG/DL (ref 0.2–1.1)
BUN BLD-MCNC: 23 MG/DL (ref 9–23)
CALCIUM BLD-MCNC: 9.3 MG/DL (ref 8.7–10.6)
CHLORIDE SERPL-SCNC: 104 MMOL/L (ref 98–112)
CO2 SERPL-SCNC: 29 MMOL/L (ref 21–32)
CREAT BLD-MCNC: 1.21 MG/DL
EGFRCR SERPLBLD CKD-EPI 2021: 49 ML/MIN/1.73M2 (ref 60–?)
EOSINOPHIL # BLD AUTO: 0.05 X10(3) UL (ref 0–0.7)
EOSINOPHIL NFR BLD AUTO: 0.6 %
ERYTHROCYTE [DISTWIDTH] IN BLOOD BY AUTOMATED COUNT: 12.7 %
GLOBULIN PLAS-MCNC: 2.3 G/DL (ref 2–3.5)
GLUCOSE BLD-MCNC: 105 MG/DL (ref 70–99)
GLUCOSE BLD-MCNC: 108 MG/DL (ref 70–99)
HCT VFR BLD AUTO: 30.4 %
HGB BLD-MCNC: 10.3 G/DL
IMM GRANULOCYTES # BLD AUTO: 0.02 X10(3) UL (ref 0–1)
IMM GRANULOCYTES NFR BLD: 0.3 %
LYMPHOCYTES # BLD AUTO: 1.91 X10(3) UL (ref 1–4)
LYMPHOCYTES NFR BLD AUTO: 24 %
MCH RBC QN AUTO: 28.9 PG (ref 26–34)
MCHC RBC AUTO-ENTMCNC: 33.9 G/DL (ref 31–37)
MCV RBC AUTO: 85.2 FL
MONOCYTES # BLD AUTO: 0.61 X10(3) UL (ref 0.1–1)
MONOCYTES NFR BLD AUTO: 7.7 %
NEUTROPHILS # BLD AUTO: 5.36 X10 (3) UL (ref 1.5–7.7)
NEUTROPHILS # BLD AUTO: 5.36 X10(3) UL (ref 1.5–7.7)
NEUTROPHILS NFR BLD AUTO: 67.1 %
OSMOLALITY SERPL CALC.SUM OF ELEC: 300 MOSM/KG (ref 275–295)
PLATELET # BLD AUTO: 172 10(3)UL (ref 150–450)
POTASSIUM SERPL-SCNC: 4.5 MMOL/L (ref 3.5–5.1)
PROT SERPL-MCNC: 6.5 G/DL (ref 5.7–8.2)
RBC # BLD AUTO: 3.57 X10(6)UL
SODIUM SERPL-SCNC: 143 MMOL/L (ref 136–145)
WBC # BLD AUTO: 8 X10(3) UL (ref 4–11)

## 2025-03-04 PROCEDURE — 99282 EMERGENCY DEPT VISIT SF MDM: CPT

## 2025-03-04 RX ORDER — KETOROLAC TROMETHAMINE 15 MG/ML
15 INJECTION, SOLUTION INTRAMUSCULAR; INTRAVENOUS EVERY 6 HOURS
Status: DISCONTINUED | OUTPATIENT
Start: 2025-03-04 | End: 2025-03-04

## 2025-03-04 RX ORDER — OXYCODONE HYDROCHLORIDE 5 MG/1
5 TABLET ORAL EVERY 6 HOURS PRN
Qty: 15 TABLET | Refills: 0 | Status: SHIPPED | OUTPATIENT
Start: 2025-03-04 | End: 2025-03-04

## 2025-03-04 RX ORDER — OXYCODONE HYDROCHLORIDE 5 MG/1
5 TABLET ORAL EVERY 6 HOURS PRN
Qty: 15 TABLET | Refills: 0 | Status: SHIPPED | OUTPATIENT
Start: 2025-03-04

## 2025-03-04 RX ORDER — SENNA AND DOCUSATE SODIUM 50; 8.6 MG/1; MG/1
1 TABLET, FILM COATED ORAL DAILY
Qty: 30 TABLET | Refills: 0 | Status: SHIPPED | OUTPATIENT
Start: 2025-03-04

## 2025-03-04 NOTE — PROGRESS NOTES
NURSING DISCHARGE NOTE    Discharged Home via Wheelchair.  Accompanied by Support staff  Belongings Taken by patient/family.    VSS, tolerating soft diet, voiding adequately, pain controlled, tolerating ambulation. Discharge education provided. BRAULIO drain instructions provided and supplies given. Reviewed medications and follow up appts. All questions answered and concerns addressed, pt verbalized understanding. IV and telemtry removed. Pt dc in stable condition. Patient left unit via wheelchair at 1131

## 2025-03-04 NOTE — PROGRESS NOTES
A&Ox4.on room air.   Telemetry: NSR  GI: Abdomen soft, nondistended with some tenderness.  Denies nausea.  : Voids.  Pain controlled with PRN pain medications  Up with standby assist and walker, baseline with cane.  Drains: BRAULIO LLQ, bloody output.  Incisions: 5 abdominal lap sites, with skin glue -CDI.   Diet: Tolerating fulls.  IV saline locked.  Plan of care discussed with patient and call light in reach.

## 2025-03-04 NOTE — PROGRESS NOTES
[facility]  Progress Note    Marcela Moreno Patient Status:  Outpatient in a Bed    10/22/1955 MRN [id number]   Location [facility] 3NW-A Attending Hemanth Murillo MD   Hosp Day # 0 PCP Rhiannon Betancourt MD     Subjective:  Patient seated upright in chair eating breakfast at time of encounter. She reports some abdominal tenderness today, but overall states she is doing very well. She is tolerating a soft diet, denies any nausea or vomiting. She states she has been passing flatus, she has not had a bowel movement. Patient has been up and ambulating. She denies any fever or chills.    Objective/Physical Exam:  General: Alert, orientated x3.  Cooperative.  No apparent distress.  Vital Signs:  Blood pressure 132/58, pulse 79, temperature 98.4 °F (36.9 °C), temperature source Oral, resp. rate 18, height 60\", weight 173 lb (78.5 kg), SpO2 95%.  Wt Readings from Last 3 Encounters:   25 173 lb (78.5 kg)   25 173 lb (78.5 kg)   25 172 lb (78 kg)     Lungs: No respiratory distress.  Cardiac: Regular rate and rhythm.   Abdomen:  Soft, non distended, appropriate incisional tenderness, with no rebound or guarding.  No peritoneal signs.   Extremities:  No lower extremity edema noted.    Incision: 6 abdominal laparoscopic incision sites covered with dermabond, clean, dry, intact, no erythema  Drain: BRAULIO drain intact, with serosanguinous output    Intake/Output:    Intake/Output Summary (Last 24 hours) at 3/4/2025 0739  Last data filed at 3/4/2025 0615  Gross per 24 hour   Intake 2759 ml   Output 2280 ml   Net 479 ml     I/O last 3 completed shifts:  In: 2769 [P.O.:600; I.V.:2159; IV PIGGYBACK:10]  Out: 2280 [Urine:2000; Drains:260; Blood:20]  No intake/output data recorded.    Medications:    insulin aspart  2-10 Units Subcutaneous TID AC and HS    ketorolac  15 mg Intravenous Q6H    acetaminophen  1,000 mg Oral Q8H GREYSON    insulin degludec  10 Units Subcutaneous Daily    pantoprazole  40 mg Oral  Daily    rosuvastatin  10 mg Oral Nightly    timolol  1 drop Both Eyes BID    oxybutynin ER  5 mg Oral Daily    insulin aspart  1-68 Units Subcutaneous TID CC    lisinopril  5 mg Oral Daily       Labs:  Lab Results   Component Value Date    WBC 8.0 03/04/2025    HGB 10.3 03/04/2025    HCT 30.4 03/04/2025    .0 03/04/2025        No results found for: \"PT\", \"INR\"      Assessment  Patient Active Problem List   Diagnosis    Mixed hyperlipidemia    Type 2 diabetes mellitus with hyperglycemia, with long-term current use of insulin (HCC)    Status post stroke    Essential hypertension    H/O arteriovenous malformation (AVM)    Hypothyroidism    Class 1 obesity due to excess calories with serious comorbidity and body mass index (BMI) of 30.0 to 30.9 in adult    Overactive bladder    RLS (restless legs syndrome)    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 associated with type 2 diabetes mellitus (HCC)    Age-related osteoporosis without current pathological fracture    Ventral hernia without obstruction or gangrene    Cellulitis and abscess of toe of right foot    Osteomyelitis of right foot, unspecified type (HCC)    Diabetic ulcer of right great toe (HCC)    Incarcerated ventral hernia       POD 1 Robotic retromuscular repair of complex ventral/incisional hernia, TAR    Plan:  Continue soft diet  Continue analgesics and antiemetics as needed  Continue drain care. Drain will remain in place at discharge.  Ambulate and up to chair  Encourage incentive spirometry  DVT prophylaxis - SCDs  GI prophylaxis with Protonix  Patient progressing very well postoperatively, stable for discharge to home today. Patient will follow up with [facility] in 10-14 days.  Continue abdominal binder for 2 weeks.    Quality:  DVT Mechanical Prophylaxis:        DVT Pharmacologic Prophylaxis   Medication   None                Code Status: Not on file  Mccallum: No urinary catheter in place  Mccallum Duration  (in days):   Central line:    FELA: 3/5/2025        Perla Avalos PA-C  3/4/2025  7:39 AM    ADDENDUM:  The patient was discussed with Gladys Guthrie PA-C.    The patient was seen and examined. I agree with above.    She reports doing well this morning. She reports mild incisional tenderness which she is managing with tylenol and toradol. She reports tolerating soft diet without nausea or vomiting. She reports passing flatus but denies vomiting.    Consitutional: awake, alert. No apparent distress  Abdomen: soft, non-distended, appropriate incisional tenderness    POD 1 robotic repair complex ventral hernia with mesh and transversus abdominus component separation    Plan:  She is stable for discharge home today once cleared by all other services  Discharge instructions discussed  She will discharge home with drain in place  She should wear abdominal binder for at least 2 weeks  Return precautions discussed  Follow up with [facility] general surgery in 10-14 days, sooner if needed    Gladys Guthrie PA-C       alex vu

## 2025-03-04 NOTE — PROGRESS NOTES
Alert & oriented x4. VSS on room air. NSR on tele. Voids. Tolerating low fiber/soft diet. Ambulates with standby assistance and walker. Denies nausea/chest pain/SOB. Pain controlled with scheduled toradol. Lap sites and BRAULIO drain intact. Abdominal binder in place. Patient updated on plan of care. Questions and concerns addressed. Frequent rounding performed

## 2025-03-05 NOTE — ED PROVIDER NOTES
Patient Seen in: [facility] Emergency Department      History     Chief Complaint   Patient presents with    Postop/Procedure Problem     Stated Complaint: patient reports she was released today for hernia repair and her sutures opened*    Subjective:   HPI      69-year-old female presenting to the emerged part for wound evaluation patient yesterday had a ventral hernia repair has a drain in place there is a small small-moderate drainage a wound at the tube insertion site so she comes in for evaluation of the wound.  She denies any pain she denies fever she denies pain redness purulent discharge or any other complaints awake alert patient appears no distress HEENT exam is normal lungs normal cardiovascular exam normal abdomen    Objective:     Past Medical History:    Abdominal pain    Arthritis    AVM (arteriovenous malformation) brain (HCC)    being followed at [facility]    Back pain    Back problem    Bloating    Calculus of kidney    Constipation    Diabetes mellitus ([facility])    Diarrhea, unspecified    Esophageal reflux    Fatigue    Food intolerance    Frequent urination    Heartburn    High blood pressure    High cholesterol    History of depression    Indigestion    Irregular bowel habits    Leaking of urine    Leg swelling    Muscle weakness    Neuropathy    TOES SOMETIMES    Osteoarthritis    Pain in joints    Pain with bowel movements    Problems with swallowing    Sleep disturbance    Sputum production    Stool incontinence    Stroke ([facility])    LEFT SIDE EFFECTED- USING CANE    Type II or unspecified type diabetes mellitus without mention of complication, not stated as uncontrolled    Uncomfortable fullness after meals    Vitamin D deficiency    Weight gain              Past Surgical History:   Procedure Laterality Date    Brain surgery  2015    avm   [facility]          X3    Cholecystectomy      Colonoscopy N/A 2024    Procedure: COLONOSCOPY;  Surgeon: Guerrero Moser MD;   Location:  ENDOSCOPY    Cystoscopy,insert ureteral stent      Hernia surgery      Other surgical history      LEFT TIBIA FRACTURE- PLATE AND SCREWS  2017                Social History     Socioeconomic History    Marital status:    Tobacco Use    Smoking status: Never    Smokeless tobacco: Never   Vaping Use    Vaping status: Never Used   Substance and Sexual Activity    Alcohol use: Not Currently    Drug use: No   Other Topics Concern    Caffeine Concern Yes     Comment: coffee 2 cups daily    Exercise No    Seat Belt Yes     Social Drivers of Health     Food Insecurity: No Food Insecurity (3/3/2025)    NCSS - Food Insecurity     Worried About Running Out of Food in the Last Year: No     Ran Out of Food in the Last Year: No   Transportation Needs: No Transportation Needs (3/3/2025)    NCSS - Transportation     Lack of Transportation: No   Housing Stability: Not At Risk (3/3/2025)    NCSS - Housing/Utilities     Has Housing: Yes     Worried About Losing Housing: No     Unable to Get Utilities: No                  Physical Exam     ED Triage Vitals [03/04/25 2113]   /78   Pulse 79   Resp 20   Temp 98.8 °F (37.1 °C)   Temp src    SpO2 96 %   O2 Device None (Room air)       Current Vitals:   Vital Signs  BP: 142/78  Pulse: 79  Resp: 20  Temp: 98.8 °F (37.1 °C)    Oxygen Therapy  SpO2: 96 %  O2 Device: None (Room air)        Physical Exam  Drain in place in the left mid quadrant region sutured in place no show erythema scant amount of serosanguineous drainage near the edge site the bulb syringe has a serosanguineous and bloody discharge no purulent material abdominal exam otherwise a soft nontender EXTR no Cyanosis or edema no rash no focal neurologic deficits    ED Course   Labs Reviewed - No data to display         Differential diagnosis includes necrotizing fasciitis, sepsis       MDM              Medical Decision Making  61-year-old female with postop wound check.  Clinically the patient appears to  have some serosanguineous drainage no signs of infection drain in place.  Patient has wound dressed and was discharged to follow-up with her surgeon for further evaluation  The patient was screened and evaluated during this visit.  As a treating physician attending to the patient, I determined, within reasonable clinical confidence and prior to discharge, that an emergency medical condition was not or was no longer present.  There was no indication for further evaluation, treatment or admission on an emergency basis.    The usual and customary discharge instructions were discussed given the patient's ER course.  We discussed signs and symptoms that should prompt the patient's immediate return to the emergency department.  Reasonable over-the-counter and prescription treatment options and physician follow-up plan was discussed.  Patient was discharged home in good condition  This note was prepared using Dragon Medical voice recognition dictation software.  As a result errors may occur.  When identified to these areas have been corrected.  While every attempt is made to correct errors during dictation discrepancies may still exist.  Please contact if there are any errors    Problems Addressed:  Wound discharge: acute illness or injury        Disposition and Plan     Clinical Impression:  1. Wound discharge         Disposition:  Discharge  3/4/2025 11:40 pm    Follow-up:  Hemanth Murillo MD  [address] [facility] [address]  [phone]    Follow up in 1 week(s)            Medications Prescribed:  Current Discharge Medication List              Supplementary Documentation:

## 2025-03-05 NOTE — PROGRESS NOTES
Pt is POD#1 s/p robotic ventral hernia repair with component separation.     Pt called in & left VM that she is coming to ED for assistance with drain as it appears that it became disconnected and is leaking. She mentioned that stitches may have come out. I tried to call back to get additional details, but call went to voicemail.    ED - If her drain has indeed backed out or fallen out entirely, she needs imaging to assess whether a seroma or hematoma is present & percutaneous replacement would be necessary.

## 2025-03-05 NOTE — ED INITIAL ASSESSMENT (HPI)
Pt to the emergency room status post hernia surgery. Pt states she noticed leaking from incision site and that a stitch had broken. Pt states she is experiencing mild discomfort to the site. No fevers noted. Pt states that the drainage is bloody in appearance. No other complaints at this time.

## 2025-03-10 ENCOUNTER — OFFICE VISIT (OUTPATIENT)
Dept: WOUND CARE | Facility: HOSPITAL | Age: 70
End: 2025-03-10
Attending: PODIATRIST
Payer: MEDICARE

## 2025-03-10 ENCOUNTER — TELEPHONE (OUTPATIENT)
Facility: LOCATION | Age: 70
End: 2025-03-10

## 2025-03-10 VITALS
TEMPERATURE: 98 F | HEART RATE: 88 BPM | DIASTOLIC BLOOD PRESSURE: 75 MMHG | SYSTOLIC BLOOD PRESSURE: 147 MMHG | RESPIRATION RATE: 16 BRPM

## 2025-03-10 DIAGNOSIS — M86.9 OSTEOMYELITIS OF GREAT TOE OF RIGHT FOOT (HCC): ICD-10-CM

## 2025-03-10 DIAGNOSIS — E11.65 TYPE 2 DIABETES MELLITUS WITH HYPERGLYCEMIA, WITH LONG-TERM CURRENT USE OF INSULIN (HCC): ICD-10-CM

## 2025-03-10 DIAGNOSIS — M20.5X1 HALLUX LIMITUS OF RIGHT FOOT: ICD-10-CM

## 2025-03-10 DIAGNOSIS — Z79.4 TYPE 2 DIABETES MELLITUS WITH HYPERGLYCEMIA, WITH LONG-TERM CURRENT USE OF INSULIN (HCC): ICD-10-CM

## 2025-03-10 DIAGNOSIS — E11.621 DIABETIC ULCER OF RIGHT GREAT TOE (HCC): Primary | ICD-10-CM

## 2025-03-10 DIAGNOSIS — L97.519 DIABETIC ULCER OF RIGHT GREAT TOE (HCC): Primary | ICD-10-CM

## 2025-03-10 LAB — GLUCOSE BLD-MCNC: 256 MG/DL (ref 70–99)

## 2025-03-10 PROCEDURE — 82962 GLUCOSE BLOOD TEST: CPT | Performed by: PODIATRIST

## 2025-03-10 PROCEDURE — 11042 DBRDMT SUBQ TIS 1ST 20SQCM/<: CPT | Performed by: PODIATRIST

## 2025-03-10 NOTE — PROGRESS NOTES
Patient ID: Marcela Moreno is a 69 year old female.    Debridement Diabetic Ulcer Plantar;Right Toe (Comment which one)   Wound 01/13/25 #1 rt great Toe (Comment which one) Plantar;Right    Performed by: Jomar Hugo DPM  Authorized by: Jomar Hugo DPM      Consent   Consent obtained? verbal  Consent given by: patient  Risks discussed? procedural risks discussed  Time out called at 3/10/2025 8:05 AM  Immediately prior to the procedure a time out was called and the performing provider verified the correct patient, procedure, equipment, support staff, and site/side marked as required.    Debridement Details  Performed by: physician  Debridement type: surgical  Level of debridement: subcutaneous tissue  Pain control: none    Pre-debridement measurements  Length (cm): 0.3  Width (cm): 0.2  Depth (cm): 0.1  Surface Area (cm^2): 0.06    Post-debridement measurements  Length (cm): 0.7  Width (cm): 0.5  Depth (cm): 0.1  Percent debrided: 100%  Surface Area (cm^2): 0.35  Area Debrided (cm^2): 0.35  Volume (cm^3): 0.03    Tissue and other material debrided: subcutaneous tissue  Devitalized tissue debrided: biofilm, callus and necrotic debris  Instrument(s) utilized: blade  Bleeding: small  Hemostasis obtained with: not applicable  Procedural pain (0-10): 0  Post-procedural pain: 0   Response to treatment: procedure was tolerated well

## 2025-03-10 NOTE — PROGRESS NOTES
Subjective   Marcela Moreno is a 69 year old female.    Chief Complaint   Patient presents with    Wound Care     Patient is here for a wound care follow up. She denies any pain or new wound concerns.       HPI  Marcela Moreno is a 69 year old female with DM2 with peripheral polyneuropathy who is returning to clinic today for recheck on her right great toe ulceration.  Patient states she is doing well overall.  She did have a surgery last week and is continuing to recover from this.  Denies any new concerns to the great toe ulceration, which she feels has improved.  Denies recent signs of infection.  She is continuing to ambulate as tolerated in her Darco offloading shoe with a cane for gait assistance.  Denies any pain and has no other concerns at this time.    Review of Systems  Denies nausea, vomiting, fever, chills, shortness of breath, chest pain, and calf pain.    Objective    Physical Exam:    Derm:  Wound Assessment  Wound 08/28/24 Perineum (Active)   Date First Assessed/Time First Assessed: 08/28/24 0931   Present on Original Admission: No  Location: (c) Perineum      Assessments 8/28/2024 10:15 AM 8/28/2024 12:27 PM   Drainage Amount None None   Dressing Open to air Open to air       No associated orders.       Wound 01/13/25 #1 rt great Toe (Comment which one) Plantar;Right (Active)   Date First Assessed/Time First Assessed: 01/13/25 0911    Wound Number (Wound Clinic Only): #1 rt great  Primary Wound Type: Diabetic Ulcer  Location: Toe (Comment which one)  Wound Location Orientation: Plantar;Right      Assessments 1/13/2025  9:12 AM 3/10/2025  7:56 AM   Wound Image       Drainage Amount Scant --   Drainage Description Serosanguineous --   Wound Length (cm) 0.8 cm 0.7 cm   Wound Width (cm) 0.7 cm 0.5 cm   Wound Surface Area (cm^2) 0.56 cm^2 0.35 cm^2   Wound Depth (cm) 0.1 cm 0.1 cm   Wound Volume (cm^3) 0.056 cm^3 0.035 cm^3   Wound Healing % -- 38   Margins Well-defined edges --   Non-staged Wound  Description Full thickness --   Kayla-wound Assessment Callous --   Wound Granulation Tissue Pink;Spongy --   Wound Bed Granulation (%) 100 % --   Wound Odor None --   Colmenares Scale Grade 1 --       Active Orders   Date Order Priority Status Authorizing Provider   03/10/25 0805 Debridement Diabetic Ulcer Plantar;Right Toe (Comment which one) Routine Active Jomar Hugo DPM       Inactive Orders   Date Order Priority Status Authorizing Provider   02/24/25 0813 Debridement Diabetic Ulcer Plantar;Right Toe (Comment which one) Routine Completed Jomar Hugo DPM   02/17/25 0917 Debridement Diabetic Ulcer Plantar;Right Toe (Comment which one) Routine Completed Jomar Hugo DPM   02/03/25 0833 Debridement Diabetic Ulcer Plantar;Right Toe (Comment which one) Routine Completed Jomar Hugo DPM   01/20/25 0859 Debridement Diabetic Ulcer Plantar;Right Toe (Comment which one) Routine Completed Jomar Hugo DPM   01/13/25 0942 Debridement Diabetic Ulcer Right;Plantar Toe (Comment which one) Routine Completed Jomar Hugo DPM       Wound 03/03/25 Abdomen Left (Active)   Date First Assessed: 03/03/25   Primary Wound Type: (c) Incision  Location: Abdomen  Wound Location Orientation: Left      Assessments 3/3/2025  8:57 AM 3/4/2025  7:55 AM   Site Assessment -- Clean;Dry;Intact   Closure -- Skin glue   Drainage Amount -- None   Treatments Abdominal Binder Abdominal Binder   Dressing Dermabond Open to air       No associated orders.       Wound 03/03/25 Abdomen Right (Active)   Date First Assessed: 03/03/25   Primary Wound Type: (c) Incision  Location: Abdomen  Wound Location Orientation: Right      Assessments 3/3/2025  9:13 AM 3/4/2025  7:55 AM   Site Assessment -- Clean;Dry;Intact   Closure -- Skin glue   Drainage Amount -- None   Treatments Abdominal Binder Abdominal Binder   Dressing Dermabond Open to air       No associated orders.       Vascular: pedal pulses are palpable. CFT <3 sec to digits  Musculoskeletal:  no acute deformities noted.  Decreased active and passive dorsiflexion of right first MPJ.  Gait is assisted with a cane  Neurological: Gross sensation intact via light touch.  Protective sensation diminished via Ipswitch test.        MRI FOOT, RIGHT (CPT=73718)     Result Date: 12/30/2024  CONCLUSION:  Soft tissue ulceration along the distal aspect of the great toe overlies the distal phalanx and there is moderate generalized edema signal associated with the great toe which is concerning for cellulitis in this region.  There is marrow signal abnormality in the distal phalanx including areas of low T1 signal, STIR hyperintensity, and cortical irregularity that is highly concerning for osteomyelitis as was suggested on recent plain film radiographs.  Clinical correlation recommended.  There is no discrete soft tissue abscess identified.  LOCATION:  [facility]   Dictated by (CST): Byaron Gardner MD on 12/30/2024 at 10:25 PM     Finalized by (CST): Bayron Gardner MD on 12/30/2024 at 10:28 PM        US ARTERIAL DUPLEX LOWER EXTREMITY BILATERAL (CPT=93925)     Result Date: 12/30/2024  CONCLUSION:  1. Diffuse atherosclerotic plaquing bilaterally with diffuse biphasic waveform patterns involving both lower extremities. 2. There is a focus of increased arterial velocity involving the left mid SFA suggesting mild to moderate grade stenosis approximating 50%.  3. Increased arterial velocity within the right mid MATT and left proximal PTA suspicious for stenosis approximating 75%.   LOCATION:  [facility]   Dictated by (CST): Maame Rodrigues DO on 12/30/2024 at 1:23 PM     Finalized by (CST): Maame Rodrigues DO on 12/30/2024 at 1:27 PM        XR FOOT, COMPLETE (MIN 3 VIEWS), RIGHT (CPT=73630)     Result Date: 12/27/2024  CONCLUSION:   Focal soft tissue defect/ulceration noted along the plantar aspect of the 1st distal phalanx.  There is associated cortical erosion/destruction involving the tip of the 1st distal phalanx most in keeping  with acute osteomyelitis.  These findings are on a background of generalized osteopenia.  Scattered arthropathy of the mid and forefoot noted.  Large plantar calcaneal spur.   LOCATION:  [facility]   Dictated by (CST): Matheus Gutierrez MD on 12/27/2024 at 9:15 PM     Finalized by (CST): Matehus Gutierrez MD on 12/27/2024 at 9:16 PM        Vital Signs  Vital Signs    03/10/25 0754   BP: 147/75   Pulse: 88   Resp: 16   Temp: 98.4 °F (36.9 °C)   PainSc: 0 - (None)         Allergies  Allergies[1]    Assessment    Encounter Diagnosis  1. Diabetic ulcer of right great toe (HCC)    2. Osteomyelitis of great toe of right foot (HCC)    3. Hallux limitus of right foot    4. Type 2 diabetes mellitus with hyperglycemia, with long-term current use of insulin (HCC)        Problem List  Patient Active Problem List   Diagnosis    Mixed hyperlipidemia    Type 2 diabetes mellitus with hyperglycemia, with long-term current use of insulin (HCC)    Status post stroke    Essential hypertension    H/O arteriovenous malformation (AVM)    Hypothyroidism    Class 1 obesity due to excess calories with serious comorbidity and body mass index (BMI) of 30.0 to 30.9 in adult    Overactive bladder    RLS (restless legs syndrome)    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 associated with type 2 diabetes mellitus (HCC)    Age-related osteoporosis without current pathological fracture    Ventral hernia without obstruction or gangrene    Cellulitis and abscess of toe of right foot    Osteomyelitis of right foot, unspecified type (HCC)    Diabetic ulcer of right great toe (HCC)    Incarcerated ventral hernia       Plan  Orders:  Orders Placed This Encounter   Procedures    Debridement Diabetic Ulcer Plantar;Right Toe (Comment which one)         -Patient examined, chart history reviewed.    -Inspected right foot: Full-thickness ulceration to plantar distal right hallux with surrounding hyperkeratotic tissue.  There is no  hypergranulation noted to the wound bed today.  No deep probing, no surrounding erythema, no purulent drainage, no other signs of infection.  Overall stable wound.  It remains superficial in nature.     -Utilizing #15 blade, excisional debridement was performed to right hallux ulceration down to including subcutaneous tissues.  Care was taken to remove all periwound hyperkeratotic tissue as well, which did appear to be growing over the wound base.       -Patient's ulceration covered with endoform, Hydrofera Blue ready, paper tape, and E-spanda.  Rolled up gauze was also placed under the hallux to assist in offloading.  Encouraged patient to keep this on.     -Will have patient change these dressings in a similar fashion every 2-4 days dependent on amount of drainage.     -Recommend patient continue to weight-bear as tolerated in her Darco shoe.  She should avoid excessive ambulation as offloading will be essential for wound healing.  Also recommend continued use of cane as needed.     -Recommend continued tight glycemic control.  Discussed blood sugars and their role in healing potential.     -Patient does understand that she is at risk of needing a partial hallux amputation.  Recent MRI reveals concerns of osteomyelitis to the distal phalanx.  No acute signs of infection currently.  Will continue to monitor    -Educated on signs of infection and encouraged patient to seek immediate medical attention if noticing any of these signs.    Follow-Up  2 weeks    Héctor Hugo DPM    3/10/2025    Dragon speech recognition software was used to prepare this note.  Errors in word recognition may occur.  Please contact me with any questions/concerns with this note.          [1] No Known Allergies

## 2025-03-10 NOTE — PROGRESS NOTES
Weekly Wound Education Note    Teaching Provided To: Patient  Training Topics: Discharge instructions, Dressing, Off-loading, Cleasing and general instructions  Training Method: Demonstration, Explain/Verbal, Written  Training Response: Patient responds and understands        Notes: Cleanse right plantar great toe wound with saline or wound cleanser apply endoform slightly moistened with saline, cover with hydrofera ready (writing side away from wound) change three times weekly. Gauze bolster under great toe secured with tape, may leave in place and reapply when soiled. E spanda to right foot/leg may remove at bedtime and reapply first thing in the morning.

## 2025-03-10 NOTE — TELEPHONE ENCOUNTER
The patient recently called stating that the blood that is coming out is not going into the drain but is going to the bandages. The patient is experiencing pain.     Call back # [phone]

## 2025-03-11 NOTE — TELEPHONE ENCOUNTER
Spoke with patient.  Patient states after changing saturated dressing again yesterday the drainage seems to be flowing a little better through the tube and collecting a little.  Patient also seeing skin irritation at the entry site.  Office visit with PA scheduled.  Future Appointments   Date Time Provider Department Center   3/12/2025 11:15 AM [facility] GEN SURG PA [facility] [id number]   3/19/2025  9:30 AM [facility] GEN SURG PA [facility] [id number]   3/24/2025  8:30 AM Jomar Hugo DPM  [facility]   4/9/2025  3:45 PM Hemanth Murillo MD [id number]

## 2025-03-12 ENCOUNTER — OFFICE VISIT (OUTPATIENT)
Facility: LOCATION | Age: 70
End: 2025-03-12
Payer: MEDICARE

## 2025-03-12 VITALS
SYSTOLIC BLOOD PRESSURE: 136 MMHG | TEMPERATURE: 99 F | RESPIRATION RATE: 16 BRPM | HEART RATE: 84 BPM | OXYGEN SATURATION: 98 % | DIASTOLIC BLOOD PRESSURE: 68 MMHG

## 2025-03-12 DIAGNOSIS — Z98.890 POST-OPERATIVE STATE: Primary | ICD-10-CM

## 2025-03-12 PROCEDURE — 3078F DIAST BP <80 MM HG: CPT

## 2025-03-12 PROCEDURE — 1111F DSCHRG MED/CURRENT MED MERGE: CPT

## 2025-03-12 PROCEDURE — 1170F FXNL STATUS ASSESSED: CPT

## 2025-03-12 PROCEDURE — 1159F MED LIST DOCD IN RCRD: CPT

## 2025-03-12 PROCEDURE — 3075F SYST BP GE 130 - 139MM HG: CPT

## 2025-03-12 PROCEDURE — 99024 POSTOP FOLLOW-UP VISIT: CPT

## 2025-03-12 NOTE — PROGRESS NOTES
Post Operative Visit Note       Active Problems  1. Post-operative state         Chief Complaint   Chief Complaint   Patient presents with    Post-Op     PO-drain not draining properly, 3/3 w/pbp, Robotic repair complex ventral hernia with mesh and transversus abdominus component separation. Pt reports that her bandage was being soaked by the drainage and it was not going into the drain. Pt states that this has recently improved. Pt says that she is having a bit of incisional pain and recently noticed a blister but denies any other symptoms.           History of Present Illness   The patient presents today for postoperative visit following robotic repair of complex ventral hernia with mesh and transversus abdominis component separation on 3/3/2025 by Dr. Murillo.    Following her discharge from the hospital after surgery, patient return to emergency department on 3/4/25 with concerns of leakage around her BRAULIO drain.  Per emergency department evaluation, patient's drain was determined to be in place, and she was discharged with instructions to follow-up with general surgery.  Patient states that since that time, she has not had issues with the drain and reports it to be draining into the bulb appropriately, but is covering the site with gauze in case there is recurrence of leakage.    Today, patient states that she is doing very well. She reports tolerating a general diet, denies nausea or vomiting.  She denies diarrhea or constipation.  She denies fever or chills.  She states that her incisions are healing well, but has noticed a small abrasion near her drain site where the tape of her dressing lies.  There is no redness or drainage noted.  She states that drain output has been roughly 40 to 50 mL/day, light pink in color.      Allergies  Marcela has No Known Allergies.    Past Medical / Surgical / Social / Family History    The past medical and past surgical history have been reviewed by me today.     Past Medical  History:    Abdominal pain    Arthritis    AVM (arteriovenous malformation) brain (HCC)    being followed at [facility]    Back pain    Back problem    Bloating    Calculus of kidney    Constipation    Diabetes mellitus (HCC)    Diarrhea, unspecified    Esophageal reflux    Fatigue    Food intolerance    Frequent urination    Heartburn    High blood pressure    High cholesterol    History of depression    Indigestion    Irregular bowel habits    Leaking of urine    Leg swelling    Muscle weakness    Neuropathy    TOES SOMETIMES    Osteoarthritis    Pain in joints    Pain with bowel movements    Problems with swallowing    Sleep disturbance    Sputum production    Stool incontinence    Stroke (HCC)    LEFT SIDE EFFECTED- USING CANE    Type II or unspecified type diabetes mellitus without mention of complication, not stated as uncontrolled    Uncomfortable fullness after meals    Vitamin D deficiency    Weight gain     Past Surgical History:   Procedure Laterality Date    Brain surgery  2015    avm   [facility]          X3    Cholecystectomy      Colonoscopy N/A 2024    Procedure: COLONOSCOPY;  Surgeon: Guerrero Moser MD;  Location:  ENDOSCOPY    Cystoscopy,insert ureteral stent      Hernia surgery      Other surgical history      LEFT TIBIA FRACTURE- PLATE AND SCREWS         The family history and social history have been reviewed by me today.    Family History   Problem Relation Age of Onset    Hypertension Father             Diabetes Mother             Hypertension Mother     Diabetes Sister     Heart Disorder Brother         CAD stent in his 40s    Diabetes Brother     Heart Attack Brother      Social History     Socioeconomic History    Marital status:    Tobacco Use    Smoking status: Never    Smokeless tobacco: Never   Vaping Use    Vaping status: Never Used   Substance and Sexual Activity    Alcohol use: Not Currently    Drug use: No   Other Topics Concern     Caffeine Concern Yes     Comment: coffee 2 cups daily    Exercise No    Seat Belt Yes        Current Outpatient Medications:     senna-docusate 8.6-50 MG Oral Tab, Take 1 tablet by mouth daily., Disp: 30 tablet, Rfl: 0    pantoprazole 40 MG Oral Tab EC, Take 1 tablet (40 mg total) by mouth daily., Disp: 30 tablet, Rfl: 2    insulin degludec (TRESIBA FLEXTOUCH) 200 UNIT/ML Subcutaneous Solution Pen-injector, INJECT UNDER THE SKIN AS DIRECTED. MAX 30 UNITS PER DAY, Disp: 18 mL, Rfl: 0    LISINOPRIL 5 MG Oral Tab, TAKE 1 TABLET(5 MG) BY MOUTH DAILY, Disp: 90 tablet, Rfl: 1    rosuvastatin 10 MG Oral Tab, Take 1 tablet (10 mg total) by mouth nightly., Disp: 90 tablet, Rfl: 3    acetaminophen 325 MG Oral Tab, Take 1 tablet (325 mg total) by mouth every 4 (four) hours as needed for Pain., Disp: , Rfl:     METFORMIN  MG Oral Tablet 24 Hr, TAKE 1 TABLET(500 MG) BY MOUTH TWICE DAILY WITH MEALS, Disp: 180 tablet, Rfl: 1    oxybutynin ER 5 MG Oral Tablet 24 Hr, Take 1 tablet (5 mg total) by mouth daily., Disp: 30 tablet, Rfl: 1    Insulin Pen Needle (BD PEN NEEDLE TITO 2ND GEN) 32G X 4 MM Does not apply Misc, Inject 1 each into the skin daily., Disp: 100 each, Rfl: 3    Glucose Blood (ONETOUCH VERIO) In Vitro Strip, USE TO TEST ONCE TO TWICE DAILY, Disp: 100 strip, Rfl: 1    OneTouch Delica Lancets 33G Does not apply Misc, 1 Lancet by Finger stick route daily., Disp: 100 each, Rfl: 0    timolol 0.5 % Ophthalmic Solution, Place 1 drop into both eyes 2 (two) times daily., Disp: , Rfl:     oxyCODONE 5 MG Oral Tab, Take 1 tablet (5 mg total) by mouth every 6 (six) hours as needed for Pain. (Patient not taking: Reported on 3/12/2025), Disp: 15 tablet, Rfl: 0    cyclobenzaprine 5 MG Oral Tab, Take 1 tablet (5 mg total) by mouth 3 (three) times daily as needed for Muscle spasms. (Patient not taking: Reported on 3/12/2025), Disp: 30 tablet, Rfl: 1      Review of Systems  A 10 point Review of Systems has been completed by me  today and is negative except as above in the HPI.    Physical Findings   /68 (BP Location: Right arm, Patient Position: Sitting, Cuff Size: adult)   Pulse 84   Temp 99 °F (37.2 °C) (Temporal)   Resp 16   LMP  (LMP Unknown)   SpO2 98%   Gen/psych: alert and oriented, cooperative, no apparent distress  Cardiovascular: regular rate  Respiratory: respirations unlabored, no wheeze  Abdominal: soft, non-tender, non-distended, no guarding/rebound  Incisions: Incision sites are well-approximated, clean, dry, intact.  No surrounding erythema, no exudates.    BRAULIO drain of LLQ in place, draining appropriately with serosanguinous output. Per patient, there has been roughly 50 mL of output over past 24 hours. There is a small 0.5 cm x 0.5cm erythematous patch of skin underlying patient's dressing tape, this area is without warmth or exudates.         Assessment/Plan  1. Post-operative state        Patient may continue a general diet, no restrictions.  BRAULIO drain to remain in place at this time. Recommend patient to utilize a smaller gauze dressing if she wishes to continue to cover the drain site and to avoid excess use of paper tape, as there is some evidence of irritation underlying her tape site.  Continue to keep incisions clean, soap and water to incision sites.  Patient to continue lifting restrictions for 6 weeks postoperatively, no lifting greater than 20 pounds.  All of the patients questions and concerns were addressed during today's visit, she is in understanding and agreement with the plan.  Patient to return to clinic in 1 week for drain check.       No orders of the defined types were placed in this encounter.       Imaging & Referrals   None    Follow Up  Return if symptoms worsen or fail to improve.    Perla Avalos PA-C  [facility] General Surgery  3/12/2025  12:35 PM

## 2025-03-17 DIAGNOSIS — Z79.4 TYPE 2 DIABETES MELLITUS WITH HYPERGLYCEMIA, WITH LONG-TERM CURRENT USE OF INSULIN (HCC): ICD-10-CM

## 2025-03-17 DIAGNOSIS — E11.65 TYPE 2 DIABETES MELLITUS WITH HYPERGLYCEMIA, WITH LONG-TERM CURRENT USE OF INSULIN (HCC): ICD-10-CM

## 2025-03-17 RX ORDER — METFORMIN HYDROCHLORIDE 500 MG/1
500 TABLET, EXTENDED RELEASE ORAL 2 TIMES DAILY WITH MEALS
Qty: 180 TABLET | Refills: 1 | Status: SHIPPED | OUTPATIENT
Start: 2025-03-17

## 2025-03-17 NOTE — TELEPHONE ENCOUNTER
Requested Prescriptions     Pending Prescriptions Disp Refills    METFORMIN  MG Oral Tablet 24 Hr [Pharmacy Med Name: METFORMIN ER 500MG 24HR TABS] 180 tablet 1     Sig: TAKE 1 TABLET(500 MG) BY MOUTH TWICE DAILY WITH MEALS     Future Appointments   Date Time Provider Department Center   3/19/2025  9:30 AM [facility] GEN SURG PA [facility] [id number]   3/24/2025  8:30 AM Jomar Hugo DPM [facility]   4/9/2025  3:45 PM Hemanth Murillo MD EMGLUISURNAP [id number]     Last A1c value was 7.9% done 2/26/2025.  Refill 09/17/24 Janice   LOV 11/12/24 Janice

## 2025-03-18 ENCOUNTER — TELEPHONE (OUTPATIENT)
Facility: CLINIC | Age: 70
End: 2025-03-18

## 2025-03-18 NOTE — TELEPHONE ENCOUNTER
Called patient and left voicemail for patient to callback to schedule 6 month follow up and ultrasound for same day if patient callback please transfer to Lulu at ext [phone].

## 2025-03-19 ENCOUNTER — OFFICE VISIT (OUTPATIENT)
Facility: LOCATION | Age: 70
End: 2025-03-19
Payer: MEDICARE

## 2025-03-19 VITALS
HEART RATE: 84 BPM | DIASTOLIC BLOOD PRESSURE: 71 MMHG | SYSTOLIC BLOOD PRESSURE: 140 MMHG | OXYGEN SATURATION: 98 % | TEMPERATURE: 97 F

## 2025-03-19 DIAGNOSIS — K43.9 VENTRAL HERNIA WITHOUT OBSTRUCTION OR GANGRENE: ICD-10-CM

## 2025-03-19 DIAGNOSIS — Z98.890 POST-OPERATIVE STATE: Primary | ICD-10-CM

## 2025-03-19 PROCEDURE — 3077F SYST BP >= 140 MM HG: CPT

## 2025-03-19 PROCEDURE — 99212 OFFICE O/P EST SF 10 MIN: CPT

## 2025-03-19 PROCEDURE — 1160F RVW MEDS BY RX/DR IN RCRD: CPT

## 2025-03-19 PROCEDURE — 1111F DSCHRG MED/CURRENT MED MERGE: CPT

## 2025-03-19 PROCEDURE — 3078F DIAST BP <80 MM HG: CPT

## 2025-03-19 PROCEDURE — 1159F MED LIST DOCD IN RCRD: CPT

## 2025-03-19 NOTE — PROGRESS NOTES
Follow Up Visit Note       Active Problems      1. Post-operative state    2. Ventral hernia without obstruction or gangrene          Chief Complaint   Chief Complaint   Patient presents with    Post-Op     PO - Robotic repair complex ventral hernia with mesh and transversus abdominus component separation \" W/ PBP 3/3. Feeling ok. Has drain still. Had 30 ml drained in 12 hrs.  Incision site a little red. 1 week after had 2 episodes of nausea and vomiting.          History of Present Illness    Marcela Moreno is a 69-year-old female who presents to clinic for continued care and evaluation following robotic ventral hernia repair and transversus abdominis release on 3/3/2025 with Dr. Murillo.    The patient was seen in clinic last week.  At that time she was doing well, but continued to have about 40-50 mL of serosanguineous output from her drain a day.    She reports doing well since her last visit.  She has noticed improvement in her abdominal discomfort.  She is no longer requiring over-the-counter analgesics.  She denies nausea, vomiting, fevers or chills.  She is tolerating a regular diet.  She reports regular bowel function, although her last bowel movement was on Sunday.  She states this is normal for her and is taking senna as prescribed.    She continues to wash her incision sites with soap and water daily.  Over the past 12 hours, her drain has put out about 30 mL.    Her blood pressure was elevated at 174/77 during the visit.  Repeat blood pressure was 140/71.    Allergies  Marcela has No Known Allergies.    Past Medical / Surgical / Social / Family History    The past medical and past surgical history have been reviewed by me today.    Past Medical History:    Abdominal pain    Arthritis    AVM (arteriovenous malformation) brain ([facility])    being followed at [facility]    Back pain    Back problem    Bloating    Calculus of kidney    Constipation    Diabetes mellitus (HCC)    Diarrhea, unspecified    Esophageal  reflux    Fatigue    Food intolerance    Frequent urination    Heartburn    High blood pressure    High cholesterol    History of depression    Indigestion    Irregular bowel habits    Leaking of urine    Leg swelling    Muscle weakness    Neuropathy    TOES SOMETIMES    Osteoarthritis    Pain in joints    Pain with bowel movements    Problems with swallowing    Sleep disturbance    Sputum production    Stool incontinence    Stroke (HCC)    LEFT SIDE EFFECTED- USING CANE    Type II or unspecified type diabetes mellitus without mention of complication, not stated as uncontrolled    Uncomfortable fullness after meals    Vitamin D deficiency    Weight gain     Past Surgical History:   Procedure Laterality Date    Brain surgery  2015    [facility]          X3    Cholecystectomy      Colonoscopy N/A 2024    Procedure: COLONOSCOPY;  Surgeon: Guerrero Moser MD;  Location:  ENDOSCOPY    Cystoscopy,insert ureteral stent      Hernia surgery      Other surgical history      LEFT TIBIA FRACTURE- PLATE AND SCREWS         The family history and social history have been reviewed by me today.    Family History   Problem Relation Age of Onset    Hypertension Father             Diabetes Mother             Hypertension Mother     Diabetes Sister     Heart Disorder Brother         CAD stent in his 40s    Diabetes Brother     Heart Attack Brother      Social History     Socioeconomic History    Marital status:    Tobacco Use    Smoking status: Never    Smokeless tobacco: Never   Vaping Use    Vaping status: Never Used   Substance and Sexual Activity    Alcohol use: Not Currently    Drug use: No   Other Topics Concern    Caffeine Concern Yes     Comment: coffee 2 cups daily    Exercise No    Seat Belt Yes        Current Outpatient Medications:     METFORMIN  MG Oral Tablet 24 Hr, TAKE 1 TABLET(500 MG) BY MOUTH TWICE DAILY WITH MEALS, Disp: 180 tablet, Rfl: 1    senna-docusate  8.6-50 MG Oral Tab, Take 1 tablet by mouth daily., Disp: 30 tablet, Rfl: 0    pantoprazole 40 MG Oral Tab EC, Take 1 tablet (40 mg total) by mouth daily., Disp: 30 tablet, Rfl: 2    insulin degludec (TRESIBA FLEXTOUCH) 200 UNIT/ML Subcutaneous Solution Pen-injector, INJECT UNDER THE SKIN AS DIRECTED. MAX 30 UNITS PER DAY, Disp: 18 mL, Rfl: 0    cyclobenzaprine 5 MG Oral Tab, Take 1 tablet (5 mg total) by mouth 3 (three) times daily as needed for Muscle spasms., Disp: 30 tablet, Rfl: 1    LISINOPRIL 5 MG Oral Tab, TAKE 1 TABLET(5 MG) BY MOUTH DAILY, Disp: 90 tablet, Rfl: 1    rosuvastatin 10 MG Oral Tab, Take 1 tablet (10 mg total) by mouth nightly., Disp: 90 tablet, Rfl: 3    acetaminophen 325 MG Oral Tab, Take 1 tablet (325 mg total) by mouth every 4 (four) hours as needed for Pain., Disp: , Rfl:     oxybutynin ER 5 MG Oral Tablet 24 Hr, Take 1 tablet (5 mg total) by mouth daily., Disp: 30 tablet, Rfl: 1    Insulin Pen Needle (BD PEN NEEDLE TITO 2ND GEN) 32G X 4 MM Does not apply Misc, Inject 1 each into the skin daily., Disp: 100 each, Rfl: 3    Glucose Blood (ONETOUCH VERIO) In Vitro Strip, USE TO TEST ONCE TO TWICE DAILY, Disp: 100 strip, Rfl: 1    OneTouch Delica Lancets 33G Does not apply Misc, 1 Lancet by Finger stick route daily., Disp: 100 each, Rfl: 0    timolol 0.5 % Ophthalmic Solution, Place 1 drop into both eyes 2 (two) times daily., Disp: , Rfl:      Review of Systems  The Review of Systems has been reviewed by me during today.  Review of Systems   Constitutional:  Negative for chills, diaphoresis, fatigue, fever and unexpected weight change.   HENT:  Negative for hearing loss, nosebleeds, sore throat and trouble swallowing.    Respiratory:  Negative for apnea, cough, shortness of breath and wheezing.    Cardiovascular:  Negative for chest pain, palpitations and leg swelling.   Gastrointestinal:  Negative for abdominal distention, abdominal pain, anal bleeding, blood in stool, constipation, diarrhea,  nausea and vomiting.   Genitourinary:  Negative for difficulty urinating, dysuria, frequency and urgency.   Musculoskeletal:  Negative for arthralgias and myalgias.   Skin:  Negative for color change and rash.   Neurological:  Negative for tremors, syncope and weakness.   Hematological:  Negative for adenopathy. Does not bruise/bleed easily.   Psychiatric/Behavioral:  Negative for behavioral problems and sleep disturbance.         Physical Findings   /71   Pulse 84   Temp 97.3 °F (36.3 °C) (Temporal)   LMP  (LMP Unknown)   SpO2 98%   Physical Exam  Constitutional:       Appearance: Normal appearance.   HENT:      Head: Normocephalic and atraumatic.   Eyes:      Extraocular Movements: Extraocular movements intact.      Pupils: Pupils are equal, round, and reactive to light.   Pulmonary:      Effort: Pulmonary effort is normal. No respiratory distress.   Abdominal:      General: Abdomen is flat. Bowel sounds are normal. There is no distension.      Palpations: Abdomen is soft. There is no mass.      Tenderness: There is no abdominal tenderness. There is no guarding or rebound.      Comments: Robotic incision sites continue to heal appropriately.  BRAULIO drain in left lower quadrant with minimal serous output.  Area of erythematous skin improved compared to last visit.   Musculoskeletal:         General: Normal range of motion.      Cervical back: Normal range of motion.   Skin:     General: Skin is warm.      Coloration: Skin is not jaundiced or pale.      Findings: No erythema.   Neurological:      General: No focal deficit present.      Mental Status: She is alert and oriented to person, place, and time.          Assessment   1. Post-operative state    2. Ventral hernia without obstruction or gangrene        Plan   Overall, the patient continues to do well postoperatively.  I advised the patient her BRAULIO drain will remain in place as she continues to have about 30 mL output a day.  Continue p.o. Tylenol or  Motrin as needed for pain control.  Continue regular diet.  Continue bowel regimen with senna as prescribed.  Recommend following with her PCP for evaluation of high blood pressure.  She is to follow-up in 1 week for drain removal.  Okay to remove BRAULIO drain even if output is not less than 10 mL/day as the drain will be in place for 3 weeks at that time.   She is scheduled to follow-up with Dr. Murillo on/9/25.  All the patient's question concerns were addressed.  She expressed understanding and is in agreement this plan.     No orders of the defined types were placed in this encounter.      Imaging & Referrals   None    Follow Up  No follow-ups on file.    NIK Mullen

## 2025-03-24 ENCOUNTER — TELEPHONE (OUTPATIENT)
Dept: WOUND CARE | Facility: HOSPITAL | Age: 70
End: 2025-03-24

## 2025-03-24 ENCOUNTER — APPOINTMENT (OUTPATIENT)
Dept: WOUND CARE | Facility: HOSPITAL | Age: 70
End: 2025-03-24
Attending: PODIATRIST
Payer: MEDICARE

## 2025-03-24 NOTE — TELEPHONE ENCOUNTER
Stated that she can't come today cause she got a head cold. She will call us back to reschedule.

## 2025-03-27 ENCOUNTER — OFFICE VISIT (OUTPATIENT)
Facility: LOCATION | Age: 70
End: 2025-03-27
Payer: MEDICARE

## 2025-03-27 VITALS — SYSTOLIC BLOOD PRESSURE: 152 MMHG | DIASTOLIC BLOOD PRESSURE: 72 MMHG

## 2025-03-27 DIAGNOSIS — Z09 POSTOP CHECK: Primary | ICD-10-CM

## 2025-03-27 DIAGNOSIS — K43.9 VENTRAL HERNIA WITHOUT OBSTRUCTION OR GANGRENE: ICD-10-CM

## 2025-03-27 PROCEDURE — 1111F DSCHRG MED/CURRENT MED MERGE: CPT | Performed by: PHYSICIAN ASSISTANT

## 2025-03-27 PROCEDURE — 3077F SYST BP >= 140 MM HG: CPT | Performed by: PHYSICIAN ASSISTANT

## 2025-03-27 PROCEDURE — 99212 OFFICE O/P EST SF 10 MIN: CPT | Performed by: PHYSICIAN ASSISTANT

## 2025-03-27 PROCEDURE — 3078F DIAST BP <80 MM HG: CPT | Performed by: PHYSICIAN ASSISTANT

## 2025-03-27 PROCEDURE — 1159F MED LIST DOCD IN RCRD: CPT | Performed by: PHYSICIAN ASSISTANT

## 2025-03-27 NOTE — PROGRESS NOTES
Post Operative Visit Note       Active Problems  1. Postop check         Chief Complaint   Chief Complaint   Patient presents with    Post-Op     PO - 3/3 w/PBP,  Robotic repair complex ventral hernia with mesh and transversus abdominus component separation, pt reports no symptoms.          History of Present Illness   The patient presents today for drain removal.  Patient underwent robotic complex ventral hernia repair with mesh and transversus abdominis component separation on 3/3/2025 with Dr. Murillo.  She was last seen in clinic on 3/19 in which she was recovering well, though drain output remained at approximately 40 cc a day, thus it was left in place and she was instructed to return today for removal.  She states she continues to have about 30 to 40 cc out per day.  Drainage appears serous in nature.  She otherwise states she is feeling well and without acute complaints.  She reports minimal pain and is no longer taking any pain medicine.  She is tolerating a diet without nausea or vomiting.  She reports bowel movements have been normal.  She denies any fevers or chills.    Allergies  Marcela has No Known Allergies.    Past Medical / Surgical / Social / Family History    The past medical and past surgical history have been reviewed by me today.     Past Medical History:    Abdominal pain    Arthritis    AVM (arteriovenous malformation) brain (HCC)    being followed at [facility]    Back pain    Back problem    Bloating    Calculus of kidney    Constipation    Diabetes mellitus (HCC)    Diarrhea, unspecified    Esophageal reflux    Fatigue    Food intolerance    Frequent urination    Heartburn    High blood pressure    High cholesterol    History of depression    Indigestion    Irregular bowel habits    Leaking of urine    Leg swelling    Muscle weakness    Neuropathy    TOES SOMETIMES    Osteoarthritis    Pain in joints    Pain with bowel movements    Problems with swallowing    Sleep disturbance    Sputum  production    Stool incontinence    Stroke (HCC)    LEFT SIDE EFFECTED- USING CANE    Type II or unspecified type diabetes mellitus without mention of complication, not stated as uncontrolled    Uncomfortable fullness after meals    Vitamin D deficiency    Weight gain     Past Surgical History:   Procedure Laterality Date    Brain surgery  2015    avm   [facility]          X3    Cholecystectomy      Colonoscopy N/A 2024    Procedure: COLONOSCOPY;  Surgeon: Guerrero Moser MD;  Location:  ENDOSCOPY    Cystoscopy,insert ureteral stent      Hernia surgery      Other surgical history      LEFT TIBIA FRACTURE- PLATE AND SCREWS         The family history and social history have been reviewed by me today.    Family History   Problem Relation Age of Onset    Hypertension Father             Diabetes Mother             Hypertension Mother     Diabetes Sister     Heart Disorder Brother         CAD stent in his 40s    Diabetes Brother     Heart Attack Brother      Social History     Socioeconomic History    Marital status:    Tobacco Use    Smoking status: Never    Smokeless tobacco: Never   Vaping Use    Vaping status: Never Used   Substance and Sexual Activity    Alcohol use: Not Currently    Drug use: No   Other Topics Concern    Caffeine Concern Yes     Comment: coffee 2 cups daily    Exercise No    Seat Belt Yes        Current Outpatient Medications:     METFORMIN  MG Oral Tablet 24 Hr, TAKE 1 TABLET(500 MG) BY MOUTH TWICE DAILY WITH MEALS, Disp: 180 tablet, Rfl: 1    senna-docusate 8.6-50 MG Oral Tab, Take 1 tablet by mouth daily., Disp: 30 tablet, Rfl: 0    pantoprazole 40 MG Oral Tab EC, Take 1 tablet (40 mg total) by mouth daily., Disp: 30 tablet, Rfl: 2    insulin degludec (TRESIBA FLEXTOUCH) 200 UNIT/ML Subcutaneous Solution Pen-injector, INJECT UNDER THE SKIN AS DIRECTED. MAX 30 UNITS PER DAY, Disp: 18 mL, Rfl: 0    cyclobenzaprine 5 MG Oral Tab, Take 1 tablet (5  mg total) by mouth 3 (three) times daily as needed for Muscle spasms., Disp: 30 tablet, Rfl: 1    LISINOPRIL 5 MG Oral Tab, TAKE 1 TABLET(5 MG) BY MOUTH DAILY, Disp: 90 tablet, Rfl: 1    rosuvastatin 10 MG Oral Tab, Take 1 tablet (10 mg total) by mouth nightly., Disp: 90 tablet, Rfl: 3    acetaminophen 325 MG Oral Tab, Take 1 tablet (325 mg total) by mouth every 4 (four) hours as needed for Pain., Disp: , Rfl:     oxybutynin ER 5 MG Oral Tablet 24 Hr, Take 1 tablet (5 mg total) by mouth daily., Disp: 30 tablet, Rfl: 1    Insulin Pen Needle (BD PEN NEEDLE TITO 2ND GEN) 32G X 4 MM Does not apply Misc, Inject 1 each into the skin daily., Disp: 100 each, Rfl: 3    Glucose Blood (ONETOUCH VERIO) In Vitro Strip, USE TO TEST ONCE TO TWICE DAILY, Disp: 100 strip, Rfl: 1    OneTouch Delica Lancets 33G Does not apply Misc, 1 Lancet by Finger stick route daily., Disp: 100 each, Rfl: 0    timolol 0.5 % Ophthalmic Solution, Place 1 drop into both eyes 2 (two) times daily., Disp: , Rfl:       Review of Systems  A 10 point Review of Systems has been completed by me today and is negative except as above in the HPI.    Physical Findings   /72 (BP Location: Left arm, Patient Position: Sitting, Cuff Size: adult)   LMP  (LMP Unknown)   Gen/psych: alert and oriented, cooperative, no apparent distress  Cardiovascular: regular rate  Respiratory: respirations unlabored, no wheeze  Abdominal: soft, non-tender, non-distended, no guarding/rebound  Incisions:         Assessment/Plan  1. Postop check      The patient presents today for drain removal.  Patient underwent robotic complex ventral hernia repair with mesh and transversus abdominis component separation on 3/3/2025 with Dr. Murillo.      The patient is doing well postoperatively  Drain was removed given it has been in place now for over 3 weeks.  She tolerated this well.  We discussed that she may continue to have some drainage from site until the drain site closes.  She may  cover with a dry gauze as needed.  Local wound care including soap and water gently over incisions.  She may continue with Tylenol or Motrin as needed for pain control  The patient is to continue with diet as tolerated.  The patient is to continue with lifting restrictions of no more than 10 pounds for 6 weeks from the date of the surgery  All questions and concerns were answered.  She is scheduled to follow-up with Dr. Murillo in 1 week, or sooner as needed          No orders of the defined types were placed in this encounter.       Imaging & Referrals   None    Follow Up  Return in 1 week (on 4/3/2025), or if symptoms worsen or fail to improve.    Tatyana Cm PA-C  [facility] General Surgery  3/27/2025  9:55 AM

## 2025-04-05 DIAGNOSIS — E11.65 TYPE 2 DIABETES MELLITUS WITH HYPERGLYCEMIA, WITH LONG-TERM CURRENT USE OF INSULIN (HCC): ICD-10-CM

## 2025-04-05 DIAGNOSIS — Z79.4 TYPE 2 DIABETES MELLITUS WITH HYPERGLYCEMIA, WITH LONG-TERM CURRENT USE OF INSULIN (HCC): ICD-10-CM

## 2025-04-07 RX ORDER — LANCETS 33 GAUGE
1 EACH MISCELLANEOUS DAILY
Qty: 100 EACH | Refills: 0 | Status: SHIPPED | OUTPATIENT
Start: 2025-04-07

## 2025-04-09 ENCOUNTER — OFFICE VISIT (OUTPATIENT)
Facility: LOCATION | Age: 70
End: 2025-04-09
Payer: MEDICARE

## 2025-04-09 VITALS
OXYGEN SATURATION: 98 % | TEMPERATURE: 97 F | RESPIRATION RATE: 18 BRPM | HEART RATE: 82 BPM | DIASTOLIC BLOOD PRESSURE: 74 MMHG | SYSTOLIC BLOOD PRESSURE: 150 MMHG

## 2025-04-09 DIAGNOSIS — Z09 POSTOP CHECK: Primary | ICD-10-CM

## 2025-04-09 PROCEDURE — 3078F DIAST BP <80 MM HG: CPT | Performed by: SURGERY

## 2025-04-09 PROCEDURE — 1160F RVW MEDS BY RX/DR IN RCRD: CPT | Performed by: SURGERY

## 2025-04-09 PROCEDURE — 99024 POSTOP FOLLOW-UP VISIT: CPT | Performed by: SURGERY

## 2025-04-09 PROCEDURE — 1170F FXNL STATUS ASSESSED: CPT | Performed by: SURGERY

## 2025-04-09 PROCEDURE — 1159F MED LIST DOCD IN RCRD: CPT | Performed by: SURGERY

## 2025-04-09 PROCEDURE — 3077F SYST BP >= 140 MM HG: CPT | Performed by: SURGERY

## 2025-04-09 NOTE — PROGRESS NOTES
Follow Up Visit Note       Active Problems      1. Postop check          Chief Complaint   Chief Complaint   Patient presents with    Post-Op     PO 3/3 Robotic repair complex ventral hernia with mesh and transversus abdominus component separation          History of Present Illness    Marcela present for continued care and evaluation following robotic ventral hernia repair and transversus abdominis component separation on 3/30/2025.    The patient reports doing well since her last visit.  She denies abdominal or incisional discomfort.  She reports having an episode of some abdominal bloating, but states this has resolved.  She denies nausea, vomiting, fevers or chills.  She is tolerating a diet without issues.  She does report diarrhea over the past few days, but states it is normal for her to have bouts of diarrhea and constipation.    Allergies  Marcela has no known allergies.    Past Medical / Surgical / Social / Family History    The past medical and past surgical history have been reviewed by me today.    Past Medical History[1]  Past Surgical History[2]    The family history and social history have been reviewed by me today.    Family History[3]  Social Hx on file[4]   Medications - Current[5]     Review of Systems  The Review of Systems has been reviewed by me during today.  Review of Systems   Constitutional: Negative.  Negative for chills, diaphoresis, fatigue, fever and unexpected weight change.   HENT: Negative.  Negative for hearing loss, nosebleeds, sore throat and trouble swallowing.    Eyes: Negative.    Respiratory: Negative.  Negative for apnea, cough, shortness of breath and wheezing.    Cardiovascular: Negative.  Negative for chest pain, palpitations and leg swelling.   Gastrointestinal: Negative.  Negative for abdominal distention, abdominal pain, anal bleeding, blood in stool, constipation, diarrhea, nausea and vomiting.   Genitourinary: Negative.  Negative for difficulty urinating, dysuria,  frequency and urgency.   Musculoskeletal: Negative.  Negative for arthralgias and myalgias.   Skin: Negative.  Negative for color change and rash.   Neurological: Negative.  Negative for tremors, syncope and weakness.   Hematological:  Negative for adenopathy. Does not bruise/bleed easily.   Psychiatric/Behavioral: Negative.  Negative for behavioral problems and sleep disturbance.         Physical Findings   /74   Pulse 82   Temp 97.3 °F (36.3 °C) (Temporal)   Resp 18   LMP  (LMP Unknown)   SpO2 98%   Physical Exam  Vitals and nursing note reviewed.   Constitutional:       General: She is not in acute distress.     Appearance: Normal appearance. She is well-developed.   HENT:      Head: Normocephalic and atraumatic.   Eyes:      General: No scleral icterus.     Conjunctiva/sclera: Conjunctivae normal.   Neck:      Trachea: No tracheal deviation.   Cardiovascular:      Rate and Rhythm: Normal rate and regular rhythm.      Heart sounds: S1 normal and S2 normal. No murmur heard.  Pulmonary:      Effort: No accessory muscle usage or respiratory distress.      Breath sounds: No decreased breath sounds, wheezing, rhonchi or rales.   Abdominal:      General: There is no distension or abdominal bruit.      Palpations: Abdomen is soft. Abdomen is not rigid. There is no shifting dullness, fluid wave, hepatomegaly, splenomegaly, mass or pulsatile mass.      Tenderness: There is no abdominal tenderness. There is no guarding or rebound. Negative signs include Silver's sign and McBurney's sign.      Hernia: There is no hernia in the umbilical area or ventral area.       Skin:     General: Skin is warm and dry.   Neurological:      Mental Status: She is alert and oriented to person, place, and time.   Psychiatric:         Speech: Speech normal.         Behavior: Behavior normal.         Thought Content: Thought content normal.         Judgment: Judgment normal.          Assessment   1. Postop check        Plan         The patient is recovering nicely following robotic repair of complex ventral hernia with mesh and transversus abdominis component separation.    The anticipated postoperative recovery was discussed with the patient in detail.    Dietary, activity, and exercise recommendations along with restrictions were discussed with the patient during today's visit.    Wound care instructions were discussed during today's visit.    The patient will return to my attention on an as needed basis.    The patient is encouraged to continue seeing the primary care physician for ongoing medical needs.    The patient was given ample opportunity to ask questions.  The patient's questions were answered in detail and to the patient's satisfaction.  The patient voiced understanding of the postoperative care plan.        No orders of the defined types were placed in this encounter.      Imaging & Referrals   None    Follow Up  No follow-ups on file.    Hemanth Murillo MD           [1]   Past Medical History:   Abdominal pain    Arthritis    AVM (arteriovenous malformation) brain ([facility])    being followed at [facility]    Back pain    Back problem    Bloating    Calculus of kidney    Constipation    Diabetes mellitus (HCC)    Diarrhea, unspecified    Esophageal reflux    Fatigue    Food intolerance    Frequent urination    Heartburn    High blood pressure    High cholesterol    History of depression    Indigestion    Irregular bowel habits    Leaking of urine    Leg swelling    Muscle weakness    Neuropathy    TOES SOMETIMES    Osteoarthritis    Pain in joints    Pain with bowel movements    Problems with swallowing    Sleep disturbance    Sputum production    Stool incontinence    Stroke (HCC)    LEFT SIDE EFFECTED- USING CANE    Type II or unspecified type diabetes mellitus without mention of complication, not stated as uncontrolled    Uncomfortable fullness after meals    Vitamin D deficiency    Weight gain   [2]   Past Surgical  History:  Procedure Laterality Date    Brain surgery  2015    avm   [facility]          X3    Cholecystectomy      Colonoscopy N/A 2024    Procedure: COLONOSCOPY;  Surgeon: Guerrero Moser MD;  Location:  ENDOSCOPY    Cystoscopy,insert ureteral stent      Hernia surgery      Other surgical history      LEFT TIBIA FRACTURE- PLATE AND SCREWS     [3]   Family History  Problem Relation Age of Onset    Hypertension Father             Diabetes Mother             Hypertension Mother     Diabetes Sister     Heart Disorder Brother         CAD stent in his 40s    Diabetes Brother     Heart Attack Brother    [4]   Social History  Socioeconomic History    Marital status:    Tobacco Use    Smoking status: Never    Smokeless tobacco: Never   Vaping Use    Vaping status: Never Used   Substance and Sexual Activity    Alcohol use: Not Currently    Drug use: No   Other Topics Concern    Caffeine Concern Yes     Comment: coffee 2 cups daily    Exercise No    Seat Belt Yes   [5]   Current Outpatient Medications:     cephALEXin 500 MG Oral Cap, Take 1 capsule (500 mg total) by mouth 4 (four) times daily for 10 days., Disp: 40 capsule, Rfl: 0    ONETOUCH DELICA PLUS [id number] Does not apply Misc, TEST DAILY, Disp: 100 each, Rfl: 0    METFORMIN  MG Oral Tablet 24 Hr, TAKE 1 TABLET(500 MG) BY MOUTH TWICE DAILY WITH MEALS, Disp: 180 tablet, Rfl: 1    senna-docusate 8.6-50 MG Oral Tab, Take 1 tablet by mouth daily., Disp: 30 tablet, Rfl: 0    pantoprazole 40 MG Oral Tab EC, Take 1 tablet (40 mg total) by mouth daily., Disp: 30 tablet, Rfl: 2    insulin degludec (TRESIBA FLEXTOUCH) 200 UNIT/ML Subcutaneous Solution Pen-injector, INJECT UNDER THE SKIN AS DIRECTED. MAX 30 UNITS PER DAY, Disp: 18 mL, Rfl: 0    cyclobenzaprine 5 MG Oral Tab, Take 1 tablet (5 mg total) by mouth 3 (three) times daily as needed for Muscle spasms., Disp: 30 tablet, Rfl: 1    LISINOPRIL 5 MG Oral Tab, TAKE 1 TABLET(5  MG) BY MOUTH DAILY, Disp: 90 tablet, Rfl: 1    rosuvastatin 10 MG Oral Tab, Take 1 tablet (10 mg total) by mouth nightly., Disp: 90 tablet, Rfl: 3    acetaminophen 325 MG Oral Tab, Take 1 tablet (325 mg total) by mouth every 4 (four) hours as needed for Pain., Disp: , Rfl:     oxybutynin ER 5 MG Oral Tablet 24 Hr, Take 1 tablet (5 mg total) by mouth daily., Disp: 30 tablet, Rfl: 1    Insulin Pen Needle (BD PEN NEEDLE TITO 2ND GEN) 32G X 4 MM Does not apply Misc, Inject 1 each into the skin daily., Disp: 100 each, Rfl: 3    Glucose Blood (ONETOUCH VERIO) In Vitro Strip, USE TO TEST ONCE TO TWICE DAILY, Disp: 100 strip, Rfl: 1    timolol 0.5 % Ophthalmic Solution, Place 1 drop into both eyes 2 (two) times daily., Disp: , Rfl:

## 2025-04-11 ENCOUNTER — TELEPHONE (OUTPATIENT)
Dept: WOUND CARE | Facility: HOSPITAL | Age: 70
End: 2025-04-11

## 2025-04-11 ENCOUNTER — HOSPITAL ENCOUNTER (EMERGENCY)
Age: 70
Discharge: HOME | End: 2025-04-11
Payer: MEDICARE

## 2025-04-11 VITALS
HEART RATE: 82 BPM | OXYGEN SATURATION: 98 % | BODY MASS INDEX: 28.66 KG/M2 | TEMPERATURE: 99 F | WEIGHT: 172 LBS | DIASTOLIC BLOOD PRESSURE: 70 MMHG | RESPIRATION RATE: 18 BRPM | HEIGHT: 65 IN | SYSTOLIC BLOOD PRESSURE: 135 MMHG

## 2025-04-11 DIAGNOSIS — L03.031 CELLULITIS OF GREAT TOE OF RIGHT FOOT: ICD-10-CM

## 2025-04-11 DIAGNOSIS — L97.512 ULCER OF TOE OF RIGHT FOOT, WITH FAT LAYER EXPOSED (HCC): Primary | ICD-10-CM

## 2025-04-11 LAB
ALBUMIN SERPL-MCNC: 3.9 G/DL (ref 3.2–4.8)
ALBUMIN/GLOB SERPL: 1.6 {RATIO} (ref 1–2)
ALP LIVER SERPL-CCNC: 77 U/L (ref 55–142)
ALT SERPL-CCNC: <7 U/L (ref 10–49)
ANION GAP SERPL CALC-SCNC: 7 MMOL/L (ref 0–18)
AST SERPL-CCNC: 11 U/L (ref ?–34)
BASOPHILS # BLD AUTO: 0.03 X10(3) UL (ref 0–0.2)
BASOPHILS NFR BLD AUTO: 0.5 %
BILIRUB SERPL-MCNC: 0.4 MG/DL (ref 0.2–1.1)
BUN BLD-MCNC: 18 MG/DL (ref 9–23)
CALCIUM BLD-MCNC: 9.2 MG/DL (ref 8.7–10.6)
CHLORIDE SERPL-SCNC: 105 MMOL/L (ref 98–112)
CO2 SERPL-SCNC: 28 MMOL/L (ref 21–32)
CREAT BLD-MCNC: 0.85 MG/DL (ref 0.55–1.02)
EGFRCR SERPLBLD CKD-EPI 2021: 75 ML/MIN/1.73M2 (ref 60–?)
EOSINOPHIL # BLD AUTO: 0.21 X10(3) UL (ref 0–0.7)
EOSINOPHIL NFR BLD AUTO: 3.6 %
ERYTHROCYTE [DISTWIDTH] IN BLOOD BY AUTOMATED COUNT: 13 %
GLOBULIN PLAS-MCNC: 2.5 G/DL (ref 2–3.5)
GLUCOSE BLD-MCNC: 260 MG/DL (ref 70–99)
HCT VFR BLD AUTO: 30.4 % (ref 35–48)
HGB BLD-MCNC: 10.2 G/DL (ref 12–16)
IMM GRANULOCYTES # BLD AUTO: 0.02 X10(3) UL (ref 0–1)
IMM GRANULOCYTES NFR BLD: 0.3 %
LYMPHOCYTES # BLD AUTO: 1.75 X10(3) UL (ref 1–4)
LYMPHOCYTES NFR BLD AUTO: 30.2 %
MCH RBC QN AUTO: 28.7 PG (ref 26–34)
MCHC RBC AUTO-ENTMCNC: 33.6 G/DL (ref 31–37)
MCV RBC AUTO: 85.6 FL (ref 80–100)
MONOCYTES # BLD AUTO: 0.39 X10(3) UL (ref 0.1–1)
MONOCYTES NFR BLD AUTO: 6.7 %
NEUTROPHILS # BLD AUTO: 3.4 X10 (3) UL (ref 1.5–7.7)
NEUTROPHILS # BLD AUTO: 3.4 X10(3) UL (ref 1.5–7.7)
NEUTROPHILS NFR BLD AUTO: 58.7 %
OSMOLALITY SERPL CALC.SUM OF ELEC: 301 MOSM/KG (ref 275–295)
PLATELET # BLD AUTO: 186 10(3)UL (ref 150–450)
POTASSIUM SERPL-SCNC: 3.8 MMOL/L (ref 3.5–5.1)
PROT SERPL-MCNC: 6.4 G/DL (ref 5.7–8.2)
RBC # BLD AUTO: 3.55 X10(6)UL (ref 3.8–5.3)
SODIUM SERPL-SCNC: 140 MMOL/L (ref 136–145)
WBC # BLD AUTO: 5.8 X10(3) UL (ref 4–11)

## 2025-04-11 PROCEDURE — 80053 COMPREHEN METABOLIC PANEL: CPT | Performed by: NURSE PRACTITIONER

## 2025-04-11 PROCEDURE — 99283 EMERGENCY DEPT VISIT LOW MDM: CPT

## 2025-04-11 PROCEDURE — 87205 SMEAR GRAM STAIN: CPT | Performed by: NURSE PRACTITIONER

## 2025-04-11 PROCEDURE — 87077 CULTURE AEROBIC IDENTIFY: CPT | Performed by: NURSE PRACTITIONER

## 2025-04-11 PROCEDURE — 36415 COLL VENOUS BLD VENIPUNCTURE: CPT

## 2025-04-11 PROCEDURE — 85025 COMPLETE CBC W/AUTO DIFF WBC: CPT | Performed by: NURSE PRACTITIONER

## 2025-04-11 PROCEDURE — 99284 EMERGENCY DEPT VISIT MOD MDM: CPT

## 2025-04-11 PROCEDURE — 87070 CULTURE OTHR SPECIMN AEROBIC: CPT | Performed by: NURSE PRACTITIONER

## 2025-04-11 RX ORDER — CEPHALEXIN 500 MG/1
500 CAPSULE ORAL 4 TIMES DAILY
Qty: 40 CAPSULE | Refills: 0 | Status: SHIPPED | OUTPATIENT
Start: 2025-04-11 | End: 2025-04-21

## 2025-04-11 NOTE — TELEPHONE ENCOUNTER
RC to pt who reports she wants antibiotics because her wound is draining more and is having an odor. Pt not seen in clinic since March 10th. Recommended pt go to ED for concerns of wound infection. Msg relayed to provider who also recommends she go to ED. Pt also stated she wants to cancel her apt here with Dr Miller and will try to follow up with Dr Hugo at his private office for wound care. This information relayed to PSR

## 2025-04-11 NOTE — TELEPHONE ENCOUNTER
Nurse Jaky WILSON stated that pt wants to cancel her initial appt with Dr Miller. Pt will see Dr Hugo at his office.

## 2025-04-11 NOTE — ED PROVIDER NOTES
Patient Seen in: [facility] In [place]    History     Chief Complaint   Patient presents with    Rash Skin Problem     Stated Complaint: wound on right great toe    Subjective:   68 yo female presents to the emergency department with c/o toe ulcer.  Patient has an ulceration to the top of her right great toe.  The wound has been there since about Thanksgiving.  She was in the hospital for it in December and was released on New Year's Day.  She has been following up with Dr. Hugo for podiatry for wound care about every 2 weeks.  She missed her appointment recently and has not seen him in about 3 weeks. A few days ago she noticed that there was an odor and drainage from the toe.  She denies any fever, chills, redness, or swelling.  She called her doctor and was told to come in and get checked out.  Patient has a history of diabetes, HTN, and hypercholesterolemia.      The history is provided by the patient.         Objective:     Past Medical History:    Abdominal pain    Arthritis    AVM (arteriovenous malformation) brain ([facility])    being followed at [facility]    Back pain    Back problem    Bloating    Calculus of kidney    Constipation    Diabetes mellitus ([facility])    Diarrhea, unspecified    Esophageal reflux    Fatigue    Food intolerance    Frequent urination    Heartburn    High blood pressure    High cholesterol    History of depression    Indigestion    Irregular bowel habits    Leaking of urine    Leg swelling    Muscle weakness    Neuropathy    TOES SOMETIMES    Osteoarthritis    Pain in joints    Pain with bowel movements    Problems with swallowing    Sleep disturbance    Sputum production    Stool incontinence    Stroke ([facility])    LEFT SIDE EFFECTED- USING CANE    Type II or unspecified type diabetes mellitus without mention of complication, not stated as uncontrolled    Uncomfortable fullness after meals    Vitamin D deficiency    Weight gain              Past Surgical History:    Procedure Laterality Date    Brain surgery  2015    avm   [facility]          X3    Cholecystectomy      Colonoscopy N/A 2024    Procedure: COLONOSCOPY;  Surgeon: Guerrero Moser MD;  Location:  ENDOSCOPY    Cystoscopy,insert ureteral stent      Hernia surgery      Other surgical history      LEFT TIBIA FRACTURE- PLATE AND SCREWS                  Social History     Socioeconomic History    Marital status:    Tobacco Use    Smoking status: Never    Smokeless tobacco: Never   Vaping Use    Vaping status: Never Used   Substance and Sexual Activity    Alcohol use: Not Currently    Drug use: No   Other Topics Concern    Caffeine Concern Yes     Comment: coffee 2 cups daily    Exercise No    Seat Belt Yes     Social Drivers of Health     Food Insecurity: No Food Insecurity (3/3/2025)    NCSS - Food Insecurity     Worried About Running Out of Food in the Last Year: No     Ran Out of Food in the Last Year: No   Transportation Needs: No Transportation Needs (3/3/2025)    NCSS - Transportation     Lack of Transportation: No   Housing Stability: Not At Risk (3/3/2025)    NCSS - Housing/Utilities     Has Housing: Yes     Worried About Losing Housing: No     Unable to Get Utilities: No                  Physical Exam     ED Triage Vitals [25 1302]   /71   Pulse 89   Resp 18   Temp 98.6 °F (37 °C)   Temp src    SpO2 99 %   O2 Device None (Room air)       Current Vitals:   Vital Signs  BP: 135/70  Pulse: 82  Resp: 18  Temp: 98.6 °F (37 °C)    Oxygen Therapy  SpO2: 98 %  O2 Device: None (Room air)        Physical Exam  Vitals and nursing note reviewed.   Constitutional:       General: She is not in acute distress.     Appearance: Normal appearance. She is normal weight. She is not ill-appearing.   HENT:      Head: Normocephalic and atraumatic.      Nose: Nose normal.      Mouth/Throat:      Mouth: Mucous membranes are moist.      Pharynx: Oropharynx is clear.   Eyes:       Conjunctiva/sclera: Conjunctivae normal.      Pupils: Pupils are equal, round, and reactive to light.   Cardiovascular:      Rate and Rhythm: Normal rate and regular rhythm.      Pulses: Normal pulses.      Heart sounds: Normal heart sounds.   Pulmonary:      Effort: Pulmonary effort is normal. No respiratory distress.      Breath sounds: Normal breath sounds.   Musculoskeletal:         General: Normal range of motion.   Feet:      Comments: Slough ulceration of the right distal 1st toe.  There is mild tenderness with palpation and a small amount of purulent drainage.  Wound culture was collected.  Drainage is malodorous.  No erythema, edema, or streaking.  Cap refill 2 sec.    Skin:     General: Skin is warm and dry.   Neurological:      General: No focal deficit present.      Mental Status: She is alert and oriented to person, place, and time.   Psychiatric:         Mood and Affect: Mood normal.         Behavior: Behavior normal.           ED Course     Labs Reviewed   COMP METABOLIC PANEL (14) - Abnormal; Notable for the following components:       Result Value    Glucose 260 (*)     Calculated Osmolality 301 (*)     ALT <7 (*)     All other components within normal limits   CBC WITH DIFFERENTIAL WITH PLATELET - Abnormal; Notable for the following components:    RBC 3.55 (*)     HGB 10.2 (*)     HCT 30.4 (*)     All other components within normal limits   AEROBIC BACTERIAL CULTURE                            MDM        Medical Decision Making  68 yo female with ulceration of the right 1st toe.  CBC, CMP, and wound culture ordered.      CBC shows no elevated WBC or leukocytosis.  H&H is stable.  CMP is unremarkable and essentially normal.  Patient glucose is 260, but she is a known diabetic.  No evidence of sepsis.  Will treat patient for cellulitis of the right great toe.  Keflex 4 times a day was prescribed for 10 days.  Recommend patient continue wound care as directed by Dr. Hugo for podiatry.  Encouraged to  follow-up with Dr. Hugo next week for a wound check.  Patient is a good understanding that if anything changes or worsens return to the ER.    Amount and/or Complexity of Data Reviewed  Labs: ordered. Decision-making details documented in ED Course.    Risk  OTC drugs.  Prescription drug management.        Disposition and Plan     Clinical Impression:  1. Ulcer of toe of right foot, with fat layer exposed (HCC)    2. Cellulitis of great toe of right foot         Disposition:  Discharge  4/11/2025  2:43 pm    Follow-up:  Jomar Hugo DPM  [id number] [address] [address]  [phone]    Follow up in 3 day(s)            Medications Prescribed:  Discharge Medication List as of 4/11/2025  2:52 PM        START taking these medications    Details   cephALEXin 500 MG Oral Cap Take 1 capsule (500 mg total) by mouth 4 (four) times daily for 10 days., Normal, Disp-40 capsule, R-0             Supplementary Documentation:

## 2025-04-11 NOTE — DISCHARGE INSTRUCTIONS
Rest and drink plenty of fluids.   Start the antibiotics as prescribed and make sure to finish the entire prescription.  Continue to dress the ulcer as directed by your doctor.   Take a probiotic daily or eat a yogurt everyday while on the antibiotics to help with GI side effects.   Follow up with Dr. Hugo in 3-5 days.

## 2025-04-14 ENCOUNTER — NURSE ONLY (OUTPATIENT)
Dept: FAMILY MEDICINE CLINIC | Facility: CLINIC | Age: 70
End: 2025-04-14
Payer: MEDICARE

## 2025-04-14 ENCOUNTER — TELEPHONE (OUTPATIENT)
Facility: CLINIC | Age: 70
End: 2025-04-14

## 2025-04-14 DIAGNOSIS — E53.8 LOW SERUM VITAMIN B12: Primary | ICD-10-CM

## 2025-04-14 RX ORDER — CYANOCOBALAMIN 1000 UG/ML
1000 INJECTION, SOLUTION INTRAMUSCULAR; SUBCUTANEOUS ONCE
Status: COMPLETED | OUTPATIENT
Start: 2025-04-14 | End: 2025-04-14

## 2025-04-14 RX ADMIN — CYANOCOBALAMIN 1000 MCG: 1000 INJECTION, SOLUTION INTRAMUSCULAR; SUBCUTANEOUS at 10:22:00

## 2025-04-14 NOTE — TELEPHONE ENCOUNTER
Incoming PA request for Km MEYERS (Raza: [id number])    Your information has been submitted to [employer]. [employer] is reviewing the PA request and you will receive an electronic response. You may check for the updated outcome later by reopening this request. The standard fax determination will also be sent to you directly.    If you have any questions about your PA submission, contact [employer] at [phone].

## 2025-04-15 ENCOUNTER — NURSE ONLY (OUTPATIENT)
Dept: FAMILY MEDICINE CLINIC | Facility: CLINIC | Age: 70
End: 2025-04-15
Payer: MEDICARE

## 2025-04-15 DIAGNOSIS — E53.8 LOW SERUM VITAMIN B12: Primary | ICD-10-CM

## 2025-04-15 PROCEDURE — 96372 THER/PROPH/DIAG INJ SC/IM: CPT

## 2025-04-15 RX ORDER — CYANOCOBALAMIN 1000 UG/ML
1000 INJECTION, SOLUTION INTRAMUSCULAR; SUBCUTANEOUS ONCE
Status: COMPLETED | OUTPATIENT
Start: 2025-04-15 | End: 2025-04-15

## 2025-04-15 RX ADMIN — CYANOCOBALAMIN 1000 MCG: 1000 INJECTION, SOLUTION INTRAMUSCULAR; SUBCUTANEOUS at 10:03:00

## 2025-04-16 ENCOUNTER — NURSE ONLY (OUTPATIENT)
Dept: FAMILY MEDICINE CLINIC | Facility: CLINIC | Age: 70
End: 2025-04-16
Payer: MEDICARE

## 2025-04-16 DIAGNOSIS — E53.8 LOW SERUM VITAMIN B12: Primary | ICD-10-CM

## 2025-04-16 PROCEDURE — 96372 THER/PROPH/DIAG INJ SC/IM: CPT

## 2025-04-16 RX ORDER — CYANOCOBALAMIN 1000 UG/ML
1000 INJECTION, SOLUTION INTRAMUSCULAR; SUBCUTANEOUS ONCE
Status: COMPLETED | OUTPATIENT
Start: 2025-04-16 | End: 2025-04-16

## 2025-04-16 RX ADMIN — CYANOCOBALAMIN 1000 MCG: 1000 INJECTION, SOLUTION INTRAMUSCULAR; SUBCUTANEOUS at 10:08:00

## 2025-04-17 ENCOUNTER — NURSE ONLY (OUTPATIENT)
Dept: FAMILY MEDICINE CLINIC | Facility: CLINIC | Age: 70
End: 2025-04-17
Payer: MEDICARE

## 2025-04-17 DIAGNOSIS — E53.8 LOW SERUM VITAMIN B12: Primary | ICD-10-CM

## 2025-04-17 PROCEDURE — 96372 THER/PROPH/DIAG INJ SC/IM: CPT | Performed by: NURSE PRACTITIONER

## 2025-04-17 RX ORDER — CYANOCOBALAMIN 1000 UG/ML
1000 INJECTION, SOLUTION INTRAMUSCULAR; SUBCUTANEOUS ONCE
Status: COMPLETED | OUTPATIENT
Start: 2025-04-17 | End: 2025-04-17

## 2025-04-17 RX ADMIN — CYANOCOBALAMIN 1000 MCG: 1000 INJECTION, SOLUTION INTRAMUSCULAR; SUBCUTANEOUS at 10:42:00

## 2025-04-18 ENCOUNTER — NURSE ONLY (OUTPATIENT)
Dept: FAMILY MEDICINE CLINIC | Facility: CLINIC | Age: 70
End: 2025-04-18
Payer: MEDICARE

## 2025-04-18 DIAGNOSIS — E53.8 LOW SERUM VITAMIN B12: Primary | ICD-10-CM

## 2025-04-18 PROCEDURE — 96372 THER/PROPH/DIAG INJ SC/IM: CPT

## 2025-04-18 RX ORDER — CYANOCOBALAMIN 1000 UG/ML
1000 INJECTION, SOLUTION INTRAMUSCULAR; SUBCUTANEOUS ONCE
Status: COMPLETED | OUTPATIENT
Start: 2025-04-18 | End: 2025-04-18

## 2025-04-18 RX ADMIN — CYANOCOBALAMIN 1000 MCG: 1000 INJECTION, SOLUTION INTRAMUSCULAR; SUBCUTANEOUS at 10:25:00

## 2025-04-20 NOTE — TELEPHONE ENCOUNTER
Spoke with pt and she I scheduled for a nurse visit to  samples and  pt assistance paperwork.  Please sign pended samples S/P TAVR (transcatheter aortic valve replacement)

## 2025-04-21 ENCOUNTER — APPOINTMENT (OUTPATIENT)
Dept: WOUND CARE | Facility: HOSPITAL | Age: 70
End: 2025-04-21
Attending: INTERNAL MEDICINE
Payer: MEDICARE

## 2025-04-25 ENCOUNTER — NURSE ONLY (OUTPATIENT)
Dept: FAMILY MEDICINE CLINIC | Facility: CLINIC | Age: 70
End: 2025-04-25
Payer: MEDICARE

## 2025-04-25 DIAGNOSIS — E53.8 LOW SERUM VITAMIN B12: Primary | ICD-10-CM

## 2025-04-25 PROCEDURE — 96372 THER/PROPH/DIAG INJ SC/IM: CPT | Performed by: FAMILY MEDICINE

## 2025-04-25 RX ORDER — CYANOCOBALAMIN 1000 UG/ML
1000 INJECTION, SOLUTION INTRAMUSCULAR; SUBCUTANEOUS ONCE
Status: COMPLETED | OUTPATIENT
Start: 2025-04-25 | End: 2025-04-25

## 2025-04-25 RX ORDER — CYANOCOBALAMIN 1000 UG/ML
1000 INJECTION, SOLUTION INTRAMUSCULAR; SUBCUTANEOUS ONCE
Status: CANCELLED | OUTPATIENT
Start: 2025-04-25 | End: 2025-04-25

## 2025-04-25 RX ADMIN — CYANOCOBALAMIN 1000 MCG: 1000 INJECTION, SOLUTION INTRAMUSCULAR; SUBCUTANEOUS at 10:25:00

## 2025-05-02 ENCOUNTER — NURSE ONLY (OUTPATIENT)
Dept: FAMILY MEDICINE CLINIC | Facility: CLINIC | Age: 70
End: 2025-05-02
Payer: MEDICARE

## 2025-05-02 DIAGNOSIS — E53.8 LOW SERUM VITAMIN B12: Primary | ICD-10-CM

## 2025-05-02 PROCEDURE — 96372 THER/PROPH/DIAG INJ SC/IM: CPT | Performed by: EMERGENCY MEDICINE

## 2025-05-02 RX ORDER — CYANOCOBALAMIN 1000 UG/ML
1000 INJECTION, SOLUTION INTRAMUSCULAR; SUBCUTANEOUS ONCE
Status: COMPLETED | OUTPATIENT
Start: 2025-05-02 | End: 2025-05-02

## 2025-05-02 RX ADMIN — CYANOCOBALAMIN 1000 MCG: 1000 INJECTION, SOLUTION INTRAMUSCULAR; SUBCUTANEOUS at 10:20:00

## 2025-05-06 RX ORDER — OXYBUTYNIN CHLORIDE 5 MG/1
TABLET ORAL
Qty: 30 TABLET | Refills: 1 | OUTPATIENT
Start: 2025-05-06

## 2025-05-06 NOTE — TELEPHONE ENCOUNTER
LOV: 10/04/24  No upcoming appt scheduled   Refill denied per office protocol -pt no longer has soto catheter

## 2025-05-08 RX ORDER — OXYBUTYNIN CHLORIDE 5 MG/1
5 TABLET, EXTENDED RELEASE ORAL DAILY
Qty: 30 TABLET | Refills: 0 | Status: SHIPPED | OUTPATIENT
Start: 2025-05-08

## 2025-05-08 NOTE — TELEPHONE ENCOUNTER
oxybutynin ER 5 MG Oral Tablet 24 Hr 30 tablet 0 5/8/2025 --    Sig - Route: Take 1 tablet (5 mg total) by mouth daily. - Oral    Sent to pharmacy as: oxyBUTYnin Chloride ER 5 MG Oral Tablet Extended Release 24 Hour (Ditropan-XL)    E-Prescribing Status: Receipt confirmed by pharmacy (5/8/2025 11:57 AM CDT)      Above authorized/approved/escribed by Dr. Germain.  Patient instructed Oxybutynin ER 5mg daily #30 tablets prescribed,. This is prescribed to be taken once daily         ( Extended release tablet).  Instructed patient 30 day supply was authorized with no additional refillsand needs to follow up in office.  Patient verbalizes understanding.Central Scheduling phone number given to patient to call/schedule ultrasound kidney/bladder (Ordered 10/4/2024 Dr. Germain - to be completed 6 weeks from 10/4/2024.  Patient verbalizes understanding.   Patient plans on completing 24 hour urine next week.  Scheduled follow up appointment 6/9/2025  2:15pm (Dr. Germain).  Patient aware of appointment

## 2025-05-08 NOTE — TELEPHONE ENCOUNTER
Received [facility] transfer call center   Patient requesting refill oxybutynin (patient unsure if ER (Extended Release)  Taking 5mg BID for at least a year now.  Requesting refill for 3 month supply.  Last seen 10/4/2024 Procedure   Plans on completing 24 hour urine next week.  US kidney/Bladder ordered 10/4 not completed.  No future appt. Pending.    (Per Pharmacy Patient had filled Oxybutynin 5mg TID PRN  4/5/2025 (Dr. Germain)  9/2024 5mg TID PRN  6/2024 5mg TID PRN)  9/2024 ER 5mg daily #30  10/2024 ER 5mg daily #30)    Ok to refill oxybutynin ER 5mg daily #30 - 30 day supply until patient completes Ultrasound, 24 hour urine, and follow up?    Message forwarded to Dr. Germain

## 2025-05-09 ENCOUNTER — NURSE ONLY (OUTPATIENT)
Dept: FAMILY MEDICINE CLINIC | Facility: CLINIC | Age: 70
End: 2025-05-09
Payer: MEDICARE

## 2025-05-09 DIAGNOSIS — E53.8 LOW SERUM VITAMIN B12: Primary | ICD-10-CM

## 2025-05-09 PROCEDURE — 96372 THER/PROPH/DIAG INJ SC/IM: CPT | Performed by: FAMILY MEDICINE

## 2025-05-09 RX ORDER — CYANOCOBALAMIN 1000 UG/ML
1000 INJECTION, SOLUTION INTRAMUSCULAR; SUBCUTANEOUS ONCE
Status: COMPLETED | OUTPATIENT
Start: 2025-05-09 | End: 2025-05-09

## 2025-05-09 RX ADMIN — CYANOCOBALAMIN 1000 MCG: 1000 INJECTION, SOLUTION INTRAMUSCULAR; SUBCUTANEOUS at 09:48:00

## 2025-05-12 ENCOUNTER — OFFICE VISIT (OUTPATIENT)
Dept: WOUND CARE | Facility: HOSPITAL | Age: 70
End: 2025-05-12
Attending: PODIATRIST
Payer: MEDICARE

## 2025-05-12 VITALS
HEART RATE: 81 BPM | RESPIRATION RATE: 14 BRPM | TEMPERATURE: 97 F | SYSTOLIC BLOOD PRESSURE: 145 MMHG | DIASTOLIC BLOOD PRESSURE: 72 MMHG

## 2025-05-12 DIAGNOSIS — M86.9 OSTEOMYELITIS OF GREAT TOE OF RIGHT FOOT (HCC): ICD-10-CM

## 2025-05-12 DIAGNOSIS — L97.512 DIABETIC ULCER OF TOE OF RIGHT FOOT ASSOCIATED WITH DIABETES MELLITUS DUE TO UNDERLYING CONDITION, WITH FAT LAYER EXPOSED (HCC): Primary | ICD-10-CM

## 2025-05-12 DIAGNOSIS — B35.1 ONYCHOMYCOSIS: ICD-10-CM

## 2025-05-12 DIAGNOSIS — E11.65 TYPE 2 DIABETES MELLITUS WITH HYPERGLYCEMIA, WITH LONG-TERM CURRENT USE OF INSULIN (HCC): ICD-10-CM

## 2025-05-12 DIAGNOSIS — E08.621 DIABETIC ULCER OF TOE OF RIGHT FOOT ASSOCIATED WITH DIABETES MELLITUS DUE TO UNDERLYING CONDITION, WITH FAT LAYER EXPOSED (HCC): Primary | ICD-10-CM

## 2025-05-12 DIAGNOSIS — Z79.4 TYPE 2 DIABETES MELLITUS WITH HYPERGLYCEMIA, WITH LONG-TERM CURRENT USE OF INSULIN (HCC): ICD-10-CM

## 2025-05-12 PROCEDURE — 11042 DBRDMT SUBQ TIS 1ST 20SQCM/<: CPT | Performed by: PODIATRIST

## 2025-05-12 RX ORDER — AMOXICILLIN 875 MG/1
875 TABLET, COATED ORAL 2 TIMES DAILY
COMMUNITY
Start: 2025-05-06

## 2025-05-12 NOTE — PROGRESS NOTES
Patient ID: Marcela Moreno is a 69 year old female.    Debridement Diabetic Ulcer Plantar;Right Toe (Comment which one)   Wound 01/13/25 #1 rt great Toe (Comment which one) Plantar;Right    Performed by: Jomar Hugo DPM  Authorized by: Jomar Hugo DPM      Consent   Consent obtained? verbal  Consent given by: patient  Risks discussed? procedural risks discussed  Time out called at 5/12/2025 3:19 PM  Immediately prior to the procedure a time out was called and the performing provider verified the correct patient, procedure, equipment, support staff, and site/side marked as required.    Debridement Details  Performed by: physician  Debridement type: surgical  Level of debridement: subcutaneous tissue  Pain control: none    Pre-debridement measurements  Length (cm): 0.3  Width (cm): 0.5  Depth (cm): 0  Surface Area (cm^2): 0.12    Post-debridement measurements  Length (cm): 0.3  Width (cm): 0.3  Depth (cm): 0.2  Percent debrided: 100%  Surface Area (cm^2): 0.07  Area Debrided (cm^2): 0.07  Volume (cm^3): 0.01    Tissue and other material debrided: subcutaneous tissue  Devitalized tissue debrided: biofilm, callus, fibrin and necrotic debris  Instrument(s) utilized: blade  Bleeding: small  Hemostasis obtained with: not applicable  Procedural pain (0-10): 0  Post-procedural pain: 0   Response to treatment: procedure was tolerated well

## 2025-05-12 NOTE — PROGRESS NOTES
Subjective   Marcela Moreno is a 69 year old female.    Chief Complaint   Patient presents with    Wound Recheck     Arrives to wound care appointment with RLE In tubigrip compression, an shoe. Denies pain to wounds. No new questions or concerns this visit       HPI  Marcela Moreno is a 69 year old female last seen on 3/10/2025 who has a history of DM2 with peripheral polyneuropathy and is returning to clinic today for recheck on right great toe ulceration.  Patient does have known osteomyelitis to the distal phalanx of the right great toe with associated ulceration.  Ulceration has continued to improve over time.  It has been about 2 months since I last saw patient.  She does share that she recently was in the ER due to drainage of the toe with associated odor.  She did take antibiotics, which helped resolve the issue.  Denies recent signs of infection.  Denying any pain.  She is ambulating in her Darco offloading shoe with a cane today.  No other concerns at this time.    Review of Systems  Denies nausea, vomiting, fever, chills, shortness of breath, chest pain, and calf pain.    Objective    Physical Exam:    Derm:  Wound Assessment  Wound 08/28/24 Perineum (Active)   Date First Assessed/Time First Assessed: 08/28/24 0931   Present on Original Admission: No  Location: (c) Perineum      Assessments 8/28/2024 10:15 AM 8/28/2024 12:27 PM   Drainage Amount None None   Dressing Open to air Open to air       No associated orders.       Wound 01/13/25 #1 rt great Toe (Comment which one) Plantar;Right (Active)   Date First Assessed/Time First Assessed: 01/13/25 0911    Wound Number (Wound Clinic Only): #1 rt great  Primary Wound Type: Diabetic Ulcer  Location: Toe (Comment which one)  Wound Location Orientation: Plantar;Right      Assessments 1/13/2025  9:12 AM 5/12/2025  2:53 PM   Wound Image       Drainage Amount Scant --   Drainage Description Serosanguineous --   Wound Length (cm) 0.8 cm 0.3 cm   Wound Width  (cm) 0.7 cm 0.3 cm   Wound Surface Area (cm^2) 0.56 cm^2 0.07 cm^2   Wound Depth (cm) 0.1 cm 0.2 cm   Wound Volume (cm^3) 0.056 cm^3 0.009 cm^3   Wound Healing % -- 84   Margins Well-defined edges --   Non-staged Wound Description Full thickness --   Kayla-wound Assessment Callous --   Wound Granulation Tissue Pink;Spongy --   Wound Bed Granulation (%) 100 % --   Wound Odor None --   Colmenares Scale Grade 1 --       Active Orders   Date Order Priority Status Authorizing Provider   05/12/25 1519 Debridement Diabetic Ulcer Plantar;Right Toe (Comment which one) Routine Active Jomar Hugo DPM       Inactive Orders   Date Order Priority Status Authorizing Provider   03/10/25 0805 Debridement Diabetic Ulcer Plantar;Right Toe (Comment which one) Routine Completed Jomar Hugo DPM   02/24/25 0813 Debridement Diabetic Ulcer Plantar;Right Toe (Comment which one) Routine Completed Jomar Hugo DPM   02/17/25 0917 Debridement Diabetic Ulcer Plantar;Right Toe (Comment which one) Routine Completed Jomar Hugo DPM   02/03/25 0833 Debridement Diabetic Ulcer Plantar;Right Toe (Comment which one) Routine Completed Jomar Hugo DPM   01/20/25 0859 Debridement Diabetic Ulcer Plantar;Right Toe (Comment which one) Routine Completed Jomar Hugo DPM   01/13/25 0942 Debridement Diabetic Ulcer Right;Plantar Toe (Comment which one) Routine Completed Jomar Hugo DPM       Wound 03/03/25 Abdomen Left (Active)   Date First Assessed: 03/03/25   Primary Wound Type: (c) Incision  Location: Abdomen  Wound Location Orientation: Left      Assessments 3/3/2025  8:57 AM 3/4/2025  7:55 AM   Site Assessment -- Clean;Dry;Intact   Closure -- Skin glue   Drainage Amount -- None   Treatments Abdominal Binder Abdominal Binder   Dressing Dermabond Open to air       No associated orders.       Wound 03/03/25 Abdomen Right (Active)   Date First Assessed: 03/03/25   Primary Wound Type: (c) Incision  Location: Abdomen  Wound Location  Orientation: Right      Assessments 3/3/2025  9:13 AM 3/4/2025  7:55 AM   Site Assessment -- Clean;Dry;Intact   Closure -- Skin glue   Drainage Amount -- None   Treatments Abdominal Binder Abdominal Binder   Dressing Dermabond Open to air       No associated orders.       Integument: See above for ulceration information.  Toenails of bilateral feet are thickened, elongated, discolored, dystrophic  Vascular: pedal pulses are palpable. CFT <3 sec to digits  Musculoskeletal: no acute deformities noted.  Decreased active and passive dorsiflexion of right first MPJ.  Gait is assisted with a cane  Neurological: Gross sensation intact via light touch.  Protective sensation diminished via Ipswitch test.        MRI FOOT, RIGHT (CPT=73718)     Result Date: 12/30/2024  CONCLUSION:  Soft tissue ulceration along the distal aspect of the great toe overlies the distal phalanx and there is moderate generalized edema signal associated with the great toe which is concerning for cellulitis in this region.  There is marrow signal abnormality in the distal phalanx including areas of low T1 signal, STIR hyperintensity, and cortical irregularity that is highly concerning for osteomyelitis as was suggested on recent plain film radiographs.  Clinical correlation recommended.  There is no discrete soft tissue abscess identified.  LOCATION:  [facility]   Dictated by (CST): Bayron Gardner MD on 12/30/2024 at 10:25 PM     Finalized by (CST): Bayron Gardner MD on 12/30/2024 at 10:28 PM        US ARTERIAL DUPLEX LOWER EXTREMITY BILATERAL (CPT=93925)     Result Date: 12/30/2024  CONCLUSION:  1. Diffuse atherosclerotic plaquing bilaterally with diffuse biphasic waveform patterns involving both lower extremities. 2. There is a focus of increased arterial velocity involving the left mid SFA suggesting mild to moderate grade stenosis approximating 50%.  3. Increased arterial velocity within the right mid MATT and left proximal PTA suspicious for stenosis  approximating 75%.   LOCATION:  [facility]   Dictated by (CST): Maame Rodrigues DO on 12/30/2024 at 1:23 PM     Finalized by (CST): RaviMaame DO on 12/30/2024 at 1:27 PM        XR FOOT, COMPLETE (MIN 3 VIEWS), RIGHT (CPT=73630)     Result Date: 12/27/2024  CONCLUSION:   Focal soft tissue defect/ulceration noted along the plantar aspect of the 1st distal phalanx.  There is associated cortical erosion/destruction involving the tip of the 1st distal phalanx most in keeping with acute osteomyelitis.  These findings are on a background of generalized osteopenia.  Scattered arthropathy of the mid and forefoot noted.  Large plantar calcaneal spur.   LOCATION:  [facility]   Dictated by (CST): Matheus Gutierrez MD on 12/27/2024 at 9:15 PM     Finalized by (CST): Matheus Gutierrez MD on 12/27/2024 at 9:16 PM    Vital Signs  Vital Signs    05/12/25 1445   BP: 145/72   Pulse: 81   Resp: 14   Temp: 97.2 °F (36.2 °C)   PainSc: 0 - (None)         Allergies  Allergies[1]    Assessment    Encounter Diagnosis  1. Diabetic ulcer of toe of right foot associated with diabetes mellitus due to underlying condition, with fat layer exposed (HCC)    2. Osteomyelitis of great toe of right foot ([facility])    3. Type 2 diabetes mellitus with hyperglycemia, with long-term current use of insulin (HCC)    4. Onychomycosis        Problem List  Problem List[2]    Plan  Orders:  Orders Placed This Encounter   Procedures    Debridement Diabetic Ulcer Plantar;Right Toe (Comment which one)         -Patient examined, chart history reviewed.    -Inspected right foot: Full-thickness ulceration to plantar distal right hallux with surrounding significant hyperkeratotic tissue.  Great improvements in dimensions noted.  There is no hypergranulation noted to the wound bed today.  No deep probing, no surrounding erythema, no purulent drainage, no other signs of infection.  Overall stable wound.  It remains superficial in nature.     -Utilizing a #15 blade, excisional debridement was  performed to right hallux ulceration down to including subcutaneous tissues.  Care was taken to remove all periwound hyperkeratotic tissue as well, which did appear to be growing over the wound base.       -Patient's ulceration covered with endoform, Hydrofera Blue ready, paper tape, and E-spanda.       -Will have patient change these dressings in a similar fashion every 2-4 days dependent on amount of drainage.     -Recommend patient continue to weight-bear as tolerated in her Darco shoe.  She should avoid excessive ambulation as offloading will be essential for wound healing.  Also recommend continued use of cane as needed.     -Recommend continued tight glycemic control.  Discussed blood sugars and their role in healing potential.     -Patient does understand that she is at risk of needing a partial hallux amputation.  Recent MRI reveals concerns of osteomyelitis to the distal phalanx.  No acute signs of infection currently.  Will continue to monitor    -Toenails x 10 debrided today without incident utilizing sterile nail nippers.    -Educated on signs of infection and encouraged patient to seek immediate medical attention if noticing any of these signs.    Follow-Up  3 weeks    Héctor Hugo DPM    5/12/2025    Dragon speech recognition software was used to prepare this note.  Errors in word recognition may occur.  Please contact me with any questions/concerns with this note.          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

## 2025-05-15 ENCOUNTER — NURSE ONLY (OUTPATIENT)
Dept: FAMILY MEDICINE CLINIC | Facility: CLINIC | Age: 70
End: 2025-05-15
Payer: MEDICARE

## 2025-05-15 DIAGNOSIS — E53.8 VITAMIN B 12 DEFICIENCY: Primary | ICD-10-CM

## 2025-05-15 PROCEDURE — 96372 THER/PROPH/DIAG INJ SC/IM: CPT

## 2025-05-15 RX ORDER — CYANOCOBALAMIN 1000 UG/ML
1000 INJECTION, SOLUTION INTRAMUSCULAR; SUBCUTANEOUS ONCE
Status: COMPLETED | OUTPATIENT
Start: 2025-05-15 | End: 2025-05-15

## 2025-05-15 RX ADMIN — CYANOCOBALAMIN 1000 MCG: 1000 INJECTION, SOLUTION INTRAMUSCULAR; SUBCUTANEOUS at 10:31:00

## 2025-06-02 ENCOUNTER — OFFICE VISIT (OUTPATIENT)
Dept: WOUND CARE | Facility: HOSPITAL | Age: 70
End: 2025-06-02
Attending: PODIATRIST
Payer: MEDICARE

## 2025-06-02 VITALS
RESPIRATION RATE: 16 BRPM | SYSTOLIC BLOOD PRESSURE: 162 MMHG | TEMPERATURE: 97 F | HEART RATE: 82 BPM | DIASTOLIC BLOOD PRESSURE: 76 MMHG

## 2025-06-02 DIAGNOSIS — E11.42 DIABETIC POLYNEUROPATHY ASSOCIATED WITH TYPE 2 DIABETES MELLITUS (HCC): ICD-10-CM

## 2025-06-02 DIAGNOSIS — Z79.4 TYPE 2 DIABETES MELLITUS WITH HYPERGLYCEMIA, WITH LONG-TERM CURRENT USE OF INSULIN (HCC): ICD-10-CM

## 2025-06-02 DIAGNOSIS — L97.512 DIABETIC ULCER OF TOE OF RIGHT FOOT ASSOCIATED WITH DIABETES MELLITUS DUE TO UNDERLYING CONDITION, WITH FAT LAYER EXPOSED (HCC): Primary | ICD-10-CM

## 2025-06-02 DIAGNOSIS — E11.65 TYPE 2 DIABETES MELLITUS WITH HYPERGLYCEMIA, WITH LONG-TERM CURRENT USE OF INSULIN (HCC): ICD-10-CM

## 2025-06-02 DIAGNOSIS — M20.5X1 HALLUX LIMITUS OF RIGHT FOOT: ICD-10-CM

## 2025-06-02 DIAGNOSIS — M86.9 OSTEOMYELITIS OF GREAT TOE OF RIGHT FOOT (HCC): ICD-10-CM

## 2025-06-02 DIAGNOSIS — E08.621 DIABETIC ULCER OF TOE OF RIGHT FOOT ASSOCIATED WITH DIABETES MELLITUS DUE TO UNDERLYING CONDITION, WITH FAT LAYER EXPOSED (HCC): Primary | ICD-10-CM

## 2025-06-02 LAB — GLUCOSE BLD-MCNC: 148 MG/DL (ref 70–99)

## 2025-06-02 PROCEDURE — 11042 DBRDMT SUBQ TIS 1ST 20SQCM/<: CPT | Performed by: PODIATRIST

## 2025-06-02 NOTE — PROGRESS NOTES
Patient ID: Marcela Moreno is a 69 year old female.    Debridement Diabetic Ulcer Plantar;Right Toe (Comment which one)   Wound 01/13/25 #1 rt great Toe (Comment which one) Plantar;Right    Performed by: Jomar Hugo DPM  Authorized by: Jomar Hugo DPM      Consent   Consent obtained? verbal  Consent given by: patient  Risks discussed? procedural risks discussed  Time out called at 6/2/2025 7:48 AM  Immediately prior to the procedure a time out was called and the performing provider verified the correct patient, procedure, equipment, support staff, and site/side marked as required.    Debridement Details  Performed by: physician  Debridement type: surgical  Level of debridement: subcutaneous tissue  Pain control: none    Pre-debridement measurements  Length (cm): 0.3  Width (cm): 0.3  Wound depth (cm): unable to probe.  Surface Area (cm^2): 0.07    Post-debridement measurements  Length (cm): 0.3  Width (cm): 0.3  Depth (cm): 0.1  Percent debrided: 100%  Surface Area (cm^2): 0.07  Area Debrided (cm^2): 0.07  Volume (cm^3): 0    Tissue and other material debrided: subcutaneous tissue  Devitalized tissue debrided: biofilm, callus and necrotic debris  Instrument(s) utilized: blade  Bleeding: small  Hemostasis obtained with: pressure  Procedural pain (0-10): 0  Post-procedural pain: 0   Response to treatment: procedure was tolerated well

## 2025-06-02 NOTE — PROGRESS NOTES
Subjective   Marcela Moreno is a 69 year old female.    Chief Complaint   Patient presents with    Wound Recheck     Patient arrives to wound care follow-up on foot. Denies pain to her wound today. No new questions or concerns.       HPI  Marcela Moreno is a 69 year old female last seen on 3/10/2025 who has a history of DM2 with peripheral polyneuropathy and is returning to clinic today for recheck on right great toe ulceration.  Patient shares that she did have a blister that formed on the toe last week that popped and dried out.  She denies any recent drainage or current signs of infection.  She has been utilizing her crocs.  She is denying any pain secondary to some numbness to her feet.  She does have her crocs on today.  Patient is also inquiring about new diabetic shoes and inserts as it has been over a year since she had her last pair.  She does utilizing a cane for gait assistance and has no other concerns.  Patient does share that she is getting over a cold currently but denies any constitutional symptoms.    Review of Systems  Denies nausea, vomiting, fever, chills, shortness of breath, chest pain, and calf pain.    Objective    Physical Exam:    Derm:  Wound Assessment  Wound 08/28/24 Perineum (Active)   Date First Assessed/Time First Assessed: 08/28/24 0931   Present on Original Admission: No  Location: (c) Perineum      Assessments 8/28/2024 10:15 AM 8/28/2024 12:27 PM   Drainage Amount None None   Dressing Open to air Open to air       No associated orders.       Wound 01/13/25 #1 rt great Toe (Comment which one) Plantar;Right (Active)   Date First Assessed/Time First Assessed: 01/13/25 0911    Wound Number (Wound Clinic Only): #1 rt great  Primary Wound Type: Diabetic Ulcer  Location: Toe (Comment which one)  Wound Location Orientation: Plantar;Right      Assessments 1/13/2025  9:12 AM 6/2/2025  7:37 AM   Wound Image       Drainage Amount Scant --   Drainage Description Serosanguineous --   Wound  Length (cm) 0.8 cm 0.3 cm   Wound Width (cm) 0.7 cm 0.3 cm   Wound Surface Area (cm^2) 0.56 cm^2 0.07 cm^2   Wound Depth (cm) 0.1 cm 0.1 cm   Wound Volume (cm^3) 0.056 cm^3 0.005 cm^3   Wound Healing % -- 91   Margins Well-defined edges --   Non-staged Wound Description Full thickness --   Kayla-wound Assessment Callous --   Wound Granulation Tissue Pink;Spongy --   Wound Bed Granulation (%) 100 % --   Wound Odor None --   Colmenares Scale Grade 1 --       Active Orders   Date Order Priority Status Authorizing Provider   06/02/25 0748 Debridement Diabetic Ulcer Plantar;Right Toe (Comment which one) Routine Active Jomar Hugo DPM       Inactive Orders   Date Order Priority Status Authorizing Provider   05/12/25 1519 Debridement Diabetic Ulcer Plantar;Right Toe (Comment which one) Routine Completed Jomar Hugo DPM   03/10/25 0805 Debridement Diabetic Ulcer Plantar;Right Toe (Comment which one) Routine Completed Jomar Hugo DPM   02/24/25 0813 Debridement Diabetic Ulcer Plantar;Right Toe (Comment which one) Routine Completed Jomar Hugo DPM   02/17/25 0917 Debridement Diabetic Ulcer Plantar;Right Toe (Comment which one) Routine Completed Jomar Hugo DPM   02/03/25 0833 Debridement Diabetic Ulcer Plantar;Right Toe (Comment which one) Routine Completed Jomar Hugo DPM   01/20/25 0859 Debridement Diabetic Ulcer Plantar;Right Toe (Comment which one) Routine Completed Jomar Hugo DPM   01/13/25 0942 Debridement Diabetic Ulcer Right;Plantar Toe (Comment which one) Routine Completed Jomar Hugo DPM       Wound 03/03/25 Abdomen Left (Active)   Date First Assessed: 03/03/25   Primary Wound Type: (c) Incision  Location: Abdomen  Wound Location Orientation: Left      Assessments 3/3/2025  8:57 AM 3/4/2025  7:55 AM   Site Assessment -- Clean;Dry;Intact   Closure -- Skin glue   Drainage Amount -- None   Treatments Abdominal Binder Abdominal Binder   Dressing Dermabond Open to air       No associated  orders.       Wound 03/03/25 Abdomen Right (Active)   Date First Assessed: 03/03/25   Primary Wound Type: (c) Incision  Location: Abdomen  Wound Location Orientation: Right      Assessments 3/3/2025  9:13 AM 3/4/2025  7:55 AM   Site Assessment -- Clean;Dry;Intact   Closure -- Skin glue   Drainage Amount -- None   Treatments Abdominal Binder Abdominal Binder   Dressing Dermabond Open to air       No associated orders.       Integument: See above for ulceration information.  Toenails of bilateral feet are thickened, elongated, discolored, dystrophic  Vascular: pedal pulses are palpable. CFT <3 sec to digits  Musculoskeletal: no acute deformities noted.  Decreased active and passive dorsiflexion of right first MPJ.  Gait is assisted with a cane  Neurological: Gross sensation intact via light touch.  Protective sensation diminished via Ipswitch test.        MRI FOOT, RIGHT (CPT=73718)     Result Date: 12/30/2024  CONCLUSION:  Soft tissue ulceration along the distal aspect of the great toe overlies the distal phalanx and there is moderate generalized edema signal associated with the great toe which is concerning for cellulitis in this region.  There is marrow signal abnormality in the distal phalanx including areas of low T1 signal, STIR hyperintensity, and cortical irregularity that is highly concerning for osteomyelitis as was suggested on recent plain film radiographs.  Clinical correlation recommended.  There is no discrete soft tissue abscess identified.  LOCATION:  [facility]   Dictated by (CST): Bayron Gardner MD on 12/30/2024 at 10:25 PM     Finalized by (CST): Bayron Gardner MD on 12/30/2024 at 10:28 PM        US ARTERIAL DUPLEX LOWER EXTREMITY BILATERAL (CPT=93925)     Result Date: 12/30/2024  CONCLUSION:  1. Diffuse atherosclerotic plaquing bilaterally with diffuse biphasic waveform patterns involving both lower extremities. 2. There is a focus of increased arterial velocity involving the left mid SFA suggesting  mild to moderate grade stenosis approximating 50%.  3. Increased arterial velocity within the right mid MATT and left proximal PTA suspicious for stenosis approximating 75%.   LOCATION:  [facility]   Dictated by (CST): Maame Rodrigues DO on 12/30/2024 at 1:23 PM     Finalized by (CST): Maame Rodrigues DO on 12/30/2024 at 1:27 PM        XR FOOT, COMPLETE (MIN 3 VIEWS), RIGHT (CPT=73630)     Result Date: 12/27/2024  CONCLUSION:   Focal soft tissue defect/ulceration noted along the plantar aspect of the 1st distal phalanx.  There is associated cortical erosion/destruction involving the tip of the 1st distal phalanx most in keeping with acute osteomyelitis.  These findings are on a background of generalized osteopenia.  Scattered arthropathy of the mid and forefoot noted.  Large plantar calcaneal spur.   LOCATION:  [facility]   Dictated by (CST): Matheus Gutierrez MD on 12/27/2024 at 9:15 PM     Finalized by (CST): Matheus Gutierrez MD on 12/27/2024 at 9:16 PM    Vital Signs  Vital Signs    06/02/25 0738   BP: (!) 162/76   Pulse: 82   Resp: 16   Temp: 97.4 °F (36.3 °C)   PainSc: 0 - (None)         Allergies  Allergies[1]    Assessment    Encounter Diagnosis  1. Diabetic ulcer of toe of right foot associated with diabetes mellitus due to underlying condition, with fat layer exposed (HCC)    2. Osteomyelitis of great toe of right foot (HCC)    3. Type 2 diabetes mellitus with hyperglycemia, with long-term current use of insulin (HCC)    4. Diabetic polyneuropathy associated with type 2 diabetes mellitus (HCC)    5. Hallux limitus of right foot        Problem List  Problem List[2]    Plan  Orders:  Orders Placed This Encounter   Procedures    Debridement Diabetic Ulcer Plantar;Right Toe (Comment which one)         -Patient examined, chart history reviewed.    -Inspected right foot: Large area of hyperkeratotic tissue noted to the distal plantar aspects of the right hallux.  There is some loose hyperkeratotic tissue secondary to a resolved blister.   There is no hypergranulation noted to the wound bed today.  No deep probing, no surrounding erythema, no purulent drainage, no other signs of infection.  Overall stable wound.     -Utilizing a #15 blade, excisional debridement was performed to right hallux HPK tissue/ulceration down to including subcutaneous tissues.  Care was taken to remove all periwound hyperkeratotic tissue as well, which did appear to be growing over the wound base.  Wound itself is stable with no current signs of infection following debridement.  There is a granular, bleeding wound bed noted.     -Patient's ulceration covered with Paulina, Xeroform, paper tape, and E-spanda.       -Will have patient change these dressings in a similar fashion every 2-4 days dependent on amount of drainage.     - Patient was provided with a new Darco offloading shoe with offloading peg liner.  Recommend patient continue to weight-bear as tolerated in her Darco shoe.  She should avoid excessive ambulation as offloading will be essential for wound healing.  Also recommend continued use of cane as needed.  Also advised patient to avoid her crocs as this may be causing added friction which may have led to her recent blister.     -Recommend continued tight glycemic control.  Discussed blood sugars and their role in healing potential.     -Patient does understand that she is at risk of needing a partial hallux amputation.  Previous MRI from 12/30/2024 reveals concerns of osteomyelitis to the distal phalanx.  No acute signs of infection currently.  Will continue to monitor    -Patient provided with an order for diabetic shoes and inserts as these will be necessary to prevention of worsening ulceration/recurrence once it has healed/new ulcerations.    -Educated on signs of infection and encouraged patient to seek immediate medical attention if noticing any of these signs.    Follow-Up  2 weeks    Héctor Hugo DPM    6/2/2025    Dragon speech recognition software was  used to prepare this note.  Errors in word recognition may occur.  Please contact me with any questions/concerns with this note.          [1] No Known Allergies  [2]   Patient Active Problem List  Diagnosis    Mixed hyperlipidemia    Type 2 diabetes mellitus with hyperglycemia, with long-term current use of insulin (HCC)    Status post stroke    Essential hypertension    H/O arteriovenous malformation (AVM)    Hypothyroidism    Class 1 obesity due to excess calories with serious comorbidity and body mass index (BMI) of 30.0 to 30.9 in adult    Overactive bladder    RLS (restless legs syndrome)    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 associated with type 2 diabetes mellitus (HCC)    Age-related osteoporosis without current pathological fracture    Ventral hernia without obstruction or gangrene    Cellulitis and abscess of toe of right foot    Osteomyelitis of right foot, unspecified type (HCC)    Diabetic ulcer of right great toe (HCC)    Incarcerated ventral hernia

## 2025-06-02 NOTE — PROGRESS NOTES
Weekly Wound Education Note    Teaching Provided To: Patient  Training Topics: Off-loading, Foot wear, Cleasing and general instructions, Discharge instructions  Training Method: Demonstration, Explain/Verbal, Written  Training Response: Patient responds and understands        Notes: Cleanse right great toe wound with saline or wound cleanser apply small piece of sheila, cover with folded xeroform and gauze. Change every 2-3 days. Moisturize toe avoiding wound area. Avoid wearing stiff shoes like crocks, provided pt with darco and liner today.

## 2025-06-12 ENCOUNTER — TELEPHONE (OUTPATIENT)
Dept: SURGERY | Facility: CLINIC | Age: 70
End: 2025-06-12

## 2025-06-12 NOTE — TELEPHONE ENCOUNTER
Per patient was told by central scheduling department that 24 hour urine collection order is not valid, patient was not given exact reason, was advised to have new order placed. Please advise thank you.

## 2025-06-13 NOTE — TELEPHONE ENCOUNTER
This encounter is now closed.     RN called patient. No answer. Left message. 24 hour urine order good until Oct 2025. Time sensitive. Need to submit urine sample to lab after completing.

## 2025-06-16 ENCOUNTER — OFFICE VISIT (OUTPATIENT)
Dept: WOUND CARE | Facility: HOSPITAL | Age: 70
End: 2025-06-16
Attending: PODIATRIST
Payer: MEDICARE

## 2025-06-16 VITALS
TEMPERATURE: 97 F | DIASTOLIC BLOOD PRESSURE: 68 MMHG | HEART RATE: 81 BPM | SYSTOLIC BLOOD PRESSURE: 149 MMHG | RESPIRATION RATE: 18 BRPM

## 2025-06-16 DIAGNOSIS — Z79.4 TYPE 2 DIABETES MELLITUS WITH HYPERGLYCEMIA, WITH LONG-TERM CURRENT USE OF INSULIN (HCC): ICD-10-CM

## 2025-06-16 DIAGNOSIS — L97.512 DIABETIC ULCER OF TOE OF RIGHT FOOT ASSOCIATED WITH DIABETES MELLITUS DUE TO UNDERLYING CONDITION, WITH FAT LAYER EXPOSED (HCC): Primary | ICD-10-CM

## 2025-06-16 DIAGNOSIS — E08.621 DIABETIC ULCER OF TOE OF RIGHT FOOT ASSOCIATED WITH DIABETES MELLITUS DUE TO UNDERLYING CONDITION, WITH FAT LAYER EXPOSED (HCC): Primary | ICD-10-CM

## 2025-06-16 DIAGNOSIS — M86.9 OSTEOMYELITIS OF GREAT TOE OF RIGHT FOOT (HCC): ICD-10-CM

## 2025-06-16 DIAGNOSIS — E11.42 DIABETIC POLYNEUROPATHY ASSOCIATED WITH TYPE 2 DIABETES MELLITUS (HCC): ICD-10-CM

## 2025-06-16 DIAGNOSIS — E11.65 TYPE 2 DIABETES MELLITUS WITH HYPERGLYCEMIA, WITH LONG-TERM CURRENT USE OF INSULIN (HCC): ICD-10-CM

## 2025-06-16 LAB — GLUCOSE BLD-MCNC: 164 MG/DL (ref 70–99)

## 2025-06-16 PROCEDURE — 99213 OFFICE O/P EST LOW 20 MIN: CPT | Performed by: PODIATRIST

## 2025-06-16 NOTE — PROGRESS NOTES
Weekly Wound Education Note    Teaching Provided To: Patient  Training Topics: Discharge instructions, Dressing, Precautions  Training Method: Demonstration, Explain/Verbal, Written  Training Response: Patient responds and understands        Notes: Cleanse right great toe with saline or wound cleanser apply xeroform and bandaid. Change every 2-3 days. E spanda applied to right leg, may remove at bedtime and reapply first thing in the morning. Follow up in one month, sooner if  you note any drainage to wound follow up sooner

## 2025-06-16 NOTE — PROGRESS NOTES
Subjective   Marcela Moreno is a 69 year old female.    Chief Complaint   Patient presents with    Wound Care     Arrives for follow-up. Denies new concerns. Has been using Paulina Ag and Xeroform to the wound.       HPI  Marcela Moreno is a 69 year old female who has a history of DM2 with peripheral polyneuropathy and is returning to clinic today for recheck on right great toe ulceration.  Patient states she is doing well overall and is denying any concerns.  She is ambulating in crocs with diabetic insoles in them.  She is utilizing a cane for gait assistance.  Has been utilizing dressings as recommended with Paulina, Xeroform, changing it every 2-3 days.  Denies recent signs of infection or noticing any drainage.  Patient has no other concerns at this time.    Review of Systems  Denies nausea, vomiting, fever, chills, shortness of breath, chest pain, and calf pain.    Objective    Physical Exam:    Derm:  Wound Assessment  Wound 01/13/25 #1 rt great Toe (Comment which one) Plantar;Right (Active)   Date First Assessed/Time First Assessed: 01/13/25 0911    Wound Number (Wound Clinic Only): #1 rt great  Primary Wound Type: Diabetic Ulcer  Location: Toe (Comment which one)  Wound Location Orientation: Plantar;Right      Assessments 1/13/2025  9:12 AM 6/16/2025  7:33 AM   Wound Image        Drainage Amount Scant None   Drainage Description Serosanguineous --   Wound Length (cm) 0.8 cm 0.1 cm   Wound Width (cm) 0.7 cm 0.1 cm   Wound Surface Area (cm^2) 0.56 cm^2 0.01 cm^2   Wound Depth (cm) 0.1 cm 0.1 cm   Wound Volume (cm^3) 0.056 cm^3 0.001 cm^3   Wound Healing % -- 98   Margins Well-defined edges Poorly defined   Non-staged Wound Description Full thickness Full thickness   Kayla-wound Assessment Callous Callous   Wound Granulation Tissue Pink;Spongy --   Wound Bed Granulation (%) 100 % --   Wound Odor None None   Shape -- all callous   Tunneling? -- No   Undermining? -- No   Sinus Tracts? -- No   Colmenares Scale Grade  1 Grade 1       Inactive Orders   Date Order Priority Status Authorizing Provider   06/02/25 0748 Debridement Diabetic Ulcer Plantar;Right Toe (Comment which one) Routine Completed Jomar Hugo DPM   05/12/25 1519 Debridement Diabetic Ulcer Plantar;Right Toe (Comment which one) Routine Completed Jomar Hugo DPM   03/10/25 0805 Debridement Diabetic Ulcer Plantar;Right Toe (Comment which one) Routine Completed Jomar Hugo DPM   02/24/25 0813 Debridement Diabetic Ulcer Plantar;Right Toe (Comment which one) Routine Completed Jomar Hugo DPM   02/17/25 0917 Debridement Diabetic Ulcer Plantar;Right Toe (Comment which one) Routine Completed Jomar Hugo DPM   02/03/25 0833 Debridement Diabetic Ulcer Plantar;Right Toe (Comment which one) Routine Completed Jomar Hugo DPM   01/20/25 0859 Debridement Diabetic Ulcer Plantar;Right Toe (Comment which one) Routine Completed Jomar Hugo DPM   01/13/25 0942 Debridement Diabetic Ulcer Right;Plantar Toe (Comment which one) Routine Completed Jomar Hugo DPM       Integument: See above for ulceration information.  Toenails of bilateral feet are thickened, discolored, dystrophic  Vascular: pedal pulses are palpable. CFT <3 sec to digits  Musculoskeletal: no acute deformities noted.  Decreased active and passive dorsiflexion of right first MPJ.  Gait is assisted with a cane  Neurological: Gross sensation intact via light touch.  Protective sensation diminished via Ipswitch test.        MRI FOOT, RIGHT (CPT=73718)     Result Date: 12/30/2024  CONCLUSION:  Soft tissue ulceration along the distal aspect of the great toe overlies the distal phalanx and there is moderate generalized edema signal associated with the great toe which is concerning for cellulitis in this region.  There is marrow signal abnormality in the distal phalanx including areas of low T1 signal, STIR hyperintensity, and cortical irregularity that is highly concerning for osteomyelitis as was  suggested on recent plain film radiographs.  Clinical correlation recommended.  There is no discrete soft tissue abscess identified.  LOCATION:  [facility]   Dictated by (CST): Bayron Gardner MD on 12/30/2024 at 10:25 PM     Finalized by (CST): Bayron Gardner MD on 12/30/2024 at 10:28 PM        US ARTERIAL DUPLEX LOWER EXTREMITY BILATERAL (CPT=93925)     Result Date: 12/30/2024  CONCLUSION:  1. Diffuse atherosclerotic plaquing bilaterally with diffuse biphasic waveform patterns involving both lower extremities. 2. There is a focus of increased arterial velocity involving the left mid SFA suggesting mild to moderate grade stenosis approximating 50%.  3. Increased arterial velocity within the right mid MATT and left proximal PTA suspicious for stenosis approximating 75%.   LOCATION:  [facility]   Dictated by (CST): Maame Rodrigues DO on 12/30/2024 at 1:23 PM     Finalized by (CST): Maame Rodrigues DO on 12/30/2024 at 1:27 PM        XR FOOT, COMPLETE (MIN 3 VIEWS), RIGHT (CPT=73630)     Result Date: 12/27/2024  CONCLUSION:   Focal soft tissue defect/ulceration noted along the plantar aspect of the 1st distal phalanx.  There is associated cortical erosion/destruction involving the tip of the 1st distal phalanx most in keeping with acute osteomyelitis.  These findings are on a background of generalized osteopenia.  Scattered arthropathy of the mid and forefoot noted.  Large plantar calcaneal spur.   LOCATION:  [facility]   Dictated by (CST): Matheus Gutierrez MD on 12/27/2024 at 9:15 PM     Finalized by (CST): Matheus Gutierrez MD on 12/27/2024 at 9:16 PM    Vital Signs  Vital Signs    06/16/25 0732   BP: 149/68   Pulse: 81   Resp: 18   Temp: 97.2 °F (36.2 °C)   PainSc: 0 - (None)         Allergies  Allergies[1]    Assessment    Encounter Diagnosis  1. Diabetic ulcer of toe of right foot associated with diabetes mellitus due to underlying condition, with fat layer exposed (HCC)    2. Osteomyelitis of great toe of right foot (HCC)    3. Type 2  diabetes mellitus with hyperglycemia, with long-term current use of insulin (HCC)    4. Diabetic polyneuropathy associated with type 2 diabetes mellitus (HCC)        Problem List  Problem List[2]    Plan  Orders:  No orders of the defined types were placed in this encounter.        -Patient examined, chart history reviewed.    -Inspected right foot: Continued area of hyperkeratotic tissue noted to the distal plantar aspects of the right hallux overlying ulceration.  There is no hypergranulation noted to the wound bed today.  No deep probing, no surrounding erythema, no purulent drainage, no other signs of infection.  Overall improvements noted to wound.     -Utilizing a #15 blade, selective debridement performed in order to remove all periwound hyperkeratotic tissue.  Following debridement, there is a superficial, pinpoint open wound that is not currently draining or has signs of infection.      -Patient's ulceration covered with Xeroform, paper tape, and E-spanda.       -Will have patient change these dressings in a similar fashion every 2-3 days.     -Recommend patient continue to weight-bear as tolerated in her Darco shoe or supportive shoe gear with use of diabetic insoles.  Also recommend continued use of cane as needed.  Patient should monitor if proximal may be causing any new wounds/blisters.     -Recommend continued tight glycemic control.  Discussed blood sugars and their role in healing potential.     -Patient does understand that she is at risk of needing a partial hallux amputation.  Previous MRI from 12/30/2024 reveals concerns of osteomyelitis to the distal phalanx.  No acute signs of infection currently.  Will continue to monitor     -Patient previously provided with an order for diabetic shoes and inserts as these will be necessary to prevention of worsening ulceration/recurrence once it has healed/new ulcerations.    -Educated on signs of infection and encouraged patient to seek immediate medical  attention if noticing any of these signs.    Follow-Up  4 weeks    Héctor Hugo DPM    6/16/2025    Dragon speech recognition software was used to prepare this note.  Errors in word recognition may occur.  Please contact me with any questions/concerns with this note.          [1] No Known Allergies  [2]   Patient Active Problem List  Diagnosis    Mixed hyperlipidemia    Type 2 diabetes mellitus with hyperglycemia, with long-term current use of insulin (HCC)    Status post stroke    Essential hypertension    H/O arteriovenous malformation (AVM)    Hypothyroidism    Class 1 obesity due to excess calories with serious comorbidity and body mass index (BMI) of 30.0 to 30.9 in adult    Overactive bladder    RLS (restless legs syndrome)    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 associated with type 2 diabetes mellitus (HCC)    Age-related osteoporosis without current pathological fracture    Ventral hernia without obstruction or gangrene    Cellulitis and abscess of toe of right foot    Osteomyelitis of right foot, unspecified type (HCC)    Diabetic ulcer of right great toe (HCC)    Incarcerated ventral hernia

## 2025-06-17 ENCOUNTER — TELEPHONE (OUTPATIENT)
Dept: FAMILY MEDICINE CLINIC | Facility: CLINIC | Age: 70
End: 2025-06-17

## 2025-06-17 ENCOUNTER — NURSE ONLY (OUTPATIENT)
Dept: FAMILY MEDICINE CLINIC | Facility: CLINIC | Age: 70
End: 2025-06-17
Payer: MEDICARE

## 2025-06-17 DIAGNOSIS — E53.8 VITAMIN B 12 DEFICIENCY: Primary | ICD-10-CM

## 2025-06-17 PROCEDURE — 96372 THER/PROPH/DIAG INJ SC/IM: CPT | Performed by: NURSE PRACTITIONER

## 2025-06-17 RX ORDER — CYANOCOBALAMIN 1000 UG/ML
1000 INJECTION, SOLUTION INTRAMUSCULAR; SUBCUTANEOUS ONCE
Status: COMPLETED | OUTPATIENT
Start: 2025-06-17 | End: 2025-06-17

## 2025-06-17 RX ADMIN — CYANOCOBALAMIN 1000 MCG: 1000 INJECTION, SOLUTION INTRAMUSCULAR; SUBCUTANEOUS at 09:43:00

## 2025-06-17 NOTE — TELEPHONE ENCOUNTER
Pt came in for nurse visit today. She asked that Dr. Betancourt could fill out another placard form. She has lost hers. Please call pt if new form is completed. Started a new placard form, placed at Dr. Dillon's bin

## 2025-06-18 ENCOUNTER — MED REC SCAN ONLY (OUTPATIENT)
Dept: FAMILY MEDICINE CLINIC | Facility: CLINIC | Age: 70
End: 2025-06-18

## 2025-07-14 ENCOUNTER — OFFICE VISIT (OUTPATIENT)
Dept: WOUND CARE | Facility: HOSPITAL | Age: 70
End: 2025-07-14
Attending: PODIATRIST
Payer: MEDICARE

## 2025-07-14 VITALS
HEART RATE: 77 BPM | RESPIRATION RATE: 14 BRPM | DIASTOLIC BLOOD PRESSURE: 73 MMHG | SYSTOLIC BLOOD PRESSURE: 151 MMHG | TEMPERATURE: 98 F

## 2025-07-14 DIAGNOSIS — M20.5X1 HALLUX LIMITUS OF RIGHT FOOT: ICD-10-CM

## 2025-07-14 DIAGNOSIS — E08.621 DIABETIC ULCER OF TOE OF RIGHT FOOT ASSOCIATED WITH DIABETES MELLITUS DUE TO UNDERLYING CONDITION, WITH FAT LAYER EXPOSED (HCC): Primary | ICD-10-CM

## 2025-07-14 DIAGNOSIS — L97.512 DIABETIC ULCER OF TOE OF RIGHT FOOT ASSOCIATED WITH DIABETES MELLITUS DUE TO UNDERLYING CONDITION, WITH FAT LAYER EXPOSED (HCC): Primary | ICD-10-CM

## 2025-07-14 DIAGNOSIS — E11.42 DIABETIC POLYNEUROPATHY ASSOCIATED WITH TYPE 2 DIABETES MELLITUS (HCC): ICD-10-CM

## 2025-07-14 DIAGNOSIS — Z79.4 TYPE 2 DIABETES MELLITUS WITH HYPERGLYCEMIA, WITH LONG-TERM CURRENT USE OF INSULIN (HCC): ICD-10-CM

## 2025-07-14 DIAGNOSIS — M86.9 OSTEOMYELITIS OF GREAT TOE OF RIGHT FOOT (HCC): ICD-10-CM

## 2025-07-14 DIAGNOSIS — L84 PRE-ULCERATIVE CALLUSES: ICD-10-CM

## 2025-07-14 DIAGNOSIS — E11.65 TYPE 2 DIABETES MELLITUS WITH HYPERGLYCEMIA, WITH LONG-TERM CURRENT USE OF INSULIN (HCC): ICD-10-CM

## 2025-07-14 PROCEDURE — 99213 OFFICE O/P EST LOW 20 MIN: CPT | Performed by: PODIATRIST

## 2025-07-14 NOTE — PROGRESS NOTES
Subjective   Marcela Moreno is a 69 year old female.    Chief Complaint   Patient presents with    Wound Recheck     Patient is here for right plantar great toe wound. Pt states no pain in wound. Pt states applying Xeroform to the wound.        HPI  Marcela Moreno is a 69 year old female who has a history of DM2 with peripheral polyneuropathy and is returning to clinic today for recheck on right great toe ulceration.  Patient states that she has been continuing to utilize Xeroform to the wound and denies noticing any recent drainage.  Denies any pain to the wound.  She is ambulating in her normal supportive shoes today.  Denies any recent constitutional symptoms and has no further concerns at this time.    Review of Systems  Denies nausea, vomiting, fever, chills, shortness of breath, chest pain, and calf pain.    Objective    Physical Exam:    Derm:  Wound Assessment  Wound 01/13/25 #1 rt great Toe (Comment which one) Plantar;Right (Active)   Date First Assessed/Time First Assessed: 01/13/25 0911    Wound Number (Wound Clinic Only): #1 rt great  Primary Wound Type: Diabetic Ulcer  Location: Toe (Comment which one)  Wound Location Orientation: Plantar;Right      Assessments 1/13/2025  9:12 AM 7/14/2025  7:32 AM   Wound Image       Drainage Amount Scant None   Drainage Description Serosanguineous --   Wound Length (cm) 0.8 cm 0.1 cm   Wound Width (cm) 0.7 cm 0.1 cm   Wound Surface Area (cm^2) 0.56 cm^2 0.01 cm^2   Wound Depth (cm) 0.1 cm 0.1 cm   Wound Volume (cm^3) 0.056 cm^3 0.001 cm^3   Wound Healing % -- 98   Margins Well-defined edges Well-defined edges   Non-staged Wound Description Full thickness Full thickness   Kayla-wound Assessment Callous Callous   Wound Granulation Tissue Pink;Spongy --   Wound Bed Granulation (%) 100 % --   Wound Odor None None   Shape -- all callous   Tunneling? -- No   Undermining? -- No   Sinus Tracts? -- No   Colmenares Scale Grade 1 Grade 1       Inactive Orders   Date Order  Priority Status Authorizing Provider   06/02/25 0748 Debridement Diabetic Ulcer Plantar;Right Toe (Comment which one) Routine Completed Jomar Hugo DPM   05/12/25 1519 Debridement Diabetic Ulcer Plantar;Right Toe (Comment which one) Routine Completed Jomar Hugo DPM   03/10/25 0805 Debridement Diabetic Ulcer Plantar;Right Toe (Comment which one) Routine Completed Jomar Hugo DPM   02/24/25 0813 Debridement Diabetic Ulcer Plantar;Right Toe (Comment which one) Routine Completed Jomar Hugo DPM   02/17/25 0917 Debridement Diabetic Ulcer Plantar;Right Toe (Comment which one) Routine Completed Jomar Hugo DPM   02/03/25 0833 Debridement Diabetic Ulcer Plantar;Right Toe (Comment which one) Routine Completed Jomar Hugo DPM   01/20/25 0859 Debridement Diabetic Ulcer Plantar;Right Toe (Comment which one) Routine Completed Jomar Hugo DPM   01/13/25 0942 Debridement Diabetic Ulcer Right;Plantar Toe (Comment which one) Routine Completed Jomar Hugo DPM       Integument: See above for ulceration information.  There is HPK tissue noted surrounding previous ulceration site to the great toe.  Toenails of bilateral feet are thickened, discolored, dystrophic  Vascular: pedal pulses are palpable. CFT <3 sec to digits  Musculoskeletal: no acute deformities noted.  Decreased active and passive dorsiflexion of right first MPJ.  Gait is assisted with a cane  Neurological: Gross sensation intact via light touch.  Protective sensation diminished via Ipswitch test.        MRI FOOT, RIGHT (CPT=73718)     Result Date: 12/30/2024  CONCLUSION:  Soft tissue ulceration along the distal aspect of the great toe overlies the distal phalanx and there is moderate generalized edema signal associated with the great toe which is concerning for cellulitis in this region.  There is marrow signal abnormality in the distal phalanx including areas of low T1 signal, STIR hyperintensity, and cortical irregularity that is highly  concerning for osteomyelitis as was suggested on recent plain film radiographs.  Clinical correlation recommended.  There is no discrete soft tissue abscess identified.  LOCATION:  [facility]   Dictated by (CST): Bayron Gardner MD on 12/30/2024 at 10:25 PM     Finalized by (CST): Bayron Gardner MD on 12/30/2024 at 10:28 PM        US ARTERIAL DUPLEX LOWER EXTREMITY BILATERAL (CPT=93925)     Result Date: 12/30/2024  CONCLUSION:  1. Diffuse atherosclerotic plaquing bilaterally with diffuse biphasic waveform patterns involving both lower extremities. 2. There is a focus of increased arterial velocity involving the left mid SFA suggesting mild to moderate grade stenosis approximating 50%.  3. Increased arterial velocity within the right mid MATT and left proximal PTA suspicious for stenosis approximating 75%.   LOCATION:  [facility]   Dictated by (CST): Maame Rodrigues DO on 12/30/2024 at 1:23 PM     Finalized by (CST): Maame Rodrigues DO on 12/30/2024 at 1:27 PM        XR FOOT, COMPLETE (MIN 3 VIEWS), RIGHT (CPT=73630)     Result Date: 12/27/2024  CONCLUSION:   Focal soft tissue defect/ulceration noted along the plantar aspect of the 1st distal phalanx.  There is associated cortical erosion/destruction involving the tip of the 1st distal phalanx most in keeping with acute osteomyelitis.  These findings are on a background of generalized osteopenia.  Scattered arthropathy of the mid and forefoot noted.  Large plantar calcaneal spur.   LOCATION:  [facility]   Dictated by (CST): Matheus Gutierrez MD on 12/27/2024 at 9:15 PM     Finalized by (CST): Matheus Gutierrez MD on 12/27/2024 at 9:16 PM    Vital Signs  Vital Signs    07/14/25 0731   BP: 151/73   Pulse: 77   Resp: 14   Temp: 97.9 °F (36.6 °C)         Allergies  Allergies[1]    Assessment    Encounter Diagnosis  1. Diabetic ulcer of toe of right foot associated with diabetes mellitus due to underlying condition, with fat layer exposed (HCC)    2. Pre-ulcerative calluses    3. Osteomyelitis of  great toe of right foot (HCC)    4. Type 2 diabetes mellitus with hyperglycemia, with long-term current use of insulin (HCC)    5. Diabetic polyneuropathy associated with type 2 diabetes mellitus (HCC)    6. Hallux limitus of right foot        Problem List  Problem List[2]    Plan  Orders:  No orders of the defined types were placed in this encounter.        -Patient examined, chart history reviewed.    -Inspected right foot: Continued area of hyperkeratotic tissue noted to the distal plantar aspects of the right hallux overlying site of previous ulceration.  No deep probing, no surrounding erythema, no drainage, no other signs of infection.  Overall improvements noted to wound.     -Utilizing a #15 blade, selective debridement performed in order to remove all periwound hyperkeratotic tissue.  Following debridement, there is an epithelialized ulceration with no active drainage or signs of infection.     -Patient's ulceration covered with Xeroform, paper tape, and E-spanda.       -Will have patient change these dressings in a similar fashion every 2-3 days.     -Recommend patient continue to weight-bear as tolerated in her Darco shoe or supportive shoe gear with use of diabetic insoles.  Also recommend continued use of cane as needed.      -Recommend continued tight glycemic control.  Discussed blood sugars and their role in healing potential.     -Patient does understand that she is at risk of needing a partial hallux amputation.  Previous MRI from 12/30/2024 reveals concerns of osteomyelitis to the distal phalanx.  No acute signs of infection currently.  Will continue to monitor     -Patient previously provided with an order for diabetic shoes and inserts as these will be necessary to prevention of worsening ulceration/recurrence once it has healed/new ulcerations.    -Educated on signs of infection and encouraged patient to seek immediate medical attention if noticing any of these signs.    Follow-Up  3 weeks in  my outpatient clinic    Héctor Hugo DPM    7/14/2025    Dragon speech recognition software was used to prepare this note.  Errors in word recognition may occur.  Please contact me with any questions/concerns with this note.        [1] No Known Allergies  [2]   Patient Active Problem List  Diagnosis    Mixed hyperlipidemia    Type 2 diabetes mellitus with hyperglycemia, with long-term current use of insulin (HCC)    Status post stroke    Essential hypertension    H/O arteriovenous malformation (AVM)    Hypothyroidism    Class 1 obesity due to excess calories with serious comorbidity and body mass index (BMI) of 30.0 to 30.9 in adult    Overactive bladder    RLS (restless legs syndrome)    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 associated with type 2 diabetes mellitus (HCC)    Age-related osteoporosis without current pathological fracture    Ventral hernia without obstruction or gangrene    Cellulitis and abscess of toe of right foot    Osteomyelitis of right foot, unspecified type (HCC)    Diabetic ulcer of right great toe (HCC)    Incarcerated ventral hernia

## 2025-07-14 NOTE — PROGRESS NOTES
Weekly Wound Education Note    Teaching Provided To: Patient  Training Topics: Discharge instructions, Dressing, Off-loading  Training Method: Demonstration, Explain/Verbal, Written  Training Response: Patient responds and understands        Notes: Cleanse right great toe wound with saline or wound cleanser apply Xeroform and bandaid change every 2-3 days E spanda applied to right leg, may remove at bedtime and reapply first thing in the morning

## 2025-07-15 DIAGNOSIS — Z79.4 TYPE 2 DIABETES MELLITUS WITH HYPERGLYCEMIA, WITH LONG-TERM CURRENT USE OF INSULIN (HCC): ICD-10-CM

## 2025-07-15 DIAGNOSIS — E11.65 TYPE 2 DIABETES MELLITUS WITH HYPERGLYCEMIA, WITH LONG-TERM CURRENT USE OF INSULIN (HCC): ICD-10-CM

## 2025-07-15 RX ORDER — LANCETS 33 GAUGE
1 EACH MISCELLANEOUS DAILY
Qty: 100 EACH | Refills: 0 | Status: SHIPPED | OUTPATIENT
Start: 2025-07-15

## 2025-07-15 NOTE — TELEPHONE ENCOUNTER
Requested Prescriptions     Signed Prescriptions Disp Refills    ONETOUCH DELICA PLUS [id number] Does not apply Misc 100 each 0     Sig: TEST BLOOD SUGAR DAILY     Authorizing Provider: LEÓN MCKINLEY     Ordering User: REVA CARSON     Future Appointments   Date Time Provider Department Center   7/21/2025  7:30 PM PF US RM1 PF [facility]     Last A1c value was 7.9% done 2/26/2025.

## 2025-07-16 ENCOUNTER — NURSE ONLY (OUTPATIENT)
Dept: FAMILY MEDICINE CLINIC | Facility: CLINIC | Age: 70
End: 2025-07-16
Payer: MEDICARE

## 2025-07-16 DIAGNOSIS — E53.8 VITAMIN B 12 DEFICIENCY: Primary | ICD-10-CM

## 2025-07-16 PROCEDURE — 96372 THER/PROPH/DIAG INJ SC/IM: CPT

## 2025-07-16 RX ORDER — CYANOCOBALAMIN 1000 UG/ML
1000 INJECTION, SOLUTION INTRAMUSCULAR; SUBCUTANEOUS ONCE
Status: COMPLETED | OUTPATIENT
Start: 2025-07-16 | End: 2025-07-16

## 2025-07-16 RX ADMIN — CYANOCOBALAMIN 1000 MCG: 1000 INJECTION, SOLUTION INTRAMUSCULAR; SUBCUTANEOUS at 16:31:00

## 2025-07-21 ENCOUNTER — HOSPITAL ENCOUNTER (OUTPATIENT)
Dept: ULTRASOUND IMAGING | Age: 70
Discharge: HOME | End: 2025-07-21
Attending: SURGERY
Payer: MEDICARE

## 2025-07-21 DIAGNOSIS — N20.0 NEPHROLITHIASIS: ICD-10-CM

## 2025-07-21 PROCEDURE — 76770 US EXAM ABDO BACK WALL COMP: CPT | Performed by: SURGERY

## 2025-07-22 DIAGNOSIS — Z79.4 TYPE 2 DIABETES MELLITUS WITH HYPERGLYCEMIA, WITH LONG-TERM CURRENT USE OF INSULIN (HCC): ICD-10-CM

## 2025-07-22 DIAGNOSIS — R80.9 POSITIVE FOR MACROALBUMINURIA: ICD-10-CM

## 2025-07-22 DIAGNOSIS — E11.65 TYPE 2 DIABETES MELLITUS WITH HYPERGLYCEMIA, WITH LONG-TERM CURRENT USE OF INSULIN (HCC): ICD-10-CM

## 2025-07-22 RX ORDER — LISINOPRIL 5 MG/1
5 TABLET ORAL DAILY
Qty: 90 TABLET | Refills: 1 | Status: SHIPPED | OUTPATIENT
Start: 2025-07-22

## 2025-07-22 NOTE — TELEPHONE ENCOUNTER
Requested Prescriptions     Pending Prescriptions Disp Refills    LISINOPRIL 5 MG Oral Tab [Pharmacy Med Name: LISINOPRIL 5MG TABLETS] 90 tablet 1     Sig: TAKE 1 TABLET(5 MG) BY MOUTH DAILY     HGBA1C:    Lab Results   Component Value Date    A1C 7.9 (H) 02/26/2025    A1C 7.5 (A) 11/12/2024    A1C 7.0 (A) 08/06/2024     (H) 02/26/2025         Last Office Visit:  11--    Last Refill:  1--90 tabs with 1 refills-      No future appointments-mychart sent

## 2025-07-22 NOTE — TELEPHONE ENCOUNTER
Future Appointments   Date Time Provider Department Center   7/31/2025  9:30 AM Jessica Hicks APRN [id number]

## 2025-07-31 ENCOUNTER — OFFICE VISIT (OUTPATIENT)
Dept: ENDOCRINOLOGY CLINIC | Facility: CLINIC | Age: 70
End: 2025-07-31
Payer: MEDICARE

## 2025-07-31 ENCOUNTER — LAB ENCOUNTER (OUTPATIENT)
Dept: LAB | Age: 70
End: 2025-07-31
Attending: SURGERY

## 2025-07-31 VITALS
DIASTOLIC BLOOD PRESSURE: 64 MMHG | OXYGEN SATURATION: 98 % | HEART RATE: 70 BPM | BODY MASS INDEX: 28 KG/M2 | RESPIRATION RATE: 14 BRPM | WEIGHT: 170 LBS | SYSTOLIC BLOOD PRESSURE: 118 MMHG

## 2025-07-31 DIAGNOSIS — Z79.4 TYPE 2 DIABETES MELLITUS WITH HYPERGLYCEMIA, WITH LONG-TERM CURRENT USE OF INSULIN (HCC): Primary | ICD-10-CM

## 2025-07-31 DIAGNOSIS — E11.65 TYPE 2 DIABETES MELLITUS WITH HYPERGLYCEMIA, WITH LONG-TERM CURRENT USE OF INSULIN (HCC): Primary | ICD-10-CM

## 2025-07-31 DIAGNOSIS — I10 ESSENTIAL HYPERTENSION: ICD-10-CM

## 2025-07-31 DIAGNOSIS — R80.9 MICROALBUMINURIA: ICD-10-CM

## 2025-07-31 DIAGNOSIS — E78.2 MIXED HYPERLIPIDEMIA: ICD-10-CM

## 2025-07-31 DIAGNOSIS — N20.0 NEPHROLITHIASIS: ICD-10-CM

## 2025-07-31 LAB — HEMOGLOBIN A1C: 7.9 % (ref 4.3–5.6)

## 2025-07-31 PROCEDURE — 99214 OFFICE O/P EST MOD 30 MIN: CPT | Performed by: NURSE PRACTITIONER

## 2025-07-31 PROCEDURE — 83735 ASSAY OF MAGNESIUM: CPT

## 2025-07-31 PROCEDURE — 84392 ASSAY OF URINE SULFATE: CPT

## 2025-07-31 PROCEDURE — 83986 ASSAY PH BODY FLUID NOS: CPT

## 2025-07-31 PROCEDURE — 82340 ASSAY OF CALCIUM IN URINE: CPT

## 2025-07-31 PROCEDURE — 84133 ASSAY OF URINE POTASSIUM: CPT

## 2025-07-31 PROCEDURE — 84105 ASSAY OF URINE PHOSPHORUS: CPT

## 2025-07-31 PROCEDURE — 84560 ASSAY OF URINE/URIC ACID: CPT

## 2025-07-31 PROCEDURE — 84300 ASSAY OF URINE SODIUM: CPT

## 2025-07-31 PROCEDURE — 83945 ASSAY OF OXALATE: CPT

## 2025-07-31 PROCEDURE — 82507 ASSAY OF CITRATE: CPT

## 2025-07-31 PROCEDURE — 81050 URINALYSIS VOLUME MEASURE: CPT

## 2025-07-31 PROCEDURE — 83036 HEMOGLOBIN GLYCOSYLATED A1C: CPT | Performed by: NURSE PRACTITIONER

## 2025-07-31 PROCEDURE — 95250 CONT GLUC MNTR PHYS/QHP EQP: CPT | Performed by: NURSE PRACTITIONER

## 2025-07-31 PROCEDURE — 82436 ASSAY OF URINE CHLORIDE: CPT

## 2025-07-31 RX ORDER — METFORMIN HYDROCHLORIDE 500 MG/1
500 TABLET, EXTENDED RELEASE ORAL 2 TIMES DAILY WITH MEALS
Qty: 180 TABLET | Refills: 1 | Status: SHIPPED | OUTPATIENT
Start: 2025-07-31

## 2025-07-31 RX ORDER — DAPAGLIFLOZIN 10 MG/1
10 TABLET, FILM COATED ORAL DAILY
Qty: 30 TABLET | Refills: 1 | Status: SHIPPED | OUTPATIENT
Start: 2025-07-31

## 2025-07-31 RX ORDER — PEN NEEDLE, DIABETIC 32GX 5/32"
1 NEEDLE, DISPOSABLE MISCELLANEOUS DAILY
Qty: 100 EACH | Refills: 3 | Status: SHIPPED | OUTPATIENT
Start: 2025-07-31

## 2025-07-31 RX ORDER — INSULIN DEGLUDEC 200 U/ML
INJECTION, SOLUTION SUBCUTANEOUS
Qty: 18 ML | Refills: 0 | Status: SHIPPED | OUTPATIENT
Start: 2025-07-31

## 2025-07-31 RX ORDER — BLOOD SUGAR DIAGNOSTIC
STRIP MISCELLANEOUS
Qty: 100 STRIP | Refills: 1 | Status: SHIPPED | OUTPATIENT
Start: 2025-07-31

## 2025-08-08 ENCOUNTER — TELEPHONE (OUTPATIENT)
Dept: SURGERY | Facility: CLINIC | Age: 70
End: 2025-08-08

## 2025-08-08 ENCOUNTER — TELEPHONE (OUTPATIENT)
Facility: CLINIC | Age: 70
End: 2025-08-08

## 2025-08-08 DIAGNOSIS — S16.1XXD STRAIN OF NECK MUSCLE, SUBSEQUENT ENCOUNTER: ICD-10-CM

## 2025-08-11 ENCOUNTER — NURSE TRIAGE (OUTPATIENT)
Dept: FAMILY MEDICINE CLINIC | Facility: CLINIC | Age: 70
End: 2025-08-11

## 2025-08-11 ENCOUNTER — OFFICE VISIT (OUTPATIENT)
Dept: FAMILY MEDICINE CLINIC | Facility: CLINIC | Age: 70
End: 2025-08-11

## 2025-08-11 VITALS
OXYGEN SATURATION: 99 % | SYSTOLIC BLOOD PRESSURE: 114 MMHG | RESPIRATION RATE: 18 BRPM | WEIGHT: 176 LBS | BODY MASS INDEX: 29 KG/M2 | HEART RATE: 94 BPM | DIASTOLIC BLOOD PRESSURE: 64 MMHG

## 2025-08-11 DIAGNOSIS — M54.50 ACUTE RIGHT-SIDED LOW BACK PAIN WITHOUT SCIATICA: Primary | ICD-10-CM

## 2025-08-11 PROCEDURE — 3060F POS MICROALBUMINURIA REV: CPT | Performed by: EMERGENCY MEDICINE

## 2025-08-11 PROCEDURE — 3061F NEG MICROALBUMINURIA REV: CPT | Performed by: EMERGENCY MEDICINE

## 2025-08-11 PROCEDURE — 3074F SYST BP LT 130 MM HG: CPT | Performed by: EMERGENCY MEDICINE

## 2025-08-11 PROCEDURE — 99214 OFFICE O/P EST MOD 30 MIN: CPT | Performed by: EMERGENCY MEDICINE

## 2025-08-11 PROCEDURE — 1159F MED LIST DOCD IN RCRD: CPT | Performed by: EMERGENCY MEDICINE

## 2025-08-11 PROCEDURE — 1125F AMNT PAIN NOTED PAIN PRSNT: CPT | Performed by: EMERGENCY MEDICINE

## 2025-08-11 PROCEDURE — G2211 COMPLEX E/M VISIT ADD ON: HCPCS | Performed by: EMERGENCY MEDICINE

## 2025-08-11 PROCEDURE — 3051F HG A1C>EQUAL 7.0%<8.0%: CPT | Performed by: EMERGENCY MEDICINE

## 2025-08-11 PROCEDURE — 3078F DIAST BP <80 MM HG: CPT | Performed by: EMERGENCY MEDICINE

## 2025-08-11 RX ORDER — CYCLOBENZAPRINE HCL 5 MG
5 TABLET ORAL 3 TIMES DAILY PRN
Qty: 30 TABLET | Refills: 1 | Status: SHIPPED | OUTPATIENT
Start: 2025-08-11

## 2025-08-11 RX ORDER — METHYLPREDNISOLONE 4 MG/1
TABLET ORAL
Qty: 21 EACH | Refills: 0 | Status: SHIPPED | OUTPATIENT
Start: 2025-08-11

## 2025-08-11 RX ORDER — ACETAMINOPHEN AND CODEINE PHOSPHATE 300; 30 MG/1; MG/1
1 TABLET ORAL EVERY 4 HOURS PRN
Qty: 30 TABLET | Refills: 0 | Status: SHIPPED | OUTPATIENT
Start: 2025-08-11

## 2025-08-21 ENCOUNTER — TELEPHONE (OUTPATIENT)
Dept: SURGERY | Facility: CLINIC | Age: 70
End: 2025-08-21

## 2025-08-27 ENCOUNTER — NURSE ONLY (OUTPATIENT)
Dept: FAMILY MEDICINE CLINIC | Facility: CLINIC | Age: 70
End: 2025-08-27

## 2025-08-27 DIAGNOSIS — E53.8 VITAMIN B 12 DEFICIENCY: Primary | ICD-10-CM

## 2025-08-27 PROCEDURE — 96372 THER/PROPH/DIAG INJ SC/IM: CPT | Performed by: FAMILY MEDICINE

## 2025-08-27 RX ORDER — CYANOCOBALAMIN 1000 UG/ML
1000 INJECTION, SOLUTION INTRAMUSCULAR; SUBCUTANEOUS ONCE
Status: COMPLETED | OUTPATIENT
Start: 2025-08-27 | End: 2025-08-27

## 2025-08-27 RX ADMIN — CYANOCOBALAMIN 1000 MCG: 1000 INJECTION, SOLUTION INTRAMUSCULAR; SUBCUTANEOUS at 09:27:00

## (undated) DIAGNOSIS — E11.65 TYPE 2 DIABETES MELLITUS WITH HYPERGLYCEMIA, WITH LONG-TERM CURRENT USE OF INSULIN (HCC): ICD-10-CM

## (undated) DIAGNOSIS — Z79.4 TYPE 2 DIABETES MELLITUS WITH HYPERGLYCEMIA, WITH LONG-TERM CURRENT USE OF INSULIN (HCC): ICD-10-CM

## (undated) DIAGNOSIS — Z79.4 TYPE 2 DIABETES MELLITUS WITH HYPERGLYCEMIA, WITH LONG-TERM CURRENT USE OF INSULIN (HCC): Primary | ICD-10-CM

## (undated) DIAGNOSIS — E11.65 TYPE 2 DIABETES MELLITUS WITH HYPERGLYCEMIA, WITH LONG-TERM CURRENT USE OF INSULIN (HCC): Primary | ICD-10-CM

## (undated) DEVICE — ROBOTIC GENERAL: Brand: MEDLINE INDUSTRIES, INC.

## (undated) DEVICE — 20 ML SYRINGE LUER-LOCK TIP: Brand: MONOJECT

## (undated) DEVICE — SUT MCRYL 4-0 18IN PS-2 ABSRB UD 19MM 3/8 CIR

## (undated) DEVICE — ENDOPATH ULTRA VERESS INSUFFLATION NEEDLES WITH LUER LOCK CONNECTORS: Brand: ENDOPATH

## (undated) DEVICE — ADHESIVE SKIN TOP FOR WND CLSR DERMBND ADV

## (undated) DEVICE — URETERAL ACCESS SHEATH SET: Brand: NAVIGATOR HD

## (undated) DEVICE — DRAIN SUR 19FR L0.25IN 3/4 FLUT 3/16IN TRCR

## (undated) DEVICE — LAPAROVUE VISIBILITY SYSTEM LAPAROSCOPIC SOLUTIONS: Brand: LAPAROVUE

## (undated) DEVICE — ABSORBABLE WOUND CLOSURE DEVICE: Brand: V-LOC 90

## (undated) DEVICE — SEAL BIOPSY PORT ACMI BX BLACK CAP

## (undated) DEVICE — NITINOL WIRE WITH HYDROPHILIC TIP: Brand: SENSOR

## (undated) DEVICE — TROCAR: Brand: KII FIOS FIRST ENTRY

## (undated) DEVICE — ABSORBABLE WOUND CLOSURE DEVICE: Brand: V-LOC 180

## (undated) DEVICE — SEAL

## (undated) DEVICE — STERILE DRAPE FOR USE WITH SITUATE ROOM SCANNER: Brand: SITUATE

## (undated) DEVICE — SYRINGE MED 10ML LL TIP W/O SFTY DISP

## (undated) DEVICE — SINGLE ACTION PUMPING SYSTEM

## (undated) DEVICE — PACK PBDS CYSTOSCOPY

## (undated) DEVICE — COVER,LIGHT,CAMERA,HARD,1/PK,STRL: Brand: MEDLINE

## (undated) DEVICE — LAPAROSCOPIC DUAL RIGID APPLICATOR: Brand: ETHICON

## (undated) DEVICE — C-ARM: Brand: UNBRANDED

## (undated) DEVICE — SYRINGE MED 20ML STD CLR PLAS LL TIP N CTRL

## (undated) DEVICE — SUT ETHLN 2-0 18IN FS NABSRB BLK 26MM 3/8 CIR

## (undated) DEVICE — GIJAW SINGLE-USE BIOPSY FORCEPS WITH NEEDLE: Brand: GIJAW

## (undated) DEVICE — GLOVE SUR 7 SENSICARE PI PIP CRM PWD F

## (undated) DEVICE — SLEEVE COMPR MD KNEE LEN SGL USE KENDALL SCD

## (undated) DEVICE — ADAPTER BX SEAL Y BIOPSY PORT ADJ FOR HYSTEROSCOPE

## (undated) DEVICE — GLOVE SUR 8 SENSICARE PI PIP CRM PWD F

## (undated) DEVICE — BINDER ABD UNISX 9IN 45IN SM AND M UNIV

## (undated) DEVICE — SOLUTION IRRIG 3000ML 0.9% NACL FLX CONT

## (undated) DEVICE — 40580 - THE PINK PAD - ADVANCED TRENDELENBURG POSITIONING KIT: Brand: 40580 - THE PINK PAD - ADVANCED TRENDELENBURG POSITIONING KIT

## (undated) DEVICE — SKN PREP SPNG STKS PVP PNT STR: Brand: MEDLINE INDUSTRIES, INC.

## (undated) DEVICE — CADIERE FORCEPS: Brand: ENDOWRIST

## (undated) DEVICE — ARM DRAPE

## (undated) DEVICE — SUT STRATAFIX SYMMTRC PDS + 1 L12IN ABSRB

## (undated) DEVICE — ZZ--CONVERTED-TO-500976-PENCIL SMK EVAC L10FT MPLR BLDE JAW OPN

## (undated) DEVICE — AIRSEAL BIFURCATED FILTERED TUBESET WITH ACTIVATED CHARCOAL FILTER: Brand: AIRSEAL

## (undated) DEVICE — 10FT COMBINED O2 DELIVERY/CO2 MONITORING. FILTER WITH MICROSTREAM TYPE LUER: Brand: DUAL ADULT NASAL CANNULA

## (undated) DEVICE — 1200CC GUARDIAN II: Brand: GUARDIAN

## (undated) DEVICE — CATHETER URET 5FR L70CM FLX OPN TIP NONPORTED

## (undated) DEVICE — EVACUATOR SUR 100CC SIL BLB WND

## (undated) DEVICE — BLADELESS OBTURATOR: Brand: WECK VISTA

## (undated) DEVICE — MEGA SUTURECUT ND: Brand: ENDOWRIST

## (undated) DEVICE — TIP COVER ACCESSORY

## (undated) DEVICE — KIT CUSTOM ENDOPROCEDURE STERIS

## (undated) DEVICE — KIT VLV 5 PC AIR H2O SUCT BX ENDOGATOR CONN

## (undated) DEVICE — 3M™ RED DOT™ MONITORING ELECTRODE WITH FOAM TAPE AND STICKY GEL, 50/BAG, 20/CASE, 72/PLT 2570: Brand: RED DOT™

## (undated) DEVICE — SINGLE-USE DIGITAL FLEXIBLE URETEROSCOPE: Brand: LITHOVUE

## (undated) DEVICE — NITINOL STONE RETRIEVAL BASKET: Brand: ZERO TIP

## (undated) DEVICE — FENESTRATED BIPOLAR FORCEPS: Brand: ENDOWRIST

## (undated) DEVICE — AIRSEAL 8 MM CANNULA CAP AND OBTURATOR WITH BLADELESS OPTICAL TIP COMPATIBLE WITH INTUITIVE DA VINCI XI AND DA VINCI X 8 MM INSTRUMENT CANNULA, STANDARD LENGTH: Brand: AIRSEAL

## (undated) DEVICE — SHEET,DRAPE,40X58,STERILE: Brand: MEDLINE

## (undated) DEVICE — BITEBLOCK ENDOSCP 60FR MAXI STRP

## (undated) DEVICE — COLUMN DRAPE

## (undated) DEVICE — BINDER ABD 2XL W9IN CIRC 62-73IN 3 PNL E HK

## (undated) DEVICE — MONOPOLAR CURVED SCISSORS: Brand: ENDOWRIST

## (undated) DEVICE — FIBER LSR 200UM 2J 80HZ 60W DL FOR LITHO

## (undated) DEVICE — TRADITIONAL MARYLAND DISSECTOR TIP, DISPOSABLE: Brand: RENEW

## (undated) DEVICE — RETRIEVAL DEPLOYMENT DEVICE: Brand: LITHOVUE EMPOWER™

## (undated) DEVICE — GUIDEWIRE: Brand: AMPLATZ SUPER STIFF

## (undated) DEVICE — KIT MFLD FOR SPEC COLL

## (undated) DEVICE — Device: Brand: SUTURE PASSOR PRO

## (undated) DEVICE — TRAY CATH 16FR F INCL BARDX IC COMPLT CARE

## (undated) DEVICE — SUT PDS II 0 L27IN ABSRB VLT L26MM CT-2

## (undated) NOTE — LETTER
02/22/18      [address] [place], [place] [id number]       To Whom it may concern:      The above patient was seen at the [facility] for treatment of a medical condition.     This patient should be excused from attending work

## (undated) NOTE — Clinical Note
04/10/2017        Chau Roa  [address]      Dear Rivera John records indicate that you have outstanding lab work and or testing that was ordered for you and has not yet been completed:      CBC W Differential W Platelet

## (undated) NOTE — ED AVS SNAPSHOT
Rosaura Anne   MRN: [id number]    Department:  [facility] Emergency Department   Date of Visit:  12/16/2017           Disclosure     Insurance plans vary and the physician(s) referred by the ER may not be covered by your plan.  Please contact y tell this physician (or your personal doctor if your instructions are to return to your personal doctor) about any new or lasting problems. The primary care or specialist physician will see patients referred from the [facility] Emergency Department.  Nehemiah Hoyt

## (undated) NOTE — LETTER
Date: 1/17/2018    Patient Name: Rudy Morales requests that you have your diabetic eye exam as part of your diabetic plan of care. She is referring you to Dr. Eusebia Hernandez.  Please schedule your eye exam as soon as po

## (undated) NOTE — MR AVS SNAPSHOT
[address] [facility] [address] [phone]               Thank you for choosing us for your health care visit with NIK Botello.   We are glad to serve you and happy to provide you with this summary of y Assoc Dx:  Type 2 diabetes mellitus with microalbuminuria, with long-term current use of insulin (HCC) [E11.29, R80.9, Z79.4]           [facility] DIGITAL SCRN BILAT W/CAD (CPT=/13312)    Complete by:   Mar 09, 2017 (Approximate)    Assoc Dx:  Screening for If you are confident that your benefit plan will not require a referral or authorization, such as [place] Medicaid, please feel free to schedule your appointment immediately.  However, if you are unsure about the requirements for authorization, please wait Allergies as of Mar 09, 2017     No Known Allergies                Today's Vital Signs     BP Pulse Temp Height Weight BMI    120/76 mmHg 93 98 °F (36.7 °C) (Oral) 61\" 172 lb 32.52 kg/m2         Current Medications          This list is accurate as of: 3/ Summaries. If you've been to the Emergency Department or your doctor's office, you can view your past visit information in [facility] by going to Visits < Visit Summaries. [facility] questions? Call [phone] for help.   [facility] is NOT to be used for urge

## (undated) NOTE — LETTER
[address] HWANG [address] [place] [address]  PH: [phone]  FAX: [phone]      Dear  ***    ***    Thank you,    Rex Gillespie MD    9/10/2024   MADINA Nielson 10/22/1955

## (undated) NOTE — LETTER
Re:  Marcela Moreno, :  10/22/1955      Dear  ***,    I am writing in regard to Marcela Moreno who is scheduled for *** on ***.  I would like her  to have a pre-op appointment with you prior to her surgery.  You should be receiving the anesthesiologist recommended pre-op testing order from the pre-admission department, with a list of tests which she will require prior to surgery.      If you do not receive the order, please contact the pre-admissions department.      If you have any other questions or concerns, please feel free to contact me.    Thank you,      Gil Germain MD

## (undated) NOTE — LETTER
01/04/19        Garrison Fairbanks  [address]      Dear Yogi Barclay,    Our records indicate that you have outstanding lab work and or testing that was ordered for you and has not yet been completed:  Orders Placed This Encounter

## (undated) NOTE — LETTER
24    Patient: Marcela Moreno  : 10/22/1955 Visit date: 2024    Dear  Rhiannon Betancourt MD    Thank you for referring Marcela Moreno to my practice.  Please find my assessment and plan below.     Assessment   1. Ventral hernia without obstruction or gangrene    2. Class 1 obesity due to excess calories with serious comorbidity and body mass index (BMI) of 30.0 to 30.9 in adult          Plan     The patient has a moderate size incarcerated, partially reducible ventral hernia.    The patient has prior history of multiple C-sections.    The patient has successfully lost 10 pounds recently under medical supervision; she was intolerant of Ozempic.    The patient may benefit from complex repair of her incarcerated ventral hernia with mesh and component separation.    I request the patient return to my attention in 3 months for continued follow-up as she continues medically supervised weight loss.    Ideally, I would prefer the patient lose approximately 10 to 20 pounds prior to considering complex ventral hernia repair.    The care plan was discussed with the patient and spouse and all questions answered.    The patient was provided ample opportunity to ask questions.  All of the patient's questions were answered in detail.  The patient voiced understanding of the care plan.      Sincerely,       Hemanth Murillo MD   CC:   No Recipients

## (undated) NOTE — MR AVS SNAPSHOT
[address] [address]  [phone]               Thank you for choosing us for your health care visit with NIK Hernandez.   We are glad to serve you and happy to provide you with this summary of you and disks in your back can’t be seen on an X-ray. If the provider sees signs of a compressed nerve, you will need to schedule an MRI scan as an outpatient. Signs of a compressed nerve include loss of strength in a leg.   Most sciatica gets better with medic If X-rays were taken, a radiologist will look at them. You will be told of any new findings that may affect your care.   When to seek medical advice  Call your healthcare provider right away if any of these occur:  · Pain gets worse even after taking prescr What changed:  Another medication with the same name was added. Make sure you understand how and when to take each. Commonly known as:  NAPROSYN           * naproxen 500 MG Tabs   Take 1 tablet (500 mg total) by mouth 2 (two) times daily with meals.    Wh EAT THESE FOODS MORE OFTEN: EAT THESE FOODS LESS OFTEN:   Make half your plate fruits and vegetables Highly refined, white starches including white bread, rice and pasta   Eat plenty of protein, keep the fat content low Sugars:  sodas and sports drinks, ca

## (undated) NOTE — LETTER
Date: 11/21/2017    Patient Name: Anna Marie Santos          To Whom it may concern: The above patient was seen at the [facility] for treatment of a medical condition.     This patient should be excused from attending work from 11/17/17 th

## (undated) NOTE — LETTER
02/22/18          Nicolette Carlson 34  [address] [facility] [place] [address]        Dear Joe Hancock,      It is noted that you do not have a colonoscopy or a stool test on file since you turned 49 that screens you for colon cancer.   The test of choice is to have a

## (undated) NOTE — LETTER
25    Patient: Marcela Moreno  : 10/22/1955 Visit date: 2025    Dear  Rhiannon Betancourt MD    Thank you for referring Marcela Moreno to my practice.  Please find my assessment and plan below.    Assessment   1. Postop check        Plan          Sincerely,       Hemanth Murillo MD   CC: No Recipients

## (undated) NOTE — LETTER
CLARIFICATION FOR E-SSS    To: Dr Murillo     Patient Name: Marcela Moreno  -Age / Sex: 10/22/1955-A: 69 y  female   Medical Records: [id number] [facility]: [id number]      Procedure Description:  ROBOTIC VENTRAL COMPONENT SEPARATION (TAR)    Please note that clarification is needed on the Electronic-Surgery Scheduling Sheet (E-SSS)  the original is included with this letter. Please have physician review and make changes on the faxed copy of the E-SSS.    Should procedure be listed as \"Transversus Abdominis Muscle Release,\" instead of Robotic Vental component separation?      Please review and submit change if needed      ALL CHANGES MUST BE DOCUMENTED ON THE E-SSS AND SIGNED BY THE PHYSICIAN    After physician has made the changes and signed the E-SSS please fax it to [phone] and these changes will then be made in Epic by the OR schedulers.     If you have any questions please call Pre-Admission Testing at [phone]    Thank you

## (undated) NOTE — LETTER
24    Patient: Marcela Moreno  : 10/22/1955 Visit date: 2024    Dear  Rhiannon Betancourt MD    Thank you for referring Marcela Moreno to my practice.  Please find my assessment and plan below.    Assessment   1. Ventral hernia without obstruction or gangrene        Plan     The patient will be scheduled for robotic repair of complex ventral hernia with mesh and transversus abdominis component separation.    Perioperative medical risk assessment and medication management will be requested from the patient's primary care physician, Dr. Betancourt.    The bryant-operative care plan was discussed with the patient, who voices understanding.  Activity and lifting recommendations were discussed in length.     The risks, benefits, and alternatives to the procedure were explained to the patient.  The risks explained include, but are not limited to, bleeding, infection, pain wound complications, recurrence, incorrect diagnosis, injury to adjacent organs and structures. We also discussed the possibile need for further therapeutic, diagnostic, or surgical intervention.  The patient voiced understanding, and after all questions were answered to the patient's satisfaction, the patient provided willing and informed consent to proceed.       Sincerely,       Hemanth Murillo MD   CC: No Recipients